# Patient Record
Sex: FEMALE | Race: BLACK OR AFRICAN AMERICAN | Employment: OTHER | URBAN - METROPOLITAN AREA
[De-identification: names, ages, dates, MRNs, and addresses within clinical notes are randomized per-mention and may not be internally consistent; named-entity substitution may affect disease eponyms.]

---

## 2019-10-15 ENCOUNTER — OFFICE VISIT (OUTPATIENT)
Dept: SLEEP MEDICINE | Age: 74
End: 2019-10-15

## 2019-10-15 VITALS
BODY MASS INDEX: 34.27 KG/M2 | SYSTOLIC BLOOD PRESSURE: 155 MMHG | HEIGHT: 64 IN | RESPIRATION RATE: 18 BRPM | WEIGHT: 200.7 LBS | DIASTOLIC BLOOD PRESSURE: 58 MMHG | OXYGEN SATURATION: 97 % | HEART RATE: 66 BPM

## 2019-10-15 DIAGNOSIS — G47.00 INSOMNIA, UNSPECIFIED TYPE: Primary | ICD-10-CM

## 2019-10-15 RX ORDER — SODIUM BICARBONATE 650 MG/1
650 TABLET ORAL 2 TIMES DAILY
Status: ON HOLD | COMMUNITY
End: 2022-07-25

## 2019-10-15 RX ORDER — OXYCODONE AND ACETAMINOPHEN 5; 325 MG/1; MG/1
TABLET ORAL
COMMUNITY
End: 2021-02-17

## 2019-10-15 RX ORDER — CALCITRIOL 0.5 UG/1
0.5 CAPSULE ORAL DAILY
COMMUNITY
End: 2021-02-14

## 2019-10-15 RX ORDER — CLONAZEPAM 0.5 MG/1
TABLET ORAL
Qty: 60 TAB | Refills: 5 | Status: SHIPPED | OUTPATIENT
Start: 2019-10-15 | End: 2021-02-14

## 2019-10-15 RX ORDER — INSULIN ASPART 100 [IU]/ML
INJECTION, SOLUTION INTRAVENOUS; SUBCUTANEOUS
COMMUNITY
End: 2021-05-10

## 2019-10-15 RX ORDER — LORATADINE 10 MG/1
10 TABLET ORAL
COMMUNITY
End: 2021-02-14

## 2019-10-15 RX ORDER — SENNOSIDES 25 MG/1
TABLET, FILM COATED ORAL
COMMUNITY
End: 2021-02-17

## 2019-10-15 RX ORDER — METOPROLOL TARTRATE 25 MG/1
50 TABLET, FILM COATED ORAL 2 TIMES DAILY
COMMUNITY
End: 2021-04-30 | Stop reason: DRUGHIGH

## 2019-10-15 NOTE — PATIENT INSTRUCTIONS

## 2019-10-15 NOTE — PROGRESS NOTES
[address] [facility] [place]. [place], [address]  Tel.  [phone]  Fax. 100 [facility] 60  [place], [address] [facility]  Tel.  [phone]  Fax. [phone] [address] [facility] Natalie Swan   Tel.  [phone]  Fax. [phone]       Chief Complaint       Chief Complaint   Patient presents with    Sleep Problem       HPI      Shannan Lilly is 69 y.o. female seen for evaluation of a sleep disorder. She has a significant medical history including cerebrovascular disease (status post CVA December 2018), coronary artery disease, diabetes and end-stage renal failure. She has a history of spinal stenosis for which she has been on oxycodone 5 mg taking 1/2-1 daily. She notes difficulties with sleep initiation stating that she will \"stay up all night\" and will sleep only briefly in the afternoons; she notes that her \"brain does not shut off on \". She had been on Ambien for 10 years; this was discontinued by her PCP. Since then she has had significant insomnia. She notes having had a sleep study 10 years ago which was interpreted as unremarkable. She had been recently given trazodone 50 mg at bedtime which is also ineffective. She had tried melatonin without a sustained response. [place] Sleepiness Score: 3       No Known Allergies    Current Outpatient Medications   Medication Sig Dispense Refill    fluticasone propion-salmeterol (ADVAIR HFA) 115-21 mcg/actuation inhaler Take 2 Puffs by inhalation two (2) times a day.  azelastine (ASTEPRO) 0.15 % (205.5 mcg) two (2) times a day.  calcitRIOL (ROCALTROL) 0.5 mcg capsule Take 0.5 mcg by mouth daily.  loratadine (CLARITIN) 10 mg tablet Take 10 mg by mouth.  insulin regular (NOVOLIN R, HUMULIN R) 100 unit/mL injection by SubCUTAneous route.  lidocaine 5 % topical cream Apply  to affected area two (2) times daily as needed for Itching.       metoprolol tartrate (LOPRESSOR) 25 mg tablet Take  by mouth two (2) times a day.  insulin aspart U-100 (NOVOLOG FLEXPEN U-100 INSULIN) 100 unit/mL (3 mL) inpn by SubCUTAneous route.  oxyCODONE-acetaminophen (PERCOCET) 5-325 mg per tablet Take  by mouth every four (4) hours as needed for Pain.  sodium bicarbonate 650 mg tablet Take  by mouth four (4) times daily.  Insulin Needles, Disposable, 31 X 5/16 \" Ndle by SubCUTAneous route three (3) times daily (with meals). 2 Package 11    amoxicillin (AMOXIL) 500 mg capsule       hydrALAZINE (APRESOLINE) 100 mg tablet Take 100 mg by mouth three (3) times daily.  NIFEdipine XL (PROCARDIA XL) 30 mg tablet Take  by mouth daily.  cholecalciferol, vitamin d3, (VITAMIN D) 1,000 unit tablet Take 1,000 Units by mouth daily.  fluconazole (DIFLUCAN) 150 mg tablet TAKE 1 TABLET EVERY DAY FOR 3 DAYS 3 Tab 0    amlodipine (NORVASC) 5 mg tablet 5 mg daily.  AGGRENOX 200-25 mg per SR capsule Take 1 Cap by mouth two (2) times a day.  ASCENSIA CONTOUR strip       cephALEXin (KEFLEX) 250 mg capsule       clotrimazole-betamethasone (LOTRISONE) topical cream       diazepam (VALIUM) 10 mg tablet       furosemide (LASIX) 40 mg tablet Take 40 mg by mouth daily.  hydrocodone-acetaminophen (LORTAB) 7.5-500 mg per tablet       lisinopril (PRINIVIL, ZESTRIL) 40 mg tablet Take 40 mg by mouth daily.  NYSTOP powder       ZEMPLAR 1 mcg capsule Take 1 mcg by mouth daily.  simvastatin (ZOCOR) 40 mg tablet Take 40 mg by mouth nightly.  BD LO-DOSE ULTRA-FINE SHORT 1/2 mL 31 x 5/16\" Syrg       DIOVAN 320 mg tablet Take 320 mg by mouth daily.  zolpidem (AMBIEN) 10 mg tablet Take 10 mg by mouth nightly as needed.  nitroglycerin (NITROSTAT) 0.4 mg SL tablet by SubLINGual route every five (5) minutes as needed.  acetaminophen (TYLENOL EX STR ARTHRITIS PAIN) 500 mg tablet Take  by mouth every six (6) hours as needed.       ALBUTEROL SULFATE (PROAIR HFA IN) Take by inhalation.  FLUTICASONE PROPIONATE (FLOVENT DISKUS IN) Take  by inhalation.  insulin glargine (LANTUS) 100 unit/mL injection 30 Units by SubCUTAneous route nightly. 1 Vial 5    insulin lispro, human, (HUMALOG KWIKPEN) 100 unit/mL flexpen 5 Units by SubCUTAneous route three (3) times daily (with meals). Plus sliding scale 1 Package 5        She  has a past medical history of Blindness/low vision (2008), CHF (congestive heart failure) ([facility] DM (diabetes mellitus) ([facility] HTN (hypertension), and Stroke ([facility] She  has a past surgical history that includes stent insertion (9422,1824). She family history is not on file. She  reports that she has quit smoking. She has never used smokeless tobacco. She reports that she does not drink alcohol or use drugs. Review of Systems:  Review of Systems   Constitutional: Negative for chills and fever. HENT: Negative for hearing loss and tinnitus. Eyes: Negative for blurred vision and double vision. Respiratory: Positive for shortness of breath. Cardiovascular: Negative for chest pain and palpitations. Gastrointestinal: Negative for abdominal pain and heartburn. Genitourinary: Negative for frequency and urgency. Musculoskeletal: Positive for back pain and joint pain. Skin: Negative for itching and rash. Neurological: Negative for dizziness and headaches. Psychiatric/Behavioral: The patient has insomnia. Objective:     Visit Vitals  /58 (BP 1 Location: Left arm, BP Patient Position: Sitting)   Pulse 66   Resp 18   Ht 5' 4\" (1.626 m)   Wt 200 lb 11.2 oz (91 kg)   LMP  (LMP Unknown)   SpO2 97%   BMI 34.45 kg/m²     Body mass index is 34.45 kg/m². General:   Conversant, cooperative   Eyes:  Pupils equal and reactive, no nystagmus   Oropharynx:   Mallampati score II, tongue normal   Tonsils:     Neck:   No carotid bruits; Neck circ.  in \"inches\": 15   Chest/Lungs:  Clear on auscultation    CVS:  Normal rate, regular rhythm, murmur ( has AVF for dialysis). Skin:  Warm to touch; no obvious rashes   Neuro:  Speech fluent, face symmetrical, tongue movement normal   Psych:  Normal affect,  normal countenance        Assessment:       ICD-10-CM ICD-9-CM    1. Insomnia, unspecified type G47.00 780.52      History of significant insomnia. Resources are limited locally for her; cognitive behavioral therapy (CBT-I) was discussed. She has difficulty getting transportation. A brief trial of clonazepam 0.5 mg at at bedtime will be undertaken. She does take limited oxycodone; she was advised to limit that medication when taking clonazepam.  She will contact the office in several weeks or sooner as needed. Plan:     No orders of the defined types were placed in this encounter. * Patient has a history and examination consistent with the diagnosis of chronic insomnia. * Treatment options if indicated were reviewed today. Instructions:    o The patient would benefit from weight reduction measures. o Do not engage in activities requiring a normal degree of alertness if fatigue is present. o Call or return if symptoms worsen or persist.          Srinivasa Padron MD, FAASM  Electronically signed 10/15/19       This note was created using voice recognition software. Despite editing, there may be syntax errors. This note will not be viewable in [address]

## 2020-07-28 ENCOUNTER — IP HISTORICAL/CONVERTED ENCOUNTER (OUTPATIENT)
Dept: OTHER | Age: 75
End: 2020-07-28

## 2020-09-15 ENCOUNTER — HOSPITAL ENCOUNTER (EMERGENCY)
Age: 75
Discharge: HOME | End: 2020-09-15
Attending: EMERGENCY MEDICINE
Payer: MEDICARE

## 2020-09-15 ENCOUNTER — APPOINTMENT (OUTPATIENT)
Dept: GENERAL RADIOLOGY | Age: 75
End: 2020-09-15
Attending: EMERGENCY MEDICINE
Payer: MEDICARE

## 2020-09-15 VITALS
DIASTOLIC BLOOD PRESSURE: 73 MMHG | RESPIRATION RATE: 18 BRPM | WEIGHT: 181 LBS | HEART RATE: 62 BPM | BODY MASS INDEX: 30.9 KG/M2 | SYSTOLIC BLOOD PRESSURE: 145 MMHG | HEIGHT: 64 IN | TEMPERATURE: 97.7 F | OXYGEN SATURATION: 99 %

## 2020-09-15 DIAGNOSIS — S20.212A CONTUSION OF LEFT CHEST WALL, INITIAL ENCOUNTER: ICD-10-CM

## 2020-09-15 DIAGNOSIS — N18.30 STAGE 3 CHRONIC KIDNEY DISEASE (HCC): Primary | ICD-10-CM

## 2020-09-15 LAB
ANION GAP SERPL CALC-SCNC: 5 MMOL/L (ref 5–15)
BASOPHILS # BLD: 0.1 K/UL (ref 0–0.2)
BASOPHILS NFR BLD: 1 % (ref 0–2.5)
BUN SERPL-MCNC: 48 MG/DL (ref 6–20)
BUN/CREAT SERPL: 13 (ref 12–20)
CA-I BLD-MCNC: 8.9 MG/DL (ref 8.5–10.1)
CHLORIDE SERPL-SCNC: 105 MMOL/L (ref 97–108)
CO2 SERPL-SCNC: 30 MMOL/L (ref 21–32)
CREAT SERPL-MCNC: 3.7 MG/DL (ref 0.55–1.02)
EOSINOPHIL # BLD: 0.2 K/UL (ref 0–0.7)
EOSINOPHIL NFR BLD: 3 % (ref 0.9–2.9)
ERYTHROCYTE [DISTWIDTH] IN BLOOD BY AUTOMATED COUNT: 15.5 % (ref 11.5–14.5)
GLUCOSE SERPL-MCNC: 112 MG/DL (ref 65–100)
HCT VFR BLD AUTO: 28.8 % (ref 36–46)
HGB BLD-MCNC: 9.6 % (ref 13.5–17.5)
LYMPHOCYTES # BLD: 1.3 K/UL (ref 1–4.8)
LYMPHOCYTES NFR BLD: 20 % (ref 20.5–51.1)
MCH RBC QN AUTO: 28.4 PG (ref 31–34)
MCHC RBC AUTO-ENTMCNC: 33.1 G/DL (ref 31–36)
MCV RBC AUTO: 85.8 FL (ref 80–100)
MONOCYTES # BLD: 0.5 K/UL (ref 0.2–2.4)
MONOCYTES NFR BLD: 7 % (ref 1.7–9.3)
NEUTS SEG # BLD: 4.6 K/UL (ref 1.8–7.7)
NEUTS SEG NFR BLD: 69 % (ref 42–75)
NRBC # BLD: 0.01 K/UL
NRBC BLD-RTO: 10 PER 100 WBC
PLATELET # BLD AUTO: 189 K/UL
PMV BLD AUTO: 8.7 FL (ref 6.5–11.5)
POTASSIUM SERPL-SCNC: 4.2 MMOL/L (ref 3.5–5.1)
RBC # BLD AUTO: 3.36 M/UL (ref 4.5–5.9)
SODIUM SERPL-SCNC: 140 MMOL/L (ref 136–145)
WBC # BLD AUTO: 6.6 K/UL (ref 4.4–11.3)

## 2020-09-15 PROCEDURE — 71046 X-RAY EXAM CHEST 2 VIEWS: CPT

## 2020-09-15 PROCEDURE — 36600 WITHDRAWAL OF ARTERIAL BLOOD: CPT

## 2020-09-15 PROCEDURE — 99283 EMERGENCY DEPT VISIT LOW MDM: CPT

## 2020-09-15 PROCEDURE — 85025 COMPLETE CBC W/AUTO DIFF WBC: CPT

## 2020-09-15 PROCEDURE — 80048 BASIC METABOLIC PNL TOTAL CA: CPT

## 2020-09-15 NOTE — ED PROVIDER NOTES
HPI   Patient reports a lengthy stay in rehab facility some months ago. She reports that she feels she is not fully rehabilitated because she has been experiencing frequent falls and a recent MRI of her brain revealed multiple subclinical CVA. Patient reports that 3 days ago, she slipped and had a mechanical fall in her kitchen and fell onto her left side. She reports that since then she has been experiencing left-sided chest wall pain that is radiated to the right, and also has radiated into her left clavicle, the site of an old fracture. Pain is described as dull and aching and is exacerbated by movement and palpation and relieved by Tylenol and rest.  Patient also reports that she has chronic kidney disease and believes that her kidney function is worsening because her urinary output has decreased. She denies dysuria,  or GI pain. Denies focal weakness, changes in speech, vision, sensation. 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    [facility]     [facility]     HTN (hypertension)     Stroke [facility])        Past Surgical History:   Procedure Laterality Date    STENT INSERTION  2001,2002    Allen Cook         No family history on file.     Social History     Socioeconomic History    Marital status:      Spouse name: Not on file    Number of children: Not on file    Years of education: Not on file    Highest education level: Not on file   Occupational History    Not on file   Social Needs    Financial resource strain: Not on file    Food insecurity     Worry: Not on file     Inability: Not on file    Transportation needs     Medical: Not on file     Non-medical: Not on file   Tobacco Use    Smoking status: Former Smoker    Smokeless tobacco: Never Used    Tobacco comment: quit 1972   Substance and Sexual Activity    Alcohol use: No    Drug use: No    Sexual activity: Not Currently   Lifestyle    Physical activity     Days per week: Not on file     Minutes per session: Not on file    Stress: Not on file   Relationships    Social connections     Talks on phone: Not on file     Gets together: Not on file     Attends Jehovah's witness service: Not on file     Active member of club or organization: Not on file     Attends meetings of clubs or organizations: Not on file     Relationship status: Not on file    Intimate partner violence     Fear of current or ex partner: Not on file     Emotionally abused: Not on file     Physically abused: Not on file     Forced sexual activity: Not on file   Other Topics Concern     Service Not Asked    Blood Transfusions Not Asked    Caffeine Concern Not Asked    Occupational Exposure Not Asked   Carmie Bame Hazards Not Asked    Sleep Concern Not Asked    Stress Concern Not Asked    Weight Concern Not Asked    Special Diet Not Asked    Back Care Not Asked    Exercise Not Asked    Bike Helmet Not Asked   [address] Not Asked    Self-Exams Not Asked   Social History Narrative    Not on file         ALLERGIES: Patient has no known allergies. Review of Systems   Constitutional: Negative. HENT: Negative. Eyes: Negative. Respiratory: Negative. Cardiovascular: Negative. Gastrointestinal: Negative. Endocrine: Negative. Genitourinary:        As in HPI   Musculoskeletal:        As in HPI   Skin: Negative. Allergic/Immunologic: Negative. Neurological: Negative. Hematological: Negative. Psychiatric/Behavioral: Negative. All other systems reviewed and are negative. Vitals:    09/15/20 1508   BP: (!) 141/88   Pulse: 60   Resp: 18   Temp: 97.7 °F (36.5 °C)   SpO2: 100%   Weight: 82.1 kg (181 lb)   Height: 5' 4\" (1.626 m)            Physical Exam  Vitals signs and nursing note reviewed. Constitutional:       Appearance: Normal appearance. She is obese. HENT:      Head: Normocephalic and atraumatic.       Nose: Nose normal. Mouth/Throat:      Mouth: Mucous membranes are moist.   Eyes:      Extraocular Movements: Extraocular movements intact. Pupils: Pupils are equal, round, and reactive to light. Neck:      Musculoskeletal: Normal range of motion and neck supple. No muscular tenderness. Cardiovascular:      Rate and Rhythm: Normal rate and regular rhythm. Comments: Chest wall is mildly tender to palpation throughout the entire anterior chest.  No sign of trauma, cellulitis, deformity. Pulmonary:      Effort: Pulmonary effort is normal. No respiratory distress. Breath sounds: Normal breath sounds. No wheezing or rales. Abdominal:      General: Abdomen is flat. Palpations: Abdomen is soft. Tenderness: There is no abdominal tenderness. Musculoskeletal: Normal range of motion. General: No swelling, tenderness or deformity. Skin:     General: Skin is warm and dry. Neurological:      General: No focal deficit present. Mental Status: She is alert and oriented to person, place, and time. Cranial Nerves: No cranial nerve deficit. Sensory: No sensory deficit. Motor: No weakness. Gait: Gait normal.   Psychiatric:         Mood and Affect: Mood normal.         Behavior: Behavior normal.          MDM  Number of Diagnoses or Management Options  Diagnosis management comments: This appears to be musculoskeletal pain associated with a recent fall. Remainder of patient's issues are chronic and known. Will check labs given patient's report of possible worsening kidney function, and chest x-ray. I suspect she will be okay for discharge.          Procedures

## 2020-09-15 NOTE — ED TRIAGE NOTES
pt with fall on Saturday evening; pt reports losing balance and falling in between bed and chair; pt unsure of LOC; pt has recently had 2 strokes and treated at [facility] [place]; has PT at home; has had multiple falls; c/o pain to B neck, B shoulders, and bruisee to R lower leg

## 2020-09-23 ENCOUNTER — HOSPITAL ENCOUNTER (OUTPATIENT)
Dept: MAMMOGRAPHY | Age: 75
Discharge: HOME | End: 2020-09-23
Attending: INTERNAL MEDICINE
Payer: MEDICARE

## 2020-09-23 DIAGNOSIS — Z12.31 SCREENING MAMMOGRAM, ENCOUNTER FOR: ICD-10-CM

## 2020-09-23 PROCEDURE — 77067 SCR MAMMO BI INCL CAD: CPT

## 2020-10-15 ENCOUNTER — APPOINTMENT (OUTPATIENT)
Dept: CT IMAGING | Age: 75
End: 2020-10-15
Attending: EMERGENCY MEDICINE
Payer: MEDICARE

## 2020-10-15 ENCOUNTER — HOSPITAL ENCOUNTER (EMERGENCY)
Age: 75
Discharge: HOME | End: 2020-10-15
Attending: EMERGENCY MEDICINE
Payer: MEDICARE

## 2020-10-15 VITALS
SYSTOLIC BLOOD PRESSURE: 134 MMHG | OXYGEN SATURATION: 98 % | WEIGHT: 181 LBS | TEMPERATURE: 98.5 F | BODY MASS INDEX: 30.9 KG/M2 | HEART RATE: 80 BPM | HEIGHT: 64 IN | DIASTOLIC BLOOD PRESSURE: 99 MMHG | RESPIRATION RATE: 15 BRPM

## 2020-10-15 DIAGNOSIS — M54.50 CHRONIC MIDLINE LOW BACK PAIN, UNSPECIFIED WHETHER SCIATICA PRESENT: ICD-10-CM

## 2020-10-15 DIAGNOSIS — G89.29 CHRONIC MIDLINE LOW BACK PAIN, UNSPECIFIED WHETHER SCIATICA PRESENT: ICD-10-CM

## 2020-10-15 DIAGNOSIS — N18.9 CHRONIC RENAL FAILURE, UNSPECIFIED CKD STAGE: Primary | ICD-10-CM

## 2020-10-15 LAB
ANION GAP SERPL CALC-SCNC: 8 MMOL/L (ref 5–15)
BASOPHILS # BLD: 0.1 K/UL (ref 0–0.2)
BASOPHILS NFR BLD: 1 % (ref 0–2.5)
BUN SERPL-MCNC: 48 MG/DL (ref 6–20)
BUN/CREAT SERPL: 14 (ref 12–20)
CA-I BLD-MCNC: 9.1 MG/DL (ref 8.5–10.1)
CHLORIDE SERPL-SCNC: 106 MMOL/L (ref 97–108)
CO2 SERPL-SCNC: 29 MMOL/L (ref 21–32)
CREAT SERPL-MCNC: 3.49 MG/DL (ref 0.55–1.02)
EOSINOPHIL # BLD: 0.2 K/UL (ref 0–0.7)
EOSINOPHIL NFR BLD: 2 % (ref 0.9–2.9)
ERYTHROCYTE [DISTWIDTH] IN BLOOD BY AUTOMATED COUNT: 16.1 % (ref 11.5–14.5)
GLUCOSE SERPL-MCNC: 168 MG/DL (ref 65–100)
HCT VFR BLD AUTO: 32.5 % (ref 36–46)
HGB BLD-MCNC: 10.5 G/DL (ref 13.5–17.5)
INR PPP: 1.1 (ref 0.9–1.1)
LYMPHOCYTES # BLD: 1.6 K/UL (ref 1–4.8)
LYMPHOCYTES NFR BLD: 19 % (ref 20.5–51.1)
MCH RBC QN AUTO: 28.2 PG (ref 31–34)
MCHC RBC AUTO-ENTMCNC: 32.3 G/DL (ref 31–36)
MCV RBC AUTO: 87.5 FL (ref 80–100)
MONOCYTES # BLD: 0.7 K/UL (ref 0.2–2.4)
MONOCYTES NFR BLD: 8 % (ref 1.7–9.3)
NEUTS SEG # BLD: 5.9 K/UL (ref 1.8–7.7)
NEUTS SEG NFR BLD: 70 % (ref 42–75)
NRBC # BLD: 0.01 K/UL
NRBC BLD-RTO: 10 PER 100 WBC
PLATELET # BLD AUTO: 187 K/UL
PMV BLD AUTO: 9.4 FL (ref 6.5–11.5)
POTASSIUM SERPL-SCNC: 3.5 MMOL/L (ref 3.5–5.1)
PROTHROMBIN TIME: 10.4 SEC (ref 9–11.1)
RBC # BLD AUTO: 3.72 M/UL (ref 4.5–5.9)
SODIUM SERPL-SCNC: 143 MMOL/L (ref 136–145)
WBC # BLD AUTO: 8.4 K/UL (ref 4.4–11.3)

## 2020-10-15 PROCEDURE — 85610 PROTHROMBIN TIME: CPT

## 2020-10-15 PROCEDURE — 36415 COLL VENOUS BLD VENIPUNCTURE: CPT

## 2020-10-15 PROCEDURE — 74011250637 HC RX REV CODE- 250/637: Performed by: EMERGENCY MEDICINE

## 2020-10-15 PROCEDURE — 70450 CT HEAD/BRAIN W/O DYE: CPT

## 2020-10-15 PROCEDURE — 80048 BASIC METABOLIC PNL TOTAL CA: CPT

## 2020-10-15 PROCEDURE — 85025 COMPLETE CBC W/AUTO DIFF WBC: CPT

## 2020-10-15 PROCEDURE — 99284 EMERGENCY DEPT VISIT MOD MDM: CPT

## 2020-10-15 RX ORDER — NAPROXEN SODIUM 220 MG
TABLET ORAL
COMMUNITY
End: 2021-02-17

## 2020-10-15 RX ORDER — NIFEDIPINE 10 MG/1
10 CAPSULE ORAL ONCE
Status: COMPLETED | OUTPATIENT
Start: 2020-10-15 | End: 2020-10-15

## 2020-10-15 RX ORDER — ALLOPURINOL 100 MG/1
50 TABLET ORAL
COMMUNITY

## 2020-10-15 RX ORDER — CHOLECALCIFEROL (VITAMIN D3) 25 MCG
1000 TABLET,CHEWABLE ORAL
COMMUNITY

## 2020-10-15 RX ORDER — ATORVASTATIN CALCIUM 40 MG/1
TABLET, FILM COATED ORAL
COMMUNITY

## 2020-10-15 RX ORDER — KETOROLAC TROMETHAMINE 30 MG/ML
30 INJECTION, SOLUTION INTRAMUSCULAR; INTRAVENOUS
Status: DISCONTINUED | OUTPATIENT
Start: 2020-10-15 | End: 2020-10-15 | Stop reason: HOSPADM

## 2020-10-15 RX ORDER — TRAZODONE HYDROCHLORIDE 100 MG/1
100 TABLET ORAL
COMMUNITY
End: 2021-04-30

## 2020-10-15 RX ORDER — AMLODIPINE BESYLATE AND ATORVASTATIN CALCIUM 10; 10 MG/1; MG/1
1 TABLET, FILM COATED ORAL DAILY
COMMUNITY
End: 2021-02-14

## 2020-10-15 RX ADMIN — NIFEDIPINE 10 MG: 10 CAPSULE ORAL at 03:21

## 2020-10-15 NOTE — ED TRIAGE NOTES
Pt brought in by EMS, in report this RN was told that pt's daughter was concerned that the pt had a fall around 2200 last night and wanted her to come in for a wellness check. Pt denies a fall.

## 2020-10-15 NOTE — ED NOTES
Transport arranged by Daughter; family friends to transport pt to her home. Report to Dearl JENELLE Yuan; all questions and concerns addressed.

## 2020-10-15 NOTE — ED NOTES
Spoke with Pt's daughter, concerning pt's status. Per MD Chris Miner pt does not meet criteria for admission, nursing home placement was offered--to which both the pt and daughter refused.  Pt currently A/Ox4 and requesting to go home, Daughter aware and was unable to contact family friend for transportation, medical transport to be called, pt with PT home visit at 0900 this am.

## 2020-10-15 NOTE — ED NOTES
This RN spoke with daughter, there was no concern related to a fall, however the pt has had a steady cognitive decline since last year, but increasingly worse starting last week which included difficulty with word recognition. Per daughter, the pt's OT and PT providers have also noted a change in her cognitive state.  Pt reports \"I have had some memory issues lately\"

## 2020-10-15 NOTE — ED PROVIDER NOTES
Patient was sent to the ER by her daughter because she is concerned that her mother has sutart a cognitive decline in past week and is innapropriate. Patient denies any recent trauma and complains of chronic back pain.             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    [facility]     [facility]di[facility]     HTN (hypertension)     Stroke [facility])        Past Surgical History:   Procedure Laterality Date    HX OOPHORECTOMY      STENT INSERTION  2001,2002    Neha Bliss         Family History:   Problem Relation Age of Onset    Breast Cancer Maternal Aunt     Breast Cancer Cousin        Social History     Socioeconomic History    Marital status:      Spouse name: Not on file    Number of children: Not on file    Years of education: Not on file    Highest education level: Not on file   Occupational History    Not on file   Social Needs    Financial resource strain: Not on file    Food insecurity     Worry: Not on file     Inability: Not on file    Transportation needs     Medical: Not on file     Non-medical: Not on file   Tobacco Use    Smoking status: Former Smoker    Smokeless tobacco: Never Used    Tobacco comment: quit 1972   Substance and Sexual Activity    Alcohol use: No    Drug use: No    Sexual activity: Not Currently   Lifestyle    Physical activity     Days per week: Not on file     Minutes per session: Not on file    Stress: Not on file   Relationships    Social connections     Talks on phone: Not on file     Gets together: Not on file     Attends Pentecostal service: Not on file     Active member of club or organization: Not on file     Attends meetings of clubs or organizations: Not on file     Relationship status: Not on file    Intimate partner violence     Fear of current or ex partner: Not on file     Emotionally abused: Not on file     Physically abused: Not on file     Forced sexual activity: Not on file   Other Topics Concern     Service Not Asked    Blood Transfusions Not Asked    Caffeine Concern Not Asked    Occupational Exposure Not Asked    Hobby Hazards Not Asked    Sleep Concern Not Asked    Stress Concern Not Asked    Weight Concern Not Asked    Special Diet Not Asked    Back Care Not Asked    Exercise Not Asked    Bike Helmet Not Asked   [address] Not Asked    Self-Exams Not Asked   Social History Narrative    Not on file         ALLERGIES: Patient has no known allergies. Review of Systems   Constitutional: Negative. HENT: Negative. Eyes: Negative. Respiratory: Negative. Cardiovascular: Negative. Gastrointestinal: Negative. Endocrine: Negative. Genitourinary: Negative. Musculoskeletal: Positive for back pain. Skin: Negative. Allergic/Immunologic: Negative. Neurological: Negative. Hematological: Negative. Psychiatric/Behavioral: Positive for decreased concentration. All other systems reviewed and are negative. Vitals:    10/15/20 0212   BP: (!) 202/81   Pulse: 81   Resp: 18   Temp: 98.5 °F (36.9 °C)   SpO2: 96%   Weight: 82.1 kg (181 lb)   Height: 5' 4\" (1.626 m)            Physical Exam  Vitals signs and nursing note reviewed. Constitutional:       Appearance: She is well-developed. HENT:      Head: Normocephalic and atraumatic. Neck:      Musculoskeletal: Normal range of motion and neck supple. Cardiovascular:      Rate and Rhythm: Normal rate and regular rhythm. Heart sounds: Normal heart sounds. Pulmonary:      Breath sounds: Normal breath sounds. Abdominal:      General: Bowel sounds are normal.      Palpations: Abdomen is soft. Musculoskeletal:      Right shoulder: She exhibits decreased range of motion and tenderness. Arms:    Neurological:      General: No focal deficit present. Mental Status: She is alert.    Psychiatric:         Mood and Affect: Mood normal.         Behavior: Behavior normal.          MDM  Number of Diagnoses or Management Options  Risk of Complications, Morbidity, and/or Mortality  Presenting problems: moderate  Diagnostic procedures: moderate      ED Course as of Oct 15 0412   Thu Oct 15, 2020   1144 Prothrombin time: 10.4 [BH]   0339 CBC WITH AUTOMATED DIFF(!):    WBC 8.4   RBC 3.72(!)   HGB 10.5(!)   HCT 32.5(!)   MCV 87.5   MCH 28.2(!)   MCHC 32.3   RDW 16.1(!)   PLATELET 423   MPV 9.4   NRBC 10.0   ABSOLUTE NRBC 0.01   NEUTROPHILS 70   LYMPHOCYTES 19(!)   MONOCYTES 8   EOSINOPHILS 2   BASOPHILS 1   ABS. NEUTROPHILS 5.9   ABS. LYMPHOCYTES 1.6   ABS. MONOCYTES 0.7   ABS. EOSINOPHILS 0.2   ABS.  BASOPHILS 0.1 [BH]   0339 HGB(!): 10.5 [BH]   0339 BUN(!): 48 [BH]   0339 Creatinine(!): 3.49 [BH]   0339 Glucose(!): 168 []      ED Course User Index  [] Hernesto Way MD       Procedures

## 2020-10-15 NOTE — ED NOTES
Spoke with patient and her daughter regarding medical transport cost, both patient and daughter aware of cost.

## 2020-12-29 ENCOUNTER — HOSPITAL ENCOUNTER (OUTPATIENT)
Dept: GENERAL RADIOLOGY | Age: 75
Discharge: HOME | End: 2020-12-29
Payer: MEDICARE

## 2020-12-29 ENCOUNTER — TRANSCRIBE ORDER (OUTPATIENT)
Dept: REGISTRATION | Age: 75
End: 2020-12-29

## 2020-12-29 DIAGNOSIS — R07.89 OTHER CHEST PAIN: Primary | ICD-10-CM

## 2020-12-29 DIAGNOSIS — R07.89 OTHER CHEST PAIN: ICD-10-CM

## 2020-12-29 PROCEDURE — 71100 X-RAY EXAM RIBS UNI 2 VIEWS: CPT

## 2021-02-13 ENCOUNTER — HOSPITAL ENCOUNTER (EMERGENCY)
Age: 76
Discharge: ANOTHER HOSPITAL | End: 2021-02-13
Attending: EMERGENCY MEDICINE
Payer: MEDICARE

## 2021-02-13 ENCOUNTER — APPOINTMENT (OUTPATIENT)
Dept: GENERAL RADIOLOGY | Age: 76
End: 2021-02-13
Attending: EMERGENCY MEDICINE
Payer: MEDICARE

## 2021-02-13 ENCOUNTER — HOSPITAL ENCOUNTER (INPATIENT)
Age: 76
LOS: 4 days | Discharge: HOME | DRG: 065 | End: 2021-02-17
Attending: EMERGENCY MEDICINE | Admitting: FAMILY MEDICINE
Payer: MEDICARE

## 2021-02-13 ENCOUNTER — APPOINTMENT (OUTPATIENT)
Dept: CT IMAGING | Age: 76
End: 2021-02-13
Attending: EMERGENCY MEDICINE
Payer: MEDICARE

## 2021-02-13 VITALS
WEIGHT: 181 LBS | BODY MASS INDEX: 30.9 KG/M2 | OXYGEN SATURATION: 97 % | HEART RATE: 60 BPM | TEMPERATURE: 99.2 F | RESPIRATION RATE: 18 BRPM | SYSTOLIC BLOOD PRESSURE: 151 MMHG | DIASTOLIC BLOOD PRESSURE: 81 MMHG | HEIGHT: 64 IN

## 2021-02-13 DIAGNOSIS — R20.0 FACIAL NUMBNESS: Primary | ICD-10-CM

## 2021-02-13 DIAGNOSIS — R11.0 NAUSEA WITHOUT VOMITING: ICD-10-CM

## 2021-02-13 DIAGNOSIS — R20.0 RIGHT FACIAL NUMBNESS: Primary | ICD-10-CM

## 2021-02-13 PROBLEM — I63.9 CVA (CEREBRAL VASCULAR ACCIDENT) (HCC): Status: ACTIVE | Noted: 2021-02-13

## 2021-02-13 LAB
ALBUMIN SERPL-MCNC: 3.5 G/DL (ref 3.5–5)
ALBUMIN/GLOB SERPL: 0.8 {RATIO} (ref 1.1–2.2)
ALP SERPL-CCNC: 78 U/L (ref 45–117)
ALT SERPL-CCNC: 21 U/L (ref 12–78)
ANION GAP SERPL CALC-SCNC: 10 MMOL/L (ref 5–15)
APPEARANCE UR: ABNORMAL
AST SERPL W P-5'-P-CCNC: 25 U/L (ref 15–37)
BACTERIA URNS QL MICRO: ABNORMAL /HPF
BASOPHILS # BLD: 0.1 K/UL (ref 0–0.2)
BASOPHILS NFR BLD: 1 % (ref 0–2.5)
BILIRUB SERPL-MCNC: 0.5 MG/DL (ref 0.2–1)
BILIRUB UR QL: NEGATIVE
BUN SERPL-MCNC: 56 MG/DL (ref 6–20)
BUN/CREAT SERPL: 15 (ref 12–20)
CA-I BLD-MCNC: 9.3 MG/DL (ref 8.5–10.1)
CHLORIDE SERPL-SCNC: 103 MMOL/L (ref 97–108)
CO2 SERPL-SCNC: 29 MMOL/L (ref 21–32)
COLOR UR: ABNORMAL
CREAT SERPL-MCNC: 3.81 MG/DL (ref 0.55–1.02)
EOSINOPHIL # BLD: 0.1 K/UL (ref 0–0.7)
EOSINOPHIL NFR BLD: 1 % (ref 0.9–2.9)
ERYTHROCYTE [DISTWIDTH] IN BLOOD BY AUTOMATED COUNT: 14.9 % (ref 11.5–14.5)
GLOBULIN SER CALC-MCNC: 4.2 G/DL (ref 2–4)
GLUCOSE BLD STRIP.AUTO-MCNC: 129 MG/DL (ref 65–100)
GLUCOSE SERPL-MCNC: 152 MG/DL (ref 65–100)
GLUCOSE UR STRIP.AUTO-MCNC: NEGATIVE MG/DL
HCT VFR BLD AUTO: 36.3 % (ref 36–46)
HGB BLD-MCNC: 11.5 G/DL (ref 13.5–17.5)
HGB UR QL STRIP: NEGATIVE
INR PPP: 1 (ref 0.9–1.1)
KETONES UR QL STRIP.AUTO: NEGATIVE MG/DL
LEUKOCYTE ESTERASE UR QL STRIP.AUTO: NEGATIVE
LYMPHOCYTES # BLD: 1.3 K/UL (ref 1–4.8)
LYMPHOCYTES NFR BLD: 22 % (ref 20.5–51.1)
MCH RBC QN AUTO: 28 PG (ref 31–34)
MCHC RBC AUTO-ENTMCNC: 31.8 G/DL (ref 31–36)
MCV RBC AUTO: 87.9 FL (ref 80–100)
MONOCYTES # BLD: 0.4 K/UL (ref 0.2–2.4)
MONOCYTES NFR BLD: 7 % (ref 1.7–9.3)
NEUTS SEG # BLD: 4.2 K/UL (ref 1.8–7.7)
NEUTS SEG NFR BLD: 69 % (ref 42–75)
NITRITE UR QL STRIP.AUTO: NEGATIVE
NRBC # BLD: 0 K/UL
NRBC BLD-RTO: 0 PER 100 WBC
PERFORMED BY, TECHID: ABNORMAL
PH UR STRIP: 7 [PH] (ref 5–8)
PLATELET # BLD AUTO: 221 K/UL
PMV BLD AUTO: 9.8 FL (ref 6.5–11.5)
POTASSIUM SERPL-SCNC: 4 MMOL/L (ref 3.5–5.1)
PROT SERPL-MCNC: 7.7 G/DL (ref 6.4–8.2)
PROT UR STRIP-MCNC: 30 MG/DL
PROTHROMBIN TIME: 10.1 SEC (ref 9–11.1)
RBC # BLD AUTO: 4.13 M/UL (ref 4.5–5.9)
RBC #/AREA URNS HPF: ABNORMAL /HPF (ref 0–3)
SODIUM SERPL-SCNC: 142 MMOL/L (ref 136–145)
SP GR UR REFRACTOMETRY: 1.01 (ref 1–1.03)
TROPONIN I SERPL-MCNC: <0.05 NG/ML
UA: UC IF INDICATED,UAUC: ABNORMAL
UROBILINOGEN UR QL STRIP.AUTO: 0.2 EU/DL (ref 0.2–1)
WBC # BLD AUTO: 6 K/UL (ref 4.4–11.3)
WBC URNS QL MICRO: ABNORMAL /HPF (ref 0–5)

## 2021-02-13 PROCEDURE — 93005 ELECTROCARDIOGRAM TRACING: CPT

## 2021-02-13 PROCEDURE — 82962 GLUCOSE BLOOD TEST: CPT

## 2021-02-13 PROCEDURE — 70450 CT HEAD/BRAIN W/O DYE: CPT

## 2021-02-13 PROCEDURE — 94761 N-INVAS EAR/PLS OXIMETRY MLT: CPT

## 2021-02-13 PROCEDURE — 74011250636 HC RX REV CODE- 250/636: Performed by: FAMILY MEDICINE

## 2021-02-13 PROCEDURE — 36415 COLL VENOUS BLD VENIPUNCTURE: CPT

## 2021-02-13 PROCEDURE — 71045 X-RAY EXAM CHEST 1 VIEW: CPT

## 2021-02-13 PROCEDURE — 99284 EMERGENCY DEPT VISIT MOD MDM: CPT

## 2021-02-13 PROCEDURE — 84484 ASSAY OF TROPONIN QUANT: CPT

## 2021-02-13 PROCEDURE — 65270000029 HC RM PRIVATE

## 2021-02-13 PROCEDURE — 74011250637 HC RX REV CODE- 250/637: Performed by: FAMILY MEDICINE

## 2021-02-13 PROCEDURE — 74011636637 HC RX REV CODE- 636/637: Performed by: FAMILY MEDICINE

## 2021-02-13 PROCEDURE — 85025 COMPLETE CBC W/AUTO DIFF WBC: CPT

## 2021-02-13 PROCEDURE — 81001 URINALYSIS AUTO W/SCOPE: CPT

## 2021-02-13 PROCEDURE — 80053 COMPREHEN METABOLIC PANEL: CPT

## 2021-02-13 PROCEDURE — 74011250637 HC RX REV CODE- 250/637: Performed by: EMERGENCY MEDICINE

## 2021-02-13 PROCEDURE — 99285 EMERGENCY DEPT VISIT HI MDM: CPT

## 2021-02-13 PROCEDURE — 85610 PROTHROMBIN TIME: CPT

## 2021-02-13 RX ORDER — ACETAMINOPHEN 325 MG/1
650 TABLET ORAL
Status: DISCONTINUED | OUTPATIENT
Start: 2021-02-13 | End: 2021-02-17 | Stop reason: HOSPADM

## 2021-02-13 RX ORDER — SODIUM BICARBONATE 650 MG/1
650 TABLET ORAL 2 TIMES DAILY
Status: DISCONTINUED | OUTPATIENT
Start: 2021-02-13 | End: 2021-02-17 | Stop reason: HOSPADM

## 2021-02-13 RX ORDER — FUROSEMIDE 40 MG/1
40 TABLET ORAL DAILY
Status: DISCONTINUED | OUTPATIENT
Start: 2021-02-14 | End: 2021-02-14

## 2021-02-13 RX ORDER — INSULIN GLARGINE 100 [IU]/ML
30 INJECTION, SOLUTION SUBCUTANEOUS
Status: DISCONTINUED | OUTPATIENT
Start: 2021-02-13 | End: 2021-02-16

## 2021-02-13 RX ORDER — POLYETHYLENE GLYCOL 3350 17 G/17G
17 POWDER, FOR SOLUTION ORAL DAILY PRN
Status: DISCONTINUED | OUTPATIENT
Start: 2021-02-13 | End: 2021-02-17 | Stop reason: HOSPADM

## 2021-02-13 RX ORDER — SODIUM CHLORIDE 9 MG/ML
25 INJECTION, SOLUTION INTRAVENOUS CONTINUOUS
Status: DISCONTINUED | OUTPATIENT
Start: 2021-02-13 | End: 2021-02-17 | Stop reason: HOSPADM

## 2021-02-13 RX ORDER — ONDANSETRON 4 MG/1
4 TABLET, ORALLY DISINTEGRATING ORAL
Status: COMPLETED | OUTPATIENT
Start: 2021-02-13 | End: 2021-02-13

## 2021-02-13 RX ORDER — LISINOPRIL 10 MG/1
40 TABLET ORAL DAILY
Status: DISCONTINUED | OUTPATIENT
Start: 2021-02-14 | End: 2021-02-13

## 2021-02-13 RX ORDER — DEXTROSE 50 % IN WATER (D50W) INTRAVENOUS SYRINGE
25-50 AS NEEDED
Status: DISCONTINUED | OUTPATIENT
Start: 2021-02-13 | End: 2021-02-17 | Stop reason: HOSPADM

## 2021-02-13 RX ORDER — PARICALCITOL 1 UG/1
1 CAPSULE, LIQUID FILLED ORAL DAILY
Status: DISCONTINUED | OUTPATIENT
Start: 2021-02-14 | End: 2021-02-14

## 2021-02-13 RX ORDER — ACETAMINOPHEN 650 MG/1
650 SUPPOSITORY RECTAL
Status: DISCONTINUED | OUTPATIENT
Start: 2021-02-13 | End: 2021-02-17 | Stop reason: HOSPADM

## 2021-02-13 RX ORDER — ONDANSETRON 2 MG/ML
4 INJECTION INTRAMUSCULAR; INTRAVENOUS
Status: DISCONTINUED | OUTPATIENT
Start: 2021-02-13 | End: 2021-02-17 | Stop reason: HOSPADM

## 2021-02-13 RX ORDER — HYDRALAZINE HYDROCHLORIDE 25 MG/1
100 TABLET, FILM COATED ORAL 3 TIMES DAILY
Status: DISCONTINUED | OUTPATIENT
Start: 2021-02-13 | End: 2021-02-14

## 2021-02-13 RX ORDER — INSULIN LISPRO 100 [IU]/ML
INJECTION, SOLUTION INTRAVENOUS; SUBCUTANEOUS
Status: DISCONTINUED | OUTPATIENT
Start: 2021-02-13 | End: 2021-02-17 | Stop reason: HOSPADM

## 2021-02-13 RX ORDER — ATORVASTATIN CALCIUM 40 MG/1
40 TABLET, FILM COATED ORAL DAILY
Status: DISCONTINUED | OUTPATIENT
Start: 2021-02-14 | End: 2021-02-15

## 2021-02-13 RX ORDER — AMLODIPINE BESYLATE 5 MG/1
5 TABLET ORAL DAILY
Status: DISCONTINUED | OUTPATIENT
Start: 2021-02-14 | End: 2021-02-14

## 2021-02-13 RX ORDER — HEPARIN SODIUM 5000 [USP'U]/ML
5000 INJECTION, SOLUTION INTRAVENOUS; SUBCUTANEOUS EVERY 8 HOURS
Status: DISCONTINUED | OUTPATIENT
Start: 2021-02-13 | End: 2021-02-14

## 2021-02-13 RX ORDER — ALLOPURINOL 100 MG/1
200 TABLET ORAL 2 TIMES DAILY
Status: DISCONTINUED | OUTPATIENT
Start: 2021-02-13 | End: 2021-02-14

## 2021-02-13 RX ORDER — ONDANSETRON 4 MG/1
4 TABLET, ORALLY DISINTEGRATING ORAL
Status: DISCONTINUED | OUTPATIENT
Start: 2021-02-13 | End: 2021-02-17 | Stop reason: HOSPADM

## 2021-02-13 RX ORDER — ASPIRIN AND DIPYRIDAMOLE 25; 200 MG/1; MG/1
1 CAPSULE, EXTENDED RELEASE ORAL 2 TIMES DAILY
Status: DISCONTINUED | OUTPATIENT
Start: 2021-02-13 | End: 2021-02-14

## 2021-02-13 RX ORDER — METOPROLOL TARTRATE 25 MG/1
25 TABLET, FILM COATED ORAL 2 TIMES DAILY
Status: DISCONTINUED | OUTPATIENT
Start: 2021-02-13 | End: 2021-02-14

## 2021-02-13 RX ORDER — MAGNESIUM SULFATE 100 %
4 CRYSTALS MISCELLANEOUS AS NEEDED
Status: DISCONTINUED | OUTPATIENT
Start: 2021-02-13 | End: 2021-02-17 | Stop reason: HOSPADM

## 2021-02-13 RX ORDER — VALSARTAN 320 MG/1
320 TABLET ORAL DAILY
Status: DISCONTINUED | OUTPATIENT
Start: 2021-02-14 | End: 2021-02-13

## 2021-02-13 RX ORDER — AMOXICILLIN 500 MG/1
500 CAPSULE ORAL EVERY 8 HOURS
Status: DISCONTINUED | OUTPATIENT
Start: 2021-02-13 | End: 2021-02-13

## 2021-02-13 RX ADMIN — ONDANSETRON 4 MG: 4 TABLET, ORALLY DISINTEGRATING ORAL at 12:46

## 2021-02-13 RX ADMIN — ACETAMINOPHEN 650 MG: 325 TABLET ORAL at 23:54

## 2021-02-13 RX ADMIN — SODIUM CHLORIDE 25 ML/HR: 9 INJECTION, SOLUTION INTRAVENOUS at 20:59

## 2021-02-13 RX ADMIN — INSULIN GLARGINE 30 UNITS: 100 INJECTION, SOLUTION SUBCUTANEOUS at 22:44

## 2021-02-13 RX ADMIN — ASPIRIN AND DIPYRIDAMOLE 1 CAPSULE: 25; 200 CAPSULE, EXTENDED RELEASE ORAL at 21:32

## 2021-02-13 RX ADMIN — SODIUM BICARBONATE 650 MG TABLET 650 MG: at 21:32

## 2021-02-13 RX ADMIN — ALLOPURINOL 200 MG: 100 TABLET ORAL at 20:46

## 2021-02-13 RX ADMIN — HYDRALAZINE HYDROCHLORIDE 100 MG: 25 TABLET ORAL at 21:32

## 2021-02-13 RX ADMIN — HEPARIN SODIUM 5000 UNITS: 5000 INJECTION INTRAVENOUS; SUBCUTANEOUS at 21:32

## 2021-02-13 NOTE — ED TRIAGE NOTES
Pt to ed via [address] [facility] [address] as a transfer from [facility] for possible stroke. Reported right sided facial numbness that began around 2am. Hx of dental work to the same side. No facial droop noted. Speech is clear.

## 2021-02-13 NOTE — ED NOTES
Pt transferred to [facility] via [place]. All pt belongings given to transport and patient.

## 2021-02-13 NOTE — ED NOTES
Pt daughter called and updated on pt care. According to pt daughter Lore Has mom  is in end stage kidney failure, takes asprin and has early onset dementia. \"     Pt daughter is requesting an MRI.

## 2021-02-13 NOTE — ED TRIAGE NOTES
Per EMS, daughter from out of stated called stating other seemed out of the normal. Pt shows no signs of neurologic deficit. Pt speech is clear, pt had  Teeth pulled on the right side two weeks ago. Pt is in NAD.

## 2021-02-13 NOTE — ED NOTES
Pt denies any pain and states \"my power is out right now so I don't know what yall are going to do about that. \" Pt denies any pain and has hx decreased strength on right d/t previous stroke. Pt ambulatory with assistance and speaking in full clear sentences.

## 2021-02-13 NOTE — ED PROVIDER NOTES
EMERGENCY DEPARTMENT HISTORY AND PHYSICAL EXAM      Date: 2/13/2021  Patient Name: Courtney Lund      History of Presenting Illness     Chief Complaint   Patient presents with    Neurologic Problem       History Provided By: Patient    HPI: Courtney Lund, 70 y.o. female with a past medical history significant diabetes, hypertension, renal insufficiency and Multiple CVAs presents to the ED with cc of right facial numbness. Patient began with right facial numbness at approximately 4 AM yesterday which is persisted. She awoke with nausea vomited one time has had intermittent nausea since then. She has chronic right hemiplegia from previous CVAs. These sensations are similar to prior CVA. There are no other complaints, changes, or physical findings at this time. PCP: Graciela Mohr MD    Current Outpatient Medications   Medication Sig Dispense Refill    Insulin Syringe-Needle U-100 (BD Insulin Syringe Ultra-Fine) 0.5 mL 31 gauge x 5/16\" syrg BD Insulin Syringe Ultra-Fine 0.5 mL 31 gauge x 5/16\"      cyanocobalamin, vitamin B-12, 3,000 mcg cap cyanocobalamin (vitamin B-12) 3,000 mcg capsule      allopurinoL (ZYLOPRIM) 100 mg tablet allopurinol 100 mg tablet      amLODIPine-atorvastatin (CADUET) 10-10 mg per tablet Take 1 Tab by mouth daily.  atorvastatin (LIPITOR) 40 mg tablet atorvastatin 40 mg tablet      traZODone (DESYREL) 100 mg tablet trazodone 100 mg tablet      fluticasone propion-salmeterol (ADVAIR HFA) 115-21 mcg/actuation inhaler Take 2 Puffs by inhalation two (2) times a day.  azelastine (ASTEPRO) 0.15 % (205.5 mcg) two (2) times a day.  calcitRIOL (ROCALTROL) 0.5 mcg capsule Take 0.5 mcg by mouth daily.  loratadine (CLARITIN) 10 mg tablet Take 10 mg by mouth.  insulin regular (NOVOLIN R, HUMULIN R) 100 unit/mL injection by SubCUTAneous route.  lidocaine 5 % topical cream Apply  to affected area two (2) times daily as needed for Itching.       metoprolol tartrate (LOPRESSOR) 25 mg tablet Take  by mouth two (2) times a day.  insulin aspart U-100 (NOVOLOG FLEXPEN U-100 INSULIN) 100 unit/mL (3 mL) inpn by SubCUTAneous route.  oxyCODONE-acetaminophen (PERCOCET) 5-325 mg per tablet Take  by mouth every four (4) hours as needed for Pain.  sodium bicarbonate 650 mg tablet Take  by mouth four (4) times daily.  clonazePAM (KLONOPIN) 0.5 mg tablet Take 1 or 2 at hs prn sleep 60 Tab 5    fluconazole (DIFLUCAN) 150 mg tablet TAKE 1 TABLET EVERY DAY FOR 3 DAYS 3 Tab 0    Insulin Needles, Disposable, 31 X 5/16 \" Ndle by SubCUTAneous route three (3) times daily (with meals). 2 Package 11    amlodipine (NORVASC) 5 mg tablet 5 mg daily.  amoxicillin (AMOXIL) 500 mg capsule       AGGRENOX 200-25 mg per SR capsule Take 1 Cap by mouth two (2) times a day.  ASCENSIA CONTOUR strip       cephALEXin (KEFLEX) 250 mg capsule       clotrimazole-betamethasone (LOTRISONE) topical cream       diazepam (VALIUM) 10 mg tablet       furosemide (LASIX) 40 mg tablet Take 40 mg by mouth daily.  hydrALAZINE (APRESOLINE) 100 mg tablet Take 100 mg by mouth three (3) times daily.  hydrocodone-acetaminophen (LORTAB) 7.5-500 mg per tablet       lisinopril (PRINIVIL, ZESTRIL) 40 mg tablet Take 40 mg by mouth daily.  NIFEdipine XL (PROCARDIA XL) 30 mg tablet Take  by mouth daily.  NYSTOP powder       ZEMPLAR 1 mcg capsule Take 1 mcg by mouth daily.  simvastatin (ZOCOR) 40 mg tablet Take 40 mg by mouth nightly.  BD LO-DOSE ULTRA-FINE SHORT 1/2 mL 31 x 5/16\" Syrg       DIOVAN 320 mg tablet Take 320 mg by mouth daily.  zolpidem (AMBIEN) 10 mg tablet Take 10 mg by mouth nightly as needed.  cholecalciferol, vitamin d3, (VITAMIN D) 1,000 unit tablet Take 1,000 Units by mouth daily.  nitroglycerin (NITROSTAT) 0.4 mg SL tablet by SubLINGual route every five (5) minutes as needed.       acetaminophen (TYLENOL EX STR ARTHRITIS PAIN) 500 mg tablet Take  by mouth every six (6) hours as needed.  ALBUTEROL SULFATE (PROAIR HFA IN) Take  by inhalation.  FLUTICASONE PROPIONATE (FLOVENT DISKUS IN) Take  by inhalation.  insulin glargine (LANTUS) 100 unit/mL injection 30 Units by SubCUTAneous route nightly. 1 Vial 5    insulin lispro, human, (HUMALOG KWIKPEN) 100 unit/mL flexpen 5 Units by SubCUTAneous route three (3) times daily (with meals). 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    CHF (congestive heart failure) (HCC)     Chronic kidney disease     [facility]di[facility] ([facility] [address]     HTN (hypertension)     Stroke ([facility] [address]        Past Surgical History:  Past Surgical History:   Procedure Laterality Date    HX OOPHORECTOMY      STENT INSERTION  2001,2002    Elham Guan       Family History:  Family History   Problem Relation Age of Onset    Breast Cancer Maternal Aunt     Breast Cancer Cousin        Social History:  Social History     Tobacco Use    Smoking status: Former Smoker    Smokeless tobacco: Never Used    Tobacco comment: quit 1972   Substance Use Topics    Alcohol use: No    Drug use: No       Allergies:  No Known Allergies      Review of Systems   Review of all other systems is negative however somewhat limited historian  Review of Systems    Physical Exam   Pleasant elderly -American female no acute distress  Physical Exam  Vitals signs and nursing note reviewed. Constitutional:       General: She is not in acute distress. Appearance: Normal appearance. She is obese. She is not ill-appearing, toxic-appearing or diaphoretic. HENT:      Head: Normocephalic and atraumatic. Nose: Nose normal.      Mouth/Throat:      Mouth: Mucous membranes are moist.   Eyes:      Extraocular Movements: Extraocular movements intact.       Conjunctiva/sclera: Conjunctivae normal.      Pupils: Pupils are equal, round, and reactive to light. Neck:      Musculoskeletal: Normal range of motion and neck supple. No neck rigidity or muscular tenderness. Cardiovascular:      Rate and Rhythm: Normal rate and regular rhythm. Pulses: Normal pulses. Heart sounds: Normal heart sounds. No murmur. Pulmonary:      Effort: Pulmonary effort is normal. No respiratory distress. Breath sounds: Normal breath sounds. No wheezing, rhonchi or rales. Chest:      Chest wall: No tenderness. Abdominal:      General: Abdomen is flat. Palpations: There is no mass. Tenderness: There is no abdominal tenderness. There is no right CVA tenderness, left CVA tenderness, guarding or rebound. Hernia: No hernia is present. Musculoskeletal: Normal range of motion. General: No tenderness, deformity or signs of injury. Right lower leg: No edema. Left lower leg: No edema. Skin:     General: Skin is warm. Findings: No erythema or rash. Neurological:      Mental Status: She is alert and oriented to person, place, and time. Cranial Nerves: No cranial nerve deficit. Sensory: Sensory deficit present. Motor: Weakness present. Comments: Chronic right hemiplegia which appears baseline per patient; decreased sensation in the right face primarily maxillary and periorbital area-NO motor weakness of the face, tongue is midline. Speech is normal   Psychiatric:         Mood and Affect: Mood normal.         Behavior: Behavior normal.         Thought Content:  Thought content normal.         Lab and Diagnostic Study Results     Labs -     Recent Results (from the past 12 hour(s))   EKG, 12 LEAD, INITIAL    Collection Time: 02/13/21 10:42 AM   Result Value Ref Range    Ventricular Rate 54 BPM    Atrial Rate 99 BPM    P-R Interval 158 ms    QRS Duration 102 ms    Q-T Interval 466 ms    QTC Calculation (Bezet) 442 ms    Calculated P Axis -15 degrees    Calculated R Axis 18 degrees Calculated T Axis 16 degrees    Diagnosis       Sinus rhythm  Atrial premature complex  Posterior infarct, old  Borderline T abnormalities, inferior leads  Baseline wander in lead(s) II,aVR,aVF     CBC WITH AUTOMATED DIFF    Collection Time: 02/13/21 11:45 AM   Result Value Ref Range    WBC 6.0 4.4 - 11.3 K/uL    RBC 4.13 (L) 4.50 - 5.90 M/uL    HGB 11.5 (L) 13.5 - 17.5 g/dL    HCT 36.3 36 - 46 %    MCV 87.9 80 - 100 FL    MCH 28.0 (L) 31 - 34 PG    MCHC 31.8 31.0 - 36.0 g/dL    RDW 14.9 (H) 11.5 - 14.5 %    PLATELET 970 K/uL    MPV 9.8 6.5 - 11.5 FL    NRBC 0.0  WBC    ABSOLUTE NRBC 0.00 K/uL    NEUTROPHILS 69 42 - 75 %    LYMPHOCYTES 22 20.5 - 51.1 %    MONOCYTES 7 1.7 - 9.3 %    EOSINOPHILS 1 0.9 - 2.9 %    BASOPHILS 1 0.0 - 2.5 %    ABS. NEUTROPHILS 4.2 1.8 - 7.7 K/UL    ABS. LYMPHOCYTES 1.3 1.0 - 4.8 K/UL    ABS. MONOCYTES 0.4 0.2 - 2.4 K/UL    ABS. EOSINOPHILS 0.1 0.0 - 0.7 K/UL    ABS. BASOPHILS 0.1 0.0 - 0.2 K/UL   METABOLIC PANEL, COMPREHENSIVE    Collection Time: 02/13/21 11:45 AM   Result Value Ref Range    Sodium 142 136 - 145 mmol/L    Potassium 4.0 3.5 - 5.1 mmol/L    Chloride 103 97 - 108 mmol/L    CO2 29 21 - 32 mmol/L    Anion gap 10 5 - 15 mmol/L    Glucose 152 (H) 65 - 100 mg/dL    BUN 56 (H) 6 - 20 mg/dL    Creatinine 3.81 (H) 0.55 - 1.02 mg/dL    BUN/Creatinine ratio 15 12 - 20      GFR est AA 14 (L) >60 ml/min/1.73m2    GFR est non-AA 12 (L) >60 ml/min/1.73m2    Calcium 9.3 8.5 - 10.1 mg/dL    Bilirubin, total 0.5 0.2 - 1.0 mg/dL    AST (SGOT) 25 15 - 37 U/L    ALT (SGPT) 21 12 - 78 U/L    Alk.  phosphatase 78 45 - 117 U/L    Protein, total 7.7 6.4 - 8.2 g/dL    Albumin 3.5 3.5 - 5.0 g/dL    Globulin 4.2 (H) 2.0 - 4.0 g/dL    A-G Ratio 0.8 (L) 1.1 - 2.2     TROPONIN I    Collection Time: 02/13/21 11:45 AM   Result Value Ref Range    Troponin-I, Qt. <0.05 <0.05 ng/mL   PROTHROMBIN TIME + INR    Collection Time: 02/13/21 11:45 AM   Result Value Ref Range    Prothrombin time 10.1 9.0 - 11.1 sec    INR 1.0 0.9 - 1.1     URINALYSIS W/ REFLEX CULTURE    Collection Time: 02/13/21 12:58 PM    Specimen: Urine   Result Value Ref Range    Color Yellow/Straw      Appearance Hazy (A) Clear      Specific gravity 1.010 1.003 - 1.030      pH (UA) 7.0 5.0 - 8.0      Protein 30 (A) Negative mg/dL    Glucose Negative Negative mg/dL    Ketone Negative Negative mg/dL    Bilirubin Negative Negative      Blood Negative Negative      Urobilinogen 0.2 0.2 - 1.0 EU/dL    Nitrites Negative Negative      Leukocyte Esterase Negative Negative      WBC 0-4 0 - 5 /hpf    RBC 0-5 0 - 3 /hpf    Bacteria 1+ (A) Negative /hpf    UA:UC IF INDICATED Culture not indicated by UA result Culture not indicated by UA result         Radiologic Studies -   [unfilled]  CT Results  (Last 48 hours)               02/13/21 1055  CT HEAD WO CONT Final result    Impression:  Atrophy. Microangiopathy. No evidence of intracranial hemorrhage or   acute large vessel distribution infarct. If there is clinical suspicion of acute lacunar infarction, consider additional   imaging evaluation with MRI. Narrative:  Dose Reduction:    All CT scans at this facility are performed using dose reduction optimization   techniques as appropriate to a performed exam including the following: Automated   exposure control, adjustments of the mA and/or kV according to patient size, or   use of iterative reconstruction technique. Unenhanced images were obtained and compared with 10/15/2020       Prominent calcification of bilateral distal vertebral and cavernous internal   carotid arteries. Internal calvarial hyperostosis, similar to 10/15/2020. Normal   gray-white differentiation. Small vessel disease involving brainstem,   periventricular deep white matter and bilateral thalami. There is diffuse   atrophy. No evidence of brain mass, hemorrhage, or acute large vessel   distribution infarct. No abnormal extra-axial fluid collection.  Thinning of corpus callosum. Normal appearance of craniovertebral junction and pituitary   gland. CXR Results  (Last 48 hours)               02/13/21 1048  XR CHEST PORT Final result    Impression:  Moderate enlargement of cardiomediastinal silhouette. Central   pulmonary vessels appear prominent. No convincing evidence of focal airspace   consolidation, but the right lung base and right hemidiaphragm are suboptimally   visualized. No definite pneumothorax. Narrative:  Chest, AP portable, 1040 hours. Comparison with 9/15/2020. Positioning is suboptimal, lordotic and slightly oblique. Medical Decision Making and ED Course   - I am the first and primary provider for this patient AND AM THE PRIMARY PROVIDER OF RECORD. - I reviewed the vital signs, available nursing notes, past medical history, past surgical history, family history and social history. - Initial assessment performed. The patients presenting problems have been discussed, and the staff are in agreement with the care plan formulated and outlined with them. I have encouraged them to ask questions as they arise throughout their visit. Vital Signs-Reviewed the patient's vital signs. Patient Vitals for the past 12 hrs:   Temp Pulse Resp BP SpO2   02/13/21 1416  60  (!) 157/87 96 %   02/13/21 1037    (!) 165/69    02/13/21 1027 99.2 °F (37.3 °C) 63 18 (!) 203/66 95 %       EKG interpretation: (Preliminary): Performed at 1042, and read at 1048  Rhythm: atrial fib; and irreg. Rate (approx.): 54; Axis: normal; OH interval: Atrial fibrillation; QRS interval: prolonged; ST/T wave: non-specific changes; Other findings: Atrial flutter at 47 probable old posterior infarct. Records Reviewed: Nursing Notes and Old Medical Records    The patient presents with facial numbness and nausea with a differential diagnosis of CVA TIA;  Bell's palsy; compression neuropathy    ED Course:              Provider Notes (Medical Decision Making):   68-year-old female multiple previous CVAs multiple medical problems presents with right facial numbness and nausea beginning approximately 30 hours ago. Labs are fairly unremarkable chronic renal insufficiency appears stable CT of the head shows chronic changes only without acute infarct. Case discussed with hospitalist Dr. Kerry Valles recommends transfer to facility with neurologic capabilities. patient notes previous CVA stable and been found by MRI discussed options will transfer to [facility] for MRI of the brain and neurologic evaluation. MDM           Consultations:       Consultations: -  Dr. Calderon Gutierrez ED physician [facility]        Procedures and Critical Care       Performed by: Jhonatan Velazquez MD  PROCEDURES: None  Procedures               CRITICAL CARE NOTE :  2:22 PM  Amount of Critical Care Time: 45(minutes)    IMPENDING DETERIORATION -CNS, Metabolic and Renal  ASSOCIATED RISK FACTORS - Hypotension, Shock, Metabolic changes, Dehydration and CNS Decompensation  MANAGEMENT- Bedside Assessment  INTERPRETATION -  Xrays and CT Scan  INTERVENTIONS - hemodynamic mngmt and Metobolic interventions  CASE REVIEW - Hospitalist/Intensivist and ED attending at [facility]  TREATMENT RESPONSE -Stable and Unchanged   PERFORMED BY - Self    NOTES   :  I have spent critical care time involved in lab review, consultations with specialist, family decision- making, bedside attention and documentation. This time excludes time spent in any separate billed procedures. During this entire length of time I was immediately available to the patient . Jhonatan Velazquez MD        Disposition     Disposition: Condition unchanged and stable        Remove if not discharged  DISCHARGE PLAN:  1. Current Discharge Medication List      CONTINUE these medications which have NOT CHANGED    Details   !!  Insulin Syringe-Needle U-100 (BD Insulin Syringe Ultra-Fine) 0.5 mL 31 gauge x 5/16\" syrg BD Insulin Syringe Ultra-Fine 0.5 mL 31 gauge x 5/16\"      cyanocobalamin, vitamin B-12, 3,000 mcg cap cyanocobalamin (vitamin B-12) 3,000 mcg capsule      allopurinoL (ZYLOPRIM) 100 mg tablet allopurinol 100 mg tablet      amLODIPine-atorvastatin (CADUET) 10-10 mg per tablet Take 1 Tab by mouth daily. atorvastatin (LIPITOR) 40 mg tablet atorvastatin 40 mg tablet      traZODone (DESYREL) 100 mg tablet trazodone 100 mg tablet      fluticasone propion-salmeterol (ADVAIR HFA) 115-21 mcg/actuation inhaler Take 2 Puffs by inhalation two (2) times a day. azelastine (ASTEPRO) 0.15 % (205.5 mcg) two (2) times a day. calcitRIOL (ROCALTROL) 0.5 mcg capsule Take 0.5 mcg by mouth daily. loratadine (CLARITIN) 10 mg tablet Take 10 mg by mouth. insulin regular (NOVOLIN R, HUMULIN R) 100 unit/mL injection by SubCUTAneous route.      lidocaine 5 % topical cream Apply  to affected area two (2) times daily as needed for Itching. metoprolol tartrate (LOPRESSOR) 25 mg tablet Take  by mouth two (2) times a day. insulin aspart U-100 (NOVOLOG FLEXPEN U-100 INSULIN) 100 unit/mL (3 mL) inpn by SubCUTAneous route. oxyCODONE-acetaminophen (PERCOCET) 5-325 mg per tablet Take  by mouth every four (4) hours as needed for Pain.      sodium bicarbonate 650 mg tablet Take  by mouth four (4) times daily. clonazePAM (KLONOPIN) 0.5 mg tablet Take 1 or 2 at hs prn sleep  Qty: 60 Tab, Refills: 5    Associated Diagnoses: Insomnia, unspecified type      fluconazole (DIFLUCAN) 150 mg tablet TAKE 1 TABLET EVERY DAY FOR 3 DAYS  Qty: 3 Tab, Refills: 0      Insulin Needles, Disposable, 31 X 5/16 \" Ndle by SubCUTAneous route three (3) times daily (with meals). Qty: 2 Package, Refills: 11      amlodipine (NORVASC) 5 mg tablet 5 mg daily. amoxicillin (AMOXIL) 500 mg capsule       AGGRENOX 200-25 mg per SR capsule Take 1 Cap by mouth two (2) times a day.       ASCENSIA CONTOUR strip       cephALEXin (KEFLEX) 250 mg capsule       clotrimazole-betamethasone (LOTRISONE) topical cream       diazepam (VALIUM) 10 mg tablet       furosemide (LASIX) 40 mg tablet Take 40 mg by mouth daily. hydrALAZINE (APRESOLINE) 100 mg tablet Take 100 mg by mouth three (3) times daily. hydrocodone-acetaminophen (LORTAB) 7.5-500 mg per tablet       lisinopril (PRINIVIL, ZESTRIL) 40 mg tablet Take 40 mg by mouth daily. NIFEdipine XL (PROCARDIA XL) 30 mg tablet Take  by mouth daily. NYSTOP powder       ZEMPLAR 1 mcg capsule Take 1 mcg by mouth daily. simvastatin (ZOCOR) 40 mg tablet Take 40 mg by mouth nightly. !! BD LO-DOSE ULTRA-FINE SHORT 1/2 mL 31 x 5/16\" Syrg       DIOVAN 320 mg tablet Take 320 mg by mouth daily. zolpidem (AMBIEN) 10 mg tablet Take 10 mg by mouth nightly as needed. cholecalciferol, vitamin d3, (VITAMIN D) 1,000 unit tablet Take 1,000 Units by mouth daily. nitroglycerin (NITROSTAT) 0.4 mg SL tablet by SubLINGual route every five (5) minutes as needed. acetaminophen (TYLENOL EX STR ARTHRITIS PAIN) 500 mg tablet Take  by mouth every six (6) hours as needed. ALBUTEROL SULFATE (PROAIR HFA IN) Take  by inhalation. FLUTICASONE PROPIONATE (FLOVENT DISKUS IN) Take  by inhalation. insulin glargine (LANTUS) 100 unit/mL injection 30 Units by SubCUTAneous route nightly. Qty: 1 Vial, Refills: 5      insulin lispro, human, (HUMALOG KWIKPEN) 100 unit/mL flexpen 5 Units by SubCUTAneous route three (3) times daily (with meals). Plus sliding scale  Qty: 1 Package, Refills: 5       !! - Potential duplicate medications found. Please discuss with provider. 2.   Follow-up Information    None       3. Return to ED if worse   4. Current Discharge Medication List          Diagnosis     Clinical Impression: Facial numbness and nausea possible CVA attestations:    Mehreen Louis MD    Please note that this dictation was completed with [email or web address], the computer voice recognition software. Quite often unanticipated grammatical, syntax, homophones, and other interpretive errors are inadvertently transcribed by the computer software. Please disregard these errors. Please excuse any errors that have escaped final proofreading. Thank you.

## 2021-02-14 LAB
ALBUMIN SERPL-MCNC: 3.1 G/DL (ref 3.5–5)
ALBUMIN/GLOB SERPL: 0.8 {RATIO} (ref 1.1–2.2)
ALP SERPL-CCNC: 76 U/L (ref 45–117)
ALT SERPL-CCNC: 16 U/L (ref 12–78)
ANION GAP SERPL CALC-SCNC: 4 MMOL/L (ref 5–15)
AST SERPL W P-5'-P-CCNC: 19 U/L (ref 15–37)
BASOPHILS # BLD: 0 K/UL (ref 0–0.1)
BASOPHILS NFR BLD: 0 % (ref 0–1)
BILIRUB SERPL-MCNC: 0.4 MG/DL (ref 0.2–1)
BUN SERPL-MCNC: 54 MG/DL (ref 6–20)
BUN/CREAT SERPL: 15 (ref 12–20)
CA-I BLD-MCNC: 9.1 MG/DL (ref 8.5–10.1)
CHLORIDE SERPL-SCNC: 106 MMOL/L (ref 97–108)
CO2 SERPL-SCNC: 30 MMOL/L (ref 21–32)
CREAT SERPL-MCNC: 3.64 MG/DL (ref 0.55–1.02)
DIFFERENTIAL METHOD BLD: ABNORMAL
EOSINOPHIL # BLD: 0 K/UL (ref 0–0.4)
EOSINOPHIL NFR BLD: 0 % (ref 0–7)
ERYTHROCYTE [DISTWIDTH] IN BLOOD BY AUTOMATED COUNT: 14.7 % (ref 11.5–14.5)
GLOBULIN SER CALC-MCNC: 3.9 G/DL (ref 2–4)
GLUCOSE BLD STRIP.AUTO-MCNC: 108 MG/DL (ref 65–100)
GLUCOSE BLD STRIP.AUTO-MCNC: 185 MG/DL (ref 65–100)
GLUCOSE BLD STRIP.AUTO-MCNC: 56 MG/DL (ref 65–100)
GLUCOSE SERPL-MCNC: 129 MG/DL (ref 65–100)
HCT VFR BLD AUTO: 32.2 % (ref 35–47)
HGB BLD-MCNC: 10.1 G/DL (ref 11.5–16)
IMM GRANULOCYTES # BLD AUTO: 0 K/UL (ref 0–0.04)
IMM GRANULOCYTES NFR BLD AUTO: 0 % (ref 0–0.5)
LYMPHOCYTES # BLD: 1.5 K/UL (ref 0.8–3.5)
LYMPHOCYTES NFR BLD: 17 % (ref 12–49)
MCH RBC QN AUTO: 27.7 PG (ref 26–34)
MCHC RBC AUTO-ENTMCNC: 31.4 G/DL (ref 30–36.5)
MCV RBC AUTO: 88.2 FL (ref 80–99)
MONOCYTES # BLD: 0.5 K/UL (ref 0–1)
MONOCYTES NFR BLD: 6 % (ref 5–13)
NEUTS SEG # BLD: 6.4 K/UL (ref 1.8–8)
NEUTS SEG NFR BLD: 77 % (ref 32–75)
PERFORMED BY, TECHID: ABNORMAL
PLATELET # BLD AUTO: 243 K/UL (ref 150–400)
PMV BLD AUTO: 11.7 FL (ref 8.9–12.9)
POTASSIUM SERPL-SCNC: 3.6 MMOL/L (ref 3.5–5.1)
PROT SERPL-MCNC: 7 G/DL (ref 6.4–8.2)
RBC # BLD AUTO: 3.65 M/UL (ref 3.8–5.2)
SODIUM SERPL-SCNC: 140 MMOL/L (ref 136–145)
WBC # BLD AUTO: 8.5 K/UL (ref 3.6–11)

## 2021-02-14 PROCEDURE — 92610 EVALUATE SWALLOWING FUNCTION: CPT

## 2021-02-14 PROCEDURE — 80053 COMPREHEN METABOLIC PANEL: CPT

## 2021-02-14 PROCEDURE — 36415 COLL VENOUS BLD VENIPUNCTURE: CPT

## 2021-02-14 PROCEDURE — 74011636637 HC RX REV CODE- 636/637: Performed by: FAMILY MEDICINE

## 2021-02-14 PROCEDURE — 74011250636 HC RX REV CODE- 250/636: Performed by: FAMILY MEDICINE

## 2021-02-14 PROCEDURE — 80061 LIPID PANEL: CPT

## 2021-02-14 PROCEDURE — 74011250637 HC RX REV CODE- 250/637: Performed by: FAMILY MEDICINE

## 2021-02-14 PROCEDURE — 65270000029 HC RM PRIVATE

## 2021-02-14 PROCEDURE — 74011250637 HC RX REV CODE- 250/637: Performed by: PSYCHIATRY & NEUROLOGY

## 2021-02-14 PROCEDURE — 85025 COMPLETE CBC W/AUTO DIFF WBC: CPT

## 2021-02-14 PROCEDURE — 82962 GLUCOSE BLOOD TEST: CPT

## 2021-02-14 RX ORDER — ASPIRIN 81 MG/1
81 TABLET ORAL DAILY
Status: DISCONTINUED | OUTPATIENT
Start: 2021-02-14 | End: 2021-02-15

## 2021-02-14 RX ORDER — METOPROLOL TARTRATE 50 MG/1
50 TABLET ORAL 2 TIMES DAILY
Status: DISCONTINUED | OUTPATIENT
Start: 2021-02-14 | End: 2021-02-17 | Stop reason: HOSPADM

## 2021-02-14 RX ORDER — FUROSEMIDE 40 MG/1
40 TABLET ORAL 3 TIMES DAILY
Status: DISCONTINUED | OUTPATIENT
Start: 2021-02-14 | End: 2021-02-17 | Stop reason: HOSPADM

## 2021-02-14 RX ORDER — AMLODIPINE BESYLATE 5 MG/1
10 TABLET ORAL DAILY
Status: DISCONTINUED | OUTPATIENT
Start: 2021-02-14 | End: 2021-02-17 | Stop reason: HOSPADM

## 2021-02-14 RX ORDER — FERROUS SULFATE 325(65) MG
325 TABLET, DELAYED RELEASE (ENTERIC COATED) ORAL
COMMUNITY
End: 2021-04-30 | Stop reason: DRUGHIGH

## 2021-02-14 RX ORDER — ALLOPURINOL 100 MG/1
100 TABLET ORAL 2 TIMES DAILY
Status: DISCONTINUED | OUTPATIENT
Start: 2021-02-14 | End: 2021-02-17 | Stop reason: HOSPADM

## 2021-02-14 RX ORDER — DOXAZOSIN 2 MG/1
2 TABLET ORAL
Status: ON HOLD | COMMUNITY
End: 2022-07-25

## 2021-02-14 RX ORDER — LANOLIN ALCOHOL/MO/W.PET/CERES
1 CREAM (GRAM) TOPICAL
Status: DISCONTINUED | OUTPATIENT
Start: 2021-02-14 | End: 2021-02-17 | Stop reason: HOSPADM

## 2021-02-14 RX ORDER — GUAIFENESIN 100 MG/5ML
81 LIQUID (ML) ORAL DAILY
COMMUNITY

## 2021-02-14 RX ORDER — DOXAZOSIN 2 MG/1
2 TABLET ORAL
Status: DISCONTINUED | OUTPATIENT
Start: 2021-02-14 | End: 2021-02-17 | Stop reason: HOSPADM

## 2021-02-14 RX ORDER — TRAZODONE HYDROCHLORIDE 50 MG/1
100 TABLET ORAL
Status: DISCONTINUED | OUTPATIENT
Start: 2021-02-14 | End: 2021-02-17 | Stop reason: HOSPADM

## 2021-02-14 RX ADMIN — ACETAMINOPHEN 650 MG: 325 TABLET ORAL at 10:49

## 2021-02-14 RX ADMIN — INSULIN LISPRO 3 UNITS: 100 INJECTION, SOLUTION INTRAVENOUS; SUBCUTANEOUS at 18:35

## 2021-02-14 RX ADMIN — SODIUM BICARBONATE 650 MG TABLET 650 MG: at 11:38

## 2021-02-14 RX ADMIN — AMLODIPINE BESYLATE 10 MG: 5 TABLET ORAL at 10:41

## 2021-02-14 RX ADMIN — APIXABAN 2.5 MG: 2.5 TABLET, FILM COATED ORAL at 21:35

## 2021-02-14 RX ADMIN — SODIUM BICARBONATE 650 MG TABLET 650 MG: at 21:35

## 2021-02-14 RX ADMIN — FUROSEMIDE 40 MG: 40 TABLET ORAL at 21:35

## 2021-02-14 RX ADMIN — ATORVASTATIN CALCIUM 40 MG: 40 TABLET, FILM COATED ORAL at 10:41

## 2021-02-14 RX ADMIN — FUROSEMIDE 40 MG: 40 TABLET ORAL at 10:41

## 2021-02-14 RX ADMIN — METOPROLOL TARTRATE 50 MG: 50 TABLET, FILM COATED ORAL at 10:41

## 2021-02-14 RX ADMIN — HEPARIN SODIUM 5000 UNITS: 5000 INJECTION INTRAVENOUS; SUBCUTANEOUS at 05:52

## 2021-02-14 RX ADMIN — ALLOPURINOL 100 MG: 100 TABLET ORAL at 21:35

## 2021-02-14 RX ADMIN — ALLOPURINOL 100 MG: 100 TABLET ORAL at 10:41

## 2021-02-14 RX ADMIN — ASPIRIN 81 MG: 81 TABLET, COATED ORAL at 10:41

## 2021-02-14 RX ADMIN — FERROUS SULFATE TAB 325 MG (65 MG ELEMENTAL FE) 325 MG: 325 (65 FE) TAB at 10:41

## 2021-02-14 RX ADMIN — TRAZODONE HYDROCHLORIDE 100 MG: 50 TABLET ORAL at 21:35

## 2021-02-14 RX ADMIN — FUROSEMIDE 40 MG: 40 TABLET ORAL at 15:04

## 2021-02-14 RX ADMIN — METOPROLOL TARTRATE 50 MG: 50 TABLET, FILM COATED ORAL at 21:35

## 2021-02-14 NOTE — CONSULTS
Consult Date: 2/14/2021    Consults Ms. Ursula Man is a 70year old woman with history of DM, HTN, ESRD on HD, multiple prior strokes with the last one being in July 2020 which presented with vertigo. She says this time she is having numbness under her right eye and right cheek and the room is spinning. She \"feels\" like she had a stroke. Ct head unrevealing. [place] 164/66 on admission.      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    CHF (congestive heart failure) (HCC)     Chronic kidney disease     DM [facility]diabetes mellitus) ([facility] [address]     HTN (hypertension)     Stroke [facility])       Past Surgical History:   Procedure Laterality Date    HX OOPHORECTOMY      STENT INSERTION  2001,2002    Josue Cage     Family History   Problem Relation Age of Onset    Breast Cancer Maternal Aunt     Breast Cancer Cousin       Social History     Tobacco Use    Smoking status: Former Smoker    Smokeless tobacco: Never Used    Tobacco comment: quit 1972   Substance Use Topics    Alcohol use: No       Current Facility-Administered Medications   Medication Dose Route Frequency Provider Last Rate Last Admin    allopurinoL (ZYLOPRIM) tablet 100 mg  100 mg Oral BID Eda Granda MD   100 mg at 02/14/21 1041    amLODIPine (NORVASC) tablet 10 mg  10 mg Oral DAILY Eda Granda MD   10 mg at 02/14/21 1041    aspirin delayed-release tablet 81 mg  81 mg Oral DAILY Eda Granda MD   81 mg at 02/14/21 1041    furosemide (LASIX) tablet 40 mg  40 mg Oral TID Eda Granda MD   40 mg at 02/14/21 1041    metoprolol tartrate (LOPRESSOR) tablet 50 mg  50 mg Oral BID Eda Granda MD   50 mg at 02/14/21 1041    traZODone (DESYREL) tablet 100 mg  100 mg Oral QHS Eda Granda MD        ferrous sulfate tablet 325 mg  1 Tab Oral DAILY WITH BREAKFAST Eda Granda MD   325 mg at 02/14/21 1041    doxazosin (CARDURA) tablet 2 mg  2 mg Oral QHS Miroslava Lo MD  apixaban (ELIQUIS) tablet 2.5 mg  2.5 mg Oral BID Michelle Bishop MD        insulin glargine (LANTUS) injection 30 Units  30 Units SubCUTAneous QHS Eda Granda MD   30 Units at 02/13/21 2244    atorvastatin (LIPITOR) tablet 40 mg  40 mg Oral DAILY Eda Granda MD   40 mg at 02/14/21 1041    sodium bicarbonate tablet 650 mg  650 mg Oral BID Yesi Granda MD   650 mg at 02/14/21 1138    acetaminophen (TYLENOL) tablet 650 mg  650 mg Oral Q6H PRN Yesi Granda MD   650 mg at 02/14/21 1049    Or    acetaminophen (TYLENOL) suppository 650 mg  650 mg Rectal Q6H PRN Yesi Granda MD        polyethylene glycol (MIRALAX) packet 17 g  17 g Oral DAILY PRN Yesi Granda MD        ondansetron (ZOFRAN ODT) tablet 4 mg  4 mg Oral Q8H PRN Yesi Granda MD        Or    ondansetron [facility]) injection 4 mg  4 mg IntraVENous Q6H PRN Venkat Seaman MD        0.9% sodium chloride infusion  25 mL/hr IntraVENous CONTINUOUS Eda Granda MD 25 mL/hr at 02/13/21 2059 25 mL/hr at 02/13/21 2059    insulin lispro (HUMALOG) injection   SubCUTAneous AC&HS Venkat Seaman MD   Stopped at 02/13/21 2200    glucose chewable tablet 16 g  4 Tab Oral PRN Yesi Granda MD        dextrose (D50W) injection syrg 12.5-25 g  25-50 mL IntraVENous PRN Yesi Granda MD        glucagon (GLUCAGEN) injection 1 mg  1 mg IntraMUSCular PRYesi Nguyen MD         Current Outpatient Medications   Medication Sig Dispense Refill    Insulin Syringe-Needle U-100 (BD Insulin Syringe Ultra-Fine) 0.5 mL 31 gauge x 5/16\" syrg BD Insulin Syringe Ultra-Fine 0.5 mL 31 gauge x 5/16\"      cyanocobalamin, vitamin B-12, 3,000 mcg cap cyanocobalamin (vitamin B-12) 3,000 mcg capsule      allopurinoL (ZYLOPRIM) 100 mg tablet allopurinol 100 mg tablet      atorvastatin (LIPITOR) 40 mg tablet atorvastatin 40 mg tablet      traZODone (DESYREL) 100 mg tablet trazodone 100 mg tablet      fluticasone propion-salmeterol (ADVAIR HFA) 115-21 mcg/actuation inhaler Take 2 Puffs by inhalation two (2) times a day.  azelastine (ASTEPRO) 0.15 % (205.5 mcg) two (2) times a day.  insulin regular (NOVOLIN R, HUMULIN R) 100 unit/mL injection by SubCUTAneous route.  lidocaine 5 % topical cream Apply  to affected area two (2) times daily as needed for Itching.  metoprolol tartrate (LOPRESSOR) 25 mg tablet Take  by mouth two (2) times a day.  insulin aspart U-100 (NOVOLOG FLEXPEN U-100 INSULIN) 100 unit/mL (3 mL) inpn by SubCUTAneous route.  oxyCODONE-acetaminophen (PERCOCET) 5-325 mg per tablet Take  by mouth every four (4) hours as needed for Pain.  sodium bicarbonate 650 mg tablet Take  by mouth four (4) times daily.  Insulin Needles, Disposable, 31 X 5/16 \" Ndle by SubCUTAneous route three (3) times daily (with meals). 2 Package 11    amlodipine (NORVASC) 5 mg tablet 5 mg daily.  ASCENSIA CONTOUR strip       clotrimazole-betamethasone (LOTRISONE) topical cream       furosemide (LASIX) 40 mg tablet Take 40 mg by mouth daily.  hydrocodone-acetaminophen (LORTAB) 7.5-500 mg per tablet       NYSTOP powder       BD LO-DOSE ULTRA-FINE SHORT 1/2 mL 31 x 5/16\" Syrg       cholecalciferol, vitamin d3, (VITAMIN D) 1,000 unit tablet Take 1,000 Units by mouth daily.  nitroglycerin (NITROSTAT) 0.4 mg SL tablet by SubLINGual route every five (5) minutes as needed.  acetaminophen (TYLENOL EX STR ARTHRITIS PAIN) 500 mg tablet Take  by mouth every six (6) hours as needed.  ALBUTEROL SULFATE (PROAIR HFA IN) Take  by inhalation.  FLUTICASONE PROPIONATE (FLOVENT DISKUS IN) Take  by inhalation.  insulin glargine (LANTUS) 100 unit/mL injection 30 Units by SubCUTAneous route nightly. 1 Vial 5    insulin lispro, human, (HUMALOG KWIKPEN) 100 unit/mL flexpen 5 Units by SubCUTAneous route three (3) times daily (with meals).  Plus sliding scale 1 Package 5        Review of Systems Neurological: Positive for dizziness and weakness. All other systems reviewed and are negative. Objective     Vital signs for last 24 hours:  Visit Vitals  BP (!) 149/69   Pulse 77   Temp 98.1 °F (36.7 °C)   Resp 20   Ht 5' 4\" (1.626 m)   Wt 82.1 kg (181 lb)   LMP  (LMP Unknown)   SpO2 99%   BMI 31.07 kg/m²       Intake/Output this shift:  Current Shift: No intake/output data recorded. Last 3 Shifts: No intake/output data recorded. Data Review:   Recent Results (from the past 24 hour(s))   URINALYSIS W/ REFLEX CULTURE    Collection Time: 02/13/21 12:58 PM    Specimen: Urine   Result Value Ref Range    Color Yellow/Straw      Appearance Hazy (A) Clear      Specific gravity 1.010 1.003 - 1.030      pH (UA) 7.0 5.0 - 8.0      Protein 30 (A) Negative mg/dL    Glucose Negative Negative mg/dL    Ketone Negative Negative mg/dL    Bilirubin Negative Negative      Blood Negative Negative      Urobilinogen 0.2 0.2 - 1.0 EU/dL    Nitrites Negative Negative      Leukocyte Esterase Negative Negative      WBC 0-4 0 - 5 /hpf    RBC 0-5 0 - 3 /hpf    Bacteria 1+ (A) Negative /hpf    UA:UC IF INDICATED Culture not indicated by UA result Culture not indicated by UA result     GLUCOSE, POC    Collection Time: 02/13/21  9:24 PM   Result Value Ref Range    Glucose (POC) 129 (H) 65 - 100 mg/dL    Performed by Tour Raisernetta Kazakh    METABOLIC PANEL, COMPREHENSIVE    Collection Time: 02/14/21  4:00 AM   Result Value Ref Range    Sodium 140 136 - 145 mmol/L    Potassium 3.6 3.5 - 5.1 mmol/L    Chloride 106 97 - 108 mmol/L    CO2 30 21 - 32 mmol/L    Anion gap 4 (L) 5 - 15 mmol/L    Glucose 129 (H) 65 - 100 mg/dL    BUN 54 (H) 6 - 20 mg/dL    Creatinine 3.64 (H) 0.55 - 1.02 mg/dL    BUN/Creatinine ratio 15 12 - 20      GFR est AA 15 (L) >60 ml/min/1.73m2    GFR est non-AA 12 (L) >60 ml/min/1.73m2    Calcium 9.1 8.5 - 10.1 mg/dL    Bilirubin, total 0.4 0.2 - 1.0 mg/dL    AST (SGOT) 19 15 - 37 U/L    ALT (SGPT) 16 12 - 78 U/L    Alk. phosphatase 76 45 - 117 U/L    Protein, total 7.0 6.4 - 8.2 g/dL    Albumin 3.1 (L) 3.5 - 5.0 g/dL    Globulin 3.9 2.0 - 4.0 g/dL    A-G Ratio 0.8 (L) 1.1 - 2.2     CBC WITH AUTOMATED DIFF    Collection Time: 02/14/21  4:00 AM   Result Value Ref Range    WBC 8.5 3.6 - 11.0 K/uL    RBC 3.65 (L) 3.80 - 5.20 M/uL    HGB 10.1 (L) 11.5 - 16.0 g/dL    HCT 32.2 (L) 35.0 - 47.0 %    MCV 88.2 80.0 - 99.0 FL    MCH 27.7 26.0 - 34.0 PG    MCHC 31.4 30.0 - 36.5 g/dL    RDW 14.7 (H) 11.5 - 14.5 %    PLATELET 561 786 - 569 K/uL    MPV 11.7 8.9 - 12.9 FL    NEUTROPHILS 77 (H) 32 - 75 %    LYMPHOCYTES 17 12 - 49 %    MONOCYTES 6 5 - 13 %    EOSINOPHILS 0 0 - 7 %    BASOPHILS 0 0 - 1 %    IMMATURE GRANULOCYTES 0 0.0 - 0.5 %    ABS. NEUTROPHILS 6.4 1.8 - 8.0 K/UL    ABS. LYMPHOCYTES 1.5 0.8 - 3.5 K/UL    ABS. MONOCYTES 0.5 0.0 - 1.0 K/UL    ABS. EOSINOPHILS 0.0 0.0 - 0.4 K/UL    ABS. BASOPHILS 0.0 0.0 - 0.1 K/UL    ABS. IMM. GRANS. 0.0 0.00 - 0.04 K/UL    DF AUTOMATED     GLUCOSE, POC    Collection Time: 02/14/21 10:29 AM   Result Value Ref Range    Glucose (POC) 108 (H) 65 - 100 mg/dL    Performed by Monica Grayson        Physical Exam    Neuro Physical Exam      General: Well developed, well nourished. Patient in no apparent distress. Neurological Exam:  Mental Status: AAOx3, normal speech   Cranial Nerves:   Intact visual fields. PERRL, EOM's full, no nystagmus, no ptosis. Facial sensation is normal. Facial movement is symmetric. Palate is midline. Normal sternocleidomastoid strength. Tongue is midline. Hearing is intact bilaterally. Motor:  4/5 strength in upper and lower proximal and distal muscles on right. 5/5 on left arm and leg    Reflexes:   Deep tendon reflexes 2+/4 and symmetrical.   Sensory:   Normal to light touch   Gait:  Patient wheelchair bound at baseline   Tremor:   No tremor noted. Cerebellar:  No cerebellar signs present.       Assessment and Plan: Ms. Juan Juárez is a 70year old woman with multiple vascular risk factors who has been having recurrent strokes over the past few years. She is currently wheelchair bound. She has new symptoms on non specific right facial numbness and vertigo. Need to rule out stroke. - CUS to rule out any new significant stenosis  - hold off TTE since she already had this with her last stroke workup.   - mri brain w/o contrast  - Hga1c and lipid panel  - add eliquis 2.5 mg bid for stroke prevention given multiple recurrent strokes despite antiplt therapy.    - cont statin  -pt/ot

## 2021-02-14 NOTE — PROGRESS NOTES
Reason for Admission:   Possible stroke                  RUR Score:     20%             PCP: First and Last name:  Deepti Carrillo MD   Name of Practice:    Are you a current patient: Yes/No:    Approximate date of last visit:    Can you participate in a virtual visit if needed:     Do you (patient/family) have any concerns for transition/discharge? Plan for utilizing home health:  [facility] with [facility]     Current Advanced Directive/Advance Care Plan:      Healthcare Decision Maker:   Click here to complete [address] including selection of the Healthcare Decision Maker Relationship (ie \"Primary\")            Transition of Care Plan:     [facility]  The patient denied SNF and IRF. Interested in going back home [facility]. The daughter expressed some concern about her mother's care. The patient lives alone, ambulate with walker, has 3 times a week personal care, paying out of pocket. The daughter is a  in [place] and checks on her mother every few weeks.

## 2021-02-14 NOTE — PROGRESS NOTES
General Daily Progress Note          Patient Name:   Annie Somers       YOB: 1945       Age:  70 y.o. Admit Date: 2/13/2021      Subjective:            Patient is resting the bed alert awake no distress no new complaints seen by the neurology ordered MRI of the brain      Objective:     Visit Vitals  /63 (BP Patient Position: At rest)   Pulse (!) 58   Temp 97.7 °F (36.5 °C)   Resp 20   Ht 5' 4\" (1.626 m)   Wt 82.1 kg (181 lb)   SpO2 99%   BMI 31.07 kg/m²        Recent Results (from the past 24 hour(s))   GLUCOSE, POC    Collection Time: 02/13/21  9:24 PM   Result Value Ref Range    Glucose (POC) 129 (H) 65 - 100 mg/dL    Performed by Mac Chaudharis    METABOLIC PANEL, COMPREHENSIVE    Collection Time: 02/14/21  4:00 AM   Result Value Ref Range    Sodium 140 136 - 145 mmol/L    Potassium 3.6 3.5 - 5.1 mmol/L    Chloride 106 97 - 108 mmol/L    CO2 30 21 - 32 mmol/L    Anion gap 4 (L) 5 - 15 mmol/L    Glucose 129 (H) 65 - 100 mg/dL    BUN 54 (H) 6 - 20 mg/dL    Creatinine 3.64 (H) 0.55 - 1.02 mg/dL    BUN/Creatinine ratio 15 12 - 20      GFR est AA 15 (L) >60 ml/min/1.73m2    GFR est non-AA 12 (L) >60 ml/min/1.73m2    Calcium 9.1 8.5 - 10.1 mg/dL    Bilirubin, total 0.4 0.2 - 1.0 mg/dL    AST (SGOT) 19 15 - 37 U/L    ALT (SGPT) 16 12 - 78 U/L    Alk.  phosphatase 76 45 - 117 U/L    Protein, total 7.0 6.4 - 8.2 g/dL    Albumin 3.1 (L) 3.5 - 5.0 g/dL    Globulin 3.9 2.0 - 4.0 g/dL    A-G Ratio 0.8 (L) 1.1 - 2.2     CBC WITH AUTOMATED DIFF    Collection Time: 02/14/21  4:00 AM   Result Value Ref Range    WBC 8.5 3.6 - 11.0 K/uL    RBC 3.65 (L) 3.80 - 5.20 M/uL    HGB 10.1 (L) 11.5 - 16.0 g/dL    HCT 32.2 (L) 35.0 - 47.0 %    MCV 88.2 80.0 - 99.0 FL    MCH 27.7 26.0 - 34.0 PG    MCHC 31.4 30.0 - 36.5 g/dL    RDW 14.7 (H) 11.5 - 14.5 %    PLATELET 721 019 - 317 K/uL    MPV 11.7 8.9 - 12.9 FL    NEUTROPHILS 77 (H) 32 - 75 %    LYMPHOCYTES 17 12 - 49 %    MONOCYTES 6 5 - 13 %    EOSINOPHILS 0 0 - 7 %    BASOPHILS 0 0 - 1 %    IMMATURE GRANULOCYTES 0 0.0 - 0.5 %    ABS. NEUTROPHILS 6.4 1.8 - 8.0 K/UL    ABS. LYMPHOCYTES 1.5 0.8 - 3.5 K/UL    ABS. MONOCYTES 0.5 0.0 - 1.0 K/UL    ABS. EOSINOPHILS 0.0 0.0 - 0.4 K/UL    ABS. BASOPHILS 0.0 0.0 - 0.1 K/UL    ABS. IMM. GRANS. 0.0 0.00 - 0.04 K/UL    DF AUTOMATED     GLUCOSE, POC    Collection Time: 02/14/21 10:29 AM   Result Value Ref Range    Glucose (POC) 108 (H) 65 - 100 mg/dL    Performed by [employer] S, POC    Collection Time: 02/14/21  2:24 PM   Result Value Ref Range    Glucose (POC) 185 (H) 65 - 100 mg/dL    Performed by Vikas Santos      [unfilled]      Review of Systems    Constitutional: Negative for chills and fever. HENT: Negative. Eyes: Negative. Respiratory: Negative. Cardiovascular: Negative. Gastrointestinal: Negative for abdominal pain and nausea. Skin: Negative. Neurological: Negative. Physical Exam:      Constitutional: pt is oriented to person, place, and time. HENT:   Head: Normocephalic and atraumatic. Eyes: Pupils are equal, round, and reactive to light. EOM are normal.   Cardiovascular: Normal rate, regular rhythm and normal heart sounds. Pulmonary/Chest: Breath sounds normal. No wheezes. No rales. Exhibits no tenderness. Abdominal: Soft. Bowel sounds are normal. There is no abdominal tenderness. There is no rebound and no guarding. Musculoskeletal: Normal range of motion. Neurological: pt is alert and oriented to person, place, and time.      MRI BRAIN WO CONT    (Results Pending)        Recent Results (from the past 24 hour(s))   GLUCOSE, POC    Collection Time: 02/13/21  9:24 PM   Result Value Ref Range    Glucose (POC) 129 (H) 65 - 100 mg/dL    Performed by Kanslerinrinne , COMPREHENSIVE    Collection Time: 02/14/21  4:00 AM   Result Value Ref Range    Sodium 140 136 - 145 mmol/L    Potassium 3.6 3.5 - 5.1 mmol/L    Chloride 106 97 - 108 mmol/L    CO2 30 21 - 32 mmol/L    Anion gap 4 (L) 5 - 15 mmol/L    Glucose 129 (H) 65 - 100 mg/dL    BUN 54 (H) 6 - 20 mg/dL    Creatinine 3.64 (H) 0.55 - 1.02 mg/dL    BUN/Creatinine ratio 15 12 - 20      GFR est AA 15 (L) >60 ml/min/1.73m2    GFR est non-AA 12 (L) >60 ml/min/1.73m2    Calcium 9.1 8.5 - 10.1 mg/dL    Bilirubin, total 0.4 0.2 - 1.0 mg/dL    AST (SGOT) 19 15 - 37 U/L    ALT (SGPT) 16 12 - 78 U/L    Alk. phosphatase 76 45 - 117 U/L    Protein, total 7.0 6.4 - 8.2 g/dL    Albumin 3.1 (L) 3.5 - 5.0 g/dL    Globulin 3.9 2.0 - 4.0 g/dL    A-G Ratio 0.8 (L) 1.1 - 2.2     CBC WITH AUTOMATED DIFF    Collection Time: 02/14/21  4:00 AM   Result Value Ref Range    WBC 8.5 3.6 - 11.0 K/uL    RBC 3.65 (L) 3.80 - 5.20 M/uL    HGB 10.1 (L) 11.5 - 16.0 g/dL    HCT 32.2 (L) 35.0 - 47.0 %    MCV 88.2 80.0 - 99.0 FL    MCH 27.7 26.0 - 34.0 PG    MCHC 31.4 30.0 - 36.5 g/dL    RDW 14.7 (H) 11.5 - 14.5 %    PLATELET 038 864 - 559 K/uL    MPV 11.7 8.9 - 12.9 FL    NEUTROPHILS 77 (H) 32 - 75 %    LYMPHOCYTES 17 12 - 49 %    MONOCYTES 6 5 - 13 %    EOSINOPHILS 0 0 - 7 %    BASOPHILS 0 0 - 1 %    IMMATURE GRANULOCYTES 0 0.0 - 0.5 %    ABS. NEUTROPHILS 6.4 1.8 - 8.0 K/UL    ABS. LYMPHOCYTES 1.5 0.8 - 3.5 K/UL    ABS. MONOCYTES 0.5 0.0 - 1.0 K/UL    ABS. EOSINOPHILS 0.0 0.0 - 0.4 K/UL    ABS. BASOPHILS 0.0 0.0 - 0.1 K/UL    ABS. IMM. GRANS. 0.0 0.00 - 0.04 K/UL    DF AUTOMATED     GLUCOSE, POC    Collection Time: 02/14/21 10:29 AM   Result Value Ref Range    Glucose (POC) 108 (H) 65 - 100 mg/dL    Performed by [employer] S, POC    Collection Time: 02/14/21  2:24 PM   Result Value Ref Range    Glucose (POC) 185 (H) 65 - 100 mg/dL    Performed by Terrell Ho        Results     ** No results found for the last 336 hours.  **           Labs:     Recent Labs     02/14/21  0400 02/13/21  1145   WBC 8.5 6.0   HGB 10.1* 11.5*   HCT 32.2* 36.3    221     Recent Labs     02/14/21  0400 02/13/21  1145    142   K 3.6 4.0    103 CO2 30 29   BUN 54* 56*   CREA 3.64* 3.81*   * 152*   CA 9.1 9.3     Recent Labs     02/14/21  0400 02/13/21  1145   ALT 16 21   AP 76 78   TBILI 0.4 0.5   TP 7.0 7.7   ALB 3.1* 3.5   GLOB 3.9 4.2*     Recent Labs     02/13/21  1145   INR 1.0   PTP 10.1      No results for input(s): FE, TIBC, PSAT, FERR in the last 72 hours. No results found for: FOL, RBCF   No results for input(s): PH, PCO2, PO2 in the last 72 hours.   Recent Labs     02/13/21  1145   TROIQ <0.05     Lab Results   Component Value Date/Time    Cholesterol, total 102 01/31/2011 10:16 AM    HDL Cholesterol 51 01/31/2011 10:16 AM    LDL, calculated 35 01/31/2011 10:16 AM    Triglyceride 82 01/31/2011 10:16 AM     Lab Results   Component Value Date/Time    Glucose (POC) 185 (H) 02/14/2021 02:24 PM    Glucose (POC) 108 (H) 02/14/2021 10:29 AM    Glucose (POC) 129 (H) 02/13/2021 09:24 PM    Glucose  01/31/2011 09:43 AM     Lab Results   Component Value Date/Time    Color Yellow/Straw 02/13/2021 12:58 PM    Appearance Hazy (A) 02/13/2021 12:58 PM    Specific gravity 1.010 02/13/2021 12:58 PM    pH (UA) 7.0 02/13/2021 12:58 PM    Protein 30 (A) 02/13/2021 12:58 PM    Glucose Negative 02/13/2021 12:58 PM    Ketone Negative 02/13/2021 12:58 PM    Bilirubin Negative 02/13/2021 12:58 PM    Urobilinogen 0.2 02/13/2021 12:58 PM    Nitrites Negative 02/13/2021 12:58 PM    Leukocyte Esterase Negative 02/13/2021 12:58 PM    Bacteria 1+ (A) 02/13/2021 12:58 PM    WBC 0-4 02/13/2021 12:58 PM    RBC 0-5 02/13/2021 12:58 PM         Assessment:          Rule out CVA  History of CVA  Type 2 diabetes  Hypertension  Hyperlipidemia  Anxiety disorder  CAD status post stent  Chronic kidney disease  Recent dental procedures    Plan:     Continue current medication  MRI of the brain if negative can be discharged home  Cussed with the neurology        Current Facility-Administered Medications:     allopurinoL (ZYLOPRIM) tablet 100 mg, 100 mg, Oral, BID, Antonio Rob MD, 100 mg at 02/14/21 1041    amLODIPine (NORVASC) tablet 10 mg, 10 mg, Oral, DAILY, Eda Granda MD, 10 mg at 02/14/21 1041    aspirin delayed-release tablet 81 mg, 81 mg, Oral, DAILY, Eda Granda MD, 81 mg at 02/14/21 1041    furosemide (LASIX) tablet 40 mg, 40 mg, Oral, TID, Valeriy Granda MD, 40 mg at 02/14/21 1041    metoprolol tartrate (LOPRESSOR) tablet 50 mg, 50 mg, Oral, BID, Eda Granda MD, 50 mg at 02/14/21 1041    traZODone (DESYREL) tablet 100 mg, 100 mg, Oral, QHS, Eda Granda MD    ferrous sulfate tablet 325 mg, 1 Tab, Oral, DAILY WITH BREAKFAST, Antonio Rob MD, 325 mg at 02/14/21 1041    doxazosin (CARDURA) tablet 2 mg, 2 mg, Oral, QHS, Valeriy Granda MD    apixaban (ELIQUIS) tablet 2.5 mg, 2.5 mg, Oral, BID, Augusto Pickering MD    insulin glargine (LANTUS) injection 30 Units, 30 Units, SubCUTAneous, QHS, Eda Granda MD, 30 Units at 02/13/21 2244    atorvastatin (LIPITOR) tablet 40 mg, 40 mg, Oral, DAILY, Antonio Rob MD, 40 mg at 02/14/21 1041    sodium bicarbonate tablet 650 mg, 650 mg, Oral, BID, Eda Granda MD, 650 mg at 02/14/21 1138    acetaminophen (TYLENOL) tablet 650 mg, 650 mg, Oral, Q6H PRN, 650 mg at 02/14/21 1049 **OR** acetaminophen (TYLENOL) suppository 650 mg, 650 mg, Rectal, Q6H PRN, Valeriy Granda MD    polyethylene glycol (MIRALAX) packet 17 g, 17 g, Oral, DAILY PRN, Valeriy Granda MD    ondansetron (ZOFRAN ODT) tablet 4 mg, 4 mg, Oral, Q8H PRN **OR** ondansetron (ZOFRAN) injection 4 mg, 4 mg, IntraVENous, Q6H PRN, Eda Granda MD    0.9% sodium chloride infusion, 25 mL/hr, IntraVENous, CONTINUOUS, Eda Granda MD, Last Rate: 25 mL/hr at 02/13/21 2059, 25 mL/hr at 02/13/21 2059    insulin lispro (HUMALOG) injection, , SubCUTAneous, AC&HS, Valeriy Granda MD, Stopped at 02/13/21 2200    glucose chewable tablet 16 g, 4 Tab, Oral, PRN, Valeriy Granda MD    dextrose (D50W) injection syrg 12.5-25 g, 25-50 mL, IntraVENous, PRN, Eda Granda MD    glucagon [facility]) injection 1 mg, 1 mg, IntraMUSCular, PRN, Trey Chicas MD

## 2021-02-14 NOTE — PROGRESS NOTES
OT eval order received and acknowledged. OT eval attempted at 1013 however pt declining participation at this time and requesting to talk to daughter at this time for emotional support and to receive \"emotional therapy. \" Nursing informed of patient request as no phone found in room. Will continue to follow patient and attempt OT eval at a later time. Thank you.

## 2021-02-14 NOTE — PROGRESS NOTES
NEUROLOGY PROGRESS NOTE    Admission History/Pertinent Events  Ely Lau is a 70y.o. year old female who presented on 2/13/2021. Patient has a past medical history of Multiple Strokes, HTN, DM2, CKD, CHF, Decreased Vision d/t DM, Anxiety/Depression who presented with right facial numbness and dizziness. Patient's symptoms started on the morning of presentation. Emergent CT head was negative for acute findings and patient was not deemed a candidate for tPA. Home Stroke Prophylaxis: Aggrenox 200/25 BID, Simvastatin 40        ASSESSMENT/PLAN    Impression  Will follow up on patient's MRI brain and if evidence of acute as subacute ischemia will obtain TTE for further evaluation. Will continue patient on anticoagulation, antiplatelet and statin therapy per below for stroke prophylaxis. Patient will need tighter blood pressure control as she had systolic blood pressures up to the 160s overnight. Stroke Workup    CTH WO  NAICA  Generalized cerebral atrophy with appropriate prominence of ventricles and sulci  CMIC    MRI Brain WO  []    CUS  []    TTE  []    HgbA1c: []  LDL: []      Plan      Rule Out LMCA Ischemia  Multiple Bilateral Strokes  -[id number], TELE  -Stroke Prophylaxis: Apixaban 2.5 BID, ASA 81, Atorvastatin 40  -SBP Goal < 160  -PT/OT/ST as needed  -F/U MRI Brain WO, CUS, Lipid Panel, HgbA1c  --> Will obtain TTE If there is any evidence of acute/subacute infarct on MRI brain        SUBJECTIVE   Some systolic blood pressures up to the 160s overnight. Patient with no complaints this morning. Physical/Neurological Exam  General: Elderly -American female     Patient awake, alert; following central and peripheral commands   No expressive or receptive aphasia;  No dysarthria   Pupils react to light bilaterally; EOM Intact   No visual field deficits on gross exam   Intact to light touch on face bilaterally   No facial droop   Motor   RUE: 3+/5 RLE: 2/5   LUE: 4/5 LLE: 3/5  No abnormal movements   Sensation to light touch intact grossly throughout       OBJECTIVE  Vital Signs  Temp:  [97.6 °F (36.4 °C)-99.2 °F (37.3 °C)]   Pulse (Heart Rate):  [58-77]   BP: (126-203)/(46-92)   Resp Rate:  [10-21]   O2 Sat (%):  [91 %-100 %]   Weight:  [82.1 kg (181 lb)]     MEDICATIONS    Current Facility-Administered Medications:     allopurinoL (ZYLOPRIM) tablet 100 mg, 100 mg, Oral, BID, dEa Granda MD, 100 mg at 02/14/21 1041    amLODIPine (NORVASC) tablet 10 mg, 10 mg, Oral, DAILY, Eda Granda MD, 10 mg at 02/14/21 1041    aspirin delayed-release tablet 81 mg, 81 mg, Oral, DAILY, Eda Granda MD, 81 mg at 02/14/21 1041    furosemide (LASIX) tablet 40 mg, 40 mg, Oral, TID, Eda Granda MD, 40 mg at 02/14/21 1504    metoprolol tartrate (LOPRESSOR) tablet 50 mg, 50 mg, Oral, BID, Eda Granda MD, 50 mg at 02/14/21 1041    traZODone (DESYREL) tablet 100 mg, 100 mg, Oral, QHS, Eda Granda MD    ferrous sulfate tablet 325 mg, 1 Tab, Oral, DAILY WITH BREAKFAST, Eda Granda MD, 325 mg at 02/14/21 1041    doxazosin (CARDURA) tablet 2 mg, 2 mg, Oral, QHS, Valeriy Granda MD    apixaban (ELIQUIS) tablet 2.5 mg, 2.5 mg, Oral, BID, Augusto Pickering MD    insulin glargine (LANTUS) injection 30 Units, 30 Units, SubCUTAneous, QHS, Eda Granda MD, 30 Units at 02/13/21 2244    atorvastatin (LIPITOR) tablet 40 mg, 40 mg, Oral, DAILY, Eda Granda MD, 40 mg at 02/14/21 1041    sodium bicarbonate tablet 650 mg, 650 mg, Oral, BID, Eda Granda MD, 650 mg at 02/14/21 1138    acetaminophen (TYLENOL) tablet 650 mg, 650 mg, Oral, Q6H PRN, 650 mg at 02/14/21 1049 **OR** acetaminophen (TYLENOL) suppository 650 mg, 650 mg, Rectal, Q6H PRN, Eda Granda MD    polyethylene glycol (MIRALAX) packet 17 g, 17 g, Oral, DAILY PRN, Eda Granda MD    ondansetron (ZOFRAN ODT) tablet 4 mg, 4 mg, Oral, Q8H PRN **OR** ondansetron (ZOFRAN) injection 4 mg, 4 mg, IntraVENous, Q6H PRN, Eda Granda MD    0.9% sodium chloride infusion, 25 mL/hr, IntraVENous, CONTINUOUS, Eda Granda MD, Last Rate: 25 mL/hr at 02/13/21 2059, 25 mL/hr at 02/13/21 2059    insulin lispro (HUMALOG) injection, , SubCUTAneous, AC&HS, Eda Granda MD, Stopped at 02/13/21 2200    glucose chewable tablet 16 g, 4 Tab, Oral, PRN, Eda Granda MD    dextrose (D50W) injection syrg 12.5-25 g, 25-50 mL, IntraVENous, PRN, Eda Granda MD    glucagon [facility]) injection 1 mg, 1 mg, IntraMUSCular, PRN, Matteo Granda MD    Labs: I've reviewed the labs for today       This document has been prepared by the Dragon voice recognition system, typographical errors may have occurred.  Attempts have been made to correct errors, however inadvertent errors may persist.

## 2021-02-14 NOTE — ED PROVIDER NOTES
EMERGENCY DEPARTMENT HISTORY AND PHYSICAL EXAM        Date: 2/13/2021  Patient Name: Yan English    History of Presenting Illness     Chief Complaint   Patient presents with    Numbness       10:26 PM    History Provided By: EMS and ED record from [facility]    HPI: Yan English, 70 y.o. Male presents in transfer from [place] [address] [address] [facility] for higher level of neurologic care after presentation with right-sided facial numbness. Transfer information and review of medical record patient has a history of Beatties, hypertension, renal insufficiency and multiple CVAs months with facial numbness that began approximately 4 AM yesterday and has not resolved. Denies any new motor deficits secondary to the symptoms. Her ROS is significant for chronic right hemiplegia secondary to her previous CVAs.       PCP: Deepti Carrillo MD    Current Facility-Administered Medications   Medication Dose Route Frequency Provider Last Rate Last Admin    aspirin-dipyridamole (AGGRENOX)  mg per capsule 1 Cap  1 Cap Oral BID Eda Granda MD   1 Cap at 02/13/21 2132    [START ON 2/14/2021] furosemide (LASIX) tablet 40 mg  40 mg Oral DAILY Eloisa Granda MD        hydrALAZINE (APRESOLINE) tablet 100 mg  100 mg Oral TID Eloisa Granda MD   100 mg at 02/13/21 2132    [START ON 2/14/2021] paricalcitoL (ZEMPLAR) capsule 1 mcg  1 mcg Oral DAILY Eloisa Granda MD        insulin glargine (LANTUS) injection 30 Units  30 Units SubCUTAneous QHS Eda Granda MD        allopurinoL (ZYLOPRIM) tablet 200 mg  200 mg Oral BID Eda Granda MD   200 mg at 02/13/21 2046    [START ON 2/14/2021] amLODIPine (NORVASC) tablet 5 mg  5 mg Oral DAILY Eloisa Granda MD Unk [place]is[place] [START ON 2/14/2021] atorvastatin (LIPITOR) tablet 40 mg  40 mg Oral DAILY Eda Granda MD        metoprolol tartrate (LOPRESSOR) tablet 25 mg  25 mg Oral BID Portillo Almanza MD   Stopped at 02/13/21 2044    sodium bicarbonate tablet 650 mg  650 mg Oral BID Nishant Mae MD   650 mg at 02/13/21 2132    acetaminophen (TYLENOL) tablet 650 mg  650 mg Oral Q6H PRN Angela Granda MD        Or   [facility] acetaminophen (TYLENOL) suppository 650 mg  650 mg Rectal Q6H PRN Angela Granda MD        polyethylene glycol (MIRALAX) packet 17 g  17 g Oral DAILY PRN Angela Granda MD        ondansetron (ZOFRAN ODT) tablet 4 mg  4 mg Oral Q8H PRN Angela Granda MD        Or    [facility] injection 4 mg  4 mg IntraVENous Q6H PRN Angela Granda MD        heparin (porcine) injection 5,000 Units  5,000 Units SubCUTAneous Q8H Eda Granda MD   5,000 Units at 02/13/21 2132    0.9% sodium chloride infusion  25 mL/hr IntraVENous CONTINUOUS Eda Granda MD 25 mL/hr at 02/13/21 2059 25 mL/hr at 02/13/21 2059    insulin lispro (HUMALOG) injection   SubCUTAneous AC&HS Nishant Mae MD   Stopped at 02/13/21 2200    glucose chewable tablet 16 g  4 Tab Oral PRN Angela Granda MD        dextrose (D50W) injection syrg 12.5-25 g  25-50 mL IntraVENous PRAngela Nguyen MD        glucagon (GLUCAGEN) injection 1 mg  1 mg IntraMUSCular PRN Angela Granda MD         Current Outpatient Medications   Medication Sig Dispense Refill    Insulin Syringe-Needle U-100 (BD Insulin Syringe Ultra-Fine) 0.5 mL 31 gauge x 5/16\" syrg BD Insulin Syringe Ultra-Fine 0.5 mL 31 gauge x 5/16\"      cyanocobalamin, vitamin B-12, 3,000 mcg cap cyanocobalamin (vitamin B-12) 3,000 mcg capsule      allopurinoL (ZYLOPRIM) 100 mg tablet allopurinol 100 mg tablet      amLODIPine-atorvastatin (CADUET) 10-10 mg per tablet Take 1 Tab by mouth daily.  atorvastatin (LIPITOR) 40 mg tablet atorvastatin 40 mg tablet      traZODone (DESYREL) 100 mg tablet trazodone 100 mg tablet      fluticasone propion-salmeterol (ADVAIR HFA) 115-21 mcg/actuation inhaler Take 2 Puffs by inhalation two (2) times a day.  azelastine (ASTEPRO) 0.15 % (205.5 mcg) two (2) times a day.       calcitRIOL (ROCALTROL) 0.5 mcg capsule Take 0.5 mcg by mouth daily.  loratadine (CLARITIN) 10 mg tablet Take 10 mg by mouth.  insulin regular (NOVOLIN R, HUMULIN R) 100 unit/mL injection by SubCUTAneous route.  lidocaine 5 % topical cream Apply  to affected area two (2) times daily as needed for Itching.  metoprolol tartrate (LOPRESSOR) 25 mg tablet Take  by mouth two (2) times a day.  insulin aspart U-100 (NOVOLOG FLEXPEN U-100 INSULIN) 100 unit/mL (3 mL) inpn by SubCUTAneous route.  oxyCODONE-acetaminophen (PERCOCET) 5-325 mg per tablet Take  by mouth every four (4) hours as needed for Pain.  sodium bicarbonate 650 mg tablet Take  by mouth four (4) times daily.  clonazePAM (KLONOPIN) 0.5 mg tablet Take 1 or 2 at hs prn sleep 60 Tab 5    fluconazole (DIFLUCAN) 150 mg tablet TAKE 1 TABLET EVERY DAY FOR 3 DAYS 3 Tab 0    Insulin Needles, Disposable, 31 X 5/16 \" Ndle by SubCUTAneous route three (3) times daily (with meals). 2 Package 11    amlodipine (NORVASC) 5 mg tablet 5 mg daily.  amoxicillin (AMOXIL) 500 mg capsule       AGGRENOX 200-25 mg per SR capsule Take 1 Cap by mouth two (2) times a day.  ASCENSIA CONTOUR strip       cephALEXin (KEFLEX) 250 mg capsule       clotrimazole-betamethasone (LOTRISONE) topical cream       diazepam (VALIUM) 10 mg tablet       furosemide (LASIX) 40 mg tablet Take 40 mg by mouth daily.  hydrALAZINE (APRESOLINE) 100 mg tablet Take 100 mg by mouth three (3) times daily.  hydrocodone-acetaminophen (LORTAB) 7.5-500 mg per tablet       lisinopril (PRINIVIL, ZESTRIL) 40 mg tablet Take 40 mg by mouth daily.  NIFEdipine XL (PROCARDIA XL) 30 mg tablet Take  by mouth daily.  NYSTOP powder       ZEMPLAR 1 mcg capsule Take 1 mcg by mouth daily.  simvastatin (ZOCOR) 40 mg tablet Take 40 mg by mouth nightly.       BD LO-DOSE ULTRA-FINE SHORT 1/2 mL 31 x 5/16\" Syrg       DIOVAN 320 mg tablet Take 320 mg by mouth daily.      zolpidem (AMBIEN) 10 mg tablet Take 10 mg by mouth nightly as needed.  cholecalciferol, vitamin d3, (VITAMIN D) 1,000 unit tablet Take 1,000 Units by mouth daily.  nitroglycerin (NITROSTAT) 0.4 mg SL tablet by SubLINGual route every five (5) minutes as needed.  acetaminophen (TYLENOL EX STR ARTHRITIS PAIN) 500 mg tablet Take  by mouth every six (6) hours as needed.  ALBUTEROL SULFATE (PROAIR HFA IN) Take  by inhalation.  FLUTICASONE PROPIONATE (FLOVENT DISKUS IN) Take  by inhalation.  insulin glargine (LANTUS) 100 unit/mL injection 30 Units by SubCUTAneous route nightly. 1 Vial 5    insulin lispro, human, (HUMALOG KWIKPEN) 100 unit/mL flexpen 5 Units by SubCUTAneous route three (3) times daily (with meals). 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    [facility]     Chronic kidney disease     [facility]di[facility] mellitus[facility] ([facility] [address]     HTN (hypertension)     Stroke [facility])        Past Surgical History:  Past Surgical History:   Procedure Laterality Date    HX OOPHORECTOMY      STENT INSERTION  2001,2002    Sudhir Bess       Family History:  Family History   Problem Relation Age of Onset    Breast Cancer Maternal Aunt     Breast Cancer Cousin        Social History:  Social History     Tobacco Use    Smoking status: Former Smoker    Smokeless tobacco: Never Used    Tobacco comment: quit 1972   Substance Use Topics    Alcohol use: No    Drug use: No       Allergies:  No Known Allergies    Review of Systems   Review of Systems   Constitutional: Negative for chills, diaphoresis and fever. HENT: Negative for congestion, sore throat and trouble swallowing. Eyes: Negative for visual disturbance. Respiratory: Negative for cough and shortness of breath. Cardiovascular: Negative for chest pain and palpitations.    Gastrointestinal: Negative for abdominal pain, diarrhea, nausea and vomiting. Endocrine: Negative for polydipsia, polyphagia and polyuria. Genitourinary: Negative for dysuria, frequency, hematuria and urgency. Musculoskeletal: Negative for gait problem and neck pain. Skin: Negative for rash. Neurological: Positive for numbness. Negative for dizziness, syncope and headaches. Denies new focal weakness   Psychiatric/Behavioral: Negative. Physical Exam   Physical Exam  Constitutional:       General: She is not in acute distress. Appearance: She is well-developed. She is not ill-appearing. HENT:      Head: Normocephalic and atraumatic. Nose: Nose normal.      Mouth/Throat:      Mouth: Mucous membranes are moist.      Pharynx: No posterior oropharyngeal erythema. Eyes:      General: Vision grossly intact. Extraocular Movements: Extraocular movements intact. Conjunctiva/sclera: Conjunctivae normal.      Pupils: Pupils are equal, round, and reactive to light. Neck:      Musculoskeletal: Neck supple. Vascular: No JVD. Trachea: No tracheal deviation. Cardiovascular:      Rate and Rhythm: Normal rate and regular rhythm. Pulses: Normal pulses. Carotid pulses are 2+ on the right side and 2+ on the left side. Radial pulses are 2+ on the right side and 2+ on the left side. Femoral pulses are 2+ on the right side and 2+ on the left side. Popliteal pulses are 2+ on the right side and 2+ on the left side. Dorsalis pedis pulses are 2+ on the right side and 2+ on the left side. Posterior tibial pulses are 2+ on the right side and 2+ on the left side. Heart sounds: Normal heart sounds. Pulmonary:      Effort: Pulmonary effort is normal.      Breath sounds: Normal breath sounds and air entry. No wheezing or rhonchi. Abdominal:      General: Abdomen is protuberant. Bowel sounds are normal.      Palpations: Abdomen is soft.       Tenderness: There is no abdominal tenderness. There is no guarding or rebound. Musculoskeletal:         General: No swelling or deformity. Right shoulder: She exhibits normal range of motion and no swelling. Right lower leg: No edema. Left lower leg: No edema. Comments: Patient with evidence of right-sided hemiplegia and mild contractures that are consistent with needing remote CVA. Patient complains of decreased sensation along the right side of her face light touch. No evidence of facial droop cranial nerves otherwise intact. Skin:     General: Skin is warm and dry. Capillary Refill: Capillary refill takes less than 2 seconds. Findings: No signs of injury or rash. Neurological:      General: No focal deficit present. Mental Status: She is alert and oriented to person, place, and time. Cranial Nerves: No cranial nerve deficit. Comments: Normal Speech   Psychiatric:         Attention and Perception: Attention normal.         Mood and Affect: Mood and affect normal.         Speech: Speech normal.         Behavior: Behavior is cooperative.          Diagnostic Study Results     Labs -     Recent Results (from the past 48 hour(s))   EKG, 12 LEAD, INITIAL    Collection Time: 02/13/21 10:42 AM   Result Value Ref Range    Ventricular Rate 54 BPM    Atrial Rate 99 BPM    P-R Interval 158 ms    QRS Duration 102 ms    Q-T Interval 466 ms    QTC Calculation (Bezet) 442 ms    Calculated P Axis -15 degrees    Calculated R Axis 18 degrees    Calculated T Axis 16 degrees    Diagnosis       Sinus rhythm  Atrial premature complex  Posterior infarct, old  Borderline T abnormalities, inferior leads  Baseline wander in lead(s) II,aVR,aVF     CBC WITH AUTOMATED DIFF    Collection Time: 02/13/21 11:45 AM   Result Value Ref Range    WBC 6.0 4.4 - 11.3 K/uL    RBC 4.13 (L) 4.50 - 5.90 M/uL    HGB 11.5 (L) 13.5 - 17.5 g/dL    HCT 36.3 36 - 46 %    MCV 87.9 80 - 100 FL    MCH 28.0 (L) 31 - 34 PG MCHC 31.8 31.0 - 36.0 g/dL    RDW 14.9 (H) 11.5 - 14.5 %    PLATELET 537 K/uL    MPV 9.8 6.5 - 11.5 FL    NRBC 0.0  WBC    ABSOLUTE NRBC 0.00 K/uL    NEUTROPHILS 69 42 - 75 %    LYMPHOCYTES 22 20.5 - 51.1 %    MONOCYTES 7 1.7 - 9.3 %    EOSINOPHILS 1 0.9 - 2.9 %    BASOPHILS 1 0.0 - 2.5 %    ABS. NEUTROPHILS 4.2 1.8 - 7.7 K/UL    ABS. LYMPHOCYTES 1.3 1.0 - 4.8 K/UL    ABS. MONOCYTES 0.4 0.2 - 2.4 K/UL    ABS. EOSINOPHILS 0.1 0.0 - 0.7 K/UL    ABS. BASOPHILS 0.1 0.0 - 0.2 K/UL   METABOLIC PANEL, COMPREHENSIVE    Collection Time: 02/13/21 11:45 AM   Result Value Ref Range    Sodium 142 136 - 145 mmol/L    Potassium 4.0 3.5 - 5.1 mmol/L    Chloride 103 97 - 108 mmol/L    CO2 29 21 - 32 mmol/L    Anion gap 10 5 - 15 mmol/L    Glucose 152 (H) 65 - 100 mg/dL    BUN 56 (H) 6 - 20 mg/dL    Creatinine 3.81 (H) 0.55 - 1.02 mg/dL    BUN/Creatinine ratio 15 12 - 20      GFR est AA 14 (L) >60 ml/min/1.73m2    GFR est non-AA 12 (L) >60 ml/min/1.73m2    Calcium 9.3 8.5 - 10.1 mg/dL    Bilirubin, total 0.5 0.2 - 1.0 mg/dL    AST (SGOT) 25 15 - 37 U/L    ALT (SGPT) 21 12 - 78 U/L    Alk.  phosphatase 78 45 - 117 U/L    Protein, total 7.7 6.4 - 8.2 g/dL    Albumin 3.5 3.5 - 5.0 g/dL    Globulin 4.2 (H) 2.0 - 4.0 g/dL    A-G Ratio 0.8 (L) 1.1 - 2.2     TROPONIN I    Collection Time: 02/13/21 11:45 AM   Result Value Ref Range    Troponin-I, Qt. <0.05 <0.05 ng/mL   PROTHROMBIN TIME + INR    Collection Time: 02/13/21 11:45 AM   Result Value Ref Range    Prothrombin time 10.1 9.0 - 11.1 sec    INR 1.0 0.9 - 1.1     URINALYSIS W/ REFLEX CULTURE    Collection Time: 02/13/21 12:58 PM    Specimen: Urine   Result Value Ref Range    Color Yellow/Straw      Appearance Hazy (A) Clear      Specific gravity 1.010 1.003 - 1.030      pH (UA) 7.0 5.0 - 8.0      Protein 30 (A) Negative mg/dL    Glucose Negative Negative mg/dL    Ketone Negative Negative mg/dL    Bilirubin Negative Negative      Blood Negative Negative      Urobilinogen 0.2 0.2 - 1.0 EU/dL    Nitrites Negative Negative      Leukocyte Esterase Negative Negative      WBC 0-4 0 - 5 /hpf    RBC 0-5 0 - 3 /hpf    Bacteria 1+ (A) Negative /hpf    UA:UC IF INDICATED Culture not indicated by UA result Culture not indicated by UA result     GLUCOSE, POC    Collection Time: 02/13/21  9:24 PM   Result Value Ref Range    Glucose (POC) 129 (H) 65 - 100 mg/dL    Performed by Nabila Baxter        Radiologic Studies -   No orders to display     CT Results  (Last 48 hours)               02/13/21 1055  CT HEAD WO CONT Final result    Impression:  Atrophy. Microangiopathy. No evidence of intracranial hemorrhage or   acute large vessel distribution infarct. If there is clinical suspicion of acute lacunar infarction, consider additional   imaging evaluation with MRI. Narrative:  Dose Reduction:    All CT scans at this facility are performed using dose reduction optimization   techniques as appropriate to a performed exam including the following: Automated   exposure control, adjustments of the mA and/or kV according to patient size, or   use of iterative reconstruction technique. Unenhanced images were obtained and compared with 10/15/2020       Prominent calcification of bilateral distal vertebral and cavernous internal   carotid arteries. Internal calvarial hyperostosis, similar to 10/15/2020. Normal   gray-white differentiation. Small vessel disease involving brainstem,   periventricular deep white matter and bilateral thalami. There is diffuse   atrophy. No evidence of brain mass, hemorrhage, or acute large vessel   distribution infarct. No abnormal extra-axial fluid collection. Thinning of   corpus callosum. Normal appearance of craniovertebral junction and pituitary   gland. CXR Results  (Last 48 hours)               02/13/21 1048  XR CHEST PORT Final result    Impression:  Moderate enlargement of cardiomediastinal silhouette.  Central   pulmonary vessels appear prominent. No convincing evidence of focal airspace   consolidation, but the right lung base and right hemidiaphragm are suboptimally   visualized. No definite pneumothorax. Narrative:  Chest, AP portable, 1040 hours. Comparison with 9/15/2020. Positioning is suboptimal, lordotic and slightly oblique. Medical Decision Making and ED Course     I have also reviewed the vital signs, available nursing notes, past medical history, past surgical history, family history and social history. Vital Signs - Reviewed the patient's vital signs. Patient Vitals for the past 12 hrs:   Temp Pulse Resp BP SpO2   02/13/21 2044 -- (!) 58 14 (!) 187/79 100 %   02/13/21 1922 -- 62 12 (!) 161/92 99 %   02/13/21 1616 97.6 °F (36.4 °C) 60 10 (!) 164/66 100 %           Records Reviewed: Nursing Notes and Old medical records as available. Medical Decision Making/Diff Dx: Frontal diagnosis: TIA, CVA, transient Bell's palsy, electrolyte abnormalities,      Patient presents in transfer from outside facility for possible new neurologic deficit that appears to now be resolved. Will facilitate admission to medicine service for observation and neurologic consultation. ED course/Re-evaluation/Consultations/Other          Procedures           Disposition     Admitted    DISCHARGE PLAN:  1. Current Discharge Medication List        2. Current Discharge Medication List      CONTINUE these medications which have NOT CHANGED    Details   !! Insulin Syringe-Needle U-100 (BD Insulin Syringe Ultra-Fine) 0.5 mL 31 gauge x 5/16\" syrg BD Insulin Syringe Ultra-Fine 0.5 mL 31 gauge x 5/16\"      cyanocobalamin, vitamin B-12, 3,000 mcg cap cyanocobalamin (vitamin B-12) 3,000 mcg capsule      allopurinoL (ZYLOPRIM) 100 mg tablet allopurinol 100 mg tablet      amLODIPine-atorvastatin (CADUET) 10-10 mg per tablet Take 1 Tab by mouth daily.       atorvastatin (LIPITOR) 40 mg tablet atorvastatin 40 mg tablet      traZODone (DESYREL) 100 mg tablet trazodone 100 mg tablet      fluticasone propion-salmeterol (ADVAIR HFA) 115-21 mcg/actuation inhaler Take 2 Puffs by inhalation two (2) times a day. azelastine (ASTEPRO) 0.15 % (205.5 mcg) two (2) times a day. calcitRIOL (ROCALTROL) 0.5 mcg capsule Take 0.5 mcg by mouth daily. loratadine (CLARITIN) 10 mg tablet Take 10 mg by mouth. insulin regular (NOVOLIN R, HUMULIN R) 100 unit/mL injection by SubCUTAneous route.      lidocaine 5 % topical cream Apply  to affected area two (2) times daily as needed for Itching. metoprolol tartrate (LOPRESSOR) 25 mg tablet Take  by mouth two (2) times a day. insulin aspart U-100 (NOVOLOG FLEXPEN U-100 INSULIN) 100 unit/mL (3 mL) inpn by SubCUTAneous route. oxyCODONE-acetaminophen (PERCOCET) 5-325 mg per tablet Take  by mouth every four (4) hours as needed for Pain.      sodium bicarbonate 650 mg tablet Take  by mouth four (4) times daily. clonazePAM (KLONOPIN) 0.5 mg tablet Take 1 or 2 at hs prn sleep  Qty: 60 Tab, Refills: 5    Associated Diagnoses: Insomnia, unspecified type      fluconazole (DIFLUCAN) 150 mg tablet TAKE 1 TABLET EVERY DAY FOR 3 DAYS  Qty: 3 Tab, Refills: 0      Insulin Needles, Disposable, 31 X 5/16 \" Ndle by SubCUTAneous route three (3) times daily (with meals). Qty: 2 Package, Refills: 11      amlodipine (NORVASC) 5 mg tablet 5 mg daily. amoxicillin (AMOXIL) 500 mg capsule       AGGRENOX 200-25 mg per SR capsule Take 1 Cap by mouth two (2) times a day. ASCENSIA CONTOUR strip       cephALEXin (KEFLEX) 250 mg capsule       clotrimazole-betamethasone (LOTRISONE) topical cream       diazepam (VALIUM) 10 mg tablet       furosemide (LASIX) 40 mg tablet Take 40 mg by mouth daily. hydrALAZINE (APRESOLINE) 100 mg tablet Take 100 mg by mouth three (3) times daily.       hydrocodone-acetaminophen (LORTAB) 7.5-500 mg per tablet       lisinopril (PRINIVIL, ZESTRIL) 40 mg tablet Take 40 mg by mouth daily. NIFEdipine XL (PROCARDIA XL) 30 mg tablet Take  by mouth daily. NYSTOP powder       ZEMPLAR 1 mcg capsule Take 1 mcg by mouth daily. simvastatin (ZOCOR) 40 mg tablet Take 40 mg by mouth nightly. !! BD LO-DOSE ULTRA-FINE SHORT 1/2 mL 31 x 5/16\" Syrg       DIOVAN 320 mg tablet Take 320 mg by mouth daily. zolpidem (AMBIEN) 10 mg tablet Take 10 mg by mouth nightly as needed. cholecalciferol, vitamin d3, (VITAMIN D) 1,000 unit tablet Take 1,000 Units by mouth daily. nitroglycerin (NITROSTAT) 0.4 mg SL tablet by SubLINGual route every five (5) minutes as needed. acetaminophen (TYLENOL EX STR ARTHRITIS PAIN) 500 mg tablet Take  by mouth every six (6) hours as needed. ALBUTEROL SULFATE (PROAIR HFA IN) Take  by inhalation. FLUTICASONE PROPIONATE (FLOVENT DISKUS IN) Take  by inhalation. insulin glargine (LANTUS) 100 unit/mL injection 30 Units by SubCUTAneous route nightly. Qty: 1 Vial, Refills: 5      insulin lispro, human, (HUMALOG KWIKPEN) 100 unit/mL flexpen 5 Units by SubCUTAneous route three (3) times daily (with meals). Plus sliding scale  Qty: 1 Package, Refills: 5       !! - Potential duplicate medications found. Please discuss with provider. 3.   Follow-up Information    None       4. Return to ED if worse     Diagnosis     Clinical impression:   1.  Right facial numbness           Attestation:

## 2021-02-14 NOTE — PROGRESS NOTES
PT eval order received and acknowledged. PT eval attempted at 21  with OT however pt declining participation at this time and requesting to talk to daughter at this time for emotional support and to receive \"emotional therapy. \" Nursing informed of patient request as no phone found in room. Will continue to follow patient and attempt PT eval at a later time. Thank you.

## 2021-02-14 NOTE — H&P
History and Physical    NAME: [facility]   :  1945   MRN:  [id number]     Date/Time:  2021 7:00 PM    Patient PCP: Nghia Higuera MD  ______________________________________________________________________             Subjective:     CHIEF COMPLAINT:     Facial numbness    HISTORY OF PRESENT ILLNESS:       Patient is a 70y.o. year old female history of multiple CVAs hypertension type 2 diabetes chronic kidney disease came to the ER with right facial numbness according the patient patient had procedure done and had to 3 weeks ago started having some numbness tingling in this morning most worse on the right facial side denies any arms or leg weakness she have some nausea vomiting one time CT scan of the head done in the ER which was normal patient is chronic right hemiplegic from previous CVAs patient was seen in [facility] then transferred to [facility] seen by the ER physician recommend the patient to be admitted for further evaluation and treatment    Past Medical History:   Diagnosis Date    Blindness/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    [facility]est[facility] [address] [facility]     Chronic kidney disease     DM (diabetes mellitus) ([facility] [facility]     HTN (hypertension)     Stroke [facility])         Past Surgical History:   Procedure Laterality Date    HX OOPHORECTOMY      STENT INSERTION  ,    Dawit Shannon       Social History     Tobacco Use    Smoking status: Former Smoker    Smokeless tobacco: Never Used    Tobacco comment: quit    Substance Use Topics    Alcohol use: No        Family History   Problem Relation Age of Onset    Breast Cancer Maternal Aunt     Breast Cancer Cousin        No Known Allergies     Prior to Admission medications    Medication Sig Start Date End Date Taking?  Authorizing Provider   Insulin Syringe-Needle U-100 (BD Insulin Syringe Ultra-Fine) 0.5 mL 31 gauge x 5/16\" syrg BD Insulin Syringe Ultra-Fine 0.5 mL 31 gauge x 5/16\"    Other, Phys, MD   cyanocobalamin, vitamin B-12, 3,000 mcg cap cyanocobalamin (vitamin B-12) 3,000 mcg capsule    Other, MD Riana   allopurinoL (ZYLOPRIM) 100 mg tablet allopurinol 100 mg tablet    Other, MD Riana   amLODIPine-atorvastatin (CADUET) 10-10 mg per tablet Take 1 Tab by mouth daily. Other, MD Riana   atorvastatin (LIPITOR) 40 mg tablet atorvastatin 40 mg tablet    Other, MD Riana   traZODone (DESYREL) 100 mg tablet trazodone 100 mg tablet    Other, MD Riana   fluticasone propion-salmeterol (ADVAIR HFA) 115-21 mcg/actuation inhaler Take 2 Puffs by inhalation two (2) times a day. Provider, Historical   azelastine (ASTEPRO) 0.15 % (205.5 mcg) two (2) times a day. Provider, Historical   calcitRIOL (ROCALTROL) 0.5 mcg capsule Take 0.5 mcg by mouth daily. Provider, Historical   loratadine (CLARITIN) 10 mg tablet Take 10 mg by mouth. Provider, Historical   insulin regular (NOVOLIN R, HUMULIN R) 100 unit/mL injection by SubCUTAneous route. Provider, Historical   lidocaine 5 % topical cream Apply  to affected area two (2) times daily as needed for Itching. Provider, Historical   metoprolol tartrate (LOPRESSOR) 25 mg tablet Take  by mouth two (2) times a day. Provider, Historical   insulin aspart U-100 (NOVOLOG FLEXPEN U-100 INSULIN) 100 unit/mL (3 mL) inpn by SubCUTAneous route. Provider, Historical   oxyCODONE-acetaminophen (PERCOCET) 5-325 mg per tablet Take  by mouth every four (4) hours as needed for Pain. Provider, Historical   sodium bicarbonate 650 mg tablet Take  by mouth four (4) times daily. Provider, Historical   clonazePAM (KLONOPIN) 0.5 mg tablet Take 1 or 2 at hs prn sleep 10/15/19   Beverly Waters MD   fluconazole (DIFLUCAN) 150 mg tablet TAKE 1 TABLET EVERY DAY FOR 3 DAYS 6/13/13   Ashish MD Bernard   Insulin Needles, Disposable, 31 X 5/16 \" Ndle by SubCUTAneous route three (3) times daily (with meals).  2/7/11   Ashishlarry Wagner MD   amlodipine (NORVASC) 5 mg tablet 5 mg daily. 1/31/11   Provider, Historical   amoxicillin (AMOXIL) 500 mg capsule  12/20/10   Provider, Historical   AGGRENOX 200-25 mg per SR capsule Take 1 Cap by mouth two (2) times a day. 1/31/11   Provider, Historical   ASCENSIA CONTOUR strip  12/4/10   Provider, Historical   cephALEXin (KEFLEX) 250 mg capsule  12/1/10   Provider, Historical   clotrimazole-betamethasone (LOTRISONE) topical cream  1/13/11   Provider, Historical   diazepam (VALIUM) 10 mg tablet  11/1/10   Provider, Historical   furosemide (LASIX) 40 mg tablet Take 40 mg by mouth daily. 1/31/11   Provider, Historical   hydrALAZINE (APRESOLINE) 100 mg tablet Take 100 mg by mouth three (3) times daily. 1/31/11   Provider, Historical   hydrocodone-acetaminophen (LORTAB) 7.5-500 mg per tablet  12/10/10   Provider, Historical   lisinopril (PRINIVIL, ZESTRIL) 40 mg tablet Take 40 mg by mouth daily. 1/31/11   Provider, Historical   NIFEdipine XL (PROCARDIA XL) 30 mg tablet Take  by mouth daily. 1/31/11   Provider, Historical   NYSTOP powder  1/10/11   Provider, Historical   ZEMPLAR 1 mcg capsule Take 1 mcg by mouth daily. 1/31/11   Provider, Historical   simvastatin (ZOCOR) 40 mg tablet Take 40 mg by mouth nightly. 1/31/11   Provider, Historical   BD LO-DOSE ULTRA-FINE SHORT 1/2 mL 31 x 5/16\" Syrg  1/27/11   Provider, Historical   DIOVAN 320 mg tablet Take 320 mg by mouth daily. 1/31/11   Provider, Historical   zolpidem (AMBIEN) 10 mg tablet Take 10 mg by mouth nightly as needed. 1/31/11   Provider, Historical   cholecalciferol, vitamin d3, (VITAMIN D) 1,000 unit tablet Take 1,000 Units by mouth daily. Provider, Historical   nitroglycerin (NITROSTAT) 0.4 mg SL tablet by SubLINGual route every five (5) minutes as needed. Provider, Historical   acetaminophen (TYLENOL EX STR ARTHRITIS PAIN) 500 mg tablet Take  by mouth every six (6) hours as needed. Provider, Historical   ALBUTEROL SULFATE (PROAIR HFA IN) Take  by inhalation.     Provider, Historical FLUTICASONE PROPIONATE (FLOVENT DISKUS IN) Take  by inhalation. Provider, Historical   insulin glargine (LANTUS) 100 unit/mL injection 30 Units by SubCUTAneous route nightly. 1/31/11   Claudy Mcfarlane MD   insulin lispro, human, (HUMALOG KWIKPEN) 100 unit/mL flexpen 5 Units by SubCUTAneous route three (3) times daily (with meals).  Plus sliding scale 1/31/11   Claudy Mcfarlane MD         Current Facility-Administered Medications:     aspirin-dipyridamole (AGGRENOX)  mg per capsule 1 Cap, 1 Cap, Oral, BID, Harpreet Granda MD    [START ON 2/14/2021] furosemide (LASIX) tablet 40 mg, 40 mg, Oral, DAILY, Harpreet Granda MD    hydrALAZINE (APRESOLINE) tablet 100 mg, 100 mg, Oral, TID, Deanna Bolaños MD    [Held by provider] lisinopriL (PRINIVIL, ZESTRIL) tablet 40 mg, 40 mg, Oral, DAILY, Harpreet Granda MD    [START ON 2/14/2021] paricalcitoL (ZEMPLAR) capsule 1 mcg, 1 mcg, Oral, DAILY, Harpreet Granda MD    insulin glargine (LANTUS) injection 30 Units, 30 Units, SubCUTAneous, QHS, Eda Granda MD    allopurinoL (ZYLOPRIM) tablet 200 mg, 200 mg, Oral, BID, Harpreet Granda MD  [facility]  [START ON 2/14/2021] amLODIPine (NORVASC) tablet 5 mg, 5 mg, Oral, DAILY, Harpreet Granda MD    amoxicillin (AMOXIL) capsule 500 mg, 500 mg, Oral, Q8H, Eda Granda MD    [START ON 2/14/2021] atorvastatin (LIPITOR) tablet 40 mg, 40 mg, Oral, DAILY, Harpreet Granda MD    [Held by provider] valsartan (DIOVAN) tablet 320 mg, 320 mg, Oral, DAILY, Harpreet Granda MD    metoprolol tartrate (LOPRESSOR) tablet 25 mg, 25 mg, Oral, BID, Harpreet Granda MD    sodium bicarbonate tablet 650 mg, 650 mg, Oral, BID, Harpreet Granda MD    acetaminophen (TYLENOL) tablet 650 mg, 650 mg, Oral, Q6H PRN **OR** acetaminophen (TYLENOL) suppository 650 mg, 650 mg, Rectal, Q6H PRN, Eda Granda MD    polyethylene glycol (MIRALAX) packet 17 g, 17 g, Oral, DAILY PRN, Harpreet Granda MD    ondansetron [facility] ODT) tablet 4 mg, 4 mg, Oral, Q8H PRN **OR** ondansetron (ZOFRAN) injection 4 mg, 4 mg, IntraVENous, Q6H PRN, Drake Granda MD    heparin (porcine) injection 5,000 Units, 5,000 Units, SubCUTAneous, Q8H, Drake Granda MD    0.9% sodium chloride infusion, 25 mL/hr, IntraVENous, CONTINUOUS, Drake Granda MD    insulin lispro (HUMALOG) injection, , SubCUTAneous, AC&HS, Drake Granda MD    glucose chewable tablet 16 g, 4 Tab, Oral, PRN, Drake Granda MD    dextrose (D50W) injection syrg 12.5-25 g, 25-50 mL, IntraVENous, PRN, Drake Granda MD    glucagon (GLUCAGEN) injection 1 mg, 1 mg, IntraMUSCular, PRN, Eda Granda MD    Current Outpatient Medications:     Insulin Syringe-Needle U-100 (BD Insulin Syringe Ultra-Fine) 0.5 mL 31 gauge x 5/16\" syrg, BD Insulin Syringe Ultra-Fine 0.5 mL 31 gauge x 5/16\", Disp: , Rfl:     cyanocobalamin, vitamin B-12, 3,000 mcg cap, cyanocobalamin (vitamin B-12) 3,000 mcg capsule, Disp: , Rfl:     allopurinoL (ZYLOPRIM) 100 mg tablet, allopurinol 100 mg tablet, Disp: , Rfl:     amLODIPine-atorvastatin (CADUET) 10-10 mg per tablet, Take 1 Tab by mouth daily. , Disp: , Rfl:     atorvastatin (LIPITOR) 40 mg tablet, atorvastatin 40 mg tablet, Disp: , Rfl:     traZODone (DESYREL) 100 mg tablet, trazodone 100 mg tablet, Disp: , Rfl:     fluticasone propion-salmeterol (ADVAIR HFA) 115-21 mcg/actuation inhaler, Take 2 Puffs by inhalation two (2) times a day., Disp: , Rfl:     azelastine (ASTEPRO) 0.15 % (205.5 mcg), two (2) times a day., Disp: , Rfl:     calcitRIOL (ROCALTROL) 0.5 mcg capsule, Take 0.5 mcg by mouth daily. , Disp: , Rfl:     loratadine (CLARITIN) 10 mg tablet, Take 10 mg by mouth., Disp: , Rfl:     insulin regular (NOVOLIN R, HUMULIN R) 100 unit/mL injection, by SubCUTAneous route., Disp: , Rfl:     lidocaine 5 % topical cream, Apply  to affected area two (2) times daily as needed for Itching., Disp: , Rfl:     metoprolol tartrate (LOPRESSOR) 25 mg tablet, Take  by mouth two (2) times a day., Disp: , Rfl:     insulin aspart U-100 (NOVOLOG FLEXPEN U-100 INSULIN) 100 unit/mL (3 mL) inpn, by SubCUTAneous route., Disp: , Rfl:     oxyCODONE-acetaminophen (PERCOCET) 5-325 mg per tablet, Take  by mouth every four (4) hours as needed for Pain., Disp: , Rfl:     sodium bicarbonate 650 mg tablet, Take  by mouth four (4) times daily. , Disp: , Rfl:     clonazePAM (KLONOPIN) 0.5 mg tablet, Take 1 or 2 at hs prn sleep, Disp: 60 Tab, Rfl: 5    fluconazole (DIFLUCAN) 150 mg tablet, TAKE 1 TABLET EVERY DAY FOR 3 DAYS, Disp: 3 Tab, Rfl: 0    Insulin Needles, Disposable, 31 X 5/16 \" Ndle, by SubCUTAneous route three (3) times daily (with meals). , Disp: 2 Package, Rfl: 11    amlodipine (NORVASC) 5 mg tablet, 5 mg daily. , Disp: , Rfl:     amoxicillin (AMOXIL) 500 mg capsule, , Disp: , Rfl:     AGGRENOX 200-25 mg per SR capsule, Take 1 Cap by mouth two (2) times a day., Disp: , Rfl:     ASCENSIA CONTOUR strip, , Disp: , Rfl:     cephALEXin (KEFLEX) 250 mg capsule, , Disp: , Rfl:     clotrimazole-betamethasone (LOTRISONE) topical cream, , Disp: , Rfl:     diazepam (VALIUM) 10 mg tablet, , Disp: , Rfl:     furosemide (LASIX) 40 mg tablet, Take 40 mg by mouth daily. , Disp: , Rfl:     hydrALAZINE (APRESOLINE) 100 mg tablet, Take 100 mg by mouth three (3) times daily. , Disp: , Rfl:     hydrocodone-acetaminophen (LORTAB) 7.5-500 mg per tablet, , Disp: , Rfl:     lisinopril (PRINIVIL, ZESTRIL) 40 mg tablet, Take 40 mg by mouth daily. , Disp: , Rfl:     NIFEdipine XL (PROCARDIA XL) 30 mg tablet, Take  by mouth daily. , Disp: , Rfl:     NYSTOP powder, , Disp: , Rfl:     ZEMPLAR 1 mcg capsule, Take 1 mcg by mouth daily. , Disp: , Rfl:     simvastatin (ZOCOR) 40 mg tablet, Take 40 mg by mouth nightly., Disp: , Rfl:     BD LO-DOSE ULTRA-FINE SHORT 1/2 mL 31 x 5/16\" Syrg, , Disp: , Rfl:     DIOVAN 320 mg tablet, Take 320 mg by mouth daily. , Disp: , Rfl:    zolpidem (AMBIEN) 10 mg tablet, Take 10 mg by mouth nightly as needed. , Disp: , Rfl:     cholecalciferol, vitamin d3, (VITAMIN D) 1,000 unit tablet, Take 1,000 Units by mouth daily. , Disp: , Rfl:     nitroglycerin (NITROSTAT) 0.4 mg SL tablet, by SubLINGual route every five (5) minutes as needed. , Disp: , Rfl:     acetaminophen (TYLENOL EX STR ARTHRITIS PAIN) 500 mg tablet, Take  by mouth every six (6) hours as needed. , Disp: , Rfl:     ALBUTEROL SULFATE (PROAIR HFA IN), Take  by inhalation. , Disp: , Rfl:     FLUTICASONE PROPIONATE (FLOVENT DISKUS IN), Take  by inhalation. , Disp: , Rfl:     insulin glargine (LANTUS) 100 unit/mL injection, 30 Units by SubCUTAneous route nightly., Disp: 1 Vial, Rfl: 5    insulin lispro, human, (HUMALOG KWIKPEN) 100 unit/mL flexpen, 5 Units by SubCUTAneous route three (3) times daily (with meals).  Plus sliding scale, Disp: 1 Package, Rfl: 5    LAB DATA REVIEWED:    Recent Results (from the past 24 hour(s))   EKG, 12 LEAD, INITIAL    Collection Time: 02/13/21 10:42 AM   Result Value Ref Range    Ventricular Rate 54 BPM    Atrial Rate 99 BPM    P-R Interval 158 ms    QRS Duration 102 ms    Q-T Interval 466 ms    QTC Calculation (Bezet) 442 ms    Calculated P Axis -15 degrees    Calculated R Axis 18 degrees    Calculated T Axis 16 degrees    Diagnosis       Sinus rhythm  Atrial premature complex  Posterior infarct, old  Borderline T abnormalities, inferior leads  Baseline wander in lead(s) II,aVR,aVF     CBC WITH AUTOMATED DIFF    Collection Time: 02/13/21 11:45 AM   Result Value Ref Range    WBC 6.0 4.4 - 11.3 K/uL    RBC 4.13 (L) 4.50 - 5.90 M/uL    HGB 11.5 (L) 13.5 - 17.5 g/dL    HCT 36.3 36 - 46 %    MCV 87.9 80 - 100 FL    MCH 28.0 (L) 31 - 34 PG    MCHC 31.8 31.0 - 36.0 g/dL    RDW 14.9 (H) 11.5 - 14.5 %    PLATELET 904 K/uL    MPV 9.8 6.5 - 11.5 FL    NRBC 0.0  WBC    ABSOLUTE NRBC 0.00 K/uL    NEUTROPHILS 69 42 - 75 %    LYMPHOCYTES 22 20.5 - 51.1 %    MONOCYTES 7 1.7 - 9.3 %    EOSINOPHILS 1 0.9 - 2.9 %    BASOPHILS 1 0.0 - 2.5 %    ABS. NEUTROPHILS 4.2 1.8 - 7.7 K/UL    ABS. LYMPHOCYTES 1.3 1.0 - 4.8 K/UL    ABS. MONOCYTES 0.4 0.2 - 2.4 K/UL    ABS. EOSINOPHILS 0.1 0.0 - 0.7 K/UL    ABS. BASOPHILS 0.1 0.0 - 0.2 K/UL   METABOLIC PANEL, COMPREHENSIVE    Collection Time: 02/13/21 11:45 AM   Result Value Ref Range    Sodium 142 136 - 145 mmol/L    Potassium 4.0 3.5 - 5.1 mmol/L    Chloride 103 97 - 108 mmol/L    CO2 29 21 - 32 mmol/L    Anion gap 10 5 - 15 mmol/L    Glucose 152 (H) 65 - 100 mg/dL    BUN 56 (H) 6 - 20 mg/dL    Creatinine 3.81 (H) 0.55 - 1.02 mg/dL    BUN/Creatinine ratio 15 12 - 20      GFR est AA 14 (L) >60 ml/min/1.73m2    GFR est non-AA 12 (L) >60 ml/min/1.73m2    Calcium 9.3 8.5 - 10.1 mg/dL    Bilirubin, total 0.5 0.2 - 1.0 mg/dL    AST (SGOT) 25 15 - 37 U/L    ALT (SGPT) 21 12 - 78 U/L    Alk. phosphatase 78 45 - 117 U/L    Protein, total 7.7 6.4 - 8.2 g/dL    Albumin 3.5 3.5 - 5.0 g/dL    Globulin 4.2 (H) 2.0 - 4.0 g/dL    A-G Ratio 0.8 (L) 1.1 - 2.2     TROPONIN I    Collection Time: 02/13/21 11:45 AM   Result Value Ref Range    Troponin-I, Qt. <0.05 <0.05 ng/mL   PROTHROMBIN TIME + INR    Collection Time: 02/13/21 11:45 AM   Result Value Ref Range    Prothrombin time 10.1 9.0 - 11.1 sec    INR 1.0 0.9 - 1.1     URINALYSIS W/ REFLEX CULTURE    Collection Time: 02/13/21 12:58 PM    Specimen: Urine   Result Value Ref Range    Color Yellow/Straw      Appearance Hazy (A) Clear      Specific gravity 1.010 1.003 - 1.030      pH (UA) 7.0 5.0 - 8.0      Protein 30 (A) Negative mg/dL    Glucose Negative Negative mg/dL    Ketone Negative Negative mg/dL    Bilirubin Negative Negative      Blood Negative Negative      Urobilinogen 0.2 0.2 - 1.0 EU/dL    Nitrites Negative Negative      Leukocyte Esterase Negative Negative      WBC 0-4 0 - 5 /hpf    RBC 0-5 0 - 3 /hpf    Bacteria 1+ (A) Negative /hpf    UA:UC IF INDICATED Culture not indicated by UA result Culture not  indicated by UA result         XR Results (most recent):  Results from Hospital Encounter encounter on 02/13/21   XR CHEST PORT    Narrative Chest, AP portable, 1040 hours. Comparison with 9/15/2020. Positioning is suboptimal, lordotic and slightly oblique. Impression Moderate enlargement of cardiomediastinal silhouette. Central  pulmonary vessels appear prominent. No convincing evidence of focal airspace  consolidation, but the right lung base and right hemidiaphragm are suboptimally  visualized. No definite pneumothorax. No orders to display        Review of Systems:  Constitutional: Negative for chills and fever. HENT: Negative. Eyes: Negative. Respiratory: Negative. Cardiovascular: Negative. Gastrointestinal: Negative for abdominal pain and nausea. Skin: Negative. Neurological: Right facial numbness    Objective:   VITALS:    Visit Vitals  BP (!) 164/66   Pulse 60   Temp 97.6 °F (36.4 °C)   Resp 10   Ht 5' 4\" (1.626 m)   Wt 82.1 kg (181 lb)   SpO2 100%   BMI 31.07 kg/m²       Physical Exam:   Constitutional: pt is oriented to person, place, and time. HENT:   Head: Normocephalic and atraumatic. Eyes: Pupils are equal, round, and reactive to light. EOM are normal.   Cardiovascular: Normal rate, regular rhythm and normal heart sounds. Pulmonary/Chest: Breath sounds normal. No wheezes. No rales. Exhibits no tenderness. Abdominal: Soft. Bowel sounds are normal. There is no abdominal tenderness. There is no rebound and no guarding. Musculoskeletal: Normal range of motion.    Neurological: Right-sided weakness right facial numbness    ASSESSMENT & PLAN:    Rule out CVA  History of CVA  Type 2 diabetes  Hypertension  Hyperlipidemia  Anxiety disorder  CAD status post stent  Chronic kidney disease  Recent dental procedures      Current Facility-Administered Medications:     aspirin-dipyridamole (AGGRENOX)  mg per capsule 1 Cap, 1 Cap, Oral, BID, Alysha Granda MD  [facility] [START ON 2/14/2021] furosemide (LASIX) tablet 40 mg, 40 mg, Oral, DAILY, Mo Granda MD    hydrALAZINE (APRESOLINE) tablet 100 mg, 100 mg, Oral, TID, Mo Granda MD    [Held by provider] lisinopriL (PRINIVIL, ZESTRIL) tablet 40 mg, 40 mg, Oral, DAILY, Mo Granda MD    [START ON 2/14/2021] paricalcitoL (ZEMPLAR) capsule 1 mcg, 1 mcg, Oral, DAILY, Mo Granda MD    insulin glargine (LANTUS) injection 30 Units, 30 Units, SubCUTAneous, QHS, Eda Granda MD    allopurinoL (ZYLOPRIM) tablet 200 mg, 200 mg, Oral, BID, Mo Granda MD Sharm[facility] Ping  [START ON 2/14/2021] amLODIPine (NORVASC) tablet 5 mg, 5 mg, Oral, DAILY, Mo Granda MD    amoxicillin (AMOXIL) capsule 500 mg, 500 mg, Oral, Q8H, Eda Granda MD Sharmaine Ping  [START ON 2/14/2021] atorvastatin (LIPITOR) tablet 40 mg, 40 mg, Oral, DAILY, Mo Granda MD    [Held by provider] valsartan (DIOVAN) tablet 320 mg, 320 mg, Oral, DAILY, Mo Granda MD    metoprolol tartrate (LOPRESSOR) tablet 25 mg, 25 mg, Oral, BID, Mo Granda MD    sodium bicarbonate tablet 650 mg, 650 mg, Oral, BID, Mo Granda MD    acetaminophen (TYLENOL) tablet 650 mg, 650 mg, Oral, Q6H PRN **OR** acetaminophen (TYLENOL) suppository 650 mg, 650 mg, Rectal, Q6H PRN, Mo Granda MD    polyethylene glycol (MIRALAX) packet 17 g, 17 g, Oral, DAILY PRN, Mo Granda MD    ondansetron (ZOFRAN ODT) tablet 4 mg, 4 mg, Oral, Q8H PRN **OR** ondansetron (ZOFRAN) injection 4 mg, 4 mg, IntraVENous, Q6H PRN, Eda Granda MD    heparin (porcine) injection 5,000 Units, 5,000 Units, SubCUTAneous, Q8H, Eda Granda MD    0.9% sodium chloride infusion, 25 mL/hr, IntraVENous, CONTINUOUS, Eda Granda MD    insulin lispro (HUMALOG) injection, , SubCUTAneous, AC&HS, Eda Granda MD    glucose chewable tablet 16 g, 4 Tab, Oral, PRN, Eda Granda MD    dextrose (D50W) injection syrg 12.5-25 g, 25-50 mL, IntraVENous, PRN, Vic Carrillo MD    glucagon [facility]) injection 1 mg, 1 mg, IntraMUSCular, PRN, Christian Granda MD    Current Outpatient Medications:     Insulin Syringe-Needle U-100 (BD Insulin Syringe Ultra-Fine) 0.5 mL 31 gauge x 5/16\" syrg, BD Insulin Syringe Ultra-Fine 0.5 mL 31 gauge x 5/16\", Disp: , Rfl:     cyanocobalamin, vitamin B-12, 3,000 mcg cap, cyanocobalamin (vitamin B-12) 3,000 mcg capsule, Disp: , Rfl:     allopurinoL (ZYLOPRIM) 100 mg tablet, allopurinol 100 mg tablet, Disp: , Rfl:     amLODIPine-atorvastatin (CADUET) 10-10 mg per tablet, Take 1 Tab by mouth daily. , Disp: , Rfl:     atorvastatin (LIPITOR) 40 mg tablet, atorvastatin 40 mg tablet, Disp: , Rfl:     traZODone (DESYREL) 100 mg tablet, trazodone 100 mg tablet, Disp: , Rfl:     fluticasone propion-salmeterol (ADVAIR HFA) 115-21 mcg/actuation inhaler, Take 2 Puffs by inhalation two (2) times a day., Disp: , Rfl:     azelastine (ASTEPRO) 0.15 % (205.5 mcg), two (2) times a day., Disp: , Rfl:     calcitRIOL (ROCALTROL) 0.5 mcg capsule, Take 0.5 mcg by mouth daily. , Disp: , Rfl:     loratadine (CLARITIN) 10 mg tablet, Take 10 mg by mouth., Disp: , Rfl:     insulin regular (NOVOLIN R, HUMULIN R) 100 unit/mL injection, by SubCUTAneous route., Disp: , Rfl:     lidocaine 5 % topical cream, Apply  to affected area two (2) times daily as needed for Itching., Disp: , Rfl:     metoprolol tartrate (LOPRESSOR) 25 mg tablet, Take  by mouth two (2) times a day., Disp: , Rfl:     insulin aspart U-100 (NOVOLOG FLEXPEN U-100 INSULIN) 100 unit/mL (3 mL) inpn, by SubCUTAneous route., Disp: , Rfl:     oxyCODONE-acetaminophen (PERCOCET) 5-325 mg per tablet, Take  by mouth every four (4) hours as needed for Pain., Disp: , Rfl:     sodium bicarbonate 650 mg tablet, Take  by mouth four (4) times daily. , Disp: , Rfl:     clonazePAM (KLONOPIN) 0.5 mg tablet, Take 1 or 2 at hs prn sleep, Disp: 60 Tab, Rfl: 5    fluconazole (DIFLUCAN) 150 mg tablet, TAKE 1 TABLET EVERY DAY FOR 3 DAYS, Disp: 3 Tab, Rfl: 0    Insulin Needles, Disposable, 31 X 5/16 \" Ndle, by SubCUTAneous route three (3) times daily (with meals). , Disp: 2 Package, Rfl: 11    amlodipine (NORVASC) 5 mg tablet, 5 mg daily. , Disp: , Rfl:     amoxicillin (AMOXIL) 500 mg capsule, , Disp: , Rfl:     AGGRENOX 200-25 mg per SR capsule, Take 1 Cap by mouth two (2) times a day., Disp: , Rfl:     ASCENSIA CONTOUR strip, , Disp: , Rfl:     cephALEXin (KEFLEX) 250 mg capsule, , Disp: , Rfl:     clotrimazole-betamethasone (LOTRISONE) topical cream, , Disp: , Rfl:     diazepam (VALIUM) 10 mg tablet, , Disp: , Rfl:     furosemide (LASIX) 40 mg tablet, Take 40 mg by mouth daily. , Disp: , Rfl:     hydrALAZINE (APRESOLINE) 100 mg tablet, Take 100 mg by mouth three (3) times daily. , Disp: , Rfl:     hydrocodone-acetaminophen (LORTAB) 7.5-500 mg per tablet, , Disp: , Rfl:     lisinopril (PRINIVIL, ZESTRIL) 40 mg tablet, Take 40 mg by mouth daily. , Disp: , Rfl:     NIFEdipine XL (PROCARDIA XL) 30 mg tablet, Take  by mouth daily. , Disp: , Rfl:     NYSTOP powder, , Disp: , Rfl:     ZEMPLAR 1 mcg capsule, Take 1 mcg by mouth daily. , Disp: , Rfl:     simvastatin (ZOCOR) 40 mg tablet, Take 40 mg by mouth nightly., Disp: , Rfl:     BD LO-DOSE ULTRA-FINE SHORT 1/2 mL 31 x 5/16\" Syrg, , Disp: , Rfl:     DIOVAN 320 mg tablet, Take 320 mg by mouth daily. , Disp: , Rfl:     zolpidem (AMBIEN) 10 mg tablet, Take 10 mg by mouth nightly as needed. , Disp: , Rfl:     cholecalciferol, vitamin d3, (VITAMIN D) 1,000 unit tablet, Take 1,000 Units by mouth daily. , Disp: , Rfl:     nitroglycerin (NITROSTAT) 0.4 mg SL tablet, by SubLINGual route every five (5) minutes as needed. , Disp: , Rfl:     acetaminophen (TYLENOL EX STR ARTHRITIS PAIN) 500 mg tablet, Take  by mouth every six (6) hours as needed. , Disp: , Rfl:     ALBUTEROL SULFATE (PROAIR HFA IN), Take  by inhalation. , Disp: , Rfl:     FLUTICASONE PROPIONATE ([address]), Take  by inhalation. , Disp: , Rfl:     insulin glargine (LANTUS) 100 unit/mL injection, 30 Units by SubCUTAneous route nightly., Disp: 1 Vial, Rfl: 5    insulin lispro, human, (HUMALOG KWIKPEN) 100 unit/mL flexpen, 5 Units by SubCUTAneous route three (3) times daily (with meals).  Plus sliding scale, Disp: 1 Package, Rfl: 5      Patient admitted to telemetry floor  Start home medications  Neurology consult PT OT and speech consult  Start amoxicillin for tooth ache    Possible discharge in 1 to 2 days  ________________________________________________________________________    Signed: Robinson Mckeon MD

## 2021-02-14 NOTE — PROGRESS NOTES
PT kristopher attempted a second time today at 16:05. Pt reported that she was only willing to do one therapy either today or tomorrow. This PT does not think that pt will be willing to do two different therapy tomorrow. This PT suggests OT/PT co treatment tomorrow for better pt tolerance.

## 2021-02-14 NOTE — PROGRESS NOTES
[address] SWALLOW EVALUATIONS  Patient: Jeny Singh  (65 y.o. )  Date: 2/14/2021  Primary Diagnosis: CVA  Precautions:      ASSESSMENT :  Patient is alert, oriented x4, and follows basic commands. No facial droop or dysarthria noted. Informally, speech language is wfl. Patient presents w/ oropharyngeal swallow fxn. Oral phase c/b effective mastication and timely A-P. Pharyngeal phase c/b timely swallow initiation and HLE is wfl upon palpation. No overt s/s of pen/asp observed. PLAN :  Recommendations and Planned Interventions:  Rec cont diabetic diet w/ thin liquids and general asp/GERD precautions. No further ST intervention at this time. Please re-consult as medically indicated. SUBJECTIVE:   Patient reports R facial paresthesia since dental surgery 3 weeks ago. She denies dysphagia. 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 [address]     HTN (hypertension)     Stroke ([facility] [address]        CXR Results  (Last 48 hours)               02/13/21 1048  XR CHEST PORT Final result    Impression:  Moderate enlargement of cardiomediastinal silhouette. Central   pulmonary vessels appear prominent. No convincing evidence of focal airspace   consolidation, but the right lung base and right hemidiaphragm are suboptimally   visualized. No definite pneumothorax. Narrative:  Chest, AP portable, 1040 hours. Comparison with 9/15/2020. Positioning is suboptimal, lordotic and slightly oblique.                  Diet prior to admission: Regular  Current Diet:  DIET CARDIAC     Cognitive and Communication Status:  Neurologic State: Alert  Orientation Level: Oriented X4  Cognition: Appropriate decision making, Appropriate for age attention/concentration, Appropriate safety awareness, Follows commands  Perception: Appears intact  Perseveration: No perseveration noted  Safety/Judgement: Awareness of environment  Swallowing Evaluation:   Oral Assessment:  Oral Assessment  Labial: No impairment  Dentition: Intact  Oral Hygiene: wfl  Lingual: No impairment  Velum: No impairment  Mandible: No impairment  P.O. Trials:  Patient Position: upright in bed  Vocal quality prior to P.O.: No impairment  Consistency Presented: Puree; Solid; Thin liquid  How Presented: Self-fed/presented;Cup/sip; Successive swallows     Bolus Acceptance: No impairment  Bolus Formation/Control: No impairment     Propulsion: No impairment  Oral Residue: None  Initiation of Swallow: No impairment  Laryngeal Elevation: Functional  Aspiration Signs/Symptoms: None        Oral Phase Severity: No impairment  Pharyngeal Phase Severity : No impairment  Voice:   Vocal Quality: No impairment     Pain:   0         After treatment:   Patient left in no apparent distress in bed, Call bell within reach and Nursing notified    COMMUNICATION/EDUCATION:   Patient receptive of education regarding s/s aspiration and aspiration precautions. The patient's plan of care including recommendations, planned interventions, and recommended diet changes were discussed with: Registered nurse.        Thank you for this referral.  Jamshid Torres M.S., M.Ed., CCC-SLP  Time Calculation: 11 mins

## 2021-02-15 ENCOUNTER — APPOINTMENT (OUTPATIENT)
Dept: MRI IMAGING | Age: 76
DRG: 065 | End: 2021-02-15
Attending: PSYCHIATRY & NEUROLOGY
Payer: MEDICARE

## 2021-02-15 ENCOUNTER — APPOINTMENT (OUTPATIENT)
Dept: NON INVASIVE DIAGNOSTICS | Age: 76
DRG: 065 | End: 2021-02-15
Attending: PSYCHIATRY & NEUROLOGY
Payer: MEDICARE

## 2021-02-15 LAB
ATRIAL RATE: 99 BPM
CALCULATED P AXIS, ECG09: -15 DEGREES
CALCULATED R AXIS, ECG10: 18 DEGREES
CALCULATED T AXIS, ECG11: 16 DEGREES
CHOLEST SERPL-MCNC: 117 MG/DL
DIAGNOSIS, 93000: NORMAL
GLUCOSE BLD STRIP.AUTO-MCNC: 110 MG/DL (ref 65–100)
GLUCOSE BLD STRIP.AUTO-MCNC: 124 MG/DL (ref 65–100)
GLUCOSE BLD STRIP.AUTO-MCNC: 210 MG/DL (ref 65–100)
HDLC SERPL-MCNC: 41 MG/DL
HDLC SERPL: 2.9 {RATIO} (ref 0–5)
LDLC SERPL CALC-MCNC: 60.8 MG/DL (ref 0–100)
LIPID PROFILE,FLP: NORMAL
P-R INTERVAL, ECG05: 158 MS
PERFORMED BY, TECHID: ABNORMAL
Q-T INTERVAL, ECG07: 466 MS
QRS DURATION, ECG06: 102 MS
QTC CALCULATION (BEZET), ECG08: 442 MS
TRIGL SERPL-MCNC: 76 MG/DL (ref ?–150)
VENTRICULAR RATE, ECG03: 54 BPM
VLDLC SERPL CALC-MCNC: 15.2 MG/DL

## 2021-02-15 PROCEDURE — 74011250637 HC RX REV CODE- 250/637: Performed by: FAMILY MEDICINE

## 2021-02-15 PROCEDURE — 70551 MRI BRAIN STEM W/O DYE: CPT

## 2021-02-15 PROCEDURE — 74011250637 HC RX REV CODE- 250/637: Performed by: PSYCHIATRY & NEUROLOGY

## 2021-02-15 PROCEDURE — 93880 EXTRACRANIAL BILAT STUDY: CPT

## 2021-02-15 PROCEDURE — 65270000029 HC RM PRIVATE

## 2021-02-15 PROCEDURE — 74011636637 HC RX REV CODE- 636/637: Performed by: FAMILY MEDICINE

## 2021-02-15 PROCEDURE — 82962 GLUCOSE BLOOD TEST: CPT

## 2021-02-15 RX ORDER — ATORVASTATIN CALCIUM 40 MG/1
80 TABLET, FILM COATED ORAL DAILY
Status: DISCONTINUED | OUTPATIENT
Start: 2021-02-16 | End: 2021-02-17 | Stop reason: HOSPADM

## 2021-02-15 RX ORDER — ASPIRIN 325 MG
325 TABLET, DELAYED RELEASE (ENTERIC COATED) ORAL DAILY
Status: DISCONTINUED | OUTPATIENT
Start: 2021-02-16 | End: 2021-02-17 | Stop reason: HOSPADM

## 2021-02-15 RX ADMIN — ACETAMINOPHEN 650 MG: 325 TABLET ORAL at 21:52

## 2021-02-15 RX ADMIN — APIXABAN 2.5 MG: 2.5 TABLET, FILM COATED ORAL at 09:13

## 2021-02-15 RX ADMIN — ALLOPURINOL 100 MG: 100 TABLET ORAL at 09:13

## 2021-02-15 RX ADMIN — AMLODIPINE BESYLATE 10 MG: 5 TABLET ORAL at 09:13

## 2021-02-15 RX ADMIN — TRAZODONE HYDROCHLORIDE 100 MG: 50 TABLET ORAL at 23:35

## 2021-02-15 RX ADMIN — FUROSEMIDE 40 MG: 40 TABLET ORAL at 23:35

## 2021-02-15 RX ADMIN — ASPIRIN 81 MG: 81 TABLET, COATED ORAL at 09:13

## 2021-02-15 RX ADMIN — METOPROLOL TARTRATE 50 MG: 50 TABLET, FILM COATED ORAL at 21:52

## 2021-02-15 RX ADMIN — SODIUM BICARBONATE 650 MG TABLET 650 MG: at 21:52

## 2021-02-15 RX ADMIN — DOXAZOSIN 2 MG: 2 TABLET ORAL at 23:36

## 2021-02-15 RX ADMIN — ACETAMINOPHEN 650 MG: 325 TABLET ORAL at 09:21

## 2021-02-15 RX ADMIN — FUROSEMIDE 40 MG: 40 TABLET ORAL at 18:59

## 2021-02-15 RX ADMIN — ATORVASTATIN CALCIUM 40 MG: 40 TABLET, FILM COATED ORAL at 09:13

## 2021-02-15 RX ADMIN — FUROSEMIDE 40 MG: 40 TABLET ORAL at 09:13

## 2021-02-15 RX ADMIN — INSULIN GLARGINE 30 UNITS: 100 INJECTION, SOLUTION SUBCUTANEOUS at 21:53

## 2021-02-15 RX ADMIN — ALLOPURINOL 100 MG: 100 TABLET ORAL at 21:52

## 2021-02-15 RX ADMIN — FERROUS SULFATE TAB 325 MG (65 MG ELEMENTAL FE) 325 MG: 325 (65 FE) TAB at 09:13

## 2021-02-15 RX ADMIN — SODIUM BICARBONATE 650 MG TABLET 650 MG: at 09:13

## 2021-02-15 RX ADMIN — METOPROLOL TARTRATE 50 MG: 50 TABLET, FILM COATED ORAL at 09:12

## 2021-02-15 NOTE — PROGRESS NOTES
Attempted OT treatment X 2; Patient off the floor at [phone] for MRI; at CV lab in pm.  Will attempt tx next visit.

## 2021-02-15 NOTE — PROGRESS NOTES
Chart reviewed, DCP is for patient to return home with [facility], accepted with [facility] , patient had declined IRF and SNF placement. CM continues to follow.

## 2021-02-15 NOTE — PROGRESS NOTES
General Daily Progress Note          Patient Name:   Blaise Conn       YOB: 1945       Age:  75 y.o.      Admit Date: 2/13/2021      Subjective:            Patient is resting the bed alert awake no distress no new complaints  Mri    IMPRESSION  Prominent microvascular ischemic changes as described above.     Acute lacunar infarct in the region of the right facial colliculus as described  above..      Objective:     Visit Vitals  BP (!) 151/74 (BP 1 Location: Right upper arm, BP Patient Position: At rest)   Pulse (!) 57   Temp 97.7 °F (36.5 °C)   Resp 18   Ht 5' 4\" (1.626 m)   Wt 83.5 kg (184 lb 1.4 oz)   SpO2 99%   BMI 31.60 kg/m²        Recent Results (from the past 24 hour(s))   GLUCOSE, POC    Collection Time: 02/14/21  9:49 PM   Result Value Ref Range    Glucose (POC) 56 (L) 65 - 100 mg/dL    Performed by HU JUNIOR    GLUCOSE, POC    Collection Time: 02/15/21  8:28 AM   Result Value Ref Range    Glucose (POC) 124 (H) 65 - 100 mg/dL    Performed by ANCELMO WILLETT    GLUCOSE, POC    Collection Time: 02/15/21 12:28 PM   Result Value Ref Range    Glucose (POC) 110 (H) 65 - 100 mg/dL    Performed by ROHINIVERO FLETCHER      [unfilled]      Review of Systems    Constitutional: Negative for chills and fever.   HENT: Negative.    Eyes: Negative.    Respiratory: Negative.    Cardiovascular: Negative.    Gastrointestinal: Negative for abdominal pain and nausea.   Skin: Negative.    Neurological: Negative.        Physical Exam:      Constitutional: pt is oriented to person, place, and time.   HENT:   Head: Normocephalic and atraumatic.   Eyes: Pupils are equal, round, and reactive to light. EOM are normal.   Cardiovascular: Normal rate, regular rhythm and normal heart sounds.   Pulmonary/Chest: Breath sounds normal. No wheezes. No rales.   Exhibits no tenderness.   Abdominal: Soft. Bowel sounds are normal. There is no abdominal tenderness. There is no rebound and no guarding.   Musculoskeletal: Normal  range of motion. Neurological: pt is alert and oriented to person, place, and time. MRI BRAIN WO CONT   Final Result   Prominent microvascular ischemic changes as described above. Acute lacunar infarct in the region of the right facial colliculus as described   above. .           Recent Results (from the past 24 hour(s))   GLUCOSE, POC    Collection Time: 02/14/21  9:49 PM   Result Value Ref Range    Glucose (POC) 56 (L) 65 - 100 mg/dL    Performed by Katrin Gaona    GLUCOSE, POC    Collection Time: 02/15/21  8:28 AM   Result Value Ref Range    Glucose (POC) 124 (H) 65 - 100 mg/dL    Performed by Ben Loera, POC    Collection Time: 02/15/21 12:28 PM   Result Value Ref Range    Glucose (POC) 110 (H) 65 - 100 mg/dL    Performed by Vishal Case        Results     ** No results found for the last 336 hours. **           Labs:     Recent Labs     02/14/21  0400 02/13/21  1145   WBC 8.5 6.0   HGB 10.1* 11.5*   HCT 32.2* 36.3    221     Recent Labs     02/14/21  0400 02/13/21  1145    142   K 3.6 4.0    103   CO2 30 29   BUN 54* 56*   CREA 3.64* 3.81*   * 152*   CA 9.1 9.3     Recent Labs     02/14/21  0400 02/13/21  1145   ALT 16 21   AP 76 78   TBILI 0.4 0.5   TP 7.0 7.7   ALB 3.1* 3.5   GLOB 3.9 4.2*     Recent Labs     02/13/21  1145   INR 1.0   PTP 10.1      No results for input(s): FE, TIBC, PSAT, FERR in the last 72 hours. No results found for: FOL, RBCF   No results for input(s): PH, PCO2, PO2 in the last 72 hours.   Recent Labs     02/13/21  1145   TROIQ <0.05     Lab Results   Component Value Date/Time    Cholesterol, total 102 01/31/2011 10:16 AM    HDL Cholesterol 51 01/31/2011 10:16 AM    LDL, calculated 35 01/31/2011 10:16 AM    Triglyceride 82 01/31/2011 10:16 AM     Lab Results   Component Value Date/Time    Glucose (POC) 110 (H) 02/15/2021 12:28 PM    Glucose (POC) 124 (H) 02/15/2021 08:28 AM    Glucose (POC) 56 (L) 02/14/2021 09:49 PM    Glucose (POC) 185 (H) 02/14/2021 02:24 PM    Glucose (POC) 108 (H) 02/14/2021 10:29 AM     Lab Results   Component Value Date/Time    Color Yellow/Straw 02/13/2021 12:58 PM    Appearance Hazy (A) 02/13/2021 12:58 PM    Specific gravity 1.010 02/13/2021 12:58 PM    pH (UA) 7.0 02/13/2021 12:58 PM    Protein 30 (A) 02/13/2021 12:58 PM    Glucose Negative 02/13/2021 12:58 PM    Ketone Negative 02/13/2021 12:58 PM    Bilirubin Negative 02/13/2021 12:58 PM    Urobilinogen 0.2 02/13/2021 12:58 PM    Nitrites Negative 02/13/2021 12:58 PM    Leukocyte Esterase Negative 02/13/2021 12:58 PM    Bacteria 1+ (A) 02/13/2021 12:58 PM    WBC 0-4 02/13/2021 12:58 PM    RBC 0-5 02/13/2021 12:58 PM         Assessment:          Acute CVA  History of CVA  Type 2 diabetes  Hypertension  Hyperlipidemia  Anxiety disorder  CAD status post stent  Chronic kidney disease  Recent dental procedures    Plan:   MRI  IMPRESSION  Prominent microvascular ischemic changes as described above.     Acute lacunar infarct in the region of the right facial colliculus as described  Above. .    Continue aspirin Eliquis Lipitor    Follow-up with the neurology      Current Facility-Administered Medications:     allopurinoL (ZYLOPRIM) tablet 100 mg, 100 mg, Oral, BID, dEa Granda MD, 100 mg at 02/15/21 0913    amLODIPine (NORVASC) tablet 10 mg, 10 mg, Oral, DAILY, Eda Granda MD, 10 mg at 02/15/21 0913    aspirin delayed-release tablet 81 mg, 81 mg, Oral, DAILY, Eda Granda MD, 81 mg at 02/15/21 0913    furosemide (LASIX) tablet 40 mg, 40 mg, Oral, TID, Eda Granda MD, 40 mg at 02/15/21 0913    metoprolol tartrate (LOPRESSOR) tablet 50 mg, 50 mg, Oral, BID, Eda Granda MD, 50 mg at 02/15/21 0912    traZODone (DESYREL) tablet 100 mg, 100 mg, Oral, QHS, Eda Granda MD, 100 mg at 02/14/21 2135    ferrous sulfate tablet 325 mg, 1 Tab, Oral, DAILY WITH BREAKFAST, Eda Granda MD, 325 mg at 02/15/21 0913    doxazosin (CARDURA) tablet 2 mg, 2 mg, Oral, QHS, Eda Granda MD, Stopped at 02/14/21 2200    apixaban (ELIQUIS) tablet 2.5 mg, 2.5 mg, Oral, BID, Adriana Myers MD, 2.5 mg at 02/15/21 0913    insulin glargine (LANTUS) injection 30 Units, 30 Units, SubCUTAneous, QHS, Eda Granda MD, Stopped at 02/14/21 2200    atorvastatin (LIPITOR) tablet 40 mg, 40 mg, Oral, DAILY, Eda Granda MD, 40 mg at 02/15/21 0913    sodium bicarbonate tablet 650 mg, 650 mg, Oral, BID, Daniel Granda MD, 650 mg at 02/15/21 0913    acetaminophen (TYLENOL) tablet 650 mg, 650 mg, Oral, Q6H PRN, 650 mg at 02/15/21 6799 **OR** acetaminophen (TYLENOL) suppository 650 mg, 650 mg, Rectal, Q6H PRN, Daniel Granda MD    polyethylene glycol (MIRALAX) packet 17 g, 17 g, Oral, DAILY PRN, Daniel Granda MD    ondansetron (ZOFRAN ODT) tablet 4 mg, 4 mg, Oral, Q8H PRN **OR** ondansetron (ZOFRAN) injection 4 mg, 4 mg, IntraVENous, Q6H PRN, Eda Granda MD    0.9% sodium chloride infusion, 25 mL/hr, IntraVENous, CONTINUOUS, Eda Granda MD, Last Rate: 25 mL/hr at 02/13/21 2059, 25 mL/hr at 02/13/21 2059    insulin lispro (HUMALOG) injection, , SubCUTAneous, AC&HS, Eda Granda MD, Stopped at 02/14/21 2200    glucose chewable tablet 16 g, 4 Tab, Oral, PRN, Eda Granda MD    dextrose (D50W) injection syrg 12.5-25 g, 25-50 mL, IntraVENous, PRN, Eda Granda MD    glucagon (GLUCAGEN) injection 1 mg, 1 mg, IntraMUSCular, PRN, Tahmina Drew MD

## 2021-02-15 NOTE — PROGRESS NOTES
NEUROLOGY PROGRESS NOTE    Admission History/Pertinent Events  Courtney Lund is a 70y.o. year old female who presented on 2/13/2021. Patient has a past medical history of Multiple Strokes, HTN, DM2, CKD, CHF, Decreased Vision d/t DM, Anxiety/Depression who presented with right facial numbness and dizziness. Patient's symptoms started on the morning of presentation. Emergent CT head was negative for acute findings and patient was not deemed a candidate for tPA. Home Stroke Prophylaxis: Aggrenox 200/25 BID, Simvastatin 40        ASSESSMENT/PLAN    Impression  Patient's MRI of the brain revealing subacute ischemia in the right pontomedullary junction which correlates with patient's presenting symptoms. Patient also found to have hemorrhagic transformation of prior pontine infarcts. Will hold patient's apixaban and increase patient's aspirin to full dose and increase statin therapy for likely small-vessel disease related ischemia. Will follow up on patient's TTE and carotid ultrasound. Patient may benefit from outpatient CTA head/neck after 2 weeks to see if dual antiplatelet therapy is needed for neurovascular/ stroke prophylaxis given patient's multiple bilateral infarcts. Will hold off on this at the current time as patient has hemorrhagic conversion of prior infarcts in the posterior circulation and scan would not change current management. Was called earlier this morning in regards to patient having pinpoint pupils which were still reactive. Feel that this is likely related to patient's cataracts. Did not see anything pathological on examination this morning. We will continue to monitor patient closely however.       Stroke Workup    CTH WO  NAICA  Generalized cerebral atrophy with appropriate prominence of ventricles and sulci  CMIC    MRI Brain WO  Subacute right pontomedullary region  New regions of hemorrhagic transformation in the silvina from prior scan  Remote infarcts/ischemia in the bilateral thalami, posterior limb of the right internal capsule, left silvina  Severe microvascular ischemic changes    CTH WO 2/19 (Small Pupils)  NAICA  No significant interval changes    CUS  No significant stenosis in bilateral carotid systems  Minimal atherosclerotic lesion in the proximal right ICA  Tortuous left proximal ICA without evidence of significant stenosis  Physiologically forward flowing bilateral vertebral arteries without evidence of significant stenosis    TTE  []    HgbA1c: []  LDL: 60.8      Plan      Right Pontomedullary Infarct  Hemorraghic Transformation of Prior Pontine Infarcts  Multiple Bilateral Strokes  -[id number], TELE  -Stroke Prophylaxis: , Atorvastatin 80  -SBP Goal < 160  -PT/OT/ST as needed  -Plan for outpatient CTA Head/Neck in 2 weeks after discharge   -F/U TTE, HgbA1c        SUBJECTIVE   Patient reportedly with some pinpoint pupils that were reactive overnight for which emergent CT head was obtained which was negative for any interval changes. No significant changes on examination this morning. Physical/Neurological Exam  General: Elderly -American female     Patient awake, alert; following central and peripheral commands   No expressive or receptive aphasia;  No dysarthria   Pupils react to light bilaterally; EOM Intact   No visual field deficits on gross exam   Intact to light touch on face bilaterally   No facial droop   Motor   RUE: 3+/5 RLE: 2/5   LUE: 4/5 LLE: 3/5  No abnormal movements   Sensation to light touch intact grossly throughout       OBJECTIVE  Vital Signs  Temp:  [97.6 °F (36.4 °C)-99.2 °F (37.3 °C)]   Pulse (Heart Rate):  [58-77]   BP: (126-203)/(46-92)   Resp Rate:  [10-21]   O2 Sat (%):  [91 %-100 %]   Weight:  [82.1 kg (181 lb)]     MEDICATIONS    Current Facility-Administered Medications:     allopurinoL (ZYLOPRIM) tablet 100 mg, 100 mg, Oral, BID, Eda Granda MD, 100 mg at 02/14/21 2135    amLODIPine (NORVASC) tablet 10 mg, 10 mg, Oral, Shyann Cobos MD, 10 mg at 02/14/21 1041    aspirin delayed-release tablet 81 mg, 81 mg, Oral, DAILY, Eda Granda MD, 81 mg at 02/14/21 1041    furosemide (LASIX) tablet 40 mg, 40 mg, Oral, TID, Mohiuddin, Gretel Lundborg, MD, 40 mg at 02/14/21 2135    metoprolol tartrate (LOPRESSOR) tablet 50 mg, 50 mg, Oral, BID, Eda Granda MD, 50 mg at 02/14/21 2135    traZODone (DESYREL) tablet 100 mg, 100 mg, Oral, QHS, Eda Granda MD, 100 mg at 02/14/21 2135    ferrous sulfate tablet 325 mg, 1 Tab, Oral, DAILY WITH BREAKFAST, Eda Granda MD, 325 mg at 02/14/21 1041    doxazosin (CARDURA) tablet 2 mg, 2 mg, Oral, QHS, Eda Granda MD, Stopped at 02/14/21 2200    apixaban (ELIQUIS) tablet 2.5 mg, 2.5 mg, Oral, BID, Klaudia Jurado MD, 2.5 mg at 02/14/21 2135    insulin glargine (LANTUS) injection 30 Units, 30 Units, SubCUTAneous, QHS, Eda Granda MD, Stopped at 02/14/21 2200    atorvastatin (LIPITOR) tablet 40 mg, 40 mg, Oral, DAILY, Eda Granda MD, 40 mg at 02/14/21 1041    sodium bicarbonate tablet 650 mg, 650 mg, Oral, BID, Eda Granda MD, 650 mg at 02/14/21 2135    acetaminophen (TYLENOL) tablet 650 mg, 650 mg, Oral, Q6H PRN, 650 mg at 02/14/21 1049 **OR** acetaminophen (TYLENOL) suppository 650 mg, 650 mg, Rectal, Q6H PRN, Mohiuddin, Gretel Lundborg, MD    polyethylene glycol (MIRALAX) packet 17 g, 17 g, Oral, DAILY PRN, Mohiuddin, Gretel Lundborg, MD    ondansetron (ZOFRAN ODT) tablet 4 mg, 4 mg, Oral, Q8H PRN **OR** ondansetron (ZOFRAN) injection 4 mg, 4 mg, IntraVENous, Q6H PRN, Eda Granda MD    0.9% sodium chloride infusion, 25 mL/hr, IntraVENous, CONTINUOUS, Eda Granda MD, Last Rate: 25 mL/hr at 02/13/21 2059, 25 mL/hr at 02/13/21 2059    insulin lispro (HUMALOG) injection, , SubCUTAneous, AC&HS, Mohiuddin, Gretel Lundborg, MD, Stopped at 02/14/21 2200    glucose chewable tablet 16 g, 4 Tab, Oral, PRN, Eda Granda MD    dextrose (D50W) injection syrg 12.5-25 g, 25-50 mL, IntraVENous, PRN, Eda Granda MD    glucagon [facility]) injection 1 mg, 1 mg, IntraMUSCular, PRN, Dilcia Granda MD    Labs: I've reviewed the labs for today       This document has been prepared by the Dragon voice recognition system, typographical errors may have occurred.  Attempts have been made to correct errors, however inadvertent errors may persist.

## 2021-02-15 NOTE — PROGRESS NOTES
Spiritual Care Assessment/Progress Note  [facility]      NAME: [facility]      MRN: [id number]  AGE: 70 y.o.  SEX: female  Gnosticism Affiliation: No preference   Language: English     2/15/2021     Total Time (in minutes): 6     Spiritual Assessment begun in [address] through conversation with:         [x]Patient        [] Family    [] Friend(s)        Reason for Consult: Request by patient     Spiritual beliefs: (Please include comment if needed)     [] Identifies with a pascale tradition:         [] Supported by a pascale community:            [] Claims no spiritual orientation:           [] Seeking spiritual identity:                [] Adheres to an individual form of spirituality:           [x] Not able to assess:                           Identified resources for coping:      [] Prayer                               [] Music                  [] Guided Imagery     [] Family/friends                 [] Pet visits     [] Devotional reading                         [x] Unknown     [] Other:                                               Interventions offered during this visit: (See comments for more details)    Patient Interventions: Coping skills reviewed/reinforced, Initial/Spiritual assessment, patient floor, Normalization of emotional/spiritual concerns           Plan of Care:     [] Support spiritual and/or cultural needs    [] Support AMD and/or advance care planning process      [] Support grieving process   [] Coordinate Rites and/or Rituals    [] Coordination with community clergy   [] No spiritual needs identified at this time   [] Detailed Plan of Care below (See Comments)  [] Make referral to Music Therapy  [] Make referral to Pet Therapy     [] Make referral to Addiction services  [] Make referral to [facility]  [] Make referral to Spiritual Care Partner  [] No future visits requested        [x] Follow up upon further referrals     Comments:  Visited patient in [facility] cardiac telemetry for patient referral.  Patient was alone  during the visit watching TV. Ms. Eduardo Mojica inquired about 's identify and purpose, and afterwards she identified her need to get better and to go home.  provided chaplaincy education and listened with empathy and reflection. I explored Ms. Conn's needs and condition of the moment. I normalized her needs which included not wanting further visit at this time. I advised of  availability. Chaplains will follow up if further referrals are requested. Chaplain Elvis Romero M.Div.    can be reached by calling the  at [facility]  [phone]

## 2021-02-15 NOTE — PROGRESS NOTES
PT evaluation orders received and acknowledged, attempted eval at 0945 however pt currently off the floor for MRI. Attempted evaluation again at [phone], pt GREGORY in cardiac lab. Will continue to follow and attempt eval at a later time. Thank you.

## 2021-02-16 ENCOUNTER — APPOINTMENT (OUTPATIENT)
Dept: NON INVASIVE DIAGNOSTICS | Age: 76
DRG: 065 | End: 2021-02-16
Attending: STUDENT IN AN ORGANIZED HEALTH CARE EDUCATION/TRAINING PROGRAM
Payer: MEDICARE

## 2021-02-16 ENCOUNTER — APPOINTMENT (OUTPATIENT)
Dept: CT IMAGING | Age: 76
DRG: 065 | End: 2021-02-16
Attending: STUDENT IN AN ORGANIZED HEALTH CARE EDUCATION/TRAINING PROGRAM
Payer: MEDICARE

## 2021-02-16 LAB
ECHO AR MAX VEL PISA: 370 CM/S
ECHO AV AREA PEAK VELOCITY: 2.7 CM2
ECHO AV AREA/BSA PEAK VELOCITY: 1.4 CM2/M2
ECHO AV PEAK GRADIENT: 11 MMHG
ECHO AV REGURGITANT PHT: 558 MS
ECHO EST RA PRESSURE: 3 MMHG
ECHO LA AREA 4C: 23.99 CM2
ECHO LV E' SEPTAL VELOCITY: 3.31 CM/S
ECHO LV EDV A2C: 34 CM3
ECHO LV EJECTION FRACTION A2C: 65 %
ECHO LV EJECTION FRACTION BIPLANE: 63.4 % (ref 55–100)
ECHO LV EJECTION FRACTION BIPLANE: 72 % (ref 55–100)
ECHO LV ESV A2C: 21.4 CM3
ECHO LV ESV A2C: 9.53 CM3
ECHO LV INTERNAL DIMENSION DIASTOLIC MMODE: 3.71 CM
ECHO LV INTERNAL DIMENSION DIASTOLIC: 3.24 CM (ref 3.9–5.3)
ECHO LV INTERNAL DIMENSION SYSTOLIC MMODE: 2.46 CM
ECHO LV INTERNAL DIMENSION SYSTOLIC: 2.12 CM
ECHO LV IVSD MMODE: 1.81 CM
ECHO LV IVSD: 1.73 CM (ref 0.6–0.9)
ECHO LV MASS 2D: 215.9 G (ref 67–162)
ECHO LV MASS INDEX 2D: 114.3 G/M2 (ref 43–95)
ECHO LV POSTERIOR WALL DIASTOLIC MMODE: 1.65 CM
ECHO LV POSTERIOR WALL DIASTOLIC: 1.67 CM (ref 0.6–0.9)
ECHO LVOT DIAM: 2.1 CM
ECHO LVOT PEAK GRADIENT: 7 MMHG
ECHO LVOT SV: 36.2 CM3
ECHO MV A VELOCITY: 128 CM/S
ECHO MV AREA PHT: 2.82 CM2
ECHO MV E DECELERATION TIME (DT): 355 MS
ECHO MV E VELOCITY: 88 CM/S
ECHO MV E/A RATIO: 0.69
ECHO MV E/E' SEPTAL: 26.59
ECHO MV PRESSURE HALF TIME (PHT): 78 MS
ECHO PV PEAK INSTANTANEOUS GRADIENT SYSTOLIC: 3 MMHG
ECHO PV REGURGITANT MAX VELOCITY: 128 CM/S
ECHO PV REGURGITANT MAX VELOCITY: 164 CM/S
ECHO PV REGURGITANT MAX VELOCITY: 84.5 CM/S
ECHO PVEIN A DURATION: 130 MS
ECHO PVEIN A VELOCITY: 22.2 CM/S
ECHO RA AREA 4C: 13.8 CM2
ECHO RIGHT VENTRICULAR SYSTOLIC PRESSURE (RVSP): 34 MMHG
ECHO RV INTERNAL DIMENSION: 3.43 CM
ECHO TV MAX VELOCITY: 278 CM/S
ECHO TV REGURGITANT PEAK GRADIENT: 31 MMHG
GLUCOSE BLD STRIP.AUTO-MCNC: 115 MG/DL (ref 65–100)
GLUCOSE BLD STRIP.AUTO-MCNC: 119 MG/DL (ref 65–100)
GLUCOSE BLD STRIP.AUTO-MCNC: 156 MG/DL (ref 65–100)
GLUCOSE BLD STRIP.AUTO-MCNC: 212 MG/DL (ref 65–100)
GLUCOSE BLD STRIP.AUTO-MCNC: 232 MG/DL (ref 65–100)
GLUCOSE BLD STRIP.AUTO-MCNC: 56 MG/DL (ref 65–100)
GLUCOSE BLD STRIP.AUTO-MCNC: 56 MG/DL (ref 65–100)
GLUCOSE BLD STRIP.AUTO-MCNC: 71 MG/DL (ref 65–100)
GLUCOSE BLD STRIP.AUTO-MCNC: 83 MG/DL (ref 65–100)
LEFT CCA DIST DIAS: 9.1 CM/S
LEFT CCA DIST SYS: 65 CM/S
LEFT CCA PROX DIAS: 4.8 CM/S
LEFT CCA PROX SYS: 55.9 CM/S
LEFT ECA DIAS: 0 CM/S
LEFT ECA SYS: 43.5 CM/S
LEFT ICA DIST DIAS: 14 CM/S
LEFT ICA DIST SYS: 67.1 CM/S
LEFT ICA PROX DIAS: 10.7 CM/S
LEFT ICA PROX SYS: 62.8 CM/S
LEFT SUBCLAVIAN DIAS: 8.4 CM/S
LEFT SUBCLAVIAN SYS: 118 CM/S
LEFT VERTEBRAL DIAS: 5.22 CM/S
LEFT VERTEBRAL SYS: 27.6 CM/S
MV DEC SLOPE: 4750 MM/S2
MV DEC SLOPE: 4750 MM/S2
PERFORMED BY, TECHID: ABNORMAL
PERFORMED BY, TECHID: NORMAL
PERFORMED BY, TECHID: NORMAL
RIGHT CCA DIST DIAS: 3.4 CM/S
RIGHT CCA DIST SYS: 60.3 CM/S
RIGHT CCA PROX DIAS: 4.8 CM/S
RIGHT CCA PROX SYS: 79 CM/S
RIGHT ECA DIAS: 0 CM/S
RIGHT ECA SYS: 54.2 CM/S
RIGHT ICA DIST DIAS: 13.4 CM/S
RIGHT ICA DIST SYS: 71.4 CM/S
RIGHT ICA PROX DIAS: 8.4 CM/S
RIGHT ICA PROX SYS: 47.4 CM/S
RIGHT SUBCLAVIAN DIAS: 0 CM/S
RIGHT SUBCLAVIAN SYS: 93.2 CM/S
RIGHT VERTEBRAL DIAS: 5.73 CM/S
RIGHT VERTEBRAL SYS: 32.8 CM/S

## 2021-02-16 PROCEDURE — 65270000029 HC RM PRIVATE

## 2021-02-16 PROCEDURE — 74011250637 HC RX REV CODE- 250/637: Performed by: STUDENT IN AN ORGANIZED HEALTH CARE EDUCATION/TRAINING PROGRAM

## 2021-02-16 PROCEDURE — 97161 PT EVAL LOW COMPLEX 20 MIN: CPT

## 2021-02-16 PROCEDURE — 82962 GLUCOSE BLOOD TEST: CPT

## 2021-02-16 PROCEDURE — 74011250637 HC RX REV CODE- 250/637: Performed by: FAMILY MEDICINE

## 2021-02-16 PROCEDURE — 97530 THERAPEUTIC ACTIVITIES: CPT

## 2021-02-16 PROCEDURE — 74011000250 HC RX REV CODE- 250: Performed by: FAMILY MEDICINE

## 2021-02-16 PROCEDURE — 83036 HEMOGLOBIN GLYCOSYLATED A1C: CPT

## 2021-02-16 PROCEDURE — 70450 CT HEAD/BRAIN W/O DYE: CPT

## 2021-02-16 PROCEDURE — 93306 TTE W/DOPPLER COMPLETE: CPT

## 2021-02-16 PROCEDURE — 97166 OT EVAL MOD COMPLEX 45 MIN: CPT

## 2021-02-16 PROCEDURE — 36415 COLL VENOUS BLD VENIPUNCTURE: CPT

## 2021-02-16 RX ORDER — LIDOCAINE 4 G/100G
1 PATCH TOPICAL EVERY 24 HOURS
Status: DISCONTINUED | OUTPATIENT
Start: 2021-02-16 | End: 2021-02-17 | Stop reason: HOSPADM

## 2021-02-16 RX ADMIN — TRAZODONE HYDROCHLORIDE 100 MG: 50 TABLET ORAL at 21:50

## 2021-02-16 RX ADMIN — DOXAZOSIN 2 MG: 2 TABLET ORAL at 21:54

## 2021-02-16 RX ADMIN — ALLOPURINOL 100 MG: 100 TABLET ORAL at 21:50

## 2021-02-16 RX ADMIN — AMLODIPINE BESYLATE 10 MG: 5 TABLET ORAL at 10:22

## 2021-02-16 RX ADMIN — SODIUM BICARBONATE 650 MG TABLET 650 MG: at 10:22

## 2021-02-16 RX ADMIN — ATORVASTATIN CALCIUM 80 MG: 40 TABLET, FILM COATED ORAL at 10:22

## 2021-02-16 RX ADMIN — FUROSEMIDE 40 MG: 40 TABLET ORAL at 18:57

## 2021-02-16 RX ADMIN — ASPIRIN 325 MG: 325 TABLET, COATED ORAL at 10:22

## 2021-02-16 RX ADMIN — METOPROLOL TARTRATE 50 MG: 50 TABLET, FILM COATED ORAL at 10:22

## 2021-02-16 RX ADMIN — FERROUS SULFATE TAB 325 MG (65 MG ELEMENTAL FE) 325 MG: 325 (65 FE) TAB at 10:22

## 2021-02-16 RX ADMIN — ALLOPURINOL 100 MG: 100 TABLET ORAL at 10:22

## 2021-02-16 RX ADMIN — FUROSEMIDE 40 MG: 40 TABLET ORAL at 10:22

## 2021-02-16 RX ADMIN — METOPROLOL TARTRATE 50 MG: 50 TABLET, FILM COATED ORAL at 21:50

## 2021-02-16 RX ADMIN — FUROSEMIDE 40 MG: 40 TABLET ORAL at 21:50

## 2021-02-16 RX ADMIN — SODIUM BICARBONATE 650 MG TABLET 650 MG: at 21:50

## 2021-02-16 NOTE — PROGRESS NOTES
OT eval order received and acknowledged. OT eval attempted at 9:58 am however off the floor at [facility]. Will continue to follow patient and attempt OT eval at a later time. Thank you.

## 2021-02-16 NOTE — PROGRESS NOTES
NEUROLOGY PROGRESS NOTE    Admission History/Pertinent Events  Luis Fernando Nicole is a 70y.o. year old female who presented on 2/13/2021. Patient has a past medical history of Multiple Strokes, HTN, DM2, CKD, CHF, Decreased Vision d/t DM, Anxiety/Depression who presented with right facial numbness and dizziness. Patient's symptoms started on the morning of presentation. Emergent CT head was negative for acute findings and patient was not deemed a candidate for tPA. Home Stroke Prophylaxis: Aggrenox 200/25 BID, Simvastatin 40        ASSESSMENT/PLAN    Impression  Patient's TTE without any significant valvular stenosis or any evidence of atrial dilation. We will continue patient on antiplatelet and statin therapy per below for stroke prophylaxis and plan to obtain CTA head/neck in 2 weeks from discharge for possible dual antiplatelet therapy if necessary given patient's history of multiple bilateral strokes.   Patient unable to tolerate anticoagulation as this has resulted in hemorrhagic conversion of some of patient's posterior circulation infarcts from the past.      Stroke Workup    CTH WO  NAICA  Generalized cerebral atrophy with appropriate prominence of ventricles and sulci  CMIC    MRI Brain WO  Subacute right pontomedullary region  New regions of hemorrhagic transformation in the sivlina from prior scan  Remote infarcts/ischemia in the bilateral thalami, posterior limb of the right internal capsule, left silvina  Severe microvascular ischemic changes    CTH WO 2/19 (Small Pupils)  NAICA  No significant interval changes    CUS  No significant stenosis in bilateral carotid systems  Minimal atherosclerotic lesion in the proximal right ICA  Tortuous left proximal ICA without evidence of significant stenosis  Physiologically forward flowing bilateral vertebral arteries without evidence of significant stenosis    TTE  EF 65%, LA/RA normal, no PFO, no significant valvular stenosis    HgbA1c: []  LDL: 60.8      Plan      Right Pontomedullary Infarct  Hemorraghic Transformation of Prior Pontine Infarcts  Multiple Bilateral Strokes  -[id number], TELE  -Stroke Prophylaxis: , Atorvastatin 80  -SBP Goal < 160  -PT/OT/ST as needed  -Plan for outpatient CTA Head/Neck in 2 weeks after discharge   -F/U HgbA1c      Patient to follow-up with Dr. Amalia HINTON [facility]) in 2 weeks from discharge    Please contact neurology with any further questions/concerns        SUBJECTIVE   No significant changes on neurological examination. Physical/Neurological Exam  General: Elderly -American female     Patient awake, alert; following central and peripheral commands   No expressive or receptive aphasia;  No dysarthria   Pupils react to light bilaterally; EOM Intact   No visual field deficits on gross exam   Intact to light touch on face bilaterally   No facial droop   Motor   RUE: 3+/5 RLE: 2/5   LUE: 4/5 LLE: 3/5  No abnormal movements   Sensation to light touch intact grossly throughout       OBJECTIVE  Vital Signs  Temp:  [97.6 °F (36.4 °C)-99.2 °F (37.3 °C)]   Pulse (Heart Rate):  [55-77]   BP: (126-203)/(46-92)   Resp Rate:  [10-21]   O2 Sat (%):  [91 %-100 %]   Weight:  [82.1 kg (181 lb)-83.5 kg (184 lb 1.4 oz)]     MEDICATIONS    Current Facility-Administered Medications:     atorvastatin (LIPITOR) tablet 80 mg, 80 mg, Oral, DAILY, Casimiro Ortiz MD    aspirin delayed-release tablet 325 mg, 325 mg, Oral, DAILY, Casimiro Ortiz MD    allopurinoL (ZYLOPRIM) tablet 100 mg, 100 mg, Oral, BID, Eda Granda MD, 100 mg at 02/15/21 2152    amLODIPine (NORVASC) tablet 10 mg, 10 mg, Oral, DAILY, Eda Granda MD, 10 mg at 02/15/21 0913    furosemide (LASIX) tablet 40 mg, 40 mg, Oral, TID, Eda Granda MD, 40 mg at 02/15/21 2335    metoprolol tartrate (LOPRESSOR) tablet 50 mg, 50 mg, Oral, BID, Eda Granda MD, 50 mg at 02/15/21 2152    traZODone (DESYREL) tablet 100 mg, 100 mg, Oral, QHS, James Martins MD, 100 mg at 02/15/21 2335    ferrous sulfate tablet 325 mg, 1 Tab, Oral, DAILY WITH BREAKFAST, Mohiuddin, Romana Dallas, MD, 325 mg at 02/15/21 0913    doxazosin (CARDURA) tablet 2 mg, 2 mg, Oral, QHS, Eda Granda MD, 2 mg at 02/15/21 2336    insulin glargine (LANTUS) injection 30 Units, 30 Units, SubCUTAneous, QHS, Eda Granda MD, 30 Units at 02/15/21 2153    sodium bicarbonate tablet 650 mg, 650 mg, Oral, BID, Mohiuddin, Romana Dallas, MD, 650 mg at 02/15/21 2152    acetaminophen (TYLENOL) tablet 650 mg, 650 mg, Oral, Q6H PRN, 650 mg at 02/15/21 2152 **OR** acetaminophen (TYLENOL) suppository 650 mg, 650 mg, Rectal, Q6H PRN, Mohiuddin, Romana Dallas, MD    polyethylene glycol (MIRALAX) packet 17 g, 17 g, Oral, DAILY PRN, Mohiuddin, Romana Dallas, MD    ondansetron (ZOFRAN ODT) tablet 4 mg, 4 mg, Oral, Q8H PRN **OR** ondansetron (ZOFRAN) injection 4 mg, 4 mg, IntraVENous, Q6H PRN, Eda Granda MD    0.9% sodium chloride infusion, 25 mL/hr, IntraVENous, CONTINUOUS, Eda Granda MD, Last Rate: 25 mL/hr at 02/13/21 2059, 25 mL/hr at 02/13/21 2059    insulin lispro (HUMALOG) injection, , SubCUTAneous, AC&HS, Eda Granda MD, Stopped at 02/14/21 2200    glucose chewable tablet 16 g, 4 Tab, Oral, PRN, Eda Granda MD    dextrose (D50W) injection syrg 12.5-25 g, 25-50 mL, IntraVENous, PRN, Eda Granda MD    glucagon (GLUCAGEN) injection 1 mg, 1 mg, IntraMUSCular, PRN, Mohiuddin, Romana Dallas, MD    Labs: I've reviewed the labs for today       This document has been prepared by the Dragon voice recognition system, typographical errors may have occurred.  Attempts have been made to correct errors, however inadvertent errors may persist.

## 2021-02-16 NOTE — PROGRESS NOTES
PT eval order received and acknowledged. PT eval attempted at 0900 however pt off the floor in CVL. Will continue to follow patient and attempt PT eval at a later time. Thank you.

## 2021-02-16 NOTE — PROGRESS NOTES
General Daily Progress Note          Patient Name:   Courtney Lund       YOB: 1945       Age:  70 y.o. Admit Date: 2/13/2021      Subjective:        Patient is resting the bed asleep, wakes to voice. no distress no new complaints    IMPRESSION  Prominent microvascular ischemic changes     Acute lacunar infarct in the region of the right facial colliculus as described  [facility]. [facility] in right pontomedullary junction. Seen by neurology this morning. Will hold apixaban and increase aspirin to full dose and increase statin therapy due to small vessel disease ischemia.  Evaluated pinpoint pupils and emergent CT ordered negative    Seen by PT given mobility education and training              Objective:     Visit Vitals  BP (!) 160/68 (BP 1 Location: Right upper arm)   Pulse 65   Temp 97.7 °F (36.5 °C)   Resp 18   Ht 5' 4.02\" (1.626 m)   Wt 83.5 kg (184 lb 1.4 oz)   SpO2 98%   BMI 31.58 kg/m²        Recent Results (from the past 24 hour(s))   DUPLEX CAROTID BILATERAL    Collection Time: 02/15/21  3:49 PM   Result Value Ref Range    Left CCA dist sys 65.0 cm/s    Left CCA dist cortes 9.1 cm/s    Left CCA prox sys 55.9 cm/s    Left CCA prox cortes 4.8 cm/s    Left ICA dist sys 67.1 cm/s    Left ICA dist cortes 14.0 cm/s    Left ICA prox sys 62.8 cm/s    Left ICA prox cortes 10.7 cm/s    Left ECA sys 43.5 cm/s    LEFT EXTERNAL CAROTID ARTERY D 0.00 cm/s    Left subclavian sys 118.0 cm/s    LEFT SUBCLAVIAN ARTERY D 8.40 cm/s    Left vertebral sys 27.6 cm/s    LEFT VERTEBRAL ARTERY D 5.22 cm/s    Right cca dist sys 60.3 cm/s    Right CCA dist cortes 3.4 cm/s    Right CCA prox sys 79.0 cm/s    Right CCA prox cortes 4.8 cm/s    Right ICA dist sys 71.4 cm/s    Right ICA dist cortes 13.4 cm/s    Right ICA prox sys 47.4 cm/s    Right ICA prox cortes 8.4 cm/s    Right eca sys 54.2 cm/s    RIGHT EXTERNAL CAROTID ARTERY D 0.00 cm/s    Right subclavian sys 93.2 cm/s    RIGHT SUBCLAVIAN ARTERY D 0.00 cm/s    Right vertebral sys 32.8 cm/s    RIGHT VERTEBRAL ARTERY D 5.73 cm/s   GLUCOSE, POC    Collection Time: 02/15/21  9:37 PM   Result Value Ref Range    Glucose (POC) 210 (H) 65 - 100 mg/dL    Performed by Jenni Perez    GLUCOSE, POC    Collection Time: 02/16/21  3:24 AM   Result Value Ref Range    Glucose (POC) 56 (L) 65 - 100 mg/dL    Performed by Leigha Ellis    GLUCOSE, POC    Collection Time: 02/16/21  3:45 AM   Result Value Ref Range    Glucose (POC) 56 (L) 65 - 100 mg/dL    Performed by [address], POC    Collection Time: 02/16/21  3:54 AM   Result Value Ref Range    Glucose (POC) 71 65 - 100 mg/dL    Performed by [address], POC    Collection Time: 02/16/21  4:38 AM   Result Value Ref Range    Glucose (POC) 83 65 - 100 mg/dL    Performed by [email or web address], POC    Collection Time: 02/16/21  6:16 AM   Result Value Ref Range    Glucose (POC) 115 (H) 65 - 100 mg/dL    Performed by [email or web address], POC    Collection Time: 02/16/21  8:01 AM   Result Value Ref Range    Glucose (POC) 119 (H) 65 - 100 mg/dL    Performed by DANNY LOCKETT    ECHO ADULT COMPLETE    Collection Time: 02/16/21 10:00 AM   Result Value Ref Range    Aortic Regurgitant Pressure Half-time 558.00 ms    AR Max Zi 370.00 cm/s    Pulmonic Regurgitant End Max Velocity 164.00 cm/s    AoV PG 11.00 mmHg    Aortic Valve Area by Continuity of Peak Velocity 2.70 cm2    IVSd 1.73 (A) 0.6 - 0.9 cm    IVSd (M-mode) 1.81 (A) cm    LVIDd 3.24 (A) 3.9 - 5.3 cm    LVIDd (M-mode) 3.71 (A) cm    LVIDs 2.12 cm    LVIDs (M-mode) 2.46 cm    LVOT d 2.10 cm    Pulmonic Regurgitant End Max Velocity 128.00 cm/s    LVOT Peak Gradient 7.00 mmHg    LVPWd 1.67 (A) 0.6 - 0.9 cm    LVPWd (M-mode) 1.65 (A) cm    LV E' Septal Velocity 3.31 cm/s    LV ED Vol A2C 34.00 cm3    BP EF 72.0 55 - 100 %    BP EF 63.4 55 - 100 %    LV ES Vol A2C 9.53 cm3    LV ES Vol A2C 21.40 cm3    E/E' septal 26.59     LV Ejection Fraction MOD 2C 65.0 %    LV Mass .9 (A) 67 - 162 g    LV Mass AL Index 114.3 (A) 43 - 95 g/m2    LVOT SV 36.2 cm3    Mitral Valve Deceleration Green 4,750.00 mm/s2    Mitral Valve Deceleration Green 4,750.00 mm/s2    Mitral Valve E Wave Deceleration Time 355.00 ms    Mitral Valve Pressure Half-time 78.00 ms    MV A Zi 128.00 cm/s    MV E Zi 88.00 cm/s    MVA (PHT) 2.82 cm2    MV E/A 0.69     Pulmonic Regurgitant End Max Velocity 84.50 cm/s    Pulmonic Valve Systolic Peak Instantaneous Gradient 3.00 mmHg    P Vein A Dur 130.00 ms    Pulmonary Vein \"A\" Wave Velocity 22.20 cm/s    Est. RA Pressure 3.00 mmHg    RVIDd 3.43 cm    RVSP 34.00 mmHg    Tricuspid Valve Max Velocity 278.00 cm/s    Triscuspid Valve Regurgitation Peak Gradient 31.00 mmHg    Right Atrial Area 4C 13.80 cm2    LA Area 4C 23.99 cm2    NURA/BSA Pk Zi 1.4 cm2/m2   GLUCOSE, POC    Collection Time: 02/16/21 11:40 AM   Result Value Ref Range    Glucose (POC) 212 (H) 65 - 100 mg/dL    Performed by Zechariah Palma      [unfilled]      Review of Systems    Constitutional: Negative for chills and fever. HENT: Negative. Eyes: Negative. Respiratory: Negative. Cardiovascular: Negative. Gastrointestinal: Negative for abdominal pain and nausea. Skin: Negative. Neurological: Negative. Physical Exam:      Constitutional: pt is oriented to person, place, and time. HENT:   Head: Normocephalic and atraumatic. Eyes: Pupils are equal, round, and reactive to light. EOM are normal.   Cardiovascular: Normal rate, regular rhythm and normal heart sounds. Pulmonary/Chest: Breath sounds normal. No wheezes. No rales. Exhibits no tenderness. Abdominal: Soft. Bowel sounds are normal. Abdominal tenderness. Musculoskeletal: Normal range of motion. Neurological: pt is alert and oriented to person, place, and time. CT HEAD WO CONT   Final Result   Diffuse chronic changes which appear stable. No acute or active   intracranial process. MRI BRAIN WO CONT   Final Result   Prominent microvascular ischemic changes as described above. Acute lacunar infarct in the region of the right facial colliculus as described   above. .           Recent Results (from the past 24 hour(s))   DUPLEX CAROTID BILATERAL    Collection Time: 02/15/21  3:49 PM   Result Value Ref Range    Left CCA dist sys 65.0 cm/s    Left CCA dist cortes 9.1 cm/s    Left CCA prox sys 55.9 cm/s    Left CCA prox cortes 4.8 cm/s    Left ICA dist sys 67.1 cm/s    Left ICA dist cortes 14.0 cm/s    Left ICA prox sys 62.8 cm/s    Left ICA prox cortes 10.7 cm/s    Left ECA sys 43.5 cm/s    LEFT EXTERNAL CAROTID ARTERY D 0.00 cm/s    Left subclavian sys 118.0 cm/s    LEFT SUBCLAVIAN ARTERY D 8.40 cm/s    Left vertebral sys 27.6 cm/s    LEFT VERTEBRAL ARTERY D 5.22 cm/s    Right cca dist sys 60.3 cm/s    Right CCA dist cortes 3.4 cm/s    Right CCA prox sys 79.0 cm/s    Right CCA prox cortes 4.8 cm/s    Right ICA dist sys 71.4 cm/s    Right ICA dist cortes 13.4 cm/s    Right ICA prox sys 47.4 cm/s    Right ICA prox cortes 8.4 cm/s    Right eca sys 54.2 cm/s    RIGHT EXTERNAL CAROTID ARTERY D 0.00 cm/s    Right subclavian sys 93.2 cm/s    RIGHT SUBCLAVIAN ARTERY D 0.00 cm/s    Right vertebral sys 32.8 cm/s    RIGHT VERTEBRAL ARTERY D 5.73 cm/s   GLUCOSE, POC    Collection Time: 02/15/21  9:37 PM   Result Value Ref Range    Glucose (POC) 210 (H) 65 - 100 mg/dL    Performed by Carisa Moscoso    GLUCOSE, POC    Collection Time: 02/16/21  3:24 AM   Result Value Ref Range    Glucose (POC) 56 (L) 65 - 100 mg/dL    Performed by Shelley Cisse    GLUCOSE, POC    Collection Time: 02/16/21  3:45 AM   Result Value Ref Range    Glucose (POC) 56 (L) 65 - 100 mg/dL    Performed by [address] [place] [address] Road, POC    Collection Time: 02/16/21  3:54 AM   Result Value Ref Range    Glucose (POC) 71 65 - 100 mg/dL    Performed by [address] [place] [address], POC    Collection Time: 02/16/21  4:38 AM   Result Value Ref Range Glucose (POC) 83 65 - 100 mg/dL    Performed by [employer], POC    Collection Time: 02/16/21  6:16 AM   Result Value Ref Range    Glucose (POC) 115 (H) 65 - 100 mg/dL    Performed by [employer], POC    Collection Time: 02/16/21  8:01 AM   Result Value Ref Range    Glucose (POC) 119 (H) 65 - 100 mg/dL    Performed by DANNY LOCKETT    ECHO ADULT COMPLETE    Collection Time: 02/16/21 10:00 AM   Result Value Ref Range    Aortic Regurgitant Pressure Half-time 558.00 ms    AR Max Zi 370.00 cm/s    Pulmonic Regurgitant End Max Velocity 164.00 cm/s    AoV PG 11.00 mmHg    Aortic Valve Area by Continuity of Peak Velocity 2.70 cm2    IVSd 1.73 (A) 0.6 - 0.9 cm    IVSd (M-mode) 1.81 (A) cm    LVIDd 3.24 (A) 3.9 - 5.3 cm    LVIDd (M-mode) 3.71 (A) cm    LVIDs 2.12 cm    LVIDs (M-mode) 2.46 cm    LVOT d 2.10 cm    Pulmonic Regurgitant End Max Velocity 128.00 cm/s    LVOT Peak Gradient 7.00 mmHg    LVPWd 1.67 (A) 0.6 - 0.9 cm    LVPWd (M-mode) 1.65 (A) cm    LV E' Septal Velocity 3.31 cm/s    LV ED Vol A2C 34.00 cm3    BP EF 72.0 55 - 100 %    BP EF 63.4 55 - 100 %    LV ES Vol A2C 9.53 cm3    LV ES Vol A2C 21.40 cm3    E/E' septal 26.59     LV Ejection Fraction MOD 2C 65.0 %    LV Mass .9 (A) 67 - 162 g    LV Mass AL Index 114.3 (A) 43 - 95 g/m2    LVOT SV 36.2 cm3    Mitral Valve Deceleration [place] 4,750.00 mm/s2    Mitral Valve Deceleration [place] 4,750.00 mm/s2    Mitral Valve E Wave Deceleration Time 355.00 ms    Mitral Valve Pressure Half-time 78.00 ms    MV A Zi 128.00 cm/s    MV E Zi 88.00 cm/s    MVA (PHT) 2.82 cm2    MV E/A 0.69     Pulmonic Regurgitant End Max Velocity 84.50 cm/s    Pulmonic Valve Systolic Peak Instantaneous Gradient 3.00 mmHg    P Vein A Dur 130.00 ms    Pulmonary Vein \"A\" Wave Velocity 22.20 cm/s    Est. RA Pressure 3.00 mmHg    RVIDd 3.43 cm    RVSP 34.00 mmHg    Tricuspid Valve Max Velocity 278.00 cm/s    Triscuspid Valve Regurgitation Peak Gradient 31.00 mmHg Right Atrial Area 4C 13.80 cm2    LA Area 4C 23.99 cm2    NURA/BSA Pk Zi 1.4 cm2/m2   GLUCOSE, POC    Collection Time: 02/16/21 11:40 AM   Result Value Ref Range    Glucose (POC) 212 (H) 65 - 100 mg/dL    Performed by [address] [place] [address]        Results     ** No results found for the last 336 hours. **           Labs:     Recent Labs     02/14/21  0400   WBC 8.5   HGB 10.1*   HCT 32.2*        Recent Labs     02/14/21  0400      K 3.6      CO2 30   BUN 54*   CREA 3.64*   *   CA 9.1     Recent Labs     02/14/21  0400   ALT 16   AP 76   TBILI 0.4   TP 7.0   ALB 3.1*   GLOB 3.9     No results for input(s): INR, PTP, APTT, INREXT, INREXT in the last 72 hours. No results for input(s): FE, TIBC, PSAT, FERR in the last 72 hours. No results found for: FOL, RBCF   No results for input(s): PH, PCO2, PO2 in the last 72 hours. No results for input(s): CPK, CKNDX, TROIQ in the last 72 hours.     No lab exists for component: CPKMB  Lab Results   Component Value Date/Time    Cholesterol, total 117 02/14/2021 11:00 PM    HDL Cholesterol 41 02/14/2021 11:00 PM    LDL, calculated 60.8 02/14/2021 11:00 PM    Triglyceride 76 02/14/2021 11:00 PM    CHOL/HDL Ratio 2.9 02/14/2021 11:00 PM     Lab Results   Component Value Date/Time    Glucose (POC) 212 (H) 02/16/2021 11:40 AM    Glucose (POC) 119 (H) 02/16/2021 08:01 AM    Glucose (POC) 115 (H) 02/16/2021 06:16 AM    Glucose (POC) 83 02/16/2021 04:38 AM    Glucose (POC) 71 02/16/2021 03:54 AM     Lab Results   Component Value Date/Time    Color Yellow/Straw 02/13/2021 12:58 PM    Appearance Hazy (A) 02/13/2021 12:58 PM    Specific gravity 1.010 02/13/2021 12:58 PM    pH (UA) 7.0 02/13/2021 12:58 PM    Protein 30 (A) 02/13/2021 12:58 PM    Glucose Negative 02/13/2021 12:58 PM    Ketone Negative 02/13/2021 12:58 PM    Bilirubin Negative 02/13/2021 12:58 PM    Urobilinogen 0.2 02/13/2021 12:58 PM    Nitrites Negative 02/13/2021 12:58 PM    Leukocyte Esterase Negative 02/13/2021 12:58 PM    Bacteria 1+ (A) 02/13/2021 12:58 PM    WBC 0-4 02/13/2021 12:58 PM    RBC 0-5 02/13/2021 12:58 PM         Assessment:       Right pontomedullay infarct  Hemorrhagic transformation of prior pontine infarcts  History of CVA  Type 2 diabetes  Hypertension  Hyperlipidemia  Anxiety disorder  CAD status post stent  Chronic kidney disease  Recent dental procedures    Plan:     MRI IMPRESSION  Prominent microvascular ischemic changes as described above.     Acute lacunar infarct in the region of the right facial colliculus as described  Above. .    Hold Eliquis  Increase aspirin dose to 325 mg  Continue Lipitor 80 mg  Q4 neuro checks  Plan to discharge to rehabilitation  Outpatient CTA of head/neck 2 weeks after discharge  TTE f/u  Order HgbA1c        Current Facility-Administered Medications:     lidocaine 4 % patch 1 Patch, 1 Patch, TransDERmal, Q24H, Eda Granda MD    atorvastatin (LIPITOR) tablet 80 mg, 80 mg, Oral, DAILY, Mirella Jarvis MD, 80 mg at 02/16/21 1022    aspirin delayed-release tablet 325 mg, 325 mg, Oral, DAILY, Mirella Ortiz MD, 325 mg at 02/16/21 1022    allopurinoL (ZYLOPRIM) tablet 100 mg, 100 mg, Oral, BID, Eda Granda MD, 100 mg at 02/16/21 1022    amLODIPine (NORVASC) tablet 10 mg, 10 mg, Oral, DAILY, Eda Granda MD, 10 mg at 02/16/21 1022    furosemide (LASIX) tablet 40 mg, 40 mg, Oral, TID, Eda Granda MD, 40 mg at 02/16/21 1022    metoprolol tartrate (LOPRESSOR) tablet 50 mg, 50 mg, Oral, BID, Eda Granda MD, 50 mg at 02/16/21 1022    traZODone (DESYREL) tablet 100 mg, 100 mg, Oral, QHS, Eda Granda MD, 100 mg at 02/15/21 2335    ferrous sulfate tablet 325 mg, 1 Tab, Oral, DAILY WITH BREAKFAST, Eda Granda MD, 325 mg at 02/16/21 1022    doxazosin (CARDURA) tablet 2 mg, 2 mg, Oral, QHS, Eda Granda MD, 2 mg at 02/15/21 7187    insulin glargine (LANTUS) injection 30 Units, 30 Units, SubCUTAneous, QHS, Deanna Bolaños MD, 30 Units at 02/15/21 2153    sodium bicarbonate tablet 650 mg, 650 mg, Oral, BID, Harpreet Granda MD, 650 mg at 02/16/21 1022    acetaminophen (TYLENOL) tablet 650 mg, 650 mg, Oral, Q6H PRN, 650 mg at 02/15/21 2152 **OR** acetaminophen (TYLENOL) suppository 650 mg, 650 mg, Rectal, Q6H PRN, Harpreet Granda MD    polyethylene glycol (MIRALAX) packet 17 g, 17 g, Oral, DAILY PRN, Harpreet Granda MD    ondansetron (ZOFRAN ODT) tablet 4 mg, 4 mg, Oral, Q8H PRN **OR** ondansetron (ZOFRAN) injection 4 mg, 4 mg, IntraVENous, Q6H PRN, Eda Granda MD    0.9% sodium chloride infusion, 25 mL/hr, IntraVENous, CONTINUOUS, Eda Granda MD, Last Rate: 25 mL/hr at 02/13/21 2059, 25 mL/hr at 02/13/21 2059    insulin lispro (HUMALOG) injection, , SubCUTAneous, AC&HS, Eda Granda MD, Stopped at 02/14/21 2200    glucose chewable tablet 16 g, 4 Tab, Oral, PRN, Eda Granda MD    dextrose (D50W) injection syrg 12.5-25 g, 25-50 mL, IntraVENous, PRN, Eda Granda MD    glucagon (GLUCAGEN) injection 1 mg, 1 mg, IntraMUSCular, PRN, Deanna Bloaños MD

## 2021-02-16 NOTE — ROUTINE PROCESS
Pt neuro assessment Q4h same as it has been, however pt pupils are almost pinpoint yet still reactive. Attempted to call on call neurologist at 2300 and 0330 with no response, vm left. Notified primary physician who still wanted neuro to be contacted. Sent to another neurologist in same practice, left vm. Notified nursing supervisor. Will continue to monitor pt. Pt called out stating blood sugar felt low, pt was groggy and sweaty, given orange juice, went from 56 to 71.pt currently drinking milk and eating peanut butter crackers, will recheck sugar afterwards. Nothing acute on head ct, bs now in 80s. Will continue to monitor pt.

## 2021-02-16 NOTE — PROGRESS NOTES
OCCUPATIONAL THERAPY EVALUATION  Patient: Luis Fernando Nicole (80 y.o. 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 PMH: low vision, CHF, CKD, DM, HTN, CVA. Pt A&O x 4. Per pt report pt resides alone in a 1 story home with 0 JAMES, ramp entrance, pt was I for ADLS/IADLS, RW occasionally with mobility prior to admission. DME at home: RW, [facility], crutch. Based on the objective data described below, the patient presents with generalized weakness, deconditioning, impaired coordination, intact sensation, anxious with mobility, decreased activity tolerance. Pt semi-supine in bed upon OT/PT arrival, agreeable to evaluation. Pt required CGA to min A for bed mobility, CGA to min A supine <> sit, CGA to min A sit <> stand transfers. Patient total A LE dressing EOB, setup A simulated grooming EOB. Pt did fair with session today with demonstrating decreased coordination via finger tip to nose and finger opposition on right compared to left as well as weakness on right LE compared to left. Patient would benefit from continued skilled OT services to address above deficits and improve safety and independence with self care and functional mobility/transfers. Recommend discharge to IRF when medically appropriate. Current Level of Function Impacting Discharge (ADLs/self-care): setup A grooming, total A LE dressing.     Other factors to consider for discharge: time since onset, new CVA, PLOF        PLAN :  Recommendations and Planned Interventions: self care training, functional mobility training, therapeutic exercise, balance training, therapeutic activities, endurance activities, patient education and family training/education    Frequency/Duration: Patient will be followed by occupational therapy 5 times a week to address goals. Recommendation for discharge: (in order for the patient to meet his/her long term goals)  IRF    This discharge recommendation:  Has been made in collaboration with the attending provider and/or case management    IF patient discharges home will need the following DME: TBD       SUBJECTIVE:   Patient stated I just signed the contract!  patient yelled when scooting towards EOB, when asked pt referring to fall contract. OBJECTIVE DATA SUMMARY:   HISTORY:   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    [facility]est[facility]     Chronic kidney disease     Chronic pain     SPINAL STENOSIS    DM [facility]diabetes mellitus) ([facility] [address]     HTN (hypertension)     Stroke [facility])      Past Surgical History:   Procedure Laterality Date    HX OOPHORECTOMY      STENT INSERTION  2001,2002    [facility]       Expanded or extensive additional review of patient history:     Home Situation  Home Environment: Private residence  # Steps to Enter: 0  Wheelchair Ramp: Yes  One/Two Story Residence: One story  Living Alone: Yes  Support Systems: Child(jennifer), Home care staff  Patient Expects to be Discharged to[de-identified] Private residence  Current DME Used/Available at Home: Cane, straight, Walker, rolling    PLOF: Pt I for ADLS/IADLS, I with mobility prior to admission. EXAMINATION OF PERFORMANCE DEFICITS:  Cognitive/Behavioral Status:  Neurologic State: Alert  Orientation Level: Oriented X4  Cognition: Appropriate decision making    Skin: intact where visible    Edema: none noted    Hearing:   Auditory  Auditory Impairment: None    Vision/Perceptual:    No visual deficits noted at this time    Range of Motion:  AROM: Generally decreased, functional  PROM: Within functional limits    Strength:  Strength: Generally decreased, functional  Grossly observed to be 3+/5    Coordination:  Coordination: Generally decreased, functional(decreased right UE compared to left UE)    Tone & Sensation:  Tone: Normal  Sensation: Intact(light touch)    Balance:  Sitting: Intact; With support  Standing: Intact; With support(used RW)    Functional Mobility and Transfers for ADLs:  Bed Mobility:  Rolling: Contact guard assistance  Supine to Sit: Contact guard assistance;Minimum assistance  Sit to Supine: Contact guard assistance;Minimum assistance  Scooting: Minimum assistance; Moderate assistance    Transfers:  Sit to Stand: Contact guard assistance  Stand to Sit: Contact guard assistance    ADL Assessment:    Oral Facial Hygiene/Grooming: Setup    Lower Body Dressing: Total assistance    ADL Intervention and task modifications:    Grooming  Grooming Assistance: Set-up(simulated)  Position Performed: Seated edge of bed    Lower Body Dressing Assistance  Dressing Assistance: Total assistance(dependent)  Socks: Total assistance (dependent)  Position Performed: Seated edge of bed    Therapeutic Exercise:  Patient may benefit from UE HEP including coordination exercises     Functional Measure:    [facility] AM-PACTM \"6 Clicks\"                                                       Daily Activity Inpatient Short Form  How much help from another person does the patient currently need. .. Total; A Lot A Little None   1. Putting on and taking off regular lower body clothing? [x]  1 []  2 []  3 []  4   2. Bathing (including washing, rinsing, drying)? []  1 [x]  2 []  3 []  4   3. Toileting, which includes using toilet, bedpan or urinal? [] 1 [x]  2 []  3 []  4   4. Putting on and taking off regular upper body clothing? []  1 []  2 [x]  3 []  4   5. Taking care of personal grooming such as brushing teeth? []  1 []  2 [x]  3 []  4   6. Eating meals? []  1 []  2 [x]  3 []  4   © 2007, Trustees of [facility], under license to [employer].  All rights reserved     Score: 14/24     Interpretation of Tool:  Represents clinically-significant functional categories (i.e. Activities of daily living). Percentage of Impairment CH    0%   CI    1-19% CJ    20-39% CK    40-59% CL    60-79% CM    80-99% CN     100%   [facility]  Score 6-24 24 23 20-22 15-19 10-14 7-9 6        Occupational Therapy Evaluation Charge Determination   History Examination Decision-Making   MEDIUM Complexity : Expanded review of history including physical, cognitive and psychosocial  history  MEDIUM Complexity : 3-5 performance deficits relating to physical, cognitive , or psychosocial skils that result in activity limitations and / or participation restrictions MEDIUM Complexity : Patient may present with comorbidities that affect occupational performnce. Miniml to moderate modification of tasks or assistance (eg, physical or verbal ) with assesment(s) is necessary to enable patient to complete evaluation       Based on the above components, the patient evaluation is determined to be of the following complexity level: MEDIUM    Pain Ratin/10 R knee pain    Activity Tolerance:   Fair    After treatment patient left in no apparent distress:    Supine in bed, Call bell within reach, Bed / chair alarm activated and Side rails x 3    COMMUNICATION/EDUCATION:   The patients plan of care was discussed with: Physical therapist and Registered nurse. Patient/family have participated as able in goal setting and plan of care. and Patient/family agree to work toward stated goals and plan of care. This patients plan of care is appropriate for delegation to \[facility]\"". OT/PT sessions occurred together for increased patient and clinician safety.     Problem: Self Care Deficits Care Plan (Adult)  Goal: *Acute Goals and Plan of Care (Insert Text)  Description: Pt will be mod I sup <> sit in prep for EOB ADLs  Pt will be mod I grooming sitting EOB  Pt will be mod A LE dressing sitting EOB/long sit  Pt will be mod I sit <>  prep for toileting LRAD  Pt will be mod I toileting/toilet transfer/cloth mgmt LRAD  Pt will be I following UE HEP in prep for self care tasks     Outcome: Not Met     Thank you for this referral.  Keyla Mcginnis, OTR/L  Time Calculation: 24 mins

## 2021-02-16 NOTE — PROGRESS NOTES
PHYSICAL THERAPY EVALUATION  Patient: Charlie Lewistingham (66 y.o. 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 PMH: low vision, CHF, CKD, DM, HTN, CVA. Pt A&O x 4. Per pt report pt resides alone in a 1 story home with 0 JAMES, ramp entrance, pt was I for ADLS/IADLS, RW occasionally with mobility prior to admission. DME at home: RW, [facility], crutch. Based on the objective data described below, the patient presents with generalized weakness, impaired functional mobility, impaired amb, impaired balance, and decreased activity tolerance. Pt semi-supine in bed upon PT/OT arrival, agreeable to evaluation. Pt required CGA to min A for bed mobility, CGA to min A supine <> sit, CGA to min A sit <> stand transfers. Pt amb 10 feet with gt belt, RW, and CGA; demonstrating WBOS with short, shuffling, step to gt pattern with no LOB or knee buckling noted. Pt did fair with session today with demonstrating decreased coordination via finger tip to nose and finger opposition on right compared to left as well as weakness on right LE compared to left. Pt will benefit from continued skilled PT to address above deficits and return to PLOF. Current PT DC recommendation IRF.      Current Level of Function Impacting Discharge (mobility/balance): CGA to min A with increased assistance required     Other factors to consider for discharge: lives alone, h/o CVA       PLAN :  Recommendations and Planned Interventions: bed mobility training, transfer training, gait training, therapeutic exercises, patient and family training/education and therapeutic activities      Recommend with staff: amb to bathroom with gt belt and RW and assistance from staff Frequency/Duration: Patient will be followed by physical therapy:  5 times a week to address goals. Recommendation for discharge: (in order for the patient to meet his/her long term goals)  IRF     This discharge recommendation:  Has been made in collaboration with the attending provider and/or case management    IF patient discharges home will need the following DME: none         SUBJECTIVE:   Patient stated I just signed a contract.  when scooting pt forward towards EOB referring to fall contract. OBJECTIVE DATA SUMMARY:   HISTORY:    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    [facility])     Chronic kidney disease     Chronic pain     SPINAL STENOSIS    DM [facility]di[facility] mellitus) ([facility] [address]     HTN (hypertension)     Stroke [facility])      Past Surgical History:   Procedure Laterality Date    HX OOPHORECTOMY      STENT INSERTION  2001,2002    [facility] Environment: Private residence  # Steps to Enter: 0  Wheelchair Ramp: Yes  One/Two Story Residence: One story  Living Alone: Yes  Support Systems: Child(jennifer), Home care staff  Patient Expects to be Discharged to[de-identified] Private residence  Current DME Used/Available at Home: Stefanie Coins, straight, Walker, rolling    EXAMINATION/PRESENTATION/DECISION MAKING:   Critical Behavior:  Neurologic State: Alert  Orientation Level: Oriented X4  Cognition: Appropriate decision making  Safety/Judgement: Awareness of environment  Hearing:   Auditory  Auditory Impairment: None  Skin:  Intact where visible   Edema: none noted   Range Of Motion:  AROM: Generally decreased, functional           PROM: Within functional limits           Strength:    Strength: Generally decreased, functional(grossly 3/5)                    Tone & Sensation:   Tone: Normal              Sensation: Intact(light touch)               Coordination:  Coordination: Generally decreased, functional(decreased right UE compared to left UE)  Vision:      Functional Mobility:  Bed Mobility:  Rolling: Contact guard assistance  Supine to Sit: Contact guard assistance;Minimum assistance  Sit to Supine: Contact guard assistance;Minimum assistance  Scooting: Minimum assistance; Moderate assistance  Transfers:  Sit to Stand: Contact guard assistance  Stand to Sit: Contact guard assistance                       Balance:   Sitting: Intact; With support  Standing: Intact; With support(used RW)  Ambulation/Gait Training:  Distance (ft): 15 Feet (ft)     Ambulation - Level of Assistance: Contact guard assistance;Minimal assistance     Gait Description (WDL): Exceptions to [facility]           Base of Support: Widened     Speed/Jennie: Shuffled; Slow    Therapeutic Exercises:   Not completed this session    Functional Measure:  [address] \[facility]\"" [address] [address] AM-PAC 6 Clicks         Basic Mobility Inpatient Short Form  How much difficulty does the patient currently have. .. Unable A Lot A Little None   1. Turning over in bed (including adjusting bedclothes, sheets and blankets)? [] 1   [] 2   [x] 3   [] 4   2. Sitting down on and standing up from a chair with arms ( e.g., wheelchair, bedside commode, etc.)   [] 1   [] 2   [x] 3   [] 4   3. Moving from lying on back to sitting on the side of the bed? [] 1   [] 2   [x] 3   [] 4          How much help from another person does the patient currently need. .. Total A Lot A Little None   4. Moving to and from a bed to a chair (including a wheelchair)? [] 1   [] 2   [x] 3   [] 4   5. Need to walk in hospital room? [] 1   [x] 2   [] 3   [] 4   6. Climbing 3-5 steps with a railing? [] 1   [x] 2   [] 3   [] 4   © 2007, Trustees of [facility] \[facility]\"" [address] [address], under license to [employer]. All rights reserved     Score:  Initial: 16/24 Most Recent: X (Date: 2/16/2021 )   Interpretation of Tool:  Represents activities that are increasingly more difficult (i.e. Bed mobility, Transfers, Gait).   Score 24 23 22-20 19-15 14-10 9-7 6 Modifier CH CI CJ CK CL CM CN         Physical Therapy Evaluation Charge Determination   History Examination Presentation Decision-Making   HIGH Complexity :3+ comorbidities / personal factors will impact the outcome/ POC  HIGH Complexity : 4+ Standardized tests and measures addressing body structure, function, activity limitation and / or participation in recreation  LOW Complexity : Stable, uncomplicated  Other outcome measures ampac 6  mod      Based on the above components, the patient evaluation is determined to be of the following complexity level: LOW     Pain Ratin/10 right knee     Activity Tolerance:   Fair    After treatment patient left in no apparent distress:   Supine in bed, Call bell within reach, Bed / chair alarm activated and Side rails x 3 and nsg updated. GOALS:    Problem: Mobility Impaired (Adult and Pediatric)  Goal: *Acute Goals and Plan of Care (Insert Text)  Description: Pt will be I with LE HEP in 7 days. Pt will perform bed mobility with mod I in 7 days. Pt will perform transfers with mod I in 7 days. Pt will amb   feet with LRAD safely with mod I in 7 days. Outcome: Not Met       COMMUNICATION/EDUCATION:   The patients plan of care was discussed with: Occupational therapist and Registered nurse. Fall prevention education was provided and the patient/caregiver indicated understanding., Patient/family have participated as able in goal setting and plan of care. and Patient/family agree to work toward stated goals and plan of care. PT/OT sessions occurred together for increased safety of pt and clinician.     Thank you for this referral.  Ginger Chirinos, PT, DPT   Time Calculation: 24 mins

## 2021-02-16 NOTE — PROGRESS NOTES
CM contacted by patient's daughter, Nitin White,, in [place] asking for an update, CM explained that [place] was set up and that when patient is d/c she will return home with [place]. Nitin White stated that she is agreeable with this plan and reported that patient has a caregiver at home that can assist her once she returns. Ms. Nitin White reportedly comes to [facility] monthly to assist the patient with her needs however was unable to come to [facility] when patient DC and was concerned about transportation and requested that she be able to set up her own transport for the patient on d/c.    CM continues to follow.

## 2021-02-17 VITALS
HEART RATE: 66 BPM | DIASTOLIC BLOOD PRESSURE: 63 MMHG | RESPIRATION RATE: 20 BRPM | SYSTOLIC BLOOD PRESSURE: 152 MMHG | OXYGEN SATURATION: 97 % | BODY MASS INDEX: 33.46 KG/M2 | TEMPERATURE: 98.1 F | HEIGHT: 64 IN | WEIGHT: 195.99 LBS

## 2021-02-17 LAB
EST. AVERAGE GLUCOSE BLD GHB EST-MCNC: 134 MG/DL
GLUCOSE BLD STRIP.AUTO-MCNC: 126 MG/DL (ref 65–100)
GLUCOSE BLD STRIP.AUTO-MCNC: 144 MG/DL (ref 65–100)
GLUCOSE BLD STRIP.AUTO-MCNC: 180 MG/DL (ref 65–100)
GLUCOSE BLD STRIP.AUTO-MCNC: 70 MG/DL (ref 65–100)
HBA1C MFR BLD: 6.3 % (ref 4–5.6)
PERFORMED BY, TECHID: ABNORMAL
PERFORMED BY, TECHID: NORMAL

## 2021-02-17 PROCEDURE — 74011250637 HC RX REV CODE- 250/637: Performed by: FAMILY MEDICINE

## 2021-02-17 PROCEDURE — 74011000250 HC RX REV CODE- 250: Performed by: FAMILY MEDICINE

## 2021-02-17 PROCEDURE — 82962 GLUCOSE BLOOD TEST: CPT

## 2021-02-17 PROCEDURE — 74011636637 HC RX REV CODE- 636/637: Performed by: FAMILY MEDICINE

## 2021-02-17 PROCEDURE — 97530 THERAPEUTIC ACTIVITIES: CPT

## 2021-02-17 PROCEDURE — 74011250637 HC RX REV CODE- 250/637: Performed by: STUDENT IN AN ORGANIZED HEALTH CARE EDUCATION/TRAINING PROGRAM

## 2021-02-17 RX ADMIN — FUROSEMIDE 40 MG: 40 TABLET ORAL at 15:29

## 2021-02-17 RX ADMIN — METOPROLOL TARTRATE 50 MG: 50 TABLET, FILM COATED ORAL at 08:51

## 2021-02-17 RX ADMIN — ALLOPURINOL 100 MG: 100 TABLET ORAL at 08:51

## 2021-02-17 RX ADMIN — ASPIRIN 325 MG: 325 TABLET, COATED ORAL at 08:51

## 2021-02-17 RX ADMIN — FUROSEMIDE 40 MG: 40 TABLET ORAL at 08:51

## 2021-02-17 RX ADMIN — ATORVASTATIN CALCIUM 80 MG: 40 TABLET, FILM COATED ORAL at 08:51

## 2021-02-17 RX ADMIN — SODIUM BICARBONATE 650 MG TABLET 650 MG: at 08:51

## 2021-02-17 RX ADMIN — INSULIN LISPRO 3 UNITS: 100 INJECTION, SOLUTION INTRAVENOUS; SUBCUTANEOUS at 12:14

## 2021-02-17 RX ADMIN — INSULIN LISPRO 3 UNITS: 100 INJECTION, SOLUTION INTRAVENOUS; SUBCUTANEOUS at 16:40

## 2021-02-17 RX ADMIN — AMLODIPINE BESYLATE 10 MG: 5 TABLET ORAL at 08:52

## 2021-02-17 RX ADMIN — POLYETHYLENE GLYCOL 3350 17 G: 17 POWDER, FOR SOLUTION ORAL at 08:53

## 2021-02-17 RX ADMIN — FERROUS SULFATE TAB 325 MG (65 MG ELEMENTAL FE) 325 MG: 325 (65 FE) TAB at 08:52

## 2021-02-17 NOTE — PROGRESS NOTES
PHYSICAL THERAPY TREATMENT  Patient: Chris Mims (2 y.o. female)  Date: 2/17/2021  Diagnosis: CVA (cerebral vascular accident) ([facility] [address] [I63.9] <principal problem not specified>       Precautions:    Chart, physical therapy assessment, plan of care and goals were reviewed. ASSESSMENT  Patient continues with skilled PT services and is progressing towards goals. Pt. Semi supine in bed agreeable to therapy session for ambulation only. Able to perform bed mobility and transfers with Min a to [address]. Ambulated 20' with RW at [address]. Foot drop on LLE Noted with shuffling gait. Pt. Emotionally liable and hard to redirect, appears stressed and mentioned being with her mom in 2 years. Notified SN for Psych consult. Recommend DC to IRF. Munir Duggan Current Level of Function Impacting Discharge (mobility/balance): Cognition. Motivation, balance, endurance, strength    Other factors to consider for discharge: PMH         PLAN :  Patient continues to benefit from skilled intervention to address the above impairments. Continue treatment per established plan of care. to address goals. Recommendation for discharge: (in order for the patient to meet his/her long term goals)  Therapy 3 hours per day 5-7 days per week    This discharge recommendation:  Has been made in collaboration with the attending provider and/or case management    IF patient discharges home will need the following DME: rolling walker       SUBJECTIVE:   Patient stated IM OKAY.     OBJECTIVE DATA SUMMARY:   Critical Behavior:  Neurologic State: Alert  Orientation Level: Oriented X4  Cognition: Appropriate decision making, Appropriate for age attention/concentration, Appropriate safety awareness  Safety/Judgement: Awareness of environment  Functional Mobility Training:  Bed Mobility:  Rolling: Stand-by assistance  Supine to Sit: Minimum assistance  Sit to Supine: Stand-by assistance  Scooting: Stand-by assistance        Transfers:  Sit to Stand: Contact guard assistance  Stand to Sit: Contact guard assistance                             Balance:  Sitting: Intact  Standing: Impaired;Pull to stand; With support  Standing - Static: Fair;Constant support  Standing - Dynamic : Fair;Constant support  Ambulation/Gait Training:  Distance (ft): 20 Feet (ft)     Ambulation - Level of Assistance: Contact guard assistance                 Base of Support: Widened     Speed/Jennie: Slow                       Stairs: Therapeutic Exercises:   REFUSED  Pain Rating:  NONE    Activity Tolerance:   Fair  Please refer to the flowsheet for vital signs taken during this treatment. After treatment patient left in no apparent distress:   Supine in bed    COMMUNICATION/COLLABORATION:   The patients plan of care was discussed with: Physical therapy assistantTerri Bradley Cue   Time Calculation: 38 mins

## 2021-02-17 NOTE — PROGRESS NOTES
CM spoke with patient's daughter regarding patient's daughter, Citlaly Unger [phone], [facility] for IRF. She is agreeable to referrals being sent, referrals sent via richard, awaiting acceptance. CM continues to follow.

## 2021-02-17 NOTE — PROGRESS NOTES
General Daily Progress Note          Patient Name:   Blaise Conn       YOB: 1945       Age:  75 y.o.      Admit Date: 2/13/2021      Subjective:        Patient is resting the bed asleep, wakes to voice. no distress no new complaints. Pinpoint pupils still present    TTE showed LVEF 65%. Mild AV regurgitation moderate mitral annular calcification and mild tricuspid valve regurgitation. No significant stenosis or atrial dilation.     Seen by neurology after TTE. Recommend continue antiplatelet therapy and statin for stroke prophylaxis and obtain CTA of head and neck in 2 weeks outpatient. Possible dual antiplatelet therapy    Patient will discharge home and home health set up.        Objective:     Visit Vitals  BP (!) 149/71   Pulse 61   Temp 98.2 °F (36.8 °C)   Resp 20   Ht 5' 4.02\" (1.626 m)   Wt 88.9 kg (195 lb 15.8 oz)   SpO2 98%   BMI 33.62 kg/m²        Recent Results (from the past 24 hour(s))   GLUCOSE, POC    Collection Time: 02/16/21 11:40 AM   Result Value Ref Range    Glucose (POC) 212 (H) 65 - 100 mg/dL    Performed by Brigida Foster    GLUCOSE, POC    Collection Time: 02/16/21  3:56 PM   Result Value Ref Range    Glucose (POC) 232 (H) 65 - 100 mg/dL    Performed by Brigida Foster    GLUCOSE, POC    Collection Time: 02/16/21  8:34 PM   Result Value Ref Range    Glucose (POC) 156 (H) 65 - 100 mg/dL    Performed by PHILL PEREZ    GLUCOSE, POC    Collection Time: 02/17/21  7:47 AM   Result Value Ref Range    Glucose (POC) 70 65 - 100 mg/dL    Performed by Khoi Michel ([facility])      [unfilled]      Review of Systems    Constitutional: Negative for chills and fever.   HENT: Negative.    Eyes: Negative.    Respiratory: Negative.    Cardiovascular: Negative.    Gastrointestinal: Negative for abdominal pain and nausea.   Skin: Negative.    Neurological: Negative.        Physical Exam:      Constitutional: pt is oriented to person, place, and time.   HENT:   Head: Normocephalic and atraumatic.  Eyes: Pupils are equal, round, and reactive to light. EOM are normal.   Cardiovascular: Normal rate, regular rhythm and normal heart sounds. Pulmonary/Chest: Breath sounds normal. No wheezes. No rales. Exhibits no tenderness. Abdominal: Soft. Bowel sounds are normal. Abdominal tenderness. Musculoskeletal: Normal range of motion. Neurological: pt is alert and oriented to person, place, and time. CT HEAD WO CONT   Final Result   Diffuse chronic changes which appear stable. No acute or active   intracranial process. MRI BRAIN WO CONT   Final Result   Prominent microvascular ischemic changes as described above. Acute lacunar infarct in the region of the right facial colliculus as described   above. .           Recent Results (from the past 24 hour(s))   GLUCOSE, POC    Collection Time: 02/16/21 11:40 AM   Result Value Ref Range    Glucose (POC) 212 (H) 65 - 100 mg/dL    Performed by [address], POC    Collection Time: 02/16/21  3:56 PM   Result Value Ref Range    Glucose (POC) 232 (H) 65 - 100 mg/dL    Performed by [address], POC    Collection Time: 02/16/21  8:34 PM   Result Value Ref Range    Glucose (POC) 156 (H) 65 - 100 mg/dL    Performed by Wyatt Sierra    GLUCOSE, POC    Collection Time: 02/17/21  7:47 AM   Result Value Ref Range    Glucose (POC) 70 65 - 100 mg/dL    Performed by Tereso Sever ([facility])        Results     ** No results found for the last 336 hours. **           Labs:     No results for input(s): WBC, HGB, HCT, PLT, HGBEXT, HCTEXT, PLTEXT, HGBEXT, HCTEXT, PLTEXT in the last 72 hours. No results for input(s): NA, K, CL, CO2, BUN, CREA, GLU, CA, MG, PHOS, URICA in the last 72 hours. No results for input(s): ALT, AP, TBIL, TBILI, TP, ALB, GLOB, GGT, AML, LPSE in the last 72 hours. No lab exists for component: SGOT, GPT, AMYP, HLPSE  No results for input(s): INR, PTP, APTT, INREXT, INREXT in the last 72 hours.    No results for input(s): FE, TIBC, PSAT, FERR in the last 72 hours. No results found for: FOL, RBCF   No results for input(s): PH, PCO2, PO2 in the last 72 hours. No results for input(s): CPK, CKNDX, TROIQ in the last 72 hours. No lab exists for component: CPKMB  Lab Results   Component Value Date/Time    Cholesterol, total 117 02/14/2021 11:00 PM    HDL Cholesterol 41 02/14/2021 11:00 PM    LDL, calculated 60.8 02/14/2021 11:00 PM    Triglyceride 76 02/14/2021 11:00 PM    CHOL/HDL Ratio 2.9 02/14/2021 11:00 PM     Lab Results   Component Value Date/Time    Glucose (POC) 70 02/17/2021 07:47 AM    Glucose (POC) 156 (H) 02/16/2021 08:34 PM    Glucose (POC) 232 (H) 02/16/2021 03:56 PM    Glucose (POC) 212 (H) 02/16/2021 11:40 AM    Glucose (POC) 119 (H) 02/16/2021 08:01 AM     Lab Results   Component Value Date/Time    Color Yellow/Straw 02/13/2021 12:58 PM    Appearance Hazy (A) 02/13/2021 12:58 PM    Specific gravity 1.010 02/13/2021 12:58 PM    pH (UA) 7.0 02/13/2021 12:58 PM    Protein 30 (A) 02/13/2021 12:58 PM    Glucose Negative 02/13/2021 12:58 PM    Ketone Negative 02/13/2021 12:58 PM    Bilirubin Negative 02/13/2021 12:58 PM    Urobilinogen 0.2 02/13/2021 12:58 PM    Nitrites Negative 02/13/2021 12:58 PM    Leukocyte Esterase Negative 02/13/2021 12:58 PM    Bacteria 1+ (A) 02/13/2021 12:58 PM    WBC 0-4 02/13/2021 12:58 PM    RBC 0-5 02/13/2021 12:58 PM         Assessment:       Right pontomedullay infarct  Hemorrhagic transformation of prior pontine infarcts  History of CVA  Type 2 diabetes  Hypertension  Hyperlipidemia  Anxiety disorder  CAD status post stent  Chronic kidney disease  Recent dental procedures    Echo done shows ejection fraction was 65%    Carotid Dopplers negative    Plan:     MRI IMPRESSION  Patient's MRI of the brain revealing subacute ischemia in the right pontomedullary junction which correlates with patient's presenting symptoms.   Patient also found to have hemorrhagic transformation of prior pontine infarcts. Hold Eliquis  Continue current medications    Continue antiplatelet therapy and statin for stroke prophylaxis and obtain CTA of head and neck in 2 weeks outpatient. Possible dual antiplatelet therapy    Patient will discharge home and home health set up.         Current Facility-Administered Medications:     lidocaine 4 % patch 1 Patch, 1 Patch, TransDERmal, Q24H, Eda Granda MD, 1 Patch at 02/16/21 1426    atorvastatin (LIPITOR) tablet 80 mg, 80 mg, Oral, DAILY, Anil Jones MD, 80 mg at 02/17/21 7652    aspirin delayed-release tablet 325 mg, 325 mg, Oral, DAILY, Anil Jones MD, 325 mg at 02/17/21 0851    allopurinoL (ZYLOPRIM) tablet 100 mg, 100 mg, Oral, BID, Eda Granda MD, 100 mg at 02/17/21 0851    amLODIPine (NORVASC) tablet 10 mg, 10 mg, Oral, DAILY, Eda Granda MD, 10 mg at 02/17/21 8311    furosemide (LASIX) tablet 40 mg, 40 mg, Oral, TID, Delaney Granda MD, 40 mg at 02/17/21 0851    metoprolol tartrate (LOPRESSOR) tablet 50 mg, 50 mg, Oral, BID, Eda Granda MD, 50 mg at 02/17/21 8165    traZODone (DESYREL) tablet 100 mg, 100 mg, Oral, QHS, Eda Granda MD, 100 mg at 02/16/21 2150    ferrous sulfate tablet 325 mg, 1 Tab, Oral, DAILY WITH BREAKFAST, Eda Granda MD, 325 mg at 02/17/21 3568    doxazosin (CARDURA) tablet 2 mg, 2 mg, Oral, QHS, Eda Granda MD, 2 mg at 02/16/21 2154    sodium bicarbonate tablet 650 mg, 650 mg, Oral, BID, Eda Granda MD, 650 mg at 02/17/21 0851    acetaminophen (TYLENOL) tablet 650 mg, 650 mg, Oral, Q6H PRN, 650 mg at 02/15/21 2152 **OR** acetaminophen (TYLENOL) suppository 650 mg, 650 mg, Rectal, Q6H PRN, Eda Granda MD    polyethylene glycol (MIRALAX) packet 17 g, 17 g, Oral, DAILY PRN, Eda Granda MD, 17 g at 02/17/21 0853    ondansetron (ZOFRAN ODT) tablet 4 mg, 4 mg, Oral, Q8H PRN **OR** ondansetron (ZOFRAN) injection 4 mg, 4 mg, IntraVENous, Q6H PRN, Delaney Granda MD    0.9% sodium chloride infusion, 25 mL/hr, IntraVENous, CONTINUOUS, Eda Granda MD, Last Rate: 25 mL/hr at 02/13/21 2059, 25 mL/hr at 02/13/21 2059    insulin lispro (HUMALOG) injection, , SubCUTAneous, AC&HS, Eda Granda MD, Stopped at 02/14/21 2200    glucose chewable tablet 16 g, 4 Tab, Oral, PRN, Eda Granda MD    dextrose (D50W) injection syrg 12.5-25 g, 25-50 mL, IntraVENous, PRN, Eda Granda MD    glucagon (GLUCAGEN) injection 1 mg, 1 mg, IntraMUSCular, PRN, Alexa Lynn MD

## 2021-02-17 NOTE — DISCHARGE SUMMARY
General Daily Progress Note          Patient Name:   Eddie Marroquin       YOB: 1945       Age:  70 y.o. Admit Date: 2/13/2021      Subjective:        Patient is resting the bed asleep, wakes to voice. no distress no new complaints. Pinpoint pupils still present    TTE showed LVEF 65%. Mild AV regurgitation moderate mitral annular calcification and mild tricuspid valve regurgitation. No significant stenosis or atrial dilation. Seen by neurology after TTE. Recommend continue antiplatelet therapy and statin for stroke prophylaxis and obtain CTA of head and neck in 2 weeks outpatient. Possible dual antiplatelet therapy          Objective:     Visit Vitals  BP (!) 154/67   Pulse 61   Temp 98.2 °F (36.8 °C)   Resp 20   Ht 5' 4.02\" (1.626 m)   Wt 88.9 kg (195 lb 15.8 oz)   SpO2 98%   BMI 33.62 kg/m²        Recent Results (from the past 24 hour(s))   GLUCOSE, POC    Collection Time: 02/16/21  3:56 PM   Result Value Ref Range    Glucose (POC) 232 (H) 65 - 100 mg/dL    Performed by [address], POC    Collection Time: 02/16/21  8:34 PM   Result Value Ref Range    Glucose (POC) 156 (H) 65 - 100 mg/dL    Performed by Wyatt Sierra    GLUCOSE, POC    Collection Time: 02/17/21  7:47 AM   Result Value Ref Range    Glucose (POC) 70 65 - 100 mg/dL    Performed by Tereso Sever ([facility])    GLUCOSE, POC    Collection Time: 02/17/21 11:19 AM   Result Value Ref Range    Glucose (POC) 180 (H) 65 - 100 mg/dL    Performed by Endero Sever ([facility])      [unfilled]      Review of Systems    Constitutional: Negative for chills and fever. HENT: Negative. Eyes: Negative. Respiratory: Negative. Cardiovascular: Negative. Gastrointestinal: Negative for abdominal pain and nausea. Skin: Negative. Neurological: Negative. Physical Exam:      Constitutional: pt is oriented to person, place, and time. HENT:   Head: Normocephalic and atraumatic.    Eyes: Pupils are equal, round, and reactive to light. EOM are normal.   Cardiovascular: Normal rate, regular rhythm and normal heart sounds. Pulmonary/Chest: Breath sounds normal. No wheezes. No rales. Exhibits no tenderness. Abdominal: Soft. Bowel sounds are normal. Abdominal tenderness. Musculoskeletal: Normal range of motion. Neurological: pt is alert and oriented to person, place, and time. CT HEAD WO CONT   Final Result   Diffuse chronic changes which appear stable. No acute or active   intracranial process. MRI BRAIN WO CONT   Final Result   Prominent microvascular ischemic changes as described above. Acute lacunar infarct in the region of the right facial colliculus as described   above. .           Recent Results (from the past 24 hour(s))   GLUCOSE, POC    Collection Time: 02/16/21  3:56 PM   Result Value Ref Range    Glucose (POC) 232 (H) 65 - 100 mg/dL    Performed by [address], POC    Collection Time: 02/16/21  8:34 PM   Result Value Ref Range    Glucose (POC) 156 (H) 65 - 100 mg/dL    Performed by Dominguez Jacques    GLUCOSE, POC    Collection Time: 02/17/21  7:47 AM   Result Value Ref Range    Glucose (POC) 70 65 - 100 mg/dL    Performed by Bear Juarez ([facility])    GLUCOSE, POC    Collection Time: 02/17/21 11:19 AM   Result Value Ref Range    Glucose (POC) 180 (H) 65 - 100 mg/dL    Performed by Bear Juarez ([facility])        Results     ** No results found for the last 336 hours. **           Labs:     No results for input(s): WBC, HGB, HCT, PLT, HGBEXT, HCTEXT, PLTEXT, HGBEXT, HCTEXT, PLTEXT in the last 72 hours. No results for input(s): NA, K, CL, CO2, BUN, CREA, GLU, CA, MG, PHOS, URICA in the last 72 hours. No results for input(s): ALT, AP, TBIL, TBILI, TP, ALB, GLOB, GGT, AML, LPSE in the last 72 hours. No lab exists for component: SGOT, GPT, AMYP, HLPSE  No results for input(s): INR, PTP, APTT, INREXT, INREXT in the last 72 hours. No results for input(s): FE, TIBC, PSAT, FERR in the last 72 hours.    No results found for: FOL, RBCF   No results for input(s): PH, PCO2, PO2 in the last 72 hours. No results for input(s): CPK, CKNDX, TROIQ in the last 72 hours. No lab exists for component: CPKMB  Lab Results   Component Value Date/Time    Cholesterol, total 117 02/14/2021 11:00 PM    HDL Cholesterol 41 02/14/2021 11:00 PM    LDL, calculated 60.8 02/14/2021 11:00 PM    Triglyceride 76 02/14/2021 11:00 PM    CHOL/HDL Ratio 2.9 02/14/2021 11:00 PM     Lab Results   Component Value Date/Time    Glucose (POC) 180 (H) 02/17/2021 11:19 AM    Glucose (POC) 70 02/17/2021 07:47 AM    Glucose (POC) 156 (H) 02/16/2021 08:34 PM    Glucose (POC) 232 (H) 02/16/2021 03:56 PM    Glucose (POC) 212 (H) 02/16/2021 11:40 AM     Lab Results   Component Value Date/Time    Color Yellow/Straw 02/13/2021 12:58 PM    Appearance Hazy (A) 02/13/2021 12:58 PM    Specific gravity 1.010 02/13/2021 12:58 PM    pH (UA) 7.0 02/13/2021 12:58 PM    Protein 30 (A) 02/13/2021 12:58 PM    Glucose Negative 02/13/2021 12:58 PM    Ketone Negative 02/13/2021 12:58 PM    Bilirubin Negative 02/13/2021 12:58 PM    Urobilinogen 0.2 02/13/2021 12:58 PM    Nitrites Negative 02/13/2021 12:58 PM    Leukocyte Esterase Negative 02/13/2021 12:58 PM    Bacteria 1+ (A) 02/13/2021 12:58 PM    WBC 0-4 02/13/2021 12:58 PM    RBC 0-5 02/13/2021 12:58 PM         Assessment:       Right pontomedullay infarct  Hemorrhagic transformation of prior pontine infarcts  History of CVA  Type 2 diabetes  Hypertension  Hyperlipidemia  Anxiety disorder  CAD status post stent  Chronic kidney disease  Recent dental procedures    Echo done shows ejection fraction was 65%    Carotid Dopplers negative    Plan:     MRI IMPRESSION  Patient's MRI of the brain revealing subacute ischemia in the right pontomedullary junction which correlates with patient's presenting symptoms. Patient also found to have hemorrhagic transformation of prior pontine infarcts.     Hold Eliquis  Continue current medications    Continue antiplatelet therapy and statin for stroke prophylaxis and obtain CTA of head and neck in 2 weeks outpatient.  Possible dual antiplatelet therapy    Discussed with the patient she insisted to go to inpatient rehab        Current Facility-Administered Medications:     lidocaine 4 % patch 1 Patch, 1 Patch, TransDERmal, Q24H, Eda Granda MD, 1 Patch at 02/16/21 1426    atorvastatin (LIPITOR) tablet 80 mg, 80 mg, Oral, DAILY, Deann Price MD, 80 mg at 02/17/21 5150    aspirin delayed-release tablet 325 mg, 325 mg, Oral, DAILY, Deann Price MD, 325 mg at 02/17/21 0851    allopurinoL (ZYLOPRIM) tablet 100 mg, 100 mg, Oral, BID, Eda Granda MD, 100 mg at 02/17/21 0851    amLODIPine (NORVASC) tablet 10 mg, 10 mg, Oral, DAILY, Shelby Bliss MD, 10 mg at 02/17/21 2949    furosemide (LASIX) tablet 40 mg, 40 mg, Oral, TID, Shelby Bliss MD, 40 mg at 02/17/21 0851    metoprolol tartrate (LOPRESSOR) tablet 50 mg, 50 mg, Oral, BID, Eda Granda MD, 50 mg at 02/17/21 3491    traZODone (DESYREL) tablet 100 mg, 100 mg, Oral, QHS, Eda Granda MD, 100 mg at 02/16/21 2150    ferrous sulfate tablet 325 mg, 1 Tab, Oral, DAILY WITH BREAKFAST, Eda Granda MD, 325 mg at 02/17/21 0862    doxazosin (CARDURA) tablet 2 mg, 2 mg, Oral, QHS, Eda Granda MD, 2 mg at 02/16/21 2154    sodium bicarbonate tablet 650 mg, 650 mg, Oral, BID, Eda Granda MD, 650 mg at 02/17/21 0851    acetaminophen (TYLENOL) tablet 650 mg, 650 mg, Oral, Q6H PRN, 650 mg at 02/15/21 2152 **OR** acetaminophen (TYLENOL) suppository 650 mg, 650 mg, Rectal, Q6H PRN, Sunita Granda Wild, MD    polyethylene glycol (MIRALAX) packet 17 g, 17 g, Oral, DAILY PRN, Shelby Bliss MD, 17 g at 02/17/21 0853    ondansetron (ZOFRAN ODT) tablet 4 mg, 4 mg, Oral, Q8H PRN **OR** ondansetron (ZOFRAN) injection 4 mg, 4 mg, IntraVENous, Q6H PRN, Eda Granda MD    0.9% sodium chloride infusion, 25 mL/hr, IntraVENous, CONTINUOUS, Eda Granda MD, Last Rate: 25 mL/hr at 02/13/21 2059, 25 mL/hr at 02/13/21 2059    insulin lispro (HUMALOG) injection, , SubCUTAneous, AC&HS, Eda Granda MD, 3 Units at 02/17/21 1214    glucose chewable tablet 16 g, 4 Tab, Oral, PRN, Eda Granda MD    dextrose (D50W) injection syrg 12.5-25 g, 25-50 mL, IntraVENous, PRN, Eda Granda MD    glucagon (GLUCAGEN) injection 1 mg, 1 mg, IntraMUSCular, PRN, See Chung MD

## 2021-02-17 NOTE — PROGRESS NOTES
CM spoke with patient's daughter, Nas Durham [phone], regarding DC. She reported that she had spoke to [address] [place] [address] and [facility] and she much prefers that the patient go to [facility] because she feels that [facility] does not do enough to prevent COVID spread. IMM delivered. COMFORT notified by Jayla Barker at [facility] that they would have a bed for patient tonight and requested transport at 8:00, [place] to contact daughter regarding bed this date. Patient is clear to d/c from  to [facility] room 208, nurse report can be called to [phone]. Discharge checklist completed with nurse, Shannon Lakhani. Medicare pt has received, reviewed, and signed 2nd IM letter informing them of their right to appeal the discharge. Signed copied has been placed on pt bedside chart.

## 2021-02-17 NOTE — PROGRESS NOTES
OCCUPATIONAL THERAPY TREATMENT  Patient: Sourav Mcgee (89 y.o. female)  Date: 2/17/2021  Diagnosis: CVA (cerebral vascular accident) ([facility] [address] [I63.9] <principal problem not specified>       Precautions:    Chart, occupational therapy assessment, plan of care, and goals were reviewed. ASSESSMENT  Patient continues with skilled OT services and is progressing towards goals. Pt. Received seated at EOB with PTA in pt's room. Pt. Agreeable to OT treatment session. Pt. Performed self feeding with increased assistance d/t decreased fine motor coordination. Pt. Able to perform cutting foods up with SBA, pt required increased assistance with container management and required Mod A for opening container( peach container). Pt was able to use Left hand to feed self with SBA. Pt. Performed LB dressing while seated at EOB with SBA. Pt  appeared to have decreased coordination via finger tip to nose and finger opposition on right side. Pt stated decreased sensation on R side of face- stating it was tingling and numb. Pt. Stated visual impairments d/t pervious stroke. Pt declined bathroom transfer at this time stated that she was tired from working with PT. PLAN :  Patient continues to benefit from skilled intervention to address the above impairments. Continue treatment per established plan of care. to address goals. Recommend next OT session: self care training, functional mobility training, therapeutic exercise, balance training, therapeutic activities, endurance activities, patient education and family training/education    Recommendation for discharge: (in order for the patient to meet his/her long term goals)  Therapy 3 hours per day 5-7 days per week    This discharge recommendation:  Has been made in collaboration with the attending provider and/or case management    IF patient discharges home will need the following DME: TBD       SUBJECTIVE:   Patient stated  I have help at home 3 days a week.     OBJECTIVE DATA SUMMARY:   Cognitive/Behavioral Status:  Neurologic State: Alert  Orientation Level: Oriented X4    Functional Mobility and Transfers for ADLs:  Bed Mobility:  Rolling: Stand-by assistance  Supine to Sit: Minimum assistance  Sit to Supine: Stand-by assistance  Scooting: Stand-by assistance    Transfers:  Sit to Stand: Contact guard assistance    Balance:  Sitting: Intact; Without support  Standing: Impaired;Pull to stand; With support  Standing - Static: Fair;Constant support  Standing - Dynamic : Fair;Constant support    ADL Intervention:  Feeding  Feeding Assistance: Stand-by assistance  Container Management: Maximum assistance  Cutting Food: Stand-by assistance  Utensil Management: Supervision  Food to Mouth: Independent     Lower Body Dressing Assistance  Socks: Stand-by assistance  Position Performed: Seated edge of bed    Therapeutic Exercises:   Exercise Sets Reps AROM AAROM PROM Self PROM Comments   Shoulder flex/ext 1 5 [x] [] [] []    Elbow flex/ext 1 5 [x] [] [] []    Wrist flex/ext 1 5 [x] [] [] []       [] [] [] []          Pain:  0/ 10 pain    Activity Tolerance:   Fair  Please refer to the flowsheet for vital signs taken during this treatment. After treatment patient left in no apparent distress:   Supine in bed, Call bell within reach, and Bed / chair alarm activated    COMMUNICATION/COLLABORATION:   The patients plan of care was discussed with: Registered nurseTerri Berg  Time Calculation: 30 mins    Problem: Self Care Deficits Care Plan (Adult)  Goal: *Acute Goals and Plan of Care (Insert Text)  Description: Pt will be mod I sup <> sit in prep for EOB ADLs  Pt will be mod I grooming sitting EOB  Pt will be mod A LE dressing sitting EOB/long sit  Pt will be mod I sit <>  prep for toileting LRAD  Pt will be mod I toileting/toilet transfer/cloth mgmt LRAD  Pt will be I following UE HEP in prep for self care tasks     Outcome: Progressing Towards Goal

## 2021-02-18 ENCOUNTER — HOSPITAL ENCOUNTER (OUTPATIENT)
Dept: LAB | Age: 76
Discharge: HOME | End: 2021-02-18

## 2021-02-18 LAB
EST. AVERAGE GLUCOSE BLD GHB EST-MCNC: 131 MG/DL
HBA1C MFR BLD: 6.2 % (ref 4–5.6)

## 2021-02-18 PROCEDURE — 83036 HEMOGLOBIN GLYCOSYLATED A1C: CPT

## 2021-02-18 NOTE — PROGRESS NOTES
IV removed, catheter tip intact. Telemetry box removed and returned. SBAR report called to Aracelis Mojica RN at [facility]. Aracelis Mojica did not have any further questions or concerns at this time. Patient left with all belongings, discharge summary, and medication list. Patient left the floor via [facility] ambulance at this time. Discharge plan of care/case management plan validated with provider discharge order. Spoke with Clarita Cannon (patient's daughter), regarding patient's discharge to [facility]. Also spoke to Clarita Cannon regarding patient's follow-up appointments (PCP, neurology). No further questions or concerns at this time.

## 2021-02-20 ENCOUNTER — HOSPITAL ENCOUNTER (OUTPATIENT)
Dept: LAB | Age: 76
Discharge: HOME | End: 2021-02-20

## 2021-02-20 LAB
ALBUMIN SERPL-MCNC: 2.8 G/DL (ref 3.5–5)
ANION GAP SERPL CALC-SCNC: 5 MMOL/L (ref 5–15)
BASOPHILS # BLD: 0 K/UL (ref 0–0.1)
BASOPHILS NFR BLD: 0 % (ref 0–1)
BUN SERPL-MCNC: 64 MG/DL (ref 6–20)
BUN/CREAT SERPL: 17 (ref 12–20)
CA-I BLD-MCNC: 8.8 MG/DL (ref 8.5–10.1)
CHLORIDE SERPL-SCNC: 106 MMOL/L (ref 97–108)
CO2 SERPL-SCNC: 28 MMOL/L (ref 21–32)
CREAT SERPL-MCNC: 3.72 MG/DL (ref 0.55–1.02)
DIFFERENTIAL METHOD BLD: ABNORMAL
EOSINOPHIL # BLD: 0.2 K/UL (ref 0–0.4)
EOSINOPHIL NFR BLD: 2 % (ref 0–7)
ERYTHROCYTE [DISTWIDTH] IN BLOOD BY AUTOMATED COUNT: 15.2 % (ref 11.5–14.5)
GLUCOSE SERPL-MCNC: 116 MG/DL (ref 65–100)
HCT VFR BLD AUTO: 28.5 % (ref 35–47)
HGB BLD-MCNC: 8.6 G/DL (ref 11.5–16)
IMM GRANULOCYTES # BLD AUTO: 0 K/UL (ref 0–0.04)
IMM GRANULOCYTES NFR BLD AUTO: 1 % (ref 0–0.5)
LYMPHOCYTES # BLD: 2.3 K/UL (ref 0.8–3.5)
LYMPHOCYTES NFR BLD: 27 % (ref 12–49)
MCH RBC QN AUTO: 27.2 PG (ref 26–34)
MCHC RBC AUTO-ENTMCNC: 30.2 G/DL (ref 30–36.5)
MCV RBC AUTO: 90.2 FL (ref 80–99)
MONOCYTES # BLD: 0.6 K/UL (ref 0–1)
MONOCYTES NFR BLD: 7 % (ref 5–13)
NEUTS SEG # BLD: 5.4 K/UL (ref 1.8–8)
NEUTS SEG NFR BLD: 63 % (ref 32–75)
PHOSPHATE SERPL-MCNC: 3.2 MG/DL (ref 2.6–4.7)
PLATELET # BLD AUTO: 258 K/UL (ref 150–400)
PMV BLD AUTO: 12.7 FL (ref 8.9–12.9)
POTASSIUM SERPL-SCNC: 4.2 MMOL/L (ref 3.5–5.1)
RBC # BLD AUTO: 3.16 M/UL (ref 3.8–5.2)
SODIUM SERPL-SCNC: 139 MMOL/L (ref 136–145)
WBC # BLD AUTO: 8.4 K/UL (ref 3.6–11)

## 2021-02-20 PROCEDURE — 85025 COMPLETE CBC W/AUTO DIFF WBC: CPT

## 2021-02-20 PROCEDURE — 80069 RENAL FUNCTION PANEL: CPT

## 2021-02-21 ENCOUNTER — HOSPITAL ENCOUNTER (OUTPATIENT)
Dept: LAB | Age: 76
Discharge: HOME | End: 2021-02-21

## 2021-02-21 LAB — GLUCOSE SERPL-MCNC: 84 MG/DL (ref 65–100)

## 2021-02-21 PROCEDURE — 82947 ASSAY GLUCOSE BLOOD QUANT: CPT

## 2021-02-23 NOTE — PROGRESS NOTES
Physician Progress Note      PATIENT:               Laina Hodges  CSN #:                  [id number]  :                       1945  ADMIT DATE:       2021 4:13 PM  [address] [place] DATE:        2021 8:10 PM  RESPONDING  PROVIDER #:        Miguelina Henderson MD        QUERY TEXT:    Stage of Chronic Kidney Disease: Please provide further specificity, if known. Clinical indicators include: hronic kidney disease, chronic kidney disease, bun, creatinine, bun/creatinine, gfr  Options provided:  -- Chronic kidney disease stage 1  -- Chronic kidney disease stage 2  -- Chronic kidney disease stage 3  -- Chronic kidney disease stage 4  -- Chronic kidney disease stage 5  -- Chronic kidney disease stage 5, requiring dialysis  -- End stage renal disease        PROVIDER RESPONSE TEXT:    The patient has chronic kidney disease stage 5.       Electronically signed by:  Lexi Norman MD 2021 7:45 PM

## 2021-02-27 ENCOUNTER — HOSPITAL ENCOUNTER (OUTPATIENT)
Dept: LAB | Age: 76
Discharge: HOME | End: 2021-02-27

## 2021-02-27 LAB
ALBUMIN SERPL-MCNC: 2.9 G/DL (ref 3.5–5)
ANION GAP SERPL CALC-SCNC: 7 MMOL/L (ref 5–15)
BASOPHILS # BLD: 0 K/UL (ref 0–0.1)
BASOPHILS NFR BLD: 0 % (ref 0–1)
BUN SERPL-MCNC: 51 MG/DL (ref 6–20)
BUN/CREAT SERPL: 17 (ref 12–20)
CA-I BLD-MCNC: 8.7 MG/DL (ref 8.5–10.1)
CHLORIDE SERPL-SCNC: 112 MMOL/L (ref 97–108)
CO2 SERPL-SCNC: 24 MMOL/L (ref 21–32)
CREAT SERPL-MCNC: 2.96 MG/DL (ref 0.55–1.02)
DIFFERENTIAL METHOD BLD: ABNORMAL
EOSINOPHIL # BLD: 0.2 K/UL (ref 0–0.4)
EOSINOPHIL NFR BLD: 3 % (ref 0–7)
ERYTHROCYTE [DISTWIDTH] IN BLOOD BY AUTOMATED COUNT: 15.5 % (ref 11.5–14.5)
GLUCOSE SERPL-MCNC: 104 MG/DL (ref 65–100)
HCT VFR BLD AUTO: 29.8 % (ref 35–47)
HGB BLD-MCNC: 9 G/DL (ref 11.5–16)
IMM GRANULOCYTES # BLD AUTO: 0 K/UL (ref 0–0.04)
IMM GRANULOCYTES NFR BLD AUTO: 0 % (ref 0–0.5)
LYMPHOCYTES # BLD: 1.9 K/UL (ref 0.8–3.5)
LYMPHOCYTES NFR BLD: 24 % (ref 12–49)
MCH RBC QN AUTO: 27.4 PG (ref 26–34)
MCHC RBC AUTO-ENTMCNC: 30.2 G/DL (ref 30–36.5)
MCV RBC AUTO: 90.6 FL (ref 80–99)
MONOCYTES # BLD: 0.6 K/UL (ref 0–1)
MONOCYTES NFR BLD: 7 % (ref 5–13)
NEUTS SEG # BLD: 5.3 K/UL (ref 1.8–8)
NEUTS SEG NFR BLD: 66 % (ref 32–75)
PHOSPHATE SERPL-MCNC: 4 MG/DL (ref 2.6–4.7)
PLATELET # BLD AUTO: 248 K/UL (ref 150–400)
PMV BLD AUTO: 12.5 FL (ref 8.9–12.9)
POTASSIUM SERPL-SCNC: 4.3 MMOL/L (ref 3.5–5.1)
RBC # BLD AUTO: 3.29 M/UL (ref 3.8–5.2)
SODIUM SERPL-SCNC: 143 MMOL/L (ref 136–145)
WBC # BLD AUTO: 8.1 K/UL (ref 3.6–11)

## 2021-02-27 PROCEDURE — 85025 COMPLETE CBC W/AUTO DIFF WBC: CPT

## 2021-02-27 PROCEDURE — 80069 RENAL FUNCTION PANEL: CPT

## 2021-02-27 PROCEDURE — 36415 COLL VENOUS BLD VENIPUNCTURE: CPT

## 2021-04-30 ENCOUNTER — HOSPITAL ENCOUNTER (INPATIENT)
Age: 76
LOS: 10 days | Discharge: NURSING, REHAB OR LONG-TERM CARE | DRG: 304 | End: 2021-05-10
Attending: HOSPITALIST | Admitting: HOSPITALIST
Payer: MEDICARE

## 2021-04-30 ENCOUNTER — APPOINTMENT (OUTPATIENT)
Dept: GENERAL RADIOLOGY | Age: 76
DRG: 304 | End: 2021-04-30
Attending: EMERGENCY MEDICINE
Payer: MEDICARE

## 2021-04-30 DIAGNOSIS — I10 ACCELERATED HYPERTENSION: Primary | ICD-10-CM

## 2021-04-30 PROBLEM — N18.9 CKD (CHRONIC KIDNEY DISEASE): Status: ACTIVE | Noted: 2021-04-30

## 2021-04-30 LAB
ALBUMIN SERPL-MCNC: 3.1 G/DL (ref 3.5–5)
ALBUMIN/GLOB SERPL: 0.9 {RATIO} (ref 1.1–2.2)
ALP SERPL-CCNC: 78 U/L (ref 45–117)
ALT SERPL-CCNC: 13 U/L (ref 12–78)
ANION GAP SERPL CALC-SCNC: 1 MMOL/L (ref 5–15)
AST SERPL W P-5'-P-CCNC: 16 U/L (ref 15–37)
BASOPHILS # BLD: 0 K/UL (ref 0–0.1)
BASOPHILS NFR BLD: 0 % (ref 0–1)
BILIRUB SERPL-MCNC: 0.4 MG/DL (ref 0.2–1)
BUN SERPL-MCNC: 36 MG/DL (ref 6–20)
BUN/CREAT SERPL: 10 (ref 12–20)
CA-I BLD-MCNC: 8.9 MG/DL (ref 8.5–10.1)
CHLORIDE SERPL-SCNC: 109 MMOL/L (ref 97–108)
CK SERPL-CCNC: 62 NG/ML (ref 26–192)
CO2 SERPL-SCNC: 29 MMOL/L (ref 21–32)
CREAT SERPL-MCNC: 3.62 MG/DL (ref 0.55–1.02)
DIFFERENTIAL METHOD BLD: ABNORMAL
EOSINOPHIL # BLD: 0.3 K/UL (ref 0–0.4)
EOSINOPHIL NFR BLD: 4 % (ref 0–7)
ERYTHROCYTE [DISTWIDTH] IN BLOOD BY AUTOMATED COUNT: 15.3 % (ref 11.5–14.5)
GLOBULIN SER CALC-MCNC: 3.5 G/DL (ref 2–4)
GLUCOSE SERPL-MCNC: 120 MG/DL (ref 65–100)
HCT VFR BLD AUTO: 28.4 % (ref 35–47)
HGB BLD-MCNC: 8.8 G/DL (ref 11.5–16)
IMM GRANULOCYTES # BLD AUTO: 0 K/UL (ref 0–0.04)
IMM GRANULOCYTES NFR BLD AUTO: 0 % (ref 0–0.5)
LYMPHOCYTES # BLD: 1.5 K/UL (ref 0.8–3.5)
LYMPHOCYTES NFR BLD: 21 % (ref 12–49)
MCH RBC QN AUTO: 27.8 PG (ref 26–34)
MCHC RBC AUTO-ENTMCNC: 31 G/DL (ref 30–36.5)
MCV RBC AUTO: 89.9 FL (ref 80–99)
MONOCYTES # BLD: 0.6 K/UL (ref 0–1)
MONOCYTES NFR BLD: 8 % (ref 5–13)
NEUTS SEG # BLD: 4.9 K/UL (ref 1.8–8)
NEUTS SEG NFR BLD: 67 % (ref 32–75)
PLATELET # BLD AUTO: 249 K/UL (ref 150–400)
PMV BLD AUTO: 11.6 FL (ref 8.9–12.9)
POTASSIUM SERPL-SCNC: 4.8 MMOL/L (ref 3.5–5.1)
PROT SERPL-MCNC: 6.6 G/DL (ref 6.4–8.2)
RBC # BLD AUTO: 3.16 M/UL (ref 3.8–5.2)
SODIUM SERPL-SCNC: 139 MMOL/L (ref 136–145)
TROPONIN I SERPL-MCNC: <0.05 NG/ML
WBC # BLD AUTO: 7.3 K/UL (ref 3.6–11)

## 2021-04-30 PROCEDURE — 74011250637 HC RX REV CODE- 250/637: Performed by: NURSE PRACTITIONER

## 2021-04-30 PROCEDURE — 36415 COLL VENOUS BLD VENIPUNCTURE: CPT

## 2021-04-30 PROCEDURE — 84484 ASSAY OF TROPONIN QUANT: CPT

## 2021-04-30 PROCEDURE — 94760 N-INVAS EAR/PLS OXIMETRY 1: CPT

## 2021-04-30 PROCEDURE — 74011250636 HC RX REV CODE- 250/636: Performed by: NURSE PRACTITIONER

## 2021-04-30 PROCEDURE — 99285 EMERGENCY DEPT VISIT HI MDM: CPT

## 2021-04-30 PROCEDURE — 71045 X-RAY EXAM CHEST 1 VIEW: CPT

## 2021-04-30 PROCEDURE — 74011250637 HC RX REV CODE- 250/637: Performed by: HOSPITALIST

## 2021-04-30 PROCEDURE — 96376 TX/PRO/DX INJ SAME DRUG ADON: CPT

## 2021-04-30 PROCEDURE — 93005 ELECTROCARDIOGRAM TRACING: CPT

## 2021-04-30 PROCEDURE — 65270000029 HC RM PRIVATE

## 2021-04-30 PROCEDURE — 82550 ASSAY OF CK (CPK): CPT

## 2021-04-30 PROCEDURE — 80053 COMPREHEN METABOLIC PANEL: CPT

## 2021-04-30 PROCEDURE — 85025 COMPLETE CBC W/AUTO DIFF WBC: CPT

## 2021-04-30 PROCEDURE — 96374 THER/PROPH/DIAG INJ IV PUSH: CPT

## 2021-04-30 RX ORDER — SODIUM CHLORIDE 0.9 % (FLUSH) 0.9 %
5-40 SYRINGE (ML) INJECTION EVERY 8 HOURS
Status: DISCONTINUED | OUTPATIENT
Start: 2021-04-30 | End: 2021-05-01

## 2021-04-30 RX ORDER — LISINOPRIL 20 MG/1
20 TABLET ORAL ONCE
Status: COMPLETED | OUTPATIENT
Start: 2021-04-30 | End: 2021-05-01

## 2021-04-30 RX ORDER — DONEPEZIL HYDROCHLORIDE 5 MG/1
5 TABLET, FILM COATED ORAL
Status: DISCONTINUED | OUTPATIENT
Start: 2021-04-30 | End: 2021-05-10 | Stop reason: HOSPADM

## 2021-04-30 RX ORDER — HYDRALAZINE HYDROCHLORIDE 20 MG/ML
10 INJECTION INTRAMUSCULAR; INTRAVENOUS
Status: COMPLETED | OUTPATIENT
Start: 2021-04-30 | End: 2021-04-30

## 2021-04-30 RX ORDER — SODIUM CHLORIDE 0.9 % (FLUSH) 0.9 %
5-40 SYRINGE (ML) INJECTION AS NEEDED
Status: DISCONTINUED | OUTPATIENT
Start: 2021-04-30 | End: 2021-05-10 | Stop reason: HOSPADM

## 2021-04-30 RX ORDER — NITROGLYCERIN 40 MG/1
1 PATCH TRANSDERMAL DAILY
Status: DISCONTINUED | OUTPATIENT
Start: 2021-04-30 | End: 2021-05-10 | Stop reason: HOSPADM

## 2021-04-30 RX ORDER — CARVEDILOL 12.5 MG/1
12.5 TABLET ORAL 2 TIMES DAILY WITH MEALS
Status: DISCONTINUED | OUTPATIENT
Start: 2021-04-30 | End: 2021-05-05

## 2021-04-30 RX ORDER — DONEPEZIL HYDROCHLORIDE 5 MG/1
5 TABLET, FILM COATED ORAL DAILY
COMMUNITY

## 2021-04-30 RX ORDER — MAGNESIUM SULFATE 100 %
4 CRYSTALS MISCELLANEOUS AS NEEDED
Status: DISCONTINUED | OUTPATIENT
Start: 2021-04-30 | End: 2021-05-10 | Stop reason: HOSPADM

## 2021-04-30 RX ORDER — METOPROLOL TARTRATE 50 MG/1
50 TABLET ORAL
Status: DISCONTINUED | OUTPATIENT
Start: 2021-04-30 | End: 2021-04-30

## 2021-04-30 RX ORDER — DEXTROSE 50 % IN WATER (D50W) INTRAVENOUS SYRINGE
25-50 AS NEEDED
Status: DISCONTINUED | OUTPATIENT
Start: 2021-04-30 | End: 2021-05-10 | Stop reason: HOSPADM

## 2021-04-30 RX ORDER — ALBUTEROL SULFATE 90 UG/1
2 AEROSOL, METERED RESPIRATORY (INHALATION)
Status: DISCONTINUED | OUTPATIENT
Start: 2021-04-30 | End: 2021-05-10 | Stop reason: HOSPADM

## 2021-04-30 RX ORDER — HYDRALAZINE HYDROCHLORIDE 25 MG/1
25 TABLET, FILM COATED ORAL 3 TIMES DAILY
COMMUNITY
Start: 2021-04-07 | End: 2021-05-10

## 2021-04-30 RX ORDER — ACETAMINOPHEN 325 MG/1
650 TABLET ORAL
Status: DISCONTINUED | OUTPATIENT
Start: 2021-04-30 | End: 2021-05-10 | Stop reason: HOSPADM

## 2021-04-30 RX ORDER — ALLOPURINOL 100 MG/1
100 TABLET ORAL DAILY
Status: DISCONTINUED | OUTPATIENT
Start: 2021-05-01 | End: 2021-05-10 | Stop reason: HOSPADM

## 2021-04-30 RX ORDER — INSULIN DETEMIR 100 [IU]/ML
5 INJECTION, SOLUTION SUBCUTANEOUS DAILY
COMMUNITY
End: 2021-05-10

## 2021-04-30 RX ORDER — LANOLIN ALCOHOL/MO/W.PET/CERES
1000 CREAM (GRAM) TOPICAL DAILY
Status: DISCONTINUED | OUTPATIENT
Start: 2021-05-01 | End: 2021-05-10 | Stop reason: HOSPADM

## 2021-04-30 RX ORDER — METOPROLOL TARTRATE 50 MG/1
50 TABLET ORAL 2 TIMES DAILY
COMMUNITY
Start: 2021-04-09 | End: 2021-05-10

## 2021-04-30 RX ORDER — ONDANSETRON 2 MG/ML
4 INJECTION INTRAMUSCULAR; INTRAVENOUS
Status: DISCONTINUED | OUTPATIENT
Start: 2021-04-30 | End: 2021-05-10 | Stop reason: HOSPADM

## 2021-04-30 RX ORDER — HEPARIN SODIUM 5000 [USP'U]/ML
5000 INJECTION, SOLUTION INTRAVENOUS; SUBCUTANEOUS EVERY 12 HOURS
Status: DISCONTINUED | OUTPATIENT
Start: 2021-04-30 | End: 2021-05-10 | Stop reason: HOSPADM

## 2021-04-30 RX ORDER — CHOLECALCIFEROL (VITAMIN D3) 125 MCG
2000 CAPSULE ORAL DAILY
COMMUNITY
Start: 2021-03-08

## 2021-04-30 RX ORDER — LISINOPRIL 20 MG/1
40 TABLET ORAL DAILY
Status: DISCONTINUED | OUTPATIENT
Start: 2021-05-01 | End: 2021-05-10 | Stop reason: HOSPADM

## 2021-04-30 RX ORDER — ALBUTEROL SULFATE 90 UG/1
2 AEROSOL, METERED RESPIRATORY (INHALATION)
COMMUNITY
Start: 2021-04-28

## 2021-04-30 RX ORDER — LIDOCAINE 50 MG/G
1 PATCH TOPICAL DAILY
COMMUNITY
Start: 2021-04-20 | End: 2022-07-27

## 2021-04-30 RX ORDER — ATORVASTATIN CALCIUM 40 MG/1
40 TABLET, FILM COATED ORAL
Status: DISCONTINUED | OUTPATIENT
Start: 2021-04-30 | End: 2021-05-10 | Stop reason: HOSPADM

## 2021-04-30 RX ORDER — AMLODIPINE BESYLATE 5 MG/1
10 TABLET ORAL DAILY
Status: DISCONTINUED | OUTPATIENT
Start: 2021-05-01 | End: 2021-05-10 | Stop reason: HOSPADM

## 2021-04-30 RX ORDER — METOPROLOL TARTRATE 50 MG/1
50 TABLET ORAL 2 TIMES DAILY
Status: DISCONTINUED | OUTPATIENT
Start: 2021-04-30 | End: 2021-04-30

## 2021-04-30 RX ORDER — MELATONIN
2000 DAILY
Status: DISCONTINUED | OUTPATIENT
Start: 2021-05-01 | End: 2021-05-10 | Stop reason: HOSPADM

## 2021-04-30 RX ORDER — HYDRALAZINE HYDROCHLORIDE 25 MG/1
25 TABLET, FILM COATED ORAL 3 TIMES DAILY
Status: DISCONTINUED | OUTPATIENT
Start: 2021-04-30 | End: 2021-05-02

## 2021-04-30 RX ORDER — INSULIN LISPRO 100 [IU]/ML
INJECTION, SOLUTION INTRAVENOUS; SUBCUTANEOUS
Status: DISCONTINUED | OUTPATIENT
Start: 2021-05-01 | End: 2021-05-10 | Stop reason: HOSPADM

## 2021-04-30 RX ORDER — LIDOCAINE 4 G/100G
1 PATCH TOPICAL DAILY
Status: DISCONTINUED | OUTPATIENT
Start: 2021-05-01 | End: 2021-05-10 | Stop reason: HOSPADM

## 2021-04-30 RX ORDER — LISINOPRIL 40 MG/1
40 TABLET ORAL DAILY
Status: ON HOLD | COMMUNITY
End: 2022-07-25

## 2021-04-30 RX ORDER — LANOLIN ALCOHOL/MO/W.PET/CERES
325 CREAM (GRAM) TOPICAL DAILY
Status: DISCONTINUED | OUTPATIENT
Start: 2021-05-01 | End: 2021-05-10 | Stop reason: HOSPADM

## 2021-04-30 RX ORDER — FERROUS SULFATE TAB 325 MG (65 MG ELEMENTAL FE) 325 (65 FE) MG
325 TAB ORAL DAILY
Status: ON HOLD | COMMUNITY
Start: 2021-03-09 | End: 2021-10-20 | Stop reason: SDUPTHER

## 2021-04-30 RX ORDER — CLONIDINE HYDROCHLORIDE 0.1 MG/1
0.2 TABLET ORAL
Status: COMPLETED | OUTPATIENT
Start: 2021-04-30 | End: 2021-04-30

## 2021-04-30 RX ORDER — ACETAMINOPHEN 650 MG/1
650 SUPPOSITORY RECTAL
Status: DISCONTINUED | OUTPATIENT
Start: 2021-04-30 | End: 2021-05-10 | Stop reason: HOSPADM

## 2021-04-30 RX ORDER — DONEPEZIL HYDROCHLORIDE 5 MG/1
5 TABLET, FILM COATED ORAL DAILY
Status: DISCONTINUED | OUTPATIENT
Start: 2021-05-01 | End: 2021-04-30

## 2021-04-30 RX ORDER — ONDANSETRON 4 MG/1
4 TABLET, ORALLY DISINTEGRATING ORAL
Status: DISCONTINUED | OUTPATIENT
Start: 2021-04-30 | End: 2021-05-10 | Stop reason: HOSPADM

## 2021-04-30 RX ORDER — LORATADINE 10 MG/1
10 TABLET ORAL DAILY
COMMUNITY
Start: 2021-04-07 | End: 2021-05-10

## 2021-04-30 RX ORDER — GUAIFENESIN 100 MG/5ML
81 LIQUID (ML) ORAL DAILY
Status: DISCONTINUED | OUTPATIENT
Start: 2021-05-01 | End: 2021-05-10 | Stop reason: HOSPADM

## 2021-04-30 RX ORDER — AMLODIPINE BESYLATE 10 MG/1
10 TABLET ORAL DAILY
Status: ON HOLD | COMMUNITY
Start: 2021-02-05 | End: 2022-07-25

## 2021-04-30 RX ORDER — FLUTICASONE PROPIONATE 50 MCG
1 SPRAY, SUSPENSION (ML) NASAL DAILY
COMMUNITY
Start: 2021-04-12 | End: 2021-05-10

## 2021-04-30 RX ADMIN — HYDRALAZINE HYDROCHLORIDE 10 MG: 20 INJECTION INTRAMUSCULAR; INTRAVENOUS at 17:49

## 2021-04-30 RX ADMIN — NITROGLYCERIN 0.5 INCH: 20 OINTMENT TOPICAL at 23:04

## 2021-04-30 RX ADMIN — CLONIDINE HYDROCHLORIDE 0.2 MG: 0.1 TABLET ORAL at 19:21

## 2021-04-30 RX ADMIN — HYDRALAZINE HYDROCHLORIDE 10 MG: 20 INJECTION INTRAMUSCULAR; INTRAVENOUS at 15:49

## 2021-04-30 NOTE — Clinical Note
Status[de-identified] INPATIENT [101]  Type of Bed: Telemetry [19]  Inpatient Hospitalization Certified Necessary for the Following Reasons: 3.  Patient receiving treatment that can only be provided in an inpatient setting (further clarification in H&P documentati on)  Admitting Diagnosis: Accelerated hypertension [773450]  Admitting Physician: Juarez Roman [[id number]]  Attending Physician: Juarez Roman [[id number]]  Estimated Length of Stay: 2 Midnights  Discharge Plan[de-identified] [facility]

## 2021-05-01 LAB
25(OH)D3 SERPL-MCNC: 42.1 NG/ML (ref 30–100)
ALBUMIN SERPL-MCNC: 2.5 G/DL (ref 3.5–5)
ANION GAP SERPL CALC-SCNC: 6 MMOL/L (ref 5–15)
ATRIAL RATE: 71 BPM
BUN SERPL-MCNC: 39 MG/DL (ref 6–20)
BUN/CREAT SERPL: 11 (ref 12–20)
CA-I BLD-MCNC: 8.6 MG/DL (ref 8.5–10.1)
CALCULATED P AXIS, ECG09: 44 DEGREES
CALCULATED R AXIS, ECG10: 15 DEGREES
CALCULATED T AXIS, ECG11: 57 DEGREES
CHLORIDE SERPL-SCNC: 111 MMOL/L (ref 97–108)
CHOLEST SERPL-MCNC: 79 MG/DL
CO2 SERPL-SCNC: 25 MMOL/L (ref 21–32)
CREAT SERPL-MCNC: 3.4 MG/DL (ref 0.55–1.02)
DIAGNOSIS, 93000: NORMAL
FOLATE SERPL-MCNC: 16.2 NG/ML (ref 5–21)
GLUCOSE BLD STRIP.AUTO-MCNC: 116 MG/DL (ref 65–100)
GLUCOSE BLD STRIP.AUTO-MCNC: 179 MG/DL (ref 65–100)
GLUCOSE BLD STRIP.AUTO-MCNC: 186 MG/DL (ref 65–100)
GLUCOSE BLD STRIP.AUTO-MCNC: 194 MG/DL (ref 65–100)
GLUCOSE BLD STRIP.AUTO-MCNC: 208 MG/DL (ref 65–100)
GLUCOSE SERPL-MCNC: 116 MG/DL (ref 65–100)
HDLC SERPL-MCNC: 39 MG/DL
HDLC SERPL: 2 {RATIO} (ref 0–5)
IRON SATN MFR SERPL: 21 % (ref 20–50)
IRON SERPL-MCNC: 30 UG/DL (ref 35–150)
LDLC SERPL CALC-MCNC: 27.6 MG/DL (ref 0–100)
LIPID PROFILE,FLP: NORMAL
P-R INTERVAL, ECG05: 196 MS
PERFORMED BY, TECHID: ABNORMAL
PHOSPHATE SERPL-MCNC: 4.5 MG/DL (ref 2.6–4.7)
POTASSIUM SERPL-SCNC: 3.8 MMOL/L (ref 3.5–5.1)
Q-T INTERVAL, ECG07: 436 MS
QRS DURATION, ECG06: 96 MS
QTC CALCULATION (BEZET), ECG08: 473 MS
SODIUM SERPL-SCNC: 142 MMOL/L (ref 136–145)
TIBC SERPL-MCNC: 144 UG/DL (ref 250–450)
TRIGL SERPL-MCNC: 62 MG/DL (ref ?–150)
TSH SERPL DL<=0.05 MIU/L-ACNC: 1.84 UIU/ML (ref 0.36–3.74)
URATE SERPL-MCNC: 4.7 MG/DL (ref 2.6–6)
VENTRICULAR RATE, ECG03: 71 BPM
VIT B12 SERPL-MCNC: >2000 PG/ML (ref 193–986)
VLDLC SERPL CALC-MCNC: 12.4 MG/DL

## 2021-05-01 PROCEDURE — 74011250637 HC RX REV CODE- 250/637: Performed by: HOSPITALIST

## 2021-05-01 PROCEDURE — 84443 ASSAY THYROID STIM HORMONE: CPT

## 2021-05-01 PROCEDURE — 80061 LIPID PANEL: CPT

## 2021-05-01 PROCEDURE — 74011250636 HC RX REV CODE- 250/636: Performed by: HOSPITALIST

## 2021-05-01 PROCEDURE — 84550 ASSAY OF BLOOD/URIC ACID: CPT

## 2021-05-01 PROCEDURE — 82306 VITAMIN D 25 HYDROXY: CPT

## 2021-05-01 PROCEDURE — 82962 GLUCOSE BLOOD TEST: CPT

## 2021-05-01 PROCEDURE — 74011636637 HC RX REV CODE- 636/637: Performed by: HOSPITALIST

## 2021-05-01 PROCEDURE — 80069 RENAL FUNCTION PANEL: CPT

## 2021-05-01 PROCEDURE — 83540 ASSAY OF IRON: CPT

## 2021-05-01 PROCEDURE — 36415 COLL VENOUS BLD VENIPUNCTURE: CPT

## 2021-05-01 PROCEDURE — 82607 VITAMIN B-12: CPT

## 2021-05-01 PROCEDURE — 65270000029 HC RM PRIVATE

## 2021-05-01 RX ADMIN — HYDRALAZINE HYDROCHLORIDE 25 MG: 25 TABLET, FILM COATED ORAL at 16:49

## 2021-05-01 RX ADMIN — ATORVASTATIN CALCIUM 40 MG: 40 TABLET, FILM COATED ORAL at 21:35

## 2021-05-01 RX ADMIN — HYDRALAZINE HYDROCHLORIDE 25 MG: 25 TABLET, FILM COATED ORAL at 21:35

## 2021-05-01 RX ADMIN — FERROUS SULFATE TAB 325 MG (65 MG ELEMENTAL FE) 325 MG: 325 (65 FE) TAB at 09:50

## 2021-05-01 RX ADMIN — HEPARIN SODIUM 5000 UNITS: 5000 INJECTION INTRAVENOUS; SUBCUTANEOUS at 21:36

## 2021-05-01 RX ADMIN — DONEPEZIL HYDROCHLORIDE 5 MG: 5 TABLET, FILM COATED ORAL at 21:35

## 2021-05-01 RX ADMIN — HYDRALAZINE HYDROCHLORIDE 25 MG: 25 TABLET, FILM COATED ORAL at 00:29

## 2021-05-01 RX ADMIN — Medication 2000 UNITS: at 09:51

## 2021-05-01 RX ADMIN — CARVEDILOL 12.5 MG: 12.5 TABLET, FILM COATED ORAL at 00:29

## 2021-05-01 RX ADMIN — ALLOPURINOL 100 MG: 100 TABLET ORAL at 09:51

## 2021-05-01 RX ADMIN — AMLODIPINE BESYLATE 10 MG: 5 TABLET ORAL at 09:51

## 2021-05-01 RX ADMIN — CARVEDILOL 12.5 MG: 12.5 TABLET, FILM COATED ORAL at 16:49

## 2021-05-01 RX ADMIN — LISINOPRIL 40 MG: 40 TABLET ORAL at 10:04

## 2021-05-01 RX ADMIN — CYANOCOBALAMIN TAB 1000 MCG 1000 MCG: 1000 TAB at 09:51

## 2021-05-01 RX ADMIN — ASPIRIN 81 MG CHEWABLE TABLET 81 MG: 81 TABLET CHEWABLE at 09:51

## 2021-05-01 RX ADMIN — HEPARIN SODIUM 5000 UNITS: 5000 INJECTION INTRAVENOUS; SUBCUTANEOUS at 09:51

## 2021-05-01 RX ADMIN — HEPARIN SODIUM 5000 UNITS: 5000 INJECTION INTRAVENOUS; SUBCUTANEOUS at 00:29

## 2021-05-01 RX ADMIN — ATORVASTATIN CALCIUM 40 MG: 40 TABLET, FILM COATED ORAL at 00:29

## 2021-05-01 RX ADMIN — LISINOPRIL 20 MG: 20 TABLET ORAL at 00:29

## 2021-05-01 RX ADMIN — INSULIN LISPRO 2 UNITS: 100 INJECTION, SOLUTION INTRAVENOUS; SUBCUTANEOUS at 11:42

## 2021-05-01 RX ADMIN — DONEPEZIL HYDROCHLORIDE 5 MG: 5 TABLET, FILM COATED ORAL at 00:33

## 2021-05-01 RX ADMIN — CARVEDILOL 12.5 MG: 12.5 TABLET, FILM COATED ORAL at 09:55

## 2021-05-01 RX ADMIN — INSULIN LISPRO 3 UNITS: 100 INJECTION, SOLUTION INTRAVENOUS; SUBCUTANEOUS at 09:50

## 2021-05-01 RX ADMIN — HYDRALAZINE HYDROCHLORIDE 25 MG: 25 TABLET, FILM COATED ORAL at 09:51

## 2021-05-01 NOTE — H&P
History and Physical              Subjective :   Chief Complaint : uncontrolled hypertension, dizziness    Source of information : Patient covered herself for not volunteering much information. History of present illness:   70 y.o. female with history of multiple CVA, diabetes mellitus, hypertension, heart failure presents to the emergency room complaining of not feeling good and weak. States that she was keep falling, having trouble with ambulation. Denies any chest pain, palpitations. But as mentioned she was just covered herself and not happy to give any information. Did not notice any fever or chills. But on evaluation found that she has significantly elevated blood pressure and due to the risk factors admitted for further management. She was comfortably laying in the bed, did not notice any trouble breathing or shortness of breath. Was given hydralazine and clonidine in the emergency room with not much improvement. 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    [facility]     Chronic kidney disease     Chronic pain     SPINAL STENOSIS    DM [facility]diabetes mellitus) ([facility] [address]     HTN (hypertension)     Stroke [facility])      Past Surgical History:   Procedure Laterality Date    HX OOPHORECTOMY      STENT INSERTION  2001,2002    Angelita Garcia     Family History   Problem Relation Age of Onset    Breast Cancer Maternal Aunt     Breast Cancer Cousin       Social History     Tobacco Use    Smoking status: Former Smoker    Smokeless tobacco: Never Used    Tobacco comment: quit 1972   Substance Use Topics    Alcohol use: No       Prior to Admission medications    Medication Sig Start Date End Date Taking? Authorizing Provider   lidocaine (LIDODERM) 5 % 1 Patch by TransDERmal route daily. 4/20/21   Provider, Historical   loratadine (CLARITIN) 10 mg tablet Take 10 mg by mouth daily.  4/7/21   Provider, Historical   albuterol (PROVENTIL HFA, VENTOLIN HFA, PROAIR HFA) 90 mcg/actuation inhaler Take 2 Puffs by inhalation every four (4) hours as needed for Shortness of Breath. 4/28/21   Provider, Historical   amLODIPine (NORVASC) 10 mg tablet Take 10 mg by mouth daily. 2/5/21   Provider, Historical   metoprolol tartrate (LOPRESSOR) 50 mg tablet Take 50 mg by mouth two (2) times a day. 4/9/21   Provider, Historical   hydrALAZINE (APRESOLINE) 25 mg tablet Take 25 mg by mouth three (3) times daily. 4/7/21   Provider, Historical   cholecalciferol, vitamin D3, 50 mcg (2,000 unit) tab Take 2,000 Units by mouth daily. 3/8/21   Provider, Historical   FeroSuL 325 mg (65 mg iron) tablet Take 325 mg by mouth daily. 3/9/21   Provider, Historical   donepeziL (ARICEPT) 5 mg tablet Take 5 mg by mouth daily. Provider, Historical   insulin detemir U-100 (Levemir FlexTouch U-100 Insuln) 100 unit/mL (3 mL) inpn 5 Units by SubCUTAneous route daily. Provider, Historical   lisinopriL (PRINIVIL, ZESTRIL) 40 mg tablet Take 40 mg by mouth daily. Provider, Historical   fluticasone propionate (FLONASE) 50 mcg/actuation nasal spray 1 [place] by Both Nostrils route daily. 4/12/21   Provider, Historical   aspirin 81 mg chewable tablet Take 81 mg by mouth daily. Provider, Historical   doxazosin (CARDURA) 2 mg tablet Take 2 mg by mouth nightly. Provider, Historical   cyanocobalamin, vitamin B-12, 3,000 mcg cap cyanocobalamin (vitamin B-12) 3,000 mcg capsule    Other, MD Riana   allopurinoL (ZYLOPRIM) 100 mg tablet allopurinol 100 mg tablet    Other, MD Riana   atorvastatin (LIPITOR) 40 mg tablet atorvastatin 40 mg tablet    Other, MD Riana   fluticasone propion-salmeterol (ADVAIR HFA) 115-21 mcg/actuation inhaler Take 2 Puffs by inhalation two (2) times a day. Provider, Historical   insulin aspart U-100 (NovoLOG Flexpen U-100 Insulin) 100 unit/mL (3 mL) inpn by SubCUTAneous route.     Provider, Historical   sodium bicarbonate 650 mg tablet Take 650 mg by mouth two (2) times a day.    Provider, Historical   furosemide (LASIX) 40 mg tablet Take 40 mg by mouth daily. 1/31/11   Provider, Historical   nitroglycerin (NITROSTAT) 0.4 mg SL tablet by SubLINGual route every five (5) minutes as needed. Provider, Historical     No Known Allergies          Review of Systems: Did not get any reasonable information from the patient. Vitals:     Patient Vitals for the past 12 hrs:   Temp Pulse Resp BP SpO2   04/30/21 2217 -- 72 18 (!) 201/78 97 %   04/30/21 1921 -- 77 17 (!) 163/86 98 %   04/30/21 1752 98.2 °F (36.8 °C) 76 -- (!) 191/61 98 %   04/30/21 1633 -- 62 -- (!) 154/121 98 %   04/30/21 1632 -- 73 16 (!) 197/162 --   04/30/21 1555 -- 69 16 (!) 230/89 99 %   04/30/21 1444 98.2 °F (36.8 °C) 70 16 (!) 226/88 96 %       Physical Exam:   General : Looks comfortable, laying in the bed comfortably. No respiratory distress noted. HEENT : Unable to evaluate well as patient not cooperative. Atraumatic normocephalic, Normal ear and nose. Neck : Supple, no JVD, no masses noted, no carotid bruit. Lungs : Breath sounds with good air entry anteriorly. No wheezes or rales, no accessory muscle use. CVS : Rhythm rate regular, S1+, S2+, no murmur or gallop. Abdomen : Soft, nontender, , bowel sounds active. Extremities:  2+ edema noted,  pedal pulses palpable. Musculoskeletal :no joint swelling or effusion, muscle tone and power appears fair. .   Skin : Moist, warm. no pathological rash. Lymphatic : No cervical lymphadenopathy. Neurological : Awake, alert, she seems oriented to the place and person. Unable to do complete examination  Psychiatric : Withdrawn and not supportive to communicate.       Data Review:   Recent Results (from the past 24 hour(s))   CBC WITH AUTOMATED DIFF    Collection Time: 04/30/21  3:52 PM   Result Value Ref Range    WBC 7.3 3.6 - 11.0 K/uL    RBC 3.16 (L) 3.80 - 5.20 M/uL    HGB 8.8 (L) 11.5 - 16.0 g/dL    HCT 28.4 (L) 35.0 - 47.0 %    MCV 89.9 80.0 - 99.0 FL    MCH 27.8 26.0 - 34.0 PG    MCHC 31.0 30.0 - 36.5 g/dL    RDW 15.3 (H) 11.5 - 14.5 %    PLATELET 804 417 - 639 K/uL    MPV 11.6 8.9 - 12.9 FL    NEUTROPHILS 67 32 - 75 %    LYMPHOCYTES 21 12 - 49 %    MONOCYTES 8 5 - 13 %    EOSINOPHILS 4 0 - 7 %    BASOPHILS 0 0 - 1 %    IMMATURE GRANULOCYTES 0 0.0 - 0.5 %    ABS. NEUTROPHILS 4.9 1.8 - 8.0 K/UL    ABS. LYMPHOCYTES 1.5 0.8 - 3.5 K/UL    ABS. MONOCYTES 0.6 0.0 - 1.0 K/UL    ABS. EOSINOPHILS 0.3 0.0 - 0.4 K/UL    ABS. BASOPHILS 0.0 0.0 - 0.1 K/UL    ABS. IMM. GRANS. 0.0 0.00 - 0.04 K/UL    DF AUTOMATED     METABOLIC PANEL, COMPREHENSIVE    Collection Time: 04/30/21  3:52 PM   Result Value Ref Range    Sodium 139 136 - 145 mmol/L    Potassium 4.8 3.5 - 5.1 mmol/L    Chloride 109 (H) 97 - 108 mmol/L    CO2 29 21 - 32 mmol/L    Anion gap 1 (L) 5 - 15 mmol/L    Glucose 120 (H) 65 - 100 mg/dL    BUN 36 (H) 6 - 20 mg/dL    Creatinine 3.62 (H) 0.55 - 1.02 mg/dL    BUN/Creatinine ratio 10 (L) 12 - 20      GFR est AA 15 (L) >60 ml/min/1.73m2    GFR est non-AA 12 (L) >60 ml/min/1.73m2    Calcium 8.9 8.5 - 10.1 mg/dL    Bilirubin, total 0.4 0.2 - 1.0 mg/dL    AST (SGOT) 16 15 - 37 U/L    ALT (SGPT) 13 12 - 78 U/L    Alk. phosphatase 78 45 - 117 U/L    Protein, total 6.6 6.4 - 8.2 g/dL    Albumin 3.1 (L) 3.5 - 5.0 g/dL    Globulin 3.5 2.0 - 4.0 g/dL    A-G Ratio 0.9 (L) 1.1 - 2.2     CK W/ REFLX CKMB    Collection Time: 04/30/21  3:52 PM   Result Value Ref Range    CK 62.0 26 - 192 ng/mL   TROPONIN I    Collection Time: 04/30/21  3:52 PM   Result Value Ref Range    Troponin-I, Qt. <0.05 <0.05 ng/mL       Radiologic Studies :     Chest x-ray:  Heart size is enlarged. No gross infiltrate or effusion. Old left clavicle  fracture. Degenerative changes of the shoulders and spine. No pneumothorax.       IMPRESSION : Cardiomegaly with no definite acute findings.       Assessment and Plan :     Malignant hypertension: Seems to have this problem for some time but not well controlled in the past also.  Medication review found discrepancies. I discontinued doxazosin as it is associated with orthostatic hypotension especially with risk of falling. Changed metoprolol to Coreg, medications are adjusted. Will adjust slowly to get in fair control, it may take some time. CVA with chronic lacunar infarcts multiple and microvascular disease for MRI done February of this year. Seems to be secondary to her fluctuating blood pressure issues and ischemia, no evidence of vascular insufficiency. Diabetes mellitus type 2: As per medication she is on a very small dose of Levemir and NovoLog, blood sugar looks very good. We will just hold the scheduled medications keep her on sliding scale, adjust.    Chronic kidney disease stage IV: Stable renal functions, I will hold the bicarbonate. Anemia secondary to chronic kidney disease, I do not see any iron studies are work-up in the past so ordered for work-up. Mention of heart failure but her echocardiogram done in February with normal ejection fraction. No evidence of fluid overload, I will hold off on diuretics. Admitted to medical telemetry, full CODE STATUS, home medication reviewed with the list that she has at bedside. No advanced medical directives available. CC : Kamari Mcmullen MD  Signed By: Tad Hobbs MD     April 30, 2021      This dictation was done by dragon, computer voice recognition software. Often unanticipated grammatical, syntax, [place] phones and other interpretive errors are inadvertently transcribed. Please excuse errors that have escaped final proofreading.

## 2021-05-01 NOTE — ROUTINE PROCESS
Myself and Nani Edge RN performed the initial skin assessment. Pt has moisture associated damage to abdominal folds and groin as well an area of shear to the right upper thigh. Rest of skin is clean, dry, and intact.

## 2021-05-01 NOTE — ROUTINE PROCESS
TRANSFER - OUT REPORT:    Verbal report given to Alexis RN(name) on Jose Domínguez  being transferred to [facility](unit) for routine progression of care       Report consisted of patients Situation, Background, Assessment and   Recommendations(SBAR). Information from the following report(s) SBAR, ED Summary and MAR was reviewed with the receiving nurse. Lines:   Peripheral IV 04/30/21 Anterior;Right Hand (Active)        Opportunity for questions and clarification was provided.       Patient transported with:   Tech    Visit Vitals  BP (!) 192/88   Pulse 68   Temp 98.2 °F (36.8 °C)   Resp 20   Ht 5' 2\" (1.575 m)   Wt 88.9 kg (195 lb 15.8 oz)   SpO2 97%   BMI 35.85 kg/m²

## 2021-05-01 NOTE — ED PROVIDER NOTES
EMERGENCY DEPARTMENT HISTORY AND PHYSICAL EXAM      Date: 4/30/2021  Patient Name: Lynette Tellez    History of Presenting Illness     Chief Complaint   Patient presents with    Hypertension       History Provided By: Patient    HPI: Lynette Tellez, 70 y.o. female with a past medical history significant hypertension, hyperlipidemia, obesity, stroke and renal insufficiency presents to the ED with cc of Hypertension. Patient sent to the ER for uncontrolled hypertension. Patient denies any associated symptoms with this. Moderate severity, no known exacerbating or relieving factors, no other associated signs and symptoms. Patient specifically denies fever, chills, chest pain, shortness of breath, abdominal pain, nausea, vomiting, diarrhea. There are no other complaints, changes, or physical findings at this time. PCP: Pancho Sinclair MD    No current facility-administered medications on file prior to encounter. Current Outpatient Medications on File Prior to Encounter   Medication Sig Dispense Refill    lidocaine (LIDODERM) 5 % 1 Patch by TransDERmal route daily.  loratadine (CLARITIN) 10 mg tablet Take 10 mg by mouth daily.  albuterol (PROVENTIL HFA, VENTOLIN HFA, PROAIR HFA) 90 mcg/actuation inhaler Take 2 Puffs by inhalation every four (4) hours as needed for Shortness of Breath.  amLODIPine (NORVASC) 10 mg tablet Take 10 mg by mouth daily.  metoprolol tartrate (LOPRESSOR) 50 mg tablet Take 50 mg by mouth two (2) times a day.  hydrALAZINE (APRESOLINE) 25 mg tablet Take 25 mg by mouth three (3) times daily.  cholecalciferol, vitamin D3, 50 mcg (2,000 unit) tab Take 2,000 Units by mouth daily.  FeroSuL 325 mg (65 mg iron) tablet Take 325 mg by mouth daily.  donepeziL (ARICEPT) 5 mg tablet Take 5 mg by mouth daily.  insulin detemir U-100 (Levemir FlexTouch U-100 Insuln) 100 unit/mL (3 mL) inpn 5 Units by SubCUTAneous route daily.       lisinopriL (PRINIVIL, ZESTRIL) 40 mg tablet Take 40 mg by mouth daily.  fluticasone propionate (FLONASE) 50 mcg/actuation nasal spray 1 [place] by Both Nostrils route daily.  aspirin 81 mg chewable tablet Take 81 mg by mouth daily.  doxazosin (CARDURA) 2 mg tablet Take 2 mg by mouth nightly.  cyanocobalamin, vitamin B-12, 3,000 mcg cap cyanocobalamin (vitamin B-12) 3,000 mcg capsule      allopurinoL (ZYLOPRIM) 100 mg tablet allopurinol 100 mg tablet      atorvastatin (LIPITOR) 40 mg tablet atorvastatin 40 mg tablet      fluticasone propion-salmeterol (ADVAIR HFA) 115-21 mcg/actuation inhaler Take 2 Puffs by inhalation two (2) times a day.  insulin aspart U-100 (NovoLOG Flexpen U-100 Insulin) 100 unit/mL (3 mL) inpn by SubCUTAneous route.  sodium bicarbonate 650 mg tablet Take 650 mg by mouth two (2) times a day.  furosemide (LASIX) 40 mg tablet Take 40 mg by mouth daily.  nitroglycerin (NITROSTAT) 0.4 mg SL tablet by SubLINGual route every five (5) minutes as needed.          Past History     Past Medical History:  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    [facility]     Chronic kidney disease     Chronic pain     SPINAL STENOSIS    DM (diabetes mellitus) ([facility] [address]     HTN (hypertension)     Stroke [facility])        Past Surgical History:  Past Surgical History:   Procedure Laterality Date    HX OOPHORECTOMY      STENT INSERTION  2001,2002    Chelsey Clarke       Family History:  Family History   Problem Relation Age of Onset    Breast Cancer Maternal Aunt     Breast Cancer Cousin        Social History:  Social History     Tobacco Use    Smoking status: Former Smoker    Smokeless tobacco: Never Used    Tobacco comment: quit 1972   Substance Use Topics    Alcohol use: No    Drug use: No       Allergies:  No Known Allergies      Review of Systems     Review of Systems   Constitutional: Negative for chills, fatigue and fever.   HENT: Negative for congestion, sinus pressure and trouble swallowing. Eyes: Negative for photophobia and pain. Respiratory: Negative for cough and shortness of breath. Cardiovascular: Negative for chest pain and leg swelling. Gastrointestinal: Negative for abdominal pain, diarrhea, nausea and vomiting. Endocrine: Negative for polydipsia, polyphagia and polyuria. Genitourinary: Negative for decreased urine volume, difficulty urinating, dysuria, hematuria and urgency. Musculoskeletal: Positive for back pain. Negative for gait problem, myalgias and neck pain. Skin: Negative for pallor and rash. Allergic/Immunologic: Negative for environmental allergies and food allergies. Neurological: Negative for dizziness, facial asymmetry, speech difficulty, numbness and headaches. Hematological: Negative for adenopathy. Does not bruise/bleed easily. Psychiatric/Behavioral: Negative for agitation, self-injury and suicidal ideas. The patient is not nervous/anxious. Physical Exam     Physical Exam  Vitals signs and nursing note reviewed. Constitutional:       Appearance: Normal appearance. HENT:      Head: Atraumatic. Right Ear: Tympanic membrane and external ear normal.      Left Ear: Tympanic membrane and external ear normal.      Nose: Nose normal.      Mouth/Throat:      Mouth: Mucous membranes are moist.   Eyes:      Extraocular Movements: Extraocular movements intact. Pupils: Pupils are equal, round, and reactive to light. Neck:      Musculoskeletal: Normal range of motion and neck supple. Cardiovascular:      Rate and Rhythm: Normal rate and regular rhythm. Pulses: Normal pulses. Heart sounds: Normal heart sounds. Pulmonary:      Breath sounds: Normal breath sounds. Abdominal:      General: Abdomen is flat. Palpations: Abdomen is soft. Musculoskeletal: Normal range of motion. Skin:     General: Skin is warm and dry.       Capillary Refill: Capillary refill takes less than 2 seconds. Neurological:      General: No focal deficit present. Mental Status: She is alert and oriented to person, place, and time. Mental status is at baseline. Psychiatric:         Mood and Affect: Mood normal.         Behavior: Behavior normal.         Lab and Diagnostic Study Results     Labs -     Recent Results (from the past 12 hour(s))   GLUCOSE, POC    Collection Time: 05/03/21  6:28 PM   Result Value Ref Range    Glucose (POC) 216 (H) 65 - 100 mg/dL    Performed by [facility] CASANDRA    GLUCOSE, POC    Collection Time: 05/03/21  7:18 PM   Result Value Ref Range    Glucose (POC) 177 (H) 65 - 100 mg/dL    Performed by [facility] ART    SED RATE (ESR)    Collection Time: 05/03/21  8:37 PM   Result Value Ref Range    Sed rate, automated 109 mm/hr   URIC ACID    Collection Time: 05/03/21  8:37 PM   Result Value Ref Range    Uric acid 4.7 2.6 - 6.0 mg/dL       Radiologic Studies -   @lastxrresult@  CT Results  (Last 48 hours)    None        CXR Results  (Last 48 hours)    None            Medical Decision Making   - I am the first provider for this patient. - I reviewed the vital signs, available nursing notes, past medical history, past surgical history, family history and social history. - Initial assessment performed. The patients presenting problems have been discussed, and they are in agreement with the care plan formulated and outlined with them. I have encouraged them to ask questions as they arise throughout their visit. Vital Signs-Reviewed the patient's vital signs. Patient Vitals for the past 12 hrs:   Temp Pulse Resp BP SpO2   05/03/21 2306 98.4 °F (36.9 °C) 79 18 (!) 171/73 99 %   05/03/21 2103 98.5 °F (36.9 °C) 76 18 (!) 174/78 97 %       Records Reviewed: Nursing Notes and Old Medical Records          ED Course:          Provider Notes (Medical Decision Making):   Patient presents with asymptomatic hypertension.  Likely essential hypertension rather than secondary from renal or endocrine cause. No symptoms like CP or SOB or HA to be concerned about end-organ damage and malignant hypertension at this time. Will provide home antihypertensives; if refractory, will provide IV medication therapy. Discussed sodium restriction, maintaining ideal body weight and regular exercise program as physiologic means to achieve blood pressure control. The patient will strive towards this. Meanwhile, it is appropriate to lower BP with medications, while observing for therapeutic effect and if appropriate later, can discuss discontinuing medicationsMDM Patient's pressure unable to be controlled in the ER. Patient admitted for hypertension. Procedures   Medical Decision Makingedical Decision Making  Performed by: Sher Salas NP  PROCEDURES:  Procedures       Disposition   Disposition: Admitted to Floor Stepdown Unit the case was discussed with the admitting physician     Admitted      Diagnosis     Clinical Impression:   1. Accelerated hypertension        Attestations:    Sher Salas NP    Please note that this dictation was completed with [employer], the computer voice recognition software. Quite often unanticipated grammatical, syntax, homophones, and other interpretive errors are inadvertently transcribed by the computer software. Please disregard these errors. Please excuse any errors that have escaped final proofreading. Thank you.

## 2021-05-01 NOTE — PROGRESS NOTES
Hospitalist Progress Note               Daily Progress Note: 5/1/2021    70 y.o. female with history of multiple CVA, diabetes mellitus, hypertension, heart failure presents to the emergency room complaining of not feeling good and weak. States that she was keep falling, having trouble with ambulation. Denies any chest pain, palpitations. But as mentioned she was just covered herself and not happy to give any information. Did not notice any fever or chills. But on evaluation found that she has significantly elevated blood pressure and due to the risk factors admitted for further management. She was comfortably laying in the bed, did not notice any trouble breathing or shortness of breath. Was given hydralazine and clonidine in the emergency room with not much improvement. Subjective: The patient is seen for follow  up. She has multiple complaints,  Feet hurt, can't walk x 2 years, in and out of snf,  No cp, + sob subjectively- not distressed and maintaining adequate sao2 on room air.   Bp remains above goal.    Problem List:  Problem List as of 5/1/2021 Date Reviewed: 10/15/2019          Codes Class Noted - Resolved    CKD (chronic kidney disease) ICD-10-CM: N18.9  ICD-9-CM: 585.9  4/30/2021 - Present        Accelerated hypertension ICD-10-CM: I10  ICD-9-CM: 401.0  4/30/2021 - Present        CVA (cerebral vascular accident) [facility]) ICD-10-CM: I63.9  ICD-9-CM: 434.91  2/13/2021 - Present              Medications reviewed  Current Facility-Administered Medications   Medication Dose Route Frequency    cyanocobalamin tablet 1,000 mcg  1,000 mcg Oral DAILY    allopurinoL (ZYLOPRIM) tablet 100 mg  100 mg Oral DAILY    atorvastatin (LIPITOR) tablet 40 mg  40 mg Oral QHS    aspirin chewable tablet 81 mg  81 mg Oral DAILY    lidocaine 4 % patch 1 Patch  1 Patch TransDERmal DAILY    albuterol (PROVENTIL HFA, VENTOLIN HFA, PROAIR HFA) inhaler 2 Puff  2 Puff Inhalation Q4H PRN    amLODIPine (NORVASC) tablet 10 mg  10 mg Oral DAILY    hydrALAZINE (APRESOLINE) tablet 25 mg  25 mg Oral TID    cholecalciferol (VITAMIN D3) (1000 Units /25 mcg) tablet 2,000 Units  2,000 Units Oral DAILY    ferrous sulfate tablet 325 mg  325 mg Oral DAILY    lisinopriL (PRINIVIL, ZESTRIL) tablet 40 mg  40 mg Oral DAILY    sodium chloride (NS) flush 5-40 mL  5-40 mL IntraVENous PRN    acetaminophen (TYLENOL) tablet 650 mg  650 mg Oral Q6H PRN    Or    acetaminophen (TYLENOL) suppository 650 mg  650 mg Rectal Q6H PRN    ondansetron (ZOFRAN ODT) tablet 4 mg  4 mg Oral Q6H PRN    Or    ondansetron (ZOFRAN) injection 4 mg  4 mg IntraVENous Q6H PRN    heparin (porcine) injection 5,000 Units  5,000 Units SubCUTAneous Q12H    insulin lispro (HUMALOG) injection   SubCUTAneous AC&HS    glucose chewable tablet 16 g  4 Tab Oral PRN    dextrose (D50W) injection syrg 12.5-25 g  25-50 mL IntraVENous PRN    glucagon (GLUCAGEN) injection 1 mg  1 mg IntraMUSCular PRN    nitroglycerin (NITRODUR) 0.2 mg/hr patch 1 Patch  1 Patch TransDERmal DAILY    carvediloL (COREG) tablet 12.5 mg  12.5 mg Oral BID WITH MEALS    donepeziL (ARICEPT) tablet 5 mg  5 mg Oral QHS       Review of Systems   Constitutional: Positive for malaise/fatigue. Negative for chills and fever. HENT: Negative. 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 Negative for cough, hemoptysis, sputum production and wheezing. Cardiovascular: Positive for leg swelling. Negative for chest pain and palpitations. Gastrointestinal: Positive for nausea. Negative for blood in stool, constipation, diarrhea and melena. Genitourinary: Negative. Musculoskeletal: Positive for myalgias. Skin: Negative. Neurological: Positive for weakness. Negative for sensory change, speech change, seizures and loss of consciousness. Endo/Heme/Allergies: Negative. Psychiatric/Behavioral: The patient is nervous/anxious.           Objective: Physical Exam:     Visit Vitals  BP (!) 149/67 (BP 1 Location: Right upper arm, BP Patient Position: At rest)   Pulse 72   Temp 97.7 °F (36.5 °C)   Resp 16   Ht 5' 2\" (1.575 m)   Wt 84.8 kg (186 lb 15.2 oz)   LMP  (LMP Unknown)   SpO2 99%   Breastfeeding No   BMI 34.19 kg/m²      O2 Device: None (Room air)    Temp (24hrs), Av.9 °F (36.6 °C), Min:97.5 °F (36.4 °C), Max:98.2 °F (36.8 °C)    No intake/output data recorded. No intake/output data recorded. General : alert, looks nad but anxious and chronically ill  HEENT : nc/at,eomi  Neck : Supple, no JVD, no masses noted, no carotid bruit. Lungs :fair ae, clear and not labored. CVS : RRR, S1+, S2+, no murmur or gallop. Abdomen : Soft, mild tenderness to deep palp, bs +  Extremities:  1+edema noted,  pedal pulses palpable. Tender feet  Musculoskeletal :no joint swelling or effusion, muscle tone and power appears fair. .   Skin : Moist, warm. no pathological rash. Lymphatic : No cervical lymphadenopathy. Neurological : Awake, alert, oriented x 3,nfd. Psychiatric : anxious not agitated  Data Review:       Recent Days:  Recent Labs     21  1552   WBC 7.3   HGB 8.8*   HCT 28.4*        Recent Labs     21  0641 21  1552    139   K 3.8 4.8   * 109*   CO2 25 29   * 120*   BUN 39* 36*   CREA 3.40* 3.62*   CA 8.6 8.9   PHOS 4.5  --    ALB 2.5* 3.1*   TBILI  --  0.4   ALT  --  13     No results for input(s): PH, PCO2, PO2, HCO3, FIO2 in the last 72 hours.     24 Hour Results:  Recent Results (from the past 24 hour(s))   GLUCOSE, POC    Collection Time: 21 12:37 AM   Result Value Ref Range    Glucose (POC) 179 (H) 65 - 100 mg/dL    Performed by Lorelei Polk    RENAL FUNCTION PANEL    Collection Time: 21  6:41 AM   Result Value Ref Range    Sodium 142 136 - 145 mmol/L    Potassium 3.8 3.5 - 5.1 mmol/L    Chloride 111 (H) 97 - 108 mmol/L    CO2 25 21 - 32 mmol/L    Anion gap 6 5 - 15 mmol/L    Glucose 116 (H) 65 - 100 mg/dL    BUN 39 (H) 6 - 20 mg/dL    Creatinine 3.40 (H) 0.55 - 1.02 mg/dL    BUN/Creatinine ratio 11 (L) 12 - 20      GFR est AA 16 (L) >60 ml/min/1.73m2    GFR est non-AA 13 (L) >60 ml/min/1.73m2    Calcium 8.6 8.5 - 10.1 mg/dL    Phosphorus 4.5 2.6 - 4.7 mg/dL    Albumin 2.5 (L) 3.5 - 5.0 g/dL   VITAMIN D, 25 HYDROXY    Collection Time: 05/01/21  6:41 AM   Result Value Ref Range    Vitamin D 25-Hydroxy 42.1 30 - 100 ng/mL   VITAMIN B12 & FOLATE    Collection Time: 05/01/21  6:41 AM   Result Value Ref Range    Vitamin B12 >2,000 (H) 193 - 986 pg/mL    Folate 16.2 5.0 - 21.0 ng/mL   TSH 3RD GENERATION    Collection Time: 05/01/21  6:41 AM   Result Value Ref Range    TSH 1.84 0.36 - 3.74 uIU/mL   URIC ACID    Collection Time: 05/01/21  6:41 AM   Result Value Ref Range    Uric acid 4.7 2.6 - 6.0 mg/dL   LIPID PANEL    Collection Time: 05/01/21  6:41 AM   Result Value Ref Range    LIPID PROFILE        Cholesterol, total 79 <200 mg/dL    Triglyceride 62 <150 mg/dL    HDL Cholesterol 39 mg/dL    LDL, calculated 27.6 0 - 100 mg/dL    VLDL, calculated 12.4 mg/dL    CHOL/HDL Ratio 2.0 0.0 - 5.0     IRON PROFILE    Collection Time: 05/01/21  6:41 AM   Result Value Ref Range    Iron 30 (L) 35 - 150 ug/dL    TIBC 144 (L) 250 - 450 ug/dL    Iron % saturation 21 20 - 50 %   GLUCOSE, POC    Collection Time: 05/01/21  9:45 AM   Result Value Ref Range    Glucose (POC) 208 (H) 65 - 100 mg/dL    Performed by [facility])    GLUCOSE, POC    Collection Time: 05/01/21 11:38 AM   Result Value Ref Range    Glucose (POC) 186 (H) 65 - 100 mg/dL    Performed by [facility])    GLUCOSE, POC    Collection Time: 05/01/21  4:51 PM   Result Value Ref Range    Glucose (POC) 116 (H) 65 - 100 mg/dL    Performed by Phil LAZCANO [place])            Assessment/     Patient Active Problem List   Diagnosis Code    CVA (cerebral vascular accident) ([facility] [address] I63.9    CKD (chronic kidney disease) N18.9    Accelerated hypertension I10         Malignant hypertension- remains above goal  Prior multiple cva/blindness  Impaired mobility and stated multiple snf over past 2 years. States can't walk. DM2, insulin treated with small dose levemir  ckf 4, bump cr 3.4, baseline appears ~ 3. Nephrology follows in [place]  Aocd  Hx chf? Polypharmacy    Plan:  Continue coreg- hr 60's, amlodipine, Hydralazine, lisinoopril, nitro patch,cardura stopped 2/2 orthostasis subjectively. Cont levemir/ssi/hypoglycemia protocol  Monitor cr- hold lasix  Pt/ot  Mobilize  nahco3 held. Vtep: heparin sq  Full code  Surrogate:  Kayce Vasquez Other Relative [phone]     Dispo: pending course, bp remains uncontrolled, mobility issues. Pt/Ot and she is open to snf possibility if recommended. Care Plan discussed with: Patient/Family and Nurse    Total time spent with patient: 30 minutes. This dictation was done by dragon, computer voice recognition software. Often unanticipated grammatical, syntax, phones and other interpretive errors are inadvertently transcribed. Please excuse errors that have escaped final proofreading.     Cristina Mcmahon MD

## 2021-05-02 LAB
ANION GAP SERPL CALC-SCNC: 5 MMOL/L (ref 5–15)
BUN SERPL-MCNC: 44 MG/DL (ref 6–20)
BUN/CREAT SERPL: 11 (ref 12–20)
CA-I BLD-MCNC: 8.6 MG/DL (ref 8.5–10.1)
CHLORIDE SERPL-SCNC: 110 MMOL/L (ref 97–108)
CO2 SERPL-SCNC: 27 MMOL/L (ref 21–32)
CREAT SERPL-MCNC: 3.84 MG/DL (ref 0.55–1.02)
GLUCOSE BLD STRIP.AUTO-MCNC: 114 MG/DL (ref 65–100)
GLUCOSE BLD STRIP.AUTO-MCNC: 143 MG/DL (ref 65–100)
GLUCOSE BLD STRIP.AUTO-MCNC: 174 MG/DL (ref 65–100)
GLUCOSE BLD STRIP.AUTO-MCNC: 181 MG/DL (ref 65–100)
GLUCOSE SERPL-MCNC: 176 MG/DL (ref 65–100)
PERFORMED BY, TECHID: ABNORMAL
POTASSIUM SERPL-SCNC: 3.6 MMOL/L (ref 3.5–5.1)
SODIUM SERPL-SCNC: 142 MMOL/L (ref 136–145)

## 2021-05-02 PROCEDURE — 80048 BASIC METABOLIC PNL TOTAL CA: CPT

## 2021-05-02 PROCEDURE — 74011636637 HC RX REV CODE- 636/637: Performed by: HOSPITALIST

## 2021-05-02 PROCEDURE — 36415 COLL VENOUS BLD VENIPUNCTURE: CPT

## 2021-05-02 PROCEDURE — 65270000029 HC RM PRIVATE

## 2021-05-02 PROCEDURE — 74011250637 HC RX REV CODE- 250/637: Performed by: HOSPITALIST

## 2021-05-02 PROCEDURE — 74011000250 HC RX REV CODE- 250: Performed by: INTERNAL MEDICINE

## 2021-05-02 PROCEDURE — 74011250636 HC RX REV CODE- 250/636: Performed by: HOSPITALIST

## 2021-05-02 PROCEDURE — 82962 GLUCOSE BLOOD TEST: CPT

## 2021-05-02 PROCEDURE — 74011250637 HC RX REV CODE- 250/637: Performed by: INTERNAL MEDICINE

## 2021-05-02 RX ORDER — HYDRALAZINE HYDROCHLORIDE 50 MG/1
50 TABLET, FILM COATED ORAL 3 TIMES DAILY
Status: DISCONTINUED | OUTPATIENT
Start: 2021-05-02 | End: 2021-05-03

## 2021-05-02 RX ORDER — SODIUM CHLORIDE 450 MG/100ML
75 INJECTION, SOLUTION INTRAVENOUS CONTINUOUS
Status: DISCONTINUED | OUTPATIENT
Start: 2021-05-02 | End: 2021-05-03

## 2021-05-02 RX ADMIN — CARVEDILOL 12.5 MG: 12.5 TABLET, FILM COATED ORAL at 17:07

## 2021-05-02 RX ADMIN — ACETAMINOPHEN 650 MG: 325 TABLET, FILM COATED ORAL at 10:23

## 2021-05-02 RX ADMIN — HEPARIN SODIUM 5000 UNITS: 5000 INJECTION INTRAVENOUS; SUBCUTANEOUS at 21:50

## 2021-05-02 RX ADMIN — FERROUS SULFATE TAB 325 MG (65 MG ELEMENTAL FE) 325 MG: 325 (65 FE) TAB at 10:18

## 2021-05-02 RX ADMIN — CYANOCOBALAMIN TAB 1000 MCG 1000 MCG: 1000 TAB at 10:18

## 2021-05-02 RX ADMIN — DONEPEZIL HYDROCHLORIDE 5 MG: 5 TABLET, FILM COATED ORAL at 21:50

## 2021-05-02 RX ADMIN — Medication 2000 UNITS: at 10:18

## 2021-05-02 RX ADMIN — AMLODIPINE BESYLATE 10 MG: 5 TABLET ORAL at 10:18

## 2021-05-02 RX ADMIN — ATORVASTATIN CALCIUM 40 MG: 40 TABLET, FILM COATED ORAL at 21:50

## 2021-05-02 RX ADMIN — INSULIN LISPRO 2 UNITS: 100 INJECTION, SOLUTION INTRAVENOUS; SUBCUTANEOUS at 11:30

## 2021-05-02 RX ADMIN — HYDRALAZINE HYDROCHLORIDE 50 MG: 50 TABLET, FILM COATED ORAL at 17:07

## 2021-05-02 RX ADMIN — LISINOPRIL 40 MG: 40 TABLET ORAL at 10:18

## 2021-05-02 RX ADMIN — CARVEDILOL 12.5 MG: 12.5 TABLET, FILM COATED ORAL at 10:18

## 2021-05-02 RX ADMIN — SODIUM CHLORIDE 75 ML/HR: 4.5 INJECTION, SOLUTION INTRAVENOUS at 17:29

## 2021-05-02 RX ADMIN — HYDRALAZINE HYDROCHLORIDE 50 MG: 50 TABLET, FILM COATED ORAL at 21:50

## 2021-05-02 RX ADMIN — ASPIRIN 81 MG CHEWABLE TABLET 81 MG: 81 TABLET CHEWABLE at 10:18

## 2021-05-02 RX ADMIN — HEPARIN SODIUM 5000 UNITS: 5000 INJECTION INTRAVENOUS; SUBCUTANEOUS at 10:19

## 2021-05-02 RX ADMIN — INSULIN LISPRO 2 UNITS: 100 INJECTION, SOLUTION INTRAVENOUS; SUBCUTANEOUS at 08:00

## 2021-05-02 RX ADMIN — HYDRALAZINE HYDROCHLORIDE 25 MG: 25 TABLET, FILM COATED ORAL at 10:18

## 2021-05-02 RX ADMIN — ACETAMINOPHEN 650 MG: 325 TABLET, FILM COATED ORAL at 17:21

## 2021-05-02 RX ADMIN — ALLOPURINOL 100 MG: 100 TABLET ORAL at 10:19

## 2021-05-02 NOTE — PROGRESS NOTES
Daughter/POA cell phone number [phone]    Daughter states patient has been fully vaccinated for COVID

## 2021-05-02 NOTE — PROGRESS NOTES
Bedside shift change report given to LAM Christensen RN (oncoming nurse) by LEXIE Louis RN (offgoing nurse). Report included the following information SBAR, Kardex, Intake/Output, MAR and Recent Results.

## 2021-05-03 LAB
ERYTHROCYTE [SEDIMENTATION RATE] IN BLOOD: 109 MM/HR
GLUCOSE BLD STRIP.AUTO-MCNC: 129 MG/DL (ref 65–100)
GLUCOSE BLD STRIP.AUTO-MCNC: 177 MG/DL (ref 65–100)
GLUCOSE BLD STRIP.AUTO-MCNC: 194 MG/DL (ref 65–100)
GLUCOSE BLD STRIP.AUTO-MCNC: 216 MG/DL (ref 65–100)
GLUCOSE BLD STRIP.AUTO-MCNC: 234 MG/DL (ref 65–100)
PERFORMED BY, TECHID: ABNORMAL
URATE SERPL-MCNC: 4.7 MG/DL (ref 2.6–6)

## 2021-05-03 PROCEDURE — 74011250636 HC RX REV CODE- 250/636: Performed by: HOSPITALIST

## 2021-05-03 PROCEDURE — 74011000250 HC RX REV CODE- 250: Performed by: HOSPITALIST

## 2021-05-03 PROCEDURE — 85652 RBC SED RATE AUTOMATED: CPT

## 2021-05-03 PROCEDURE — 97530 THERAPEUTIC ACTIVITIES: CPT

## 2021-05-03 PROCEDURE — 65270000029 HC RM PRIVATE

## 2021-05-03 PROCEDURE — 97165 OT EVAL LOW COMPLEX 30 MIN: CPT

## 2021-05-03 PROCEDURE — 74011250637 HC RX REV CODE- 250/637: Performed by: INTERNAL MEDICINE

## 2021-05-03 PROCEDURE — 97161 PT EVAL LOW COMPLEX 20 MIN: CPT

## 2021-05-03 PROCEDURE — 74011250636 HC RX REV CODE- 250/636: Performed by: INTERNAL MEDICINE

## 2021-05-03 PROCEDURE — 74011000250 HC RX REV CODE- 250: Performed by: INTERNAL MEDICINE

## 2021-05-03 PROCEDURE — 36415 COLL VENOUS BLD VENIPUNCTURE: CPT

## 2021-05-03 PROCEDURE — 74011636637 HC RX REV CODE- 636/637: Performed by: HOSPITALIST

## 2021-05-03 PROCEDURE — 82962 GLUCOSE BLOOD TEST: CPT

## 2021-05-03 PROCEDURE — 84550 ASSAY OF BLOOD/URIC ACID: CPT

## 2021-05-03 PROCEDURE — 74011250637 HC RX REV CODE- 250/637: Performed by: HOSPITALIST

## 2021-05-03 RX ORDER — TRIAMCINOLONE ACETONIDE 40 MG/ML
60 INJECTION, SUSPENSION INTRA-ARTICULAR; INTRAMUSCULAR ONCE
Status: COMPLETED | OUTPATIENT
Start: 2021-05-03 | End: 2021-05-03

## 2021-05-03 RX ORDER — HYDRALAZINE HYDROCHLORIDE 50 MG/1
100 TABLET, FILM COATED ORAL 3 TIMES DAILY
Status: DISCONTINUED | OUTPATIENT
Start: 2021-05-03 | End: 2021-05-10 | Stop reason: HOSPADM

## 2021-05-03 RX ORDER — TRAMADOL HYDROCHLORIDE 50 MG/1
50 TABLET ORAL
Status: DISCONTINUED | OUTPATIENT
Start: 2021-05-03 | End: 2021-05-10 | Stop reason: HOSPADM

## 2021-05-03 RX ADMIN — AMLODIPINE BESYLATE 10 MG: 5 TABLET ORAL at 09:25

## 2021-05-03 RX ADMIN — INSULIN LISPRO 3 UNITS: 100 INJECTION, SOLUTION INTRAVENOUS; SUBCUTANEOUS at 13:42

## 2021-05-03 RX ADMIN — CYANOCOBALAMIN TAB 1000 MCG 1000 MCG: 1000 TAB at 09:25

## 2021-05-03 RX ADMIN — TRIAMCINOLONE ACETONIDE 60 MG: 200 INJECTION, SUSPENSION INTRA-ARTICULAR; INTRAMUSCULAR at 19:00

## 2021-05-03 RX ADMIN — ASPIRIN 81 MG CHEWABLE TABLET 81 MG: 81 TABLET CHEWABLE at 09:24

## 2021-05-03 RX ADMIN — INSULIN LISPRO 3 UNITS: 100 INJECTION, SOLUTION INTRAVENOUS; SUBCUTANEOUS at 16:30

## 2021-05-03 RX ADMIN — DONEPEZIL HYDROCHLORIDE 5 MG: 5 TABLET, FILM COATED ORAL at 21:50

## 2021-05-03 RX ADMIN — HYDRALAZINE HYDROCHLORIDE 50 MG: 50 TABLET, FILM COATED ORAL at 09:25

## 2021-05-03 RX ADMIN — HYDRALAZINE HYDROCHLORIDE 50 MG: 50 TABLET, FILM COATED ORAL at 18:25

## 2021-05-03 RX ADMIN — SODIUM CHLORIDE 75 ML/HR: 4.5 INJECTION, SOLUTION INTRAVENOUS at 09:24

## 2021-05-03 RX ADMIN — HEPARIN SODIUM 5000 UNITS: 5000 INJECTION INTRAVENOUS; SUBCUTANEOUS at 13:42

## 2021-05-03 RX ADMIN — HEPARIN SODIUM 5000 UNITS: 5000 INJECTION INTRAVENOUS; SUBCUTANEOUS at 21:47

## 2021-05-03 RX ADMIN — CARVEDILOL 12.5 MG: 12.5 TABLET, FILM COATED ORAL at 09:25

## 2021-05-03 RX ADMIN — ATORVASTATIN CALCIUM 40 MG: 40 TABLET, FILM COATED ORAL at 21:57

## 2021-05-03 RX ADMIN — LISINOPRIL 40 MG: 40 TABLET ORAL at 09:25

## 2021-05-03 RX ADMIN — ALLOPURINOL 100 MG: 100 TABLET ORAL at 09:25

## 2021-05-03 RX ADMIN — CARVEDILOL 12.5 MG: 12.5 TABLET, FILM COATED ORAL at 18:25

## 2021-05-03 RX ADMIN — FERROUS SULFATE TAB 325 MG (65 MG ELEMENTAL FE) 325 MG: 325 (65 FE) TAB at 09:25

## 2021-05-03 RX ADMIN — Medication 2000 UNITS: at 09:24

## 2021-05-03 RX ADMIN — HYDRALAZINE HYDROCHLORIDE 100 MG: 50 TABLET, FILM COATED ORAL at 21:58

## 2021-05-03 NOTE — PROGRESS NOTES
Hospitalist Progress Note               Daily Progress Note: 5/3/2021    70 y.o. female with history of multiple CVA, diabetes mellitus, hypertension, heart failure presents to the emergency room complaining of not feeling good and weak. States that she was keep falling, having trouble with ambulation. Denies any chest pain, palpitations. But as mentioned she was just covered herself and not happy to give any information. Did not notice any fever or chills. But on evaluation found that she has significantly elevated blood pressure and due to the risk factors admitted for further management. She was comfortably laying in the bed, did not notice any trouble breathing or shortness of breath. Was given hydralazine and clonidine in the emergency room with not much improvement. Subjective: The patient is seen for follow  up. She has multiple complaints,  C/o feet and back pain. Gout hx- is this gouty attack- seems atypical.  No cp, + sob subjectively- not distressed and maintaining adequate sao2 on room air.   Bp remains above goal.    Problem List:  Problem List as of 5/3/2021 Date Reviewed: 10/15/2019          Codes Class Noted - Resolved    CKD (chronic kidney disease) ICD-10-CM: N18.9  ICD-9-CM: 585.9  4/30/2021 - Present        Accelerated hypertension ICD-10-CM: I10  ICD-9-CM: 401.0  4/30/2021 - Present        CVA (cerebral vascular accident) [facility]) ICD-10-CM: I63.9  ICD-9-CM: 434.91  2/13/2021 - Present              Medications reviewed  Current Facility-Administered Medications   Medication Dose Route Frequency    hydrALAZINE (APRESOLINE) tablet 50 mg  50 mg Oral TID    0.45% sodium chloride infusion  75 mL/hr IntraVENous CONTINUOUS    cyanocobalamin tablet 1,000 mcg  1,000 mcg Oral DAILY    allopurinoL (ZYLOPRIM) tablet 100 mg  100 mg Oral DAILY    atorvastatin (LIPITOR) tablet 40 mg  40 mg Oral QHS    aspirin chewable tablet 81 mg  81 mg Oral DAILY    lidocaine 4 % patch 1 Patch  1 Patch TransDERmal DAILY    albuterol (PROVENTIL HFA, VENTOLIN HFA, PROAIR HFA) inhaler 2 Puff  2 Puff Inhalation Q4H PRN    amLODIPine (NORVASC) tablet 10 mg  10 mg Oral DAILY    cholecalciferol (VITAMIN D3) (1000 Units /25 mcg) tablet 2,000 Units  2,000 Units Oral DAILY    ferrous sulfate tablet 325 mg  325 mg Oral DAILY    lisinopriL (PRINIVIL, ZESTRIL) tablet 40 mg  40 mg Oral DAILY    sodium chloride (NS) flush 5-40 mL  5-40 mL IntraVENous PRN    acetaminophen (TYLENOL) tablet 650 mg  650 mg Oral Q6H PRN    Or    acetaminophen (TYLENOL) suppository 650 mg  650 mg Rectal Q6H PRN    ondansetron (ZOFRAN ODT) tablet 4 mg  4 mg Oral Q6H PRN    Or    ondansetron (ZOFRAN) injection 4 mg  4 mg IntraVENous Q6H PRN    heparin (porcine) injection 5,000 Units  5,000 Units SubCUTAneous Q12H    insulin lispro (HUMALOG) injection   SubCUTAneous AC&HS    glucose chewable tablet 16 g  4 Tab Oral PRN    dextrose (D50W) injection syrg 12.5-25 g  25-50 mL IntraVENous PRN    glucagon (GLUCAGEN) injection 1 mg  1 mg IntraMUSCular PRN    nitroglycerin (NITRODUR) 0.2 mg/hr patch 1 Patch  1 Patch TransDERmal DAILY    carvediloL (COREG) tablet 12.5 mg  12.5 mg Oral BID WITH MEALS    donepeziL (ARICEPT) tablet 5 mg  5 mg Oral QHS       Review of Systems   Constitutional: Positive for malaise/fatigue. Negative for chills and fever. HENT: Negative. 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 Negative for cough, hemoptysis, sputum production and wheezing. Cardiovascular: Positive for leg swelling. Negative for chest pain and palpitations. Gastrointestinal: Negative for blood in stool, constipation, diarrhea, melena and nausea. Genitourinary: Negative. Musculoskeletal: Positive for myalgias. Skin: Negative. Neurological: Positive for weakness. Negative for sensory change, speech change, seizures and loss of consciousness. Endo/Heme/Allergies: Negative. Psychiatric/Behavioral: The patient is nervous/anxious. Objective:   Physical Exam:     Visit Vitals  BP (!) 158/78   Pulse 62   Temp 98.2 °F (36.8 °C)   Resp 18   Ht 5' 2\" (1.575 m)   Wt 85 kg (187 lb 8 oz)   LMP  (LMP Unknown)   SpO2 99%   Breastfeeding No   BMI 34.29 kg/m²      O2 Device: None (Room air)      General : alert, looks nad less anxious and chronically ill  HEENT : nc/at,eomi  Neck : Supple, no JVD, no masses noted, no carotid bruit. Lungs :fair ae, clear and not labored. CVS : RRR, S1+, S2+, no murmur or gallop. Abdomen : Soft, mild tenderness to deep palp, bs +  Extremities:  1+edema noted,  pedal pulses palpable. Tender feet  Musculoskeletal :no joint swelling or effusion, muscle tone and power appears fair. . Pain rom bilat ankles and tender to superficial tosh, no erythema, trace edema  Skin : Moist, warm. no pathological rash. Lymphatic : No cervical lymphadenopathy. Neurological : Awake, alert, oriented x 4,nfd. Psychiatric : anxious not agitated  Data Review:       Recent Days:  Recent Labs     04/30/21  1552   WBC 7.3   HGB 8.8*   HCT 28.4*        Recent Labs     05/02/21  1048 05/01/21  0641 04/30/21  1552    142 139   K 3.6 3.8 4.8   * 111* 109*   CO2 27 25 29   * 116* 120*   BUN 44* 39* 36*   CREA 3.84* 3.40* 3.62*   CA 8.6 8.6 8.9   PHOS  --  4.5  --    ALB  --  2.5* 3.1*   TBILI  --   --  0.4   ALT  --   --  13     No results for input(s): PH, PCO2, PO2, HCO3, FIO2 in the last 72 hours.     24 Hour Results:  Recent Results (from the past 24 hour(s))   METABOLIC PANEL, BASIC    Collection Time: 05/02/21 10:48 AM   Result Value Ref Range    Sodium 142 136 - 145 mmol/L    Potassium 3.6 3.5 - 5.1 mmol/L    Chloride 110 (H) 97 - 108 mmol/L    CO2 27 21 - 32 mmol/L    Anion gap 5 5 - 15 mmol/L    Glucose 176 (H) 65 - 100 mg/dL    BUN 44 (H) 6 - 20 mg/dL    Creatinine 3.84 (H) 0.55 - 1.02 mg/dL    BUN/Creatinine ratio 11 (L) 12 - 20      GFR est AA 14 (L) >60 ml/min/1.73m2    GFR est non-AA 12 (L) >60 ml/min/1.73m2    Calcium 8.6 8.5 - 10.1 mg/dL   GLUCOSE, POC    Collection Time: 05/02/21 12:54 PM   Result Value Ref Range    Glucose (POC) 143 (H) 65 - 100 mg/dL    Performed by Prerna LAZCANO [place])    GLUCOSE, POC    Collection Time: 05/02/21  3:44 PM   Result Value Ref Range    Glucose (POC) 114 (H) 65 - 100 mg/dL    Performed by Juju [address], POC    Collection Time: 05/02/21  7:49 PM   Result Value Ref Range    Glucose (POC) 174 (H) 65 - 100 mg/dL    Performed by Alejandro Stuart 10, POC    Collection Time: 05/03/21  7:56 AM   Result Value Ref Range    Glucose (POC) 129 (H) 65 - 100 mg/dL    Performed by Rich Murphy (PCT)            Assessment/     Patient Active Problem List   Diagnosis Code    CVA (cerebral vascular accident) ([facility] [address] I63.9    CKD (chronic kidney disease) N18.9    Accelerated hypertension I10         Malignant hypertension- remains above goal though improved  Prior multiple cva/blindness  Impaired mobility and stated multiple snf over past 2 years. States can't walk , weakness + bilat foot pains- today states x 2 weeks. Hx gout, would seem atypical for gout. DM2, insulin treated with small dose levemir- adequate  ckf 4, bump cr 3.8 baseline appears ~ 3.5- Nephrology follows in [place]  Aocd, hgb stable  Hx chf? Not decompensated  Polypharmacy    Plan:  Continue coreg- hr 60's, amlodipine, Hydralazine increased, lisinoopril, nitro patch,cardura stopped 2/2 orthostasis subjectively. Cont levemir/ssi/hypoglycemia protocol  Monitor cr- hold lasix  Esr/uric acid. triamcinalone 60 mg im x 1- gouty pain suspected. Pt/ot- rec [facility]  Mobilize  nahco3 held. Vtep: heparin sq  Full code  Surrogate:  Alison Agent Other Relative [phone]     Dispo: pending course, bp remains uncontrolled, mobility issues. Pt/Ot and she is open to snf possibility if recommended.         Care Plan discussed with: Patient/Family and Nurse    Total time spent with patient: 30 minutes. This dictation was done by dragon, computer voice recognition software. Often unanticipated grammatical, syntax, phones and other interpretive errors are inadvertently transcribed. Please excuse errors that have escaped final proofreading.     Michi Cuba MD

## 2021-05-03 NOTE — PROGRESS NOTES
Reason for Admission:  Accelerated Hypertension          RUR Score: 23%                 PCP: First and Last name:   Marina Torres MD   Name of Practice:    Are you a current patient: Yes/No: yes   Approximate date of last visit: 1 to 2 weeks    Can you participate in a virtual visit if needed: no    Do you (patient/family) have any concerns for transition/discharge? None at this time                  Plan for utilizing home health: current with [facility]      Current Advanced Directive/Advance Care Plan:  Full Code      Healthcare Decision Maker:   Laurel Elam (daughter) [phone]  Click here to complete [id number] including selection of the Healthcare Decision Maker Relationship (ie \"Primary\")            Transition of Care Plan: CM met with the patient at the bedside to complete assessment. Patient reported she lives at home alone in her own house. She stated she currently has a  who comes 3 days a week for house cleaning and she makes her meals sometimes. She also has [facility] for PT/OT/SN/ and speech therapy. Patient reported she currently has a walker and canes but reported she stays in the bed most of the time. The patient is very upset about her foot pain and feels if that can be resolved she will be able to return home and resume home health services. CM unsure if patient will be able to safely return home. Patient will need to work with PT and OT. At this time however, patient reports she will be returning home and resuming HH with [facility].

## 2021-05-03 NOTE — PROGRESS NOTES
Problem: Mobility Impaired (Adult and Pediatric)  Goal: *Acute Goals and Plan of Care (Insert Text)  Description: Patient will move from supine to sit and sit to supine , scoot up and down, and roll side to side in bed with independence within 7 day(s). Patient will transfer from bed to chair and chair to bed with independence using the least restrictive device within 7 day(s). Patient will improve static standing balance to independence within 1 week(s). Patient will ambulate 25 feet with minimal assistance with least restrictive device within 1 weeks. Outcome: Not Met   PHYSICAL THERAPY EVALUATION  Patient: Geneva Merritt (63 y.o. female)  Date: 5/3/2021  Primary Diagnosis: Accelerated hypertension [I10]        Precautions: fall  ASSESSMENT  Pt is a 71 y/o F with PMH of CVA, diabetes mellitus, hypertension, heart failure presents to the [facility] ED complaining of not feeling good and weak. States that she was keep falling, having trouble with ambulation. Denies any chest pain, palpitations. Per medical chart, on evaluation found that she has significantly elevated blood pressure and due to the risk factors admitted for further management. She was comfortably laying in the bed, did not notice any trouble breathing or shortness of breath. Was given hydralazine and clonidine in the emergency room with not much improvement. Pt admitted 4/30/21 and being treated for accelerated hypertension. Pt received semi-supine in bed upon arrival, AXO x4, and agreeable to PT/OT evaluation at this time. Per pt report, pt lives alone in a one-story home with 3 JAMES and B HR, was IND with ADLs and Mod I using RW for mobility at [facility]. Per pt/chart, pt was recently admitted to [facility] in February, treated for CVA, pt d/c to [facility] 2/17/21.  Pt states she was there for \"about 40 days\" then was d/c home and receiving HHPT/OT and speech services prior to arrival. Pt reports she has a  that assist with IADLs, also owns a RW and SPC. Based on current observations, pt presents with deficits in generalized strength/AROM, static/dynamic sitting balance, bed mobility, functional activity tolerance, and reported increased B foot pain impacting overall performance of ADLs and functional transfers/mobility. Pt currently requires SBA supine>sit with fair sitting balance noted EOB; able to pull up/adjust socks at ankles, however with limited anterior reach anticipated min A to don socks at this time. Pt scoots forward with CGA, tolerates approx 10 minutes seated EOB, attempted STS however pt reports B foot pain and unable to progress to OOB transfer at this time. Sit>supine completed with CGA back into bed and pt performs basic face washing task s/p setup in long sitting. Overall, pt tolerates session fair today, limited mostly by pain to B feet today, RN made aware. Pt would benefit from continued skilled PT services to address current impairments and improve overall IND and safety with self cares and functional transfers/mobility. Recommend discharge to SNF vs. [facility] pending progress during hospital stay and once medically appropriate. Other factors to consider for discharge: family/social support, DME, time since onset, severity of deficits      Patient will benefit from skilled therapy intervention to address the above noted impairments       PLAN :  Recommendations and Planned Interventions: bed mobility training, transfer training, gait training, therapeutic exercises, patient and family training/education, and therapeutic activities      Frequency/Duration: Patient will be followed by physical therapy:  3 -5times a week to address goals.     Recommendation for discharge: (in order for the patient to meet his/her long term goals)  Therapy 3 hours per day 5-7 days per week    This discharge recommendation:  Has been made in collaboration with the attending provider and/or case management    IF patient discharges home will need the following DME: rolling walker         SUBJECTIVE:   Patient stated i have a lot of pain in my ankle. I need cast..    OBJECTIVE DATA SUMMARY:   HISTORY:    Past Medical History:   Diagnosis Date    Blindness/low vision 2008    due to dm    CHF (congestive heart failure) (HCC)     Chronic kidney disease     Chronic pain     SPINAL STENOSIS    DM (diabetes mellitus) ([facility] [address]     HTN (hypertension)     Stroke [facility])      Past Surgical History:   Procedure Laterality Date    HX OOPHORECTOMY      STENT INSERTION  2001,2002    Montrell King       Personal factors and/or comorbidities impacting plan of care:   Home Situation  Home Environment: Private residence  # Steps to Enter: 3  Rails to Enter: Yes  One/Two Story Residence: One story  Living Alone: Yes  Support Systems: Other (comments)(caregiver 3 x week)  Patient Expects to be Discharged to[de-identified] Other (comment)(TBD)  Current DME Used/Available at Home: Walker, rolling    EXAMINATION/PRESENTATION/DECISION MAKING:   Critical Behavior:  Neurologic State: Alert  Orientation Level: Oriented X4  Cognition: Follows commands     Hearing: Auditory  Auditory Impairment: None  Range Of Motion:  AROM: Generally decreased, functional                       Strength:    Strength: Generally decreased, functional                    Tone & Sensation:                  Sensation: Intact               Functional Mobility:  Bed Mobility:  Rolling: Stand-by assistance  Supine to Sit: Stand-by assistance  Sit to Supine: Minimum assistance  Scooting: Contact guard assistance  Transfers:  Sit to Stand: Moderate assistance;Assist x1;Assist x2  Stand to Sit: Moderate assistance;Assist x1;Assist x2                       Balance:   Sitting: Impaired; With support  Sitting - Static: Good (unsupported)  Sitting - Dynamic: Fair (occasional)  Ambulation/Gait Training:         Patient was unable to walk                                                     Functional Measure:  [address] [place] [id number] AM-PAC 6 Clicks         Basic Mobility Inpatient Short Form  How much difficulty does the patient currently have. .. Unable A Lot A Little None   1. Turning over in bed (including adjusting bedclothes, sheets and blankets)? [] 1   [] 2   [x] 3   [] 4   2. Sitting down on and standing up from a chair with arms ( e.g., wheelchair, bedside commode, etc.)   [] 1   [x] 2   [x] 3   [] 4   3. Moving from lying on back to sitting on the side of the bed? [] 1   [] 2   [x] 3   [] 4          How much help from another person does the patient currently need. .. Total A Lot A Little None   4. Moving to and from a bed to a chair (including a wheelchair)? [] 1   [x] 2   [] 3   [] 4   5. Need to walk in hospital room? [] 1   [x] 2   [] 3   [] 4   6. Climbing 3-5 steps with a railing? [] 1   [x] 2   [] 3   [] 4   © 2007, Trustees of [address] [place] [address] [address] [address], under license to [employer]. All rights reserved     Score:  Initial: 14/24 Most Recent: X (Date: 05/03/2021 )   Interpretation of Tool:  Represents activities that are increasingly more difficult (i.e. Bed mobility, Transfers, Gait). Score 24 23 22-20 19-15 14-10 9-7 6   Modifier CH CI CJ CK CL CM CN           Physical Therapy Evaluation Charge Determination   History Examination Presentation Decision-Making   LOW Complexity : Zero comorbidities / personal factors that will impact the outcome / POC MEDIUM Complexity : 3 Standardized tests and measures addressing body structure, function, activity limitation and / or participation in recreation  MEDIUM Complexity : Evolving with changing characteristics  MEDIUM Complexity : FOTO score of 26-74      Based on the above components, the patient evaluation is determined to be of the following complexity level: MEDIUM    Pain Rating:  10/10    Activity Tolerance:   Fair and Poor  Please refer to the flowsheet for vital signs taken during this treatment.     After treatment patient left in no apparent distress:   Supine in bed, Heels elevated for pressure relief, Call bell within reach, and Caregiver / family present    COMMUNICATION/EDUCATION:   The patients plan of care was discussed with: Occupational therapist and Registered nurse. Fall prevention education was provided and the patient/caregiver indicated understanding., Patient/family have participated as able in goal setting and plan of care. , and Patient/family agree to work toward stated goals and plan of care.     Thank you for this referral.  Beatrice Bills PT   Time Calculation: 34 mins

## 2021-05-03 NOTE — PROGRESS NOTES
Hospitalist Progress Note               Daily Progress Note: 5/2/2021    70 y.o. female with history of multiple CVA, diabetes mellitus, hypertension, heart failure presents to the emergency room complaining of not feeling good and weak. States that she was keep falling, having trouble with ambulation. Denies any chest pain, palpitations. But as mentioned she was just covered herself and not happy to give any information. Did not notice any fever or chills. But on evaluation found that she has significantly elevated blood pressure and due to the risk factors admitted for further management. She was comfortably laying in the bed, did not notice any trouble breathing or shortness of breath. Was given hydralazine and clonidine in the emergency room with not much improvement. Subjective: The patient is seen for follow  up. She has multiple complaints,  Feet hurt, can't walk x 2 years, in and out of snf,  No cp, + sob subjectively- not distressed and maintaining adequate sao2 on room air.   Bp remains above goal.    Problem List:  Problem List as of 5/3/2021 Date Reviewed: 10/15/2019          Codes Class Noted - Resolved    CKD (chronic kidney disease) ICD-10-CM: N18.9  ICD-9-CM: 585.9  4/30/2021 - Present        Accelerated hypertension ICD-10-CM: I10  ICD-9-CM: 401.0  4/30/2021 - Present        CVA (cerebral vascular accident) [facility]) ICD-10-CM: I63.9  ICD-9-CM: 434.91  2/13/2021 - Present              Medications reviewed  Current Facility-Administered Medications   Medication Dose Route Frequency    hydrALAZINE (APRESOLINE) tablet 50 mg  50 mg Oral TID    0.45% sodium chloride infusion  75 mL/hr IntraVENous CONTINUOUS    cyanocobalamin tablet 1,000 mcg  1,000 mcg Oral DAILY    allopurinoL (ZYLOPRIM) tablet 100 mg  100 mg Oral DAILY    atorvastatin (LIPITOR) tablet 40 mg  40 mg Oral QHS    aspirin chewable tablet 81 mg  81 mg Oral DAILY    lidocaine 4 % patch 1 Patch  1 Patch TransDERmal DAILY    albuterol (PROVENTIL HFA, VENTOLIN HFA, PROAIR HFA) inhaler 2 Puff  2 Puff Inhalation Q4H PRN    amLODIPine (NORVASC) tablet 10 mg  10 mg Oral DAILY    cholecalciferol (VITAMIN D3) (1000 Units /25 mcg) tablet 2,000 Units  2,000 Units Oral DAILY    ferrous sulfate tablet 325 mg  325 mg Oral DAILY    lisinopriL (PRINIVIL, ZESTRIL) tablet 40 mg  40 mg Oral DAILY    sodium chloride (NS) flush 5-40 mL  5-40 mL IntraVENous PRN    acetaminophen (TYLENOL) tablet 650 mg  650 mg Oral Q6H PRN    Or    acetaminophen (TYLENOL) suppository 650 mg  650 mg Rectal Q6H PRN    ondansetron (ZOFRAN ODT) tablet 4 mg  4 mg Oral Q6H PRN    Or    ondansetron (ZOFRAN) injection 4 mg  4 mg IntraVENous Q6H PRN    heparin (porcine) injection 5,000 Units  5,000 Units SubCUTAneous Q12H    insulin lispro (HUMALOG) injection   SubCUTAneous AC&HS    glucose chewable tablet 16 g  4 Tab Oral PRN    dextrose (D50W) injection syrg 12.5-25 g  25-50 mL IntraVENous PRN    glucagon (GLUCAGEN) injection 1 mg  1 mg IntraMUSCular PRN    nitroglycerin (NITRODUR) 0.2 mg/hr patch 1 Patch  1 Patch TransDERmal DAILY    carvediloL (COREG) tablet 12.5 mg  12.5 mg Oral BID WITH MEALS    donepeziL (ARICEPT) tablet 5 mg  5 mg Oral QHS       Review of Systems   Constitutional: Positive for malaise/fatigue. Negative for chills and fever. HENT: Negative. 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 Negative for cough, hemoptysis, sputum production and wheezing. Cardiovascular: Positive for leg swelling. Negative for chest pain and palpitations. Gastrointestinal: Negative for blood in stool, constipation, diarrhea, melena and nausea. Genitourinary: Negative. Musculoskeletal: Positive for myalgias. Skin: Negative. Neurological: Positive for weakness. Negative for sensory change, speech change, seizures and loss of consciousness. Endo/Heme/Allergies: Negative.     Psychiatric/Behavioral: The patient is nervous/anxious. Objective:   Physical Exam:     Visit Vitals  BP (!) 165/72   Pulse 65   Temp 98.2 °F (36.8 °C)   Resp 18   Ht 5' 2\" (1.575 m)   Wt 85 kg (187 lb 8 oz)   LMP  (LMP Unknown)   SpO2 98%   Breastfeeding No   BMI 34.29 kg/m²      O2 Device: None (Room air)      General : alert, looks nad but anxious and chronically ill  HEENT : nc/at,eomi  Neck : Supple, no JVD, no masses noted, no carotid bruit. Lungs :fair ae, clear and not labored. CVS : RRR, S1+, S2+, no murmur or gallop. Abdomen : Soft, mild tenderness to deep palp, bs +  Extremities:  1+edema noted,  pedal pulses palpable. Tender feet  Musculoskeletal :no joint swelling or effusion, muscle tone and power appears fair. .   Skin : Moist, warm. no pathological rash. Lymphatic : No cervical lymphadenopathy. Neurological : Awake, alert, oriented x 3,nfd. Psychiatric : anxious not agitated  Data Review:       Recent Days:  Recent Labs     04/30/21  1552   WBC 7.3   HGB 8.8*   HCT 28.4*        Recent Labs     05/02/21  1048 05/01/21  0641 04/30/21  1552    142 139   K 3.6 3.8 4.8   * 111* 109*   CO2 27 25 29   * 116* 120*   BUN 44* 39* 36*   CREA 3.84* 3.40* 3.62*   CA 8.6 8.6 8.9   PHOS  --  4.5  --    ALB  --  2.5* 3.1*   TBILI  --   --  0.4   ALT  --   --  13     No results for input(s): PH, PCO2, PO2, HCO3, FIO2 in the last 72 hours.     24 Hour Results:  Recent Results (from the past 24 hour(s))   METABOLIC PANEL, BASIC    Collection Time: 05/02/21 10:48 AM   Result Value Ref Range    Sodium 142 136 - 145 mmol/L    Potassium 3.6 3.5 - 5.1 mmol/L    Chloride 110 (H) 97 - 108 mmol/L    CO2 27 21 - 32 mmol/L    Anion gap 5 5 - 15 mmol/L    Glucose 176 (H) 65 - 100 mg/dL    BUN 44 (H) 6 - 20 mg/dL    Creatinine 3.84 (H) 0.55 - 1.02 mg/dL    BUN/Creatinine ratio 11 (L) 12 - 20      GFR est AA 14 (L) >60 ml/min/1.73m2    GFR est non-AA 12 (L) >60 ml/min/1.73m2    Calcium 8.6 8.5 - 10.1 mg/dL GLUCOSE, POC    Collection Time: 05/02/21 12:54 PM   Result Value Ref Range    Glucose (POC) 143 (H) 65 - 100 mg/dL    Performed by [facility] [place])    GLUCOSE, POC    Collection Time: 05/02/21  3:44 PM   Result Value Ref Range    Glucose (POC) 114 (H) 65 - 100 mg/dL    Performed by Juju Lei [address], POC    Collection Time: 05/02/21  7:49 PM   Result Value Ref Range    Glucose (POC) 174 (H) 65 - 100 mg/dL    Performed by Alejandro Stuart 10, POC    Collection Time: 05/03/21  7:56 AM   Result Value Ref Range    Glucose (POC) 129 (H) 65 - 100 mg/dL    Performed by Carmen Garcia (PCT)            Assessment/     Patient Active Problem List   Diagnosis Code    CVA (cerebral vascular accident) ([facility] [address] I63.9    CKD (chronic kidney disease) N18.9    Accelerated hypertension I10         Malignant hypertension- remains above goal  Prior multiple cva/blindness  Impaired mobility and stated multiple snf over past 2 years. States can't walk , weakness + bilat foot pains  DM2, insulin treated with small dose levemir- adequate  ckf 4, bump cr 3.8 baseline appears ~ 33.5- Nephrology follows in [place]  Aocd, hgb stable  Hx chf? Not decompensated  Polypharmacy    Plan:  Continue coreg- hr 60's, amlodipine, Hydralazine, lisinoopril, nitro patch,cardura stopped 2/2 orthostasis subjectively. Cont levemir/ssi/hypoglycemia protocol  Monitor cr- hold lasix  Pt/ot  Mobilize  nahco3 held. Vtep: heparin sq  Full code  Surrogate:  Kayce Vasquez Other Relative [phone]     Dispo: pending course, bp remains uncontrolled, mobility issues. Pt/Ot and she is open to snf possibility if recommended. Care Plan discussed with: Patient/Family and Nurse    Total time spent with patient: 30 minutes. This dictation was done by dragon, computer voice recognition software. Often unanticipated grammatical, syntax, phones and other interpretive errors are inadvertently transcribed.   Please excuse errors that have escaped final proofreading.     Crow Prince MD

## 2021-05-04 ENCOUNTER — APPOINTMENT (OUTPATIENT)
Dept: GENERAL RADIOLOGY | Age: 76
DRG: 304 | End: 2021-05-04
Attending: INTERNAL MEDICINE
Payer: MEDICARE

## 2021-05-04 LAB
ANION GAP SERPL CALC-SCNC: 7 MMOL/L (ref 5–15)
BASOPHILS # BLD: 0 K/UL (ref 0–0.1)
BASOPHILS NFR BLD: 0 % (ref 0–1)
BUN SERPL-MCNC: 49 MG/DL (ref 6–20)
BUN/CREAT SERPL: 13 (ref 12–20)
CA-I BLD-MCNC: 9 MG/DL (ref 8.5–10.1)
CHLORIDE SERPL-SCNC: 111 MMOL/L (ref 97–108)
CO2 SERPL-SCNC: 24 MMOL/L (ref 21–32)
CREAT SERPL-MCNC: 3.65 MG/DL (ref 0.55–1.02)
DIFFERENTIAL METHOD BLD: ABNORMAL
EOSINOPHIL # BLD: 0.2 K/UL (ref 0–0.4)
EOSINOPHIL NFR BLD: 3 % (ref 0–7)
ERYTHROCYTE [DISTWIDTH] IN BLOOD BY AUTOMATED COUNT: 15.4 % (ref 11.5–14.5)
GLUCOSE BLD STRIP.AUTO-MCNC: 119 MG/DL (ref 65–100)
GLUCOSE BLD STRIP.AUTO-MCNC: 160 MG/DL (ref 65–100)
GLUCOSE BLD STRIP.AUTO-MCNC: 176 MG/DL (ref 65–100)
GLUCOSE BLD STRIP.AUTO-MCNC: 245 MG/DL (ref 65–100)
GLUCOSE SERPL-MCNC: 104 MG/DL (ref 65–100)
HCT VFR BLD AUTO: 27.5 % (ref 35–47)
HGB BLD-MCNC: 8.7 G/DL (ref 11.5–16)
IMM GRANULOCYTES # BLD AUTO: 0 K/UL (ref 0–0.04)
IMM GRANULOCYTES NFR BLD AUTO: 0 % (ref 0–0.5)
LYMPHOCYTES # BLD: 1.7 K/UL (ref 0.8–3.5)
LYMPHOCYTES NFR BLD: 24 % (ref 12–49)
MCH RBC QN AUTO: 27.7 PG (ref 26–34)
MCHC RBC AUTO-ENTMCNC: 31.6 G/DL (ref 30–36.5)
MCV RBC AUTO: 87.6 FL (ref 80–99)
MONOCYTES # BLD: 0.6 K/UL (ref 0–1)
MONOCYTES NFR BLD: 9 % (ref 5–13)
NEUTS SEG # BLD: 4.5 K/UL (ref 1.8–8)
NEUTS SEG NFR BLD: 64 % (ref 32–75)
PERFORMED BY, TECHID: ABNORMAL
PLATELET # BLD AUTO: 266 K/UL (ref 150–400)
PMV BLD AUTO: 12.4 FL (ref 8.9–12.9)
POTASSIUM SERPL-SCNC: 3.5 MMOL/L (ref 3.5–5.1)
RBC # BLD AUTO: 3.14 M/UL (ref 3.8–5.2)
SODIUM SERPL-SCNC: 142 MMOL/L (ref 136–145)
WBC # BLD AUTO: 7 K/UL (ref 3.6–11)

## 2021-05-04 PROCEDURE — 74011250636 HC RX REV CODE- 250/636: Performed by: HOSPITALIST

## 2021-05-04 PROCEDURE — 65270000029 HC RM PRIVATE

## 2021-05-04 PROCEDURE — 74011250637 HC RX REV CODE- 250/637: Performed by: HOSPITALIST

## 2021-05-04 PROCEDURE — 74011250637 HC RX REV CODE- 250/637: Performed by: INTERNAL MEDICINE

## 2021-05-04 PROCEDURE — 85025 COMPLETE CBC W/AUTO DIFF WBC: CPT

## 2021-05-04 PROCEDURE — 72100 X-RAY EXAM L-S SPINE 2/3 VWS: CPT

## 2021-05-04 PROCEDURE — 82962 GLUCOSE BLOOD TEST: CPT

## 2021-05-04 PROCEDURE — 80048 BASIC METABOLIC PNL TOTAL CA: CPT

## 2021-05-04 PROCEDURE — 36415 COLL VENOUS BLD VENIPUNCTURE: CPT

## 2021-05-04 PROCEDURE — 74011000250 HC RX REV CODE- 250: Performed by: HOSPITALIST

## 2021-05-04 PROCEDURE — 74011636637 HC RX REV CODE- 636/637: Performed by: HOSPITALIST

## 2021-05-04 RX ORDER — POTASSIUM CHLORIDE 20 MEQ/1
40 TABLET, EXTENDED RELEASE ORAL
Status: COMPLETED | OUTPATIENT
Start: 2021-05-04 | End: 2021-05-04

## 2021-05-04 RX ADMIN — HEPARIN SODIUM 5000 UNITS: 5000 INJECTION INTRAVENOUS; SUBCUTANEOUS at 22:15

## 2021-05-04 RX ADMIN — HYDRALAZINE HYDROCHLORIDE 100 MG: 50 TABLET, FILM COATED ORAL at 22:15

## 2021-05-04 RX ADMIN — AMLODIPINE BESYLATE 10 MG: 5 TABLET ORAL at 08:28

## 2021-05-04 RX ADMIN — HEPARIN SODIUM 5000 UNITS: 5000 INJECTION INTRAVENOUS; SUBCUTANEOUS at 12:36

## 2021-05-04 RX ADMIN — FERROUS SULFATE TAB 325 MG (65 MG ELEMENTAL FE) 325 MG: 325 (65 FE) TAB at 08:28

## 2021-05-04 RX ADMIN — ALLOPURINOL 100 MG: 100 TABLET ORAL at 08:28

## 2021-05-04 RX ADMIN — HYDRALAZINE HYDROCHLORIDE 100 MG: 50 TABLET, FILM COATED ORAL at 17:01

## 2021-05-04 RX ADMIN — HYDRALAZINE HYDROCHLORIDE 100 MG: 50 TABLET, FILM COATED ORAL at 08:28

## 2021-05-04 RX ADMIN — LISINOPRIL 40 MG: 40 TABLET ORAL at 08:28

## 2021-05-04 RX ADMIN — ATORVASTATIN CALCIUM 40 MG: 40 TABLET, FILM COATED ORAL at 22:15

## 2021-05-04 RX ADMIN — DONEPEZIL HYDROCHLORIDE 5 MG: 5 TABLET, FILM COATED ORAL at 22:16

## 2021-05-04 RX ADMIN — POTASSIUM CHLORIDE 40 MEQ: 1500 TABLET, EXTENDED RELEASE ORAL at 14:16

## 2021-05-04 RX ADMIN — CYANOCOBALAMIN TAB 1000 MCG 1000 MCG: 1000 TAB at 08:29

## 2021-05-04 RX ADMIN — CARVEDILOL 12.5 MG: 12.5 TABLET, FILM COATED ORAL at 08:28

## 2021-05-04 RX ADMIN — ASPIRIN 81 MG CHEWABLE TABLET 81 MG: 81 TABLET CHEWABLE at 08:28

## 2021-05-04 RX ADMIN — INSULIN LISPRO 3 UNITS: 100 INJECTION, SOLUTION INTRAVENOUS; SUBCUTANEOUS at 17:01

## 2021-05-04 RX ADMIN — INSULIN LISPRO 2 UNITS: 100 INJECTION, SOLUTION INTRAVENOUS; SUBCUTANEOUS at 12:36

## 2021-05-04 RX ADMIN — CARVEDILOL 12.5 MG: 12.5 TABLET, FILM COATED ORAL at 17:01

## 2021-05-04 RX ADMIN — Medication 2000 UNITS: at 08:29

## 2021-05-04 NOTE — PROGRESS NOTES
OT treatment attempted at 0945 however, pt currently off floor at this time. Will continue to follow pt and will attempt treatment at a later time.

## 2021-05-04 NOTE — PROGRESS NOTES
PT treatment attempted at 0945 however, pt currently off floor at this time. Will continue to follow pt and will attempt treatment at a later time.  Thank you

## 2021-05-04 NOTE — PROGRESS NOTES
CM had family meeting with patient and NOK/Daughter Reema Prey [phone]. Current recommendations are a combination of IRF, SNF, HH. Daughter and patient agree with IRF or SNF pending acceptance. Multiple referrals sent. Daughter will be in town on Thursday for the possible transition. In the mean time, daughter would like to be screened for LTC Medicaid. CM emailed ESS Team.  CM team will continue to follow the case. Pending accepting facility.

## 2021-05-04 NOTE — PROGRESS NOTES
Hospitalist Progress Note               Daily Progress Note: 5/3/2021    70 y.o. female with history of multiple CVA, diabetes mellitus, hypertension, heart failure presents to the emergency room complaining of not feeling good and weak. Admit s/t hypertensive malignancy, progressive decline/gait instability. Subjective: The patient is seen for follow  up. She appears less anxious today and not distressed  Maintains c/o bilat foot pain/back pain exacerbated after fall 2 weeks ago. Gout hx- is this gouty attack? seems atypical.  No cp, + sob subjectively- not distressed and maintaining adequate sao2 on room air.   Bp remains above goal.    Problem List:  Problem List as of 5/4/2021 Date Reviewed: 10/15/2019          Codes Class Noted - Resolved    CKD (chronic kidney disease) ICD-10-CM: N18.9  ICD-9-CM: 585.9  4/30/2021 - Present        Accelerated hypertension ICD-10-CM: I10  ICD-9-CM: 401.0  4/30/2021 - Present        CVA (cerebral vascular accident) [facility]) ICD-10-CM: I63.9  ICD-9-CM: 434.91  2/13/2021 - Present              Medications reviewed  Current Facility-Administered Medications   Medication Dose Route Frequency    hydrALAZINE (APRESOLINE) tablet 100 mg  100 mg Oral TID    traMADoL (ULTRAM) tablet 50 mg  50 mg Oral Q6H PRN    cyanocobalamin tablet 1,000 mcg  1,000 mcg Oral DAILY    allopurinoL (ZYLOPRIM) tablet 100 mg  100 mg Oral DAILY    atorvastatin (LIPITOR) tablet 40 mg  40 mg Oral QHS    aspirin chewable tablet 81 mg  81 mg Oral DAILY    lidocaine 4 % patch 1 Patch  1 Patch TransDERmal DAILY    albuterol (PROVENTIL HFA, VENTOLIN HFA, PROAIR HFA) inhaler 2 Puff  2 Puff Inhalation Q4H PRN    amLODIPine (NORVASC) tablet 10 mg  10 mg Oral DAILY    cholecalciferol (VITAMIN D3) (1000 Units /25 mcg) tablet 2,000 Units  2,000 Units Oral DAILY    ferrous sulfate tablet 325 mg  325 mg Oral DAILY    lisinopriL (PRINIVIL, ZESTRIL) tablet 40 mg  40 mg Oral DAILY    sodium chloride (NS) flush 5-40 mL  5-40 mL IntraVENous PRN    acetaminophen (TYLENOL) tablet 650 mg  650 mg Oral Q6H PRN    Or    acetaminophen (TYLENOL) suppository 650 mg  650 mg Rectal Q6H PRN    ondansetron (ZOFRAN ODT) tablet 4 mg  4 mg Oral Q6H PRN    Or    ondansetron (ZOFRAN) injection 4 mg  4 mg IntraVENous Q6H PRN    heparin (porcine) injection 5,000 Units  5,000 Units SubCUTAneous Q12H    insulin lispro (HUMALOG) injection   SubCUTAneous AC&HS    glucose chewable tablet 16 g  4 Tab Oral PRN    dextrose (D50W) injection syrg 12.5-25 g  25-50 mL IntraVENous PRN    glucagon (GLUCAGEN) injection 1 mg  1 mg IntraMUSCular PRN    nitroglycerin (NITRODUR) 0.2 mg/hr patch 1 Patch  1 Patch TransDERmal DAILY    carvediloL (COREG) tablet 12.5 mg  12.5 mg Oral BID WITH MEALS    donepeziL (ARICEPT) tablet 5 mg  5 mg Oral QHS       Review of Systems   Constitutional: Positive for malaise/fatigue. Negative for chills and fever. HENT: Negative. 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 Negative for cough, hemoptysis, sputum production and wheezing. Cardiovascular: Positive for leg swelling. Negative for chest pain and palpitations. Gastrointestinal: Negative for blood in stool, constipation, diarrhea, melena and nausea. Genitourinary: Negative. Musculoskeletal: Positive for falls and joint pain. Skin: Negative. Neurological: Positive for weakness. Negative for sensory change, speech change, seizures and loss of consciousness. Endo/Heme/Allergies: Negative. Psychiatric/Behavioral: The patient is nervous/anxious.           Objective:   Physical Exam:     Visit Vitals  BP (!) 171/73 (BP 1 Location: Right upper arm, BP Patient Position: At rest)   Pulse 79   Temp 98.4 °F (36.9 °C)   Resp 18   Ht 5' 2\" (1.575 m)   Wt 89 kg (196 lb 3.4 oz)   LMP  (LMP Unknown)   SpO2 99%   Breastfeeding No   BMI 35.89 kg/m²      O2 Device: None (Room air)      General : alert, looks nad less anxious and chronically ill  HEENT : nc/at,eomi  Neck : Supple, no JVD, no masses noted, no carotid bruit. Lungs :fair ae, clear and not labored. CVS : RRR, S1+, S2+, no murmur or gallop. Abdomen : Soft, mild tenderness to deep palp, bs +  Extremities:  1+edema noted,  pedal pulses palpable. Tender feet  Musculoskeletal :no joint swelling or effusion, muscle tone and power appears fair. . Pain rom bilat ankles and tender to superficial tosh, no erythema, trace edema  Skin : Moist, warm. no pathological rash. Lymphatic : No cervical lymphadenopathy. Neurological : Awake, alert, oriented x 4,nfd. Psychiatric : anxious not agitated  Data Review:       Recent Days:  No results for input(s): WBC, HGB, HCT, PLT, HGBEXT, HCTEXT, PLTEXT, HGBEXT, HCTEXT, PLTEXT in the last 72 hours. Recent Labs     05/02/21  1048 05/01/21  0641    142   K 3.6 3.8   * 111*   CO2 27 25   * 116*   BUN 44* 39*   CREA 3.84* 3.40*   CA 8.6 8.6   PHOS  --  4.5   ALB  --  2.5*     No results for input(s): PH, PCO2, PO2, HCO3, FIO2 in the last 72 hours.     24 Hour Results:  Recent Results (from the past 24 hour(s))   GLUCOSE, POC    Collection Time: 05/03/21  7:56 AM   Result Value Ref Range    Glucose (POC) 129 (H) 65 - 100 mg/dL    Performed by Jen Major (PCT)    GLUCOSE, POC    Collection Time: 05/03/21 11:12 AM   Result Value Ref Range    Glucose (POC) 234 (H) 65 - 100 mg/dL    Performed by Jen Major (PCT)    GLUCOSE, POC    Collection Time: 05/03/21  2:58 PM   Result Value Ref Range    Glucose (POC) 194 (H) 65 - 100 mg/dL    Performed by Jen Major (PCT)    GLUCOSE, POC    Collection Time: 05/03/21  6:28 PM   Result Value Ref Range    Glucose (POC) 216 (H) 65 - 100 mg/dL    Performed by [facility] CASANDRA    GLUCOSE, POC    Collection Time: 05/03/21  7:18 PM   Result Value Ref Range    Glucose (POC) 177 (H) 65 - 100 mg/dL    Performed by Shukri Shaw KAELYN    SED RATE (ESR)    Collection Time: 05/03/21  8:37 PM   Result Value Ref Range    Sed rate, automated 109 mm/hr   URIC ACID    Collection Time: 05/03/21  8:37 PM   Result Value Ref Range    Uric acid 4.7 2.6 - 6.0 mg/dL           Assessment/     Patient Active Problem List   Diagnosis Code    CVA (cerebral vascular accident) ([facility] [place] [address] I63.9    CKD (chronic kidney disease) N18.9    Accelerated hypertension I10         -Malignant hypertension- remains above goal though much improved, titrating rx  -Prior multiple cva/blindness  -Impaired mobility and stated multiple snf over past 2 years. States can't walk , weakness + bilat foot pains- today states x 2 weeks. Hx gout, would seem atypical for gout. After d/w daughter, pt has severe spinal stenosis that has previously eval'd apparently and apparently \"nothing left to do\". -DM2, insulin treated with small dose levemir- adequate  -ckf 4, bump cr 3.8 baseline appears ~ 3.5- Nephrology follows in [place]. Consider nephro if trends up further, labs pending.  -Aocd, hgb stable  -Hx chf? Not decompensated  -Polypharmacy  -Gout? Plan:  Continue coreg- hr 60's, amlodipine, Hydralazine increased, lisinoopril, nitro patch,cardura stopped 2/2 orthostasis subjectively. Cont levemir/ssi/hypoglycemia protocol  Monitor cr- hold lasix  Esr/uric acid pending. triamcinalone 60 mg im x 1- gouty pain suspected. Pt/ot- rec h[facility]  Mobilize  nahco3 held. Vtep: heparin sq  Full code  Surrogate:  Jolene Rodríguez Other Relative [phone]     Dispo: pending course, bp remains above goal but better. mobility issues acute/chronic. D/w daughter Linda Craven. States very involved with mom's care though employment keeps her in [place]. Has cameras set up at home to monitor n=mom from a distance and is working on home aid. Daughter requesting for patient to go home with the monika becauses thinks mom doesn't sleep at night secondary to frequent urination- im not sure about that possibility and will defer to case management.   Daughter states that patient cannot be discharged home until she comes down from [place] possibly Wednesday. \"Mom will not go home until I get there\"        Care Plan discussed with: Patient/Family and Nurse    Total time spent with patient: 30 minutes. This dictation was done by dragon, computer voice recognition software. Often unanticipated grammatical, syntax, phones and other interpretive errors are inadvertently transcribed. Please excuse errors that have escaped final proofreading.     Vishal Ge MD

## 2021-05-05 LAB
ANION GAP SERPL CALC-SCNC: 6 MMOL/L (ref 5–15)
BUN SERPL-MCNC: 51 MG/DL (ref 6–20)
BUN/CREAT SERPL: 14 (ref 12–20)
CA-I BLD-MCNC: 8.9 MG/DL (ref 8.5–10.1)
CHLORIDE SERPL-SCNC: 113 MMOL/L (ref 97–108)
CO2 SERPL-SCNC: 24 MMOL/L (ref 21–32)
CREAT SERPL-MCNC: 3.67 MG/DL (ref 0.55–1.02)
ERYTHROCYTE [DISTWIDTH] IN BLOOD BY AUTOMATED COUNT: 15 % (ref 11.5–14.5)
GLUCOSE BLD STRIP.AUTO-MCNC: 131 MG/DL (ref 65–100)
GLUCOSE BLD STRIP.AUTO-MCNC: 133 MG/DL (ref 65–100)
GLUCOSE BLD STRIP.AUTO-MCNC: 158 MG/DL (ref 65–100)
GLUCOSE BLD STRIP.AUTO-MCNC: 229 MG/DL (ref 65–100)
GLUCOSE SERPL-MCNC: 154 MG/DL (ref 65–100)
HCT VFR BLD AUTO: 27.8 % (ref 35–47)
HGB BLD-MCNC: 8.5 G/DL (ref 11.5–16)
MCH RBC QN AUTO: 27.4 PG (ref 26–34)
MCHC RBC AUTO-ENTMCNC: 30.6 G/DL (ref 30–36.5)
MCV RBC AUTO: 89.7 FL (ref 80–99)
NRBC # BLD: 0 K/UL (ref 0–0.01)
NRBC BLD-RTO: 0 PER 100 WBC
PERFORMED BY, TECHID: ABNORMAL
PLATELET # BLD AUTO: 231 K/UL (ref 150–400)
PMV BLD AUTO: 12.1 FL (ref 8.9–12.9)
POTASSIUM SERPL-SCNC: 4 MMOL/L (ref 3.5–5.1)
RBC # BLD AUTO: 3.1 M/UL (ref 3.8–5.2)
SODIUM SERPL-SCNC: 143 MMOL/L (ref 136–145)
WBC # BLD AUTO: 7.5 K/UL (ref 3.6–11)

## 2021-05-05 PROCEDURE — 97530 THERAPEUTIC ACTIVITIES: CPT

## 2021-05-05 PROCEDURE — 94760 N-INVAS EAR/PLS OXIMETRY 1: CPT

## 2021-05-05 PROCEDURE — 80048 BASIC METABOLIC PNL TOTAL CA: CPT

## 2021-05-05 PROCEDURE — 85027 COMPLETE CBC AUTOMATED: CPT

## 2021-05-05 PROCEDURE — 65270000029 HC RM PRIVATE

## 2021-05-05 PROCEDURE — 74011250637 HC RX REV CODE- 250/637: Performed by: INTERNAL MEDICINE

## 2021-05-05 PROCEDURE — 74011250637 HC RX REV CODE- 250/637: Performed by: HOSPITALIST

## 2021-05-05 PROCEDURE — 74011000250 HC RX REV CODE- 250: Performed by: HOSPITALIST

## 2021-05-05 PROCEDURE — 36415 COLL VENOUS BLD VENIPUNCTURE: CPT

## 2021-05-05 PROCEDURE — 74011636637 HC RX REV CODE- 636/637: Performed by: HOSPITALIST

## 2021-05-05 PROCEDURE — 82962 GLUCOSE BLOOD TEST: CPT

## 2021-05-05 PROCEDURE — 74011250636 HC RX REV CODE- 250/636: Performed by: HOSPITALIST

## 2021-05-05 RX ORDER — HALOPERIDOL 5 MG/1
5 TABLET ORAL ONCE
Status: COMPLETED | OUTPATIENT
Start: 2021-05-06 | End: 2021-05-05

## 2021-05-05 RX ORDER — CARVEDILOL 12.5 MG/1
25 TABLET ORAL 2 TIMES DAILY WITH MEALS
Status: DISCONTINUED | OUTPATIENT
Start: 2021-05-05 | End: 2021-05-10 | Stop reason: HOSPADM

## 2021-05-05 RX ADMIN — ATORVASTATIN CALCIUM 40 MG: 40 TABLET, FILM COATED ORAL at 22:10

## 2021-05-05 RX ADMIN — CYANOCOBALAMIN TAB 1000 MCG 1000 MCG: 1000 TAB at 08:52

## 2021-05-05 RX ADMIN — HALOPERIDOL 5 MG: 5 TABLET ORAL at 23:52

## 2021-05-05 RX ADMIN — CARVEDILOL 12.5 MG: 12.5 TABLET, FILM COATED ORAL at 08:52

## 2021-05-05 RX ADMIN — HYDRALAZINE HYDROCHLORIDE 100 MG: 50 TABLET, FILM COATED ORAL at 22:10

## 2021-05-05 RX ADMIN — INSULIN LISPRO 3 UNITS: 100 INJECTION, SOLUTION INTRAVENOUS; SUBCUTANEOUS at 17:34

## 2021-05-05 RX ADMIN — LISINOPRIL 40 MG: 40 TABLET ORAL at 08:51

## 2021-05-05 RX ADMIN — HYDRALAZINE HYDROCHLORIDE 100 MG: 50 TABLET, FILM COATED ORAL at 08:51

## 2021-05-05 RX ADMIN — Medication 2000 UNITS: at 08:51

## 2021-05-05 RX ADMIN — AMLODIPINE BESYLATE 10 MG: 5 TABLET ORAL at 08:51

## 2021-05-05 RX ADMIN — ALLOPURINOL 100 MG: 100 TABLET ORAL at 08:51

## 2021-05-05 RX ADMIN — DONEPEZIL HYDROCHLORIDE 5 MG: 5 TABLET, FILM COATED ORAL at 22:10

## 2021-05-05 RX ADMIN — HYDRALAZINE HYDROCHLORIDE 100 MG: 50 TABLET, FILM COATED ORAL at 17:34

## 2021-05-05 RX ADMIN — HEPARIN SODIUM 5000 UNITS: 5000 INJECTION INTRAVENOUS; SUBCUTANEOUS at 12:03

## 2021-05-05 RX ADMIN — INSULIN LISPRO 2 UNITS: 100 INJECTION, SOLUTION INTRAVENOUS; SUBCUTANEOUS at 12:03

## 2021-05-05 RX ADMIN — ASPIRIN 81 MG CHEWABLE TABLET 81 MG: 81 TABLET CHEWABLE at 08:51

## 2021-05-05 RX ADMIN — CARVEDILOL 25 MG: 12.5 TABLET, FILM COATED ORAL at 17:34

## 2021-05-05 RX ADMIN — HEPARIN SODIUM 5000 UNITS: 5000 INJECTION INTRAVENOUS; SUBCUTANEOUS at 22:10

## 2021-05-05 RX ADMIN — FERROUS SULFATE TAB 325 MG (65 MG ELEMENTAL FE) 325 MG: 325 (65 FE) TAB at 08:51

## 2021-05-05 NOTE — PROGRESS NOTES
OCCUPATIONAL THERAPY TREATMENT  Patient: Anna Bolton (49 y.o. female)  Date: 5/5/2021  Diagnosis: Accelerated hypertension [I10] <principal problem not specified>       Precautions:    Chart, occupational therapy assessment, plan of care, and goals were reviewed. ASSESSMENT  Patient continues with skilled OT services and is progressing towards goals. Pt received semi supine in bed upon arrival and agreeable to session. Pt performed sup>sit with min A as pt requesting assist with BLE d/t ankle and foot pain. Pt sat EOB while performing therex. intact sitting balance demonstrated. Performed completed sit-> stand with min A x 2 and RW for balance upon standing. Able to take a few steps forward and backward with RW and min A x 2, however pt continually stating she can't walk due to pain in ankles and feet. Pt returned to EOB and required SBA to perform sit>sup. Recommending d/c to SNF once medically appropriate. Other factors to consider for discharge: PLOF, time since on set         PLAN :  Patient continues to benefit from skilled intervention to address the above impairments. Continue treatment per established plan of care. to address goals. Recommendation for discharge: (in order for the patient to meet his/her long term goals)  SNF vs HHOT    This discharge recommendation:  Has been made in collaboration with the attending provider and/or case management    IF patient discharges home will need the following DME: TBD       SUBJECTIVE:   Patient stated my ankles hurt ! Jermaine Pearce    OBJECTIVE DATA SUMMARY:   Cognitive/Behavioral Status:  Neurologic State: Alert  Orientation Level: Disoriented to person  Cognition: Follows commands      Functional Mobility and Transfers for ADLs:  Bed Mobility:  Rolling: Stand-by assistance  Supine to Sit: Minimum assistance  Sit to Supine: Stand-by assistance  Scooting: Contact guard assistance    Transfers:  Sit to Stand: Minimum assistance;Assist x2 Balance:  Sitting: Intact  Sitting - Static: Good (unsupported)  Sitting - Dynamic: Good (unsupported)  Standing: Impaired; With support  Standing - Static: Constant support; Fair  Standing - Dynamic : Constant support; Fair      Therapeutic Exercises: gal UE's - pt stated her Ue's are strong and no need for therex- pt educated on importance to continue for overall strengthening  Exercise Sets Reps AROM AAROM PROM Self PROM Comments   Shoulder flex/ext 1 10 [x] [] [] []    Elbow flex/ext 1 10 [x] [] [] []    Wrist flex/ext   [] [] [] []       [] [] [] []          Pain:  10/10 gal ankle pain    Activity Tolerance:   Fair and Poor  Please refer to the flowsheet for vital signs taken during this treatment. After treatment patient left in no apparent distress:   Supine in bed, Call bell within reach, Bed / chair alarm activated, and Side rails x 3    COMMUNICATION/COLLABORATION:   The patients plan of care was discussed with: Physical therapy assistant co-tx with PTA for increased assistance.      Manohar Santana  Time Calculation: 23 mins    Problem: Self Care Deficits Care Plan (Adult)  Goal: *Acute Goals and Plan of Care (Insert Text)  Description: Pt will be IND sup <> sit in prep for EOB ADLs  Pt will be Mod I grooming standing sink side LRAD  Pt will be Mod I LE dressing sitting EOB/long sit  Pt will be Mod I sit <>  prep for toileting LRAD  Pt will be Mod I toileting/toilet transfer/cloth mgmt LRAD  Pt will be IND following UE HEP in prep for self care tasks     Outcome: Progressing Towards Goal

## 2021-05-05 NOTE — PROGRESS NOTES
PHYSICAL THERAPY TREATMENT  Patient: Kiera Waite (8 y.o. female)  Date: 5/5/2021  Diagnosis: Accelerated hypertension [I10] <principal problem not specified>       Precautions:    Chart, physical therapy assessment, plan of care and goals were reviewed. ASSESSMENT  Patient continues with skilled PT services and is progressing towards goals. Pt semi supine in bed upon PT/OT arrival and agreeable to session. Performed sup>sit with min A as pt requesting assist with BLE 2/2 ankle and foot pain. Pt sat EOB while performing therex, noted with intact sitting balance. Performed STS min A x 2 and RW for balance upon standing. Able to take a few steps forward and backward with RW and min A x 2, however pt continually stating she can't walk due to pain in ankles and feet. Pt returned to EOB and required SBA to perform sit>sup. Recommending d/c to SNF once medically appropriate. Current Level of Function Impacting Discharge (mobility/balance): assistance required for OOB mobility    Other factors to consider for discharge: PLOF, severity of deficits, time since onset, confusion         PLAN :  Patient continues to benefit from skilled intervention to address the above impairments. Continue treatment per established plan of care. to address goals. Recommendation for discharge: (in order for the patient to meet his/her long term goals)  Therapy up to 5 days/week in SNF setting    This discharge recommendation:  Has been made in collaboration with the attending provider and/or case management    IF patient discharges home will need the following DME: to be determined (TBD)       SUBJECTIVE:   Patient stated I can't walk my ankles and feet hurt so bad.     OBJECTIVE DATA SUMMARY:   Critical Behavior:  Neurologic State: Alert  Orientation Level: Disoriented to person  Cognition: Follows commands     Functional Mobility Training:  Bed Mobility:  Rolling: Stand-by assistance  Supine to Sit: Minimum assistance  Sit to Supine: Stand-by assistance  Scooting: Contact guard assistance    Transfers:  Sit to Stand: Minimum assistance;Assist x2  Stand to Sit: Minimum assistance;Assist x2    Balance:  Sitting: Intact  Sitting - Static: Good (unsupported)  Sitting - Dynamic: Good (unsupported)  Standing: Impaired; With support  Standing - Static: Constant support; Fair  Standing - Dynamic : Constant support; Fair    Ambulation/Gait Training:  Distance (ft): 3 Feet (ft)  Assistive Device: Gait belt;Walker, rolling  Ambulation - Level of Assistance: Minimal assistance;Assist x2  Base of Support: Narrowed  Step Length: Left shortened;Right shortened    Therapeutic Exercises:   1x 20 AP  1 x 20 LAQ  1 x 15 marches  1 x 15 hip ab/ad    Pain Rating:  10/10 bilat ankle/foot pain    Activity Tolerance:   Fair and requires rest breaks  Please refer to the flowsheet for vital signs taken during this treatment. After treatment patient left in no apparent distress:   Supine in bed, Call bell within reach, Bed / chair alarm activated, and Side rails x 3    COMMUNICATION/COLLABORATION:   The patients plan of care was discussed with: Occupational therapy assistant and Registered nurse. OT/PT sessions occurred together for increased patient and clinician safety    Problem: Mobility Impaired (Adult and Pediatric)  Goal: *Acute Goals and Plan of Care (Insert Text)  Description: Patient will move from supine to sit and sit to supine , scoot up and down, and roll side to side in bed with independence within 7 day(s). Patient will transfer from bed to chair and chair to bed with independence using the least restrictive device within 7 day(s). Patient will improve static standing balance to independence within 1 week(s). Patient will ambulate 25 feet with minimal assistance with least restrictive device within 1 weeks.        Outcome: Progressing Towards Goal       Rony West PTA   Time Calculation: 23 mins

## 2021-05-05 NOTE — PROGRESS NOTES
Physician Progress Note      PATIENT:               Jesus Jeronimo  CSN #:                  [id number]  :                       1945  ADMIT DATE:       2021 2:21 PM  [address] [place] DATE:  RESPONDING  PROVIDER #:        Garth Khalil MD          QUERY TEXT:    Pt admitted with Weakness/ Uncontrolled HTN and has CHF documented. If possible, please document in progress notes and discharge summary further specificity regarding the type and acuity of CHF:    The medical record reflects the following:  Risk Factors: HTN, DM II, CKD, ANEMIA, CHF, CVA  Clinical Indicators: H/P: PMH- CHF, ECHP : \"Calculated LVEF is 65%. Normal cavity size and systolic function (ejection fraction normal). Moderate concentric hypertrophy. \" NO BNP RESULTS SEEN. CXR: CARDIOMEGALY  Treatment: MONITOR I&O'S, LASIX, WEIGHT MONITORING,CXR, LAB MONITORING. Thank you,  CYNTHIA SamuelsN, RN, CDI Specialist  [email or web address] or [phone]  Options provided:  -- Chronic Systolic CHF/HFrEF  -- Chronic Diastolic CHF/HFpEF  -- Chronic Systolic and Diastolic CHF  -- Acute on Chronic Systolic CHF/HFrEF  -- Acute on Chronic Diastolic CHF/HFpEF  -- Acute on Chronic Systolic and Diastolic CHF  -- Acute Systolic CHF/HFrEF  -- Acute Diastolic CHF/HFpEF  -- Acute Systolic and Diastolic CHF  -- Other - I will add my own diagnosis  -- Disagree - Not applicable / Not valid  -- Disagree - Clinically unable to determine / Unknown  -- Refer to Clinical Documentation Reviewer    PROVIDER RESPONSE TEXT:    This patient has chronic diastolic CHF/HFpEF. Query created by: Feliciano Syed on 5/3/2021 1:05 PM      QUERY TEXT:    Pt admitted with WEAKNESS/UNCONTROLLED HTN. Pt noted to have MULTIPLE CHRONIC INFARTS & MICROVASCULAR DISEASE WITH SBP READINGS >180 AND DBP READINGS >110 AND THE USE OF IV MEDS TO REDUCE BP.  If possible, please document in progress notes and discharge summary if you are evaluating and/or treating any of the following: The medical record reflects the following:  Risk Factors: HTN, DM II, CVA, ANEMIA, CKD, CHF  Clinical Indicators: BP: 226/88, 230/89, 197/162, 154/121. H&P: \"CVA with chronic lacunar infarcts multiple and microvascular disease for MRI done February of this year. Seems to be secondary to her fluctuating blood pressure issues. \"  ED SUMMARY: APRESOLINE IV X 2, LOPRESSOR PO, CLONIDINE PO, NITROPASTE GIVEN FOR REDUCTION IN BP. Treatment: APRESOLINE IV X 2, LOPRESSOR PO, CLONIDINE PO, NITROPASTE, HEAD CT, CXR, BP MONITORING, LAB MONITORING. Thank you,  HIPOLITO Rahman, RN, CDI Specialist  [email or web address] or [phone]      Hypertensive Crisis, unspecified: at least 2 consecutive readings of SBP > 180 mmHg or DBP > 110 mmHg  - Hypertensive Urgency: Hypertensive crisis w/o associated organ dysfunction. S/s may or may not be present, but can include severe headache, SOB, epistaxis, severe anxiety. Tx: adjustment of oral antihypertensives; IV meds not usually required. - Hypertensive Emergency: Hypertensive crisis w/ associated organ damage (stroke, encephalopathy, LORY, MI, angina, acute or decompensated CHF, acute pulmonary edema, HELLP, etc.). Requires immediate treatment (usually IV meds) & possible ICU admission. Associated organ dysfunction needs documented. [email or web address]  Options provided:  -- Hypertensive Emergency  -- Hypertensive Crisis  -- Hypertensive Urgency  -- Other - I will add my own diagnosis  -- Disagree - Not applicable / Not valid  -- Disagree - Clinically unable to determine / Unknown  -- Refer to Clinical Documentation Reviewer    PROVIDER RESPONSE TEXT:    This patient has hypertensive urgency.     Query created by: Azalea Levy on 5/3/2021 1:15 PM      Electronically signed by:  Oswald Diehl MD 5/5/2021 7:05 AM

## 2021-05-05 NOTE — WOUND CARE
IP WOUND CONSULT    [id number] RECORD NUMBER:  [id number]  AGE: 70 y.o. GENDER: female  : 1945  TODAY'S DATE:  2021    GENERAL     [] Follow-up   [x] New Consult    Arcelia Dakin is a 70 y.o. female referred by:   [x] Physician  [] Nursing  [] Other:         PAST MEDICAL HISTORY    Past Medical History:   Diagnosis Date    Blindness/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    CHF (congestive heart failure) (HCC)     Chronic kidney disease     Chronic pain     SPINAL STENOSIS    DM [facility]di[facility] mellitus[facility]     HTN (hypertension)     Stroke ([facility]         PAST SURGICAL HISTORY    Past Surgical History:   Procedure Laterality Date    HX OOPHORECTOMY      STENT INSERTION  ,    Dawit Shannon       FAMILY HISTORY    Family History   Problem Relation Age of Onset    Breast Cancer Maternal Aunt     Breast Cancer Cousin          ALLERGIES    No Known Allergies    MEDICATIONS    No current facility-administered medications on file prior to encounter. Current Outpatient Medications on File Prior to Encounter   Medication Sig Dispense Refill    lidocaine (LIDODERM) 5 % 1 Patch by TransDERmal route daily.  loratadine (CLARITIN) 10 mg tablet Take 10 mg by mouth daily.  albuterol (PROVENTIL HFA, VENTOLIN HFA, PROAIR HFA) 90 mcg/actuation inhaler Take 2 Puffs by inhalation every four (4) hours as needed for Shortness of Breath.  amLODIPine (NORVASC) 10 mg tablet Take 10 mg by mouth daily.  metoprolol tartrate (LOPRESSOR) 50 mg tablet Take 50 mg by mouth two (2) times a day.  hydrALAZINE (APRESOLINE) 25 mg tablet Take 25 mg by mouth three (3) times daily.  cholecalciferol, vitamin D3, 50 mcg (2,000 unit) tab Take 2,000 Units by mouth daily.  FeroSuL 325 mg (65 mg iron) tablet Take 325 mg by mouth daily.  donepeziL (ARICEPT) 5 mg tablet Take 5 mg by mouth daily.       insulin detemir U-100 (Levemir FlexTouch U-100 Insuln) 100 unit/mL (3 mL) inpn 5 Units by SubCUTAneous route daily.  lisinopriL (PRINIVIL, ZESTRIL) 40 mg tablet Take 40 mg by mouth daily.  fluticasone propionate (FLONASE) 50 mcg/actuation nasal spray 1 [place] by Both Nostrils route daily.  aspirin 81 mg chewable tablet Take 81 mg by mouth daily.  doxazosin (CARDURA) 2 mg tablet Take 2 mg by mouth nightly.  cyanocobalamin, vitamin B-12, 3,000 mcg cap cyanocobalamin (vitamin B-12) 3,000 mcg capsule      allopurinoL (ZYLOPRIM) 100 mg tablet allopurinol 100 mg tablet      atorvastatin (LIPITOR) 40 mg tablet atorvastatin 40 mg tablet      fluticasone propion-salmeterol (ADVAIR HFA) 115-21 mcg/actuation inhaler Take 2 Puffs by inhalation two (2) times a day.  insulin aspart U-100 (NovoLOG Flexpen U-100 Insulin) 100 unit/mL (3 mL) inpn by SubCUTAneous route.  sodium bicarbonate 650 mg tablet Take 650 mg by mouth two (2) times a day.  furosemide (LASIX) 40 mg tablet Take 40 mg by mouth daily.  nitroglycerin (NITROSTAT) 0.4 mg SL tablet by SubLINGual route every five (5) minutes as needed.            [unfilled]  Visit Vitals  BP (!) 170/70   Pulse 81   Temp 98.1 °F (36.7 °C)   Resp 18   Ht 5' 2\" (1.575 m)   Wt 89 kg (196 lb 3.4 oz)   LMP  (LMP Unknown)   SpO2 98%   Breastfeeding No   BMI 35.89 kg/m²       ASSESSMENT     Wound Identification & Type: MASD to abdominal fold and both groin areas  Dressing change: Zinc paste to buttocks, abdominal fold, and groin areas  Verbal consent for picture: No picture needed    Contributing Factors: anticoagulation therapy, diabetes, poor glucose control, decreased mobility, shear force, incontinence of urine and obesity            PLAN     Skin Care & Pressure Relief Recommendations  Minimize layers of linen  Turn/reposition approximately every 2 hours  Pillow wedges  Manage incontinence   Promote continence; Skin Protective lotion/cream to buttocks and sacrum daily and as needed with incontinence care  Offload heels pillows    Price 17, I estimate at 14  Blood Glucose: 131 on 5/5/21                             Albumin: 2.5 on 5/1/21  WBCs: 7.0 on 5/4/21    Support Surface: Gel mattress    Physician/Provider notified: Dr. Long Smalls  Recommendations: Patient complaining of pain to forehead and to right side/flank. She is concerned about pain and wanted physician to know before potential discharge. I informed Dr. Long Smalls via [employer] and he acknowledged. Patient has MASD to abdominal fold and both groin areas. Sacrum and heels intact. Patient turns with 1-person assistance. Maintain PureWick to manage  incontinence. Float heels with pillows while in bed. Use wedge to turn q2h at 30 degrees or more. Maintain HOB at 30 degrees or less if not contraindicated. Will continue to follow.       Teaching completed with:   [] Patient           [] Family member       [] Caregiver          [] Nursing  [] Other    Patient/Caregiver Teaching:  Level of patient/caregiver understanding able to:   [] Indicates understanding       [] Needs reinforcement  [] Unsuccessful      [] Verbal Understanding  [] Demonstrated understanding       [] No evidence of learning  [] Refused teaching         [] N/A       Electronically signed by Cassandra Galan RN on 5/5/2021 at 10:16 AM

## 2021-05-05 NOTE — PROGRESS NOTES
Two person skin assessment performed by myself and Myles Barrett RN. Patient has moisture associated breakdown in the abdominal fold and groin, skin abrasion on the right upper thigh. All other skin is warm, dry and intact.

## 2021-05-05 NOTE — PROGRESS NOTES
Hospitalist Progress Note               Daily Progress Note: 5/3/2021    70 y.o. female with history of multiple CVA, diabetes mellitus, hypertension, heart failure presents to the emergency room complaining of not feeling good and weak. Admit s/t hypertensive malignancy, progressive decline/gait instability.     Subjective:   Patient seen and evaluated at bedside, of note patient currently was concerned about her blood pressure, patient states she wants to go home, discussed with RN and     Problem List:  Problem List as of 5/5/2021 Date Reviewed: 10/15/2019          Codes Class Noted - Resolved    CKD (chronic kidney disease) ICD-10-CM: N18.9  ICD-9-CM: 585.9  4/30/2021 - Present        Accelerated hypertension ICD-10-CM: I10  ICD-9-CM: 401.0  4/30/2021 - Present        CVA (cerebral vascular accident) [facility]) ICD-10-CM: I63.9  ICD-9-CM: 434.91  2/13/2021 - Present              Medications reviewed  Current Facility-Administered Medications   Medication Dose Route Frequency    carvediloL (COREG) tablet 25 mg  25 mg Oral BID WITH MEALS    hydrALAZINE (APRESOLINE) tablet 100 mg  100 mg Oral TID    traMADoL (ULTRAM) tablet 50 mg  50 mg Oral Q6H PRN    cyanocobalamin tablet 1,000 mcg  1,000 mcg Oral DAILY    allopurinoL (ZYLOPRIM) tablet 100 mg  100 mg Oral DAILY    atorvastatin (LIPITOR) tablet 40 mg  40 mg Oral QHS    aspirin chewable tablet 81 mg  81 mg Oral DAILY    lidocaine 4 % patch 1 Patch  1 Patch TransDERmal DAILY    albuterol (PROVENTIL HFA, VENTOLIN HFA, PROAIR HFA) inhaler 2 Puff  2 Puff Inhalation Q4H PRN    amLODIPine (NORVASC) tablet 10 mg  10 mg Oral DAILY    cholecalciferol (VITAMIN D3) (1000 Units /25 mcg) tablet 2,000 Units  2,000 Units Oral DAILY    ferrous sulfate tablet 325 mg  325 mg Oral DAILY    lisinopriL (PRINIVIL, ZESTRIL) tablet 40 mg  40 mg Oral DAILY    sodium chloride (NS) flush 5-40 mL  5-40 mL IntraVENous PRN    acetaminophen (TYLENOL) tablet 650 mg  650 mg Oral Q6H PRN    Or    acetaminophen (TYLENOL) suppository 650 mg  650 mg Rectal Q6H PRN    ondansetron (ZOFRAN ODT) tablet 4 mg  4 mg Oral Q6H PRN    Or    ondansetron (ZOFRAN) injection 4 mg  4 mg IntraVENous Q6H PRN    heparin (porcine) injection 5,000 Units  5,000 Units SubCUTAneous Q12H    insulin lispro (HUMALOG) injection   SubCUTAneous AC&HS    glucose chewable tablet 16 g  4 Tab Oral PRN    dextrose (D50W) injection syrg 12.5-25 g  25-50 mL IntraVENous PRN    glucagon (GLUCAGEN) injection 1 mg  1 mg IntraMUSCular PRN    nitroglycerin (NITRODUR) 0.2 mg/hr patch 1 Patch  1 Patch TransDERmal DAILY    donepeziL (ARICEPT) tablet 5 mg  5 mg Oral QHS       Review of Systems   Constitutional: Positive for malaise/fatigue. Negative for chills and fever. HENT: Negative. 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 Negative for cough, hemoptysis, sputum production and wheezing. Cardiovascular: Positive for leg swelling. Negative for chest pain and palpitations. Gastrointestinal: Negative for blood in stool, constipation, diarrhea, melena and nausea. Genitourinary: Negative. Musculoskeletal: Positive for falls and joint pain. Skin: Negative. Neurological: Positive for weakness. Negative for sensory change, speech change, seizures and loss of consciousness. Endo/Heme/Allergies: Negative. Psychiatric/Behavioral: The patient is nervous/anxious. Objective:   Physical Exam:     Visit Vitals  BP (!) 170/70   Pulse 81   Temp 98.1 °F (36.7 °C)   Resp 18   Ht 5' 2\" (1.575 m)   Wt 89 kg (196 lb 3.4 oz)   LMP  (LMP Unknown)   SpO2 98%   Breastfeeding No   BMI 35.89 kg/m²      O2 Device: None (Room air)      General : alert, looks nad less anxious and chronically ill  HEENT : nc/at,eomi  Neck : Supple, no JVD, no masses noted, no carotid bruit. Lungs :fair ae, clear and not labored. CVS : RRR, S1+, S2+, no murmur or gallop.   Abdomen : Soft, mild tenderness to deep palp, bs +  Extremities:  1+edema noted,  pedal pulses palpable. Tender feet  Musculoskeletal :no joint swelling or effusion, muscle tone and power appears fair. . Pain rom bilat ankles and tender to superficial tosh, no erythema, trace edema  Skin : Moist, warm. no pathological rash. Lymphatic : No cervical lymphadenopathy. Neurological : Awake, alert, oriented x 4,nfd. Psychiatric : anxious not agitated  Data Review:       Recent Days:  Recent Labs     05/05/21 0913 05/04/21  0720   WBC 7.5 7.0   HGB 8.5* 8.7*   HCT 27.8* 27.5*    266     Recent Labs     05/05/21 0913 05/04/21  0720    142   K 4.0 3.5   * 111*   CO2 24 24   * 104*   BUN 51* 49*   CREA 3.67* 3.65*   CA 8.9 9.0     No results for input(s): PH, PCO2, PO2, HCO3, FIO2 in the last 72 hours.     24 Hour Results:  Recent Results (from the past 24 hour(s))   GLUCOSE, POC    Collection Time: 05/04/21  3:23 PM   Result Value Ref Range    Glucose (POC) 245 (H) 65 - 100 mg/dL    Performed by RADHA GARCIA    GLUCOSE, POC    Collection Time: 05/04/21  8:37 PM   Result Value Ref Range    Glucose (POC) 176 (H) 65 - 100 mg/dL    Performed by Prince Puentes    GLUCOSE, POC    Collection Time: 05/05/21  8:03 AM   Result Value Ref Range    Glucose (POC) 131 (H) 65 - 100 mg/dL    Performed by Anne Rodgers (PCT)    CBC W/O DIFF    Collection Time: 05/05/21  9:13 AM   Result Value Ref Range    WBC 7.5 3.6 - 11.0 K/uL    RBC 3.10 (L) 3.80 - 5.20 M/uL    HGB 8.5 (L) 11.5 - 16.0 g/dL    HCT 27.8 (L) 35.0 - 47.0 %    MCV 89.7 80.0 - 99.0 FL    MCH 27.4 26.0 - 34.0 PG    MCHC 30.6 30.0 - 36.5 g/dL    RDW 15.0 (H) 11.5 - 14.5 %    PLATELET 765 314 - 379 K/uL    MPV 12.1 8.9 - 12.9 FL    NRBC 0.0 0.0  WBC    ABSOLUTE NRBC 0.00 0.00 - 1.27 K/uL   METABOLIC PANEL, BASIC    Collection Time: 05/05/21  9:13 AM   Result Value Ref Range    Sodium 143 136 - 145 mmol/L    Potassium 4.0 3.5 - 5.1 mmol/L    Chloride 113 (H) 97 - 108 mmol/L    CO2 24 21 - 32 mmol/L    Anion gap 6 5 - 15 mmol/L    Glucose 154 (H) 65 - 100 mg/dL    BUN 51 (H) 6 - 20 mg/dL    Creatinine 3.67 (H) 0.55 - 1.02 mg/dL    BUN/Creatinine ratio 14 12 - 20      GFR est AA 15 (L) >60 ml/min/1.73m2    GFR est non-AA 12 (L) >60 ml/min/1.73m2    Calcium 8.9 8.5 - 10.1 mg/dL   GLUCOSE, POC    Collection Time: 05/05/21 11:24 AM   Result Value Ref Range    Glucose (POC) 158 (H) 65 - 100 mg/dL    Performed by Leo Banks (PCT)            Assessment/     Patient Active Problem List   Diagnosis Code    CVA (cerebral vascular accident) ([facility] [address] I63.9    CKD (chronic kidney disease) N18.9    Accelerated hypertension I10           Hypertensive urgency-currently significantly better controlled however patient still hypertensive currently maxed on antihypertensive medications except Coreg  Continue hydralazine 100 mg 3 times daily  Continue lisinopril 40 mg once daily  Continue Norvasc 10 mg once daily  Increase Coreg to 25 mg twice daily  Continue to monitor    Hyperglycemia type 2 diabetes-currently under much better control  Continue insulin sliding scale  Continue to monitor    Chronic kidney disease stage IV-patient has a history of stage IV chronic kidney disease with serum creatinine currently at baseline  Continue to trend serum creatinine    Gout-continue home medications    Prophylaxis-Heparin subcu  FEN-diabetic diet, replete potassium and magnesium  Full code, patient surrogate decision-maker is her daughter  Disposition-to skilled nursing facility, patient is currently stable for discharge once accepting facility is available with bed and insurance authorization, discussed with case management in detail    [facility] Other Relative [address] discussed with: Patient/Family and Nurse/Case Management    Total time spent with patient: 35 minutes. This dictation was done by dragon, computer voice recognition software.   Often unanticipated grammatical, syntax, phones and other interpretive errors are inadvertently transcribed. Please excuse errors that have escaped final proofreading.     Kristin Dominguez MD

## 2021-05-05 NOTE — PROGRESS NOTES
CM reviewed chart, discharge order present. No accepting IRF or SNF at this time. CM uploaded recent clinicals for SNF and IRF review. Pending accepting facility.

## 2021-05-06 LAB
ANION GAP SERPL CALC-SCNC: 8 MMOL/L (ref 5–15)
BUN SERPL-MCNC: 53 MG/DL (ref 6–20)
BUN/CREAT SERPL: 15 (ref 12–20)
CA-I BLD-MCNC: 8.6 MG/DL (ref 8.5–10.1)
CHLORIDE SERPL-SCNC: 114 MMOL/L (ref 97–108)
CO2 SERPL-SCNC: 21 MMOL/L (ref 21–32)
COVID-19 RAPID TEST, COVR: NOT DETECTED
CREAT SERPL-MCNC: 3.51 MG/DL (ref 0.55–1.02)
ERYTHROCYTE [DISTWIDTH] IN BLOOD BY AUTOMATED COUNT: 14.6 % (ref 11.5–14.5)
GLUCOSE BLD STRIP.AUTO-MCNC: 156 MG/DL (ref 65–100)
GLUCOSE BLD STRIP.AUTO-MCNC: 161 MG/DL (ref 65–100)
GLUCOSE BLD STRIP.AUTO-MCNC: 165 MG/DL (ref 65–100)
GLUCOSE BLD STRIP.AUTO-MCNC: 180 MG/DL (ref 65–100)
GLUCOSE SERPL-MCNC: 135 MG/DL (ref 65–100)
HCT VFR BLD AUTO: 28.4 % (ref 35–47)
HGB BLD-MCNC: 8.6 G/DL (ref 11.5–16)
MCH RBC QN AUTO: 27 PG (ref 26–34)
MCHC RBC AUTO-ENTMCNC: 30.3 G/DL (ref 30–36.5)
MCV RBC AUTO: 89.3 FL (ref 80–99)
NRBC # BLD: 0 K/UL (ref 0–0.01)
NRBC BLD-RTO: 0 PER 100 WBC
PERFORMED BY, TECHID: ABNORMAL
PLATELET # BLD AUTO: 250 K/UL (ref 150–400)
PMV BLD AUTO: 12.8 FL (ref 8.9–12.9)
POTASSIUM SERPL-SCNC: 3.8 MMOL/L (ref 3.5–5.1)
RBC # BLD AUTO: 3.18 M/UL (ref 3.8–5.2)
SODIUM SERPL-SCNC: 143 MMOL/L (ref 136–145)
SPECIMEN SOURCE: NORMAL
WBC # BLD AUTO: 9 K/UL (ref 3.6–11)

## 2021-05-06 PROCEDURE — 74011000250 HC RX REV CODE- 250: Performed by: HOSPITALIST

## 2021-05-06 PROCEDURE — 74011250637 HC RX REV CODE- 250/637: Performed by: HOSPITALIST

## 2021-05-06 PROCEDURE — 74011250637 HC RX REV CODE- 250/637: Performed by: INTERNAL MEDICINE

## 2021-05-06 PROCEDURE — 94760 N-INVAS EAR/PLS OXIMETRY 1: CPT

## 2021-05-06 PROCEDURE — 74011636637 HC RX REV CODE- 636/637: Performed by: HOSPITALIST

## 2021-05-06 PROCEDURE — 65270000029 HC RM PRIVATE

## 2021-05-06 PROCEDURE — 74011250636 HC RX REV CODE- 250/636: Performed by: HOSPITALIST

## 2021-05-06 PROCEDURE — 82962 GLUCOSE BLOOD TEST: CPT

## 2021-05-06 PROCEDURE — 80048 BASIC METABOLIC PNL TOTAL CA: CPT

## 2021-05-06 PROCEDURE — 85027 COMPLETE CBC AUTOMATED: CPT

## 2021-05-06 PROCEDURE — 87635 SARS-COV-2 COVID-19 AMP PRB: CPT

## 2021-05-06 PROCEDURE — 36415 COLL VENOUS BLD VENIPUNCTURE: CPT

## 2021-05-06 RX ORDER — ACETAMINOPHEN 325 MG/1
650 TABLET ORAL
Qty: 60 TAB | Refills: 0 | Status: ON HOLD
Start: 2021-05-06 | End: 2022-07-25

## 2021-05-06 RX ORDER — HYDRALAZINE HYDROCHLORIDE 100 MG/1
100 TABLET, FILM COATED ORAL 3 TIMES DAILY
Qty: 90 TAB | Refills: 0 | Status: ON HOLD
Start: 2021-05-06 | End: 2022-07-25

## 2021-05-06 RX ORDER — CARVEDILOL 25 MG/1
25 TABLET ORAL 2 TIMES DAILY WITH MEALS
Qty: 60 TAB | Refills: 0 | Status: ON HOLD
Start: 2021-05-06 | End: 2022-07-20

## 2021-05-06 RX ORDER — INSULIN LISPRO 100 [IU]/ML
INJECTION, SOLUTION INTRAVENOUS; SUBCUTANEOUS
Qty: 1 VIAL | Refills: 0 | Status: SHIPPED
Start: 2021-05-06

## 2021-05-06 RX ORDER — CLONIDINE HYDROCHLORIDE 0.1 MG/1
0.1 TABLET ORAL
Status: DISCONTINUED | OUTPATIENT
Start: 2021-05-06 | End: 2021-05-10 | Stop reason: HOSPADM

## 2021-05-06 RX ORDER — NITROGLYCERIN 40 MG/1
1 PATCH TRANSDERMAL DAILY
Qty: 3 PATCH | Refills: 0 | Status: ON HOLD
Start: 2021-05-06 | End: 2022-07-25

## 2021-05-06 RX ADMIN — HYDRALAZINE HYDROCHLORIDE 100 MG: 50 TABLET, FILM COATED ORAL at 16:58

## 2021-05-06 RX ADMIN — INSULIN LISPRO 2 UNITS: 100 INJECTION, SOLUTION INTRAVENOUS; SUBCUTANEOUS at 16:58

## 2021-05-06 RX ADMIN — Medication 2000 UNITS: at 08:57

## 2021-05-06 RX ADMIN — FERROUS SULFATE TAB 325 MG (65 MG ELEMENTAL FE) 325 MG: 325 (65 FE) TAB at 08:56

## 2021-05-06 RX ADMIN — INSULIN LISPRO 2 UNITS: 100 INJECTION, SOLUTION INTRAVENOUS; SUBCUTANEOUS at 12:19

## 2021-05-06 RX ADMIN — ASPIRIN 81 MG CHEWABLE TABLET 81 MG: 81 TABLET CHEWABLE at 08:57

## 2021-05-06 RX ADMIN — INSULIN LISPRO 2 UNITS: 100 INJECTION, SOLUTION INTRAVENOUS; SUBCUTANEOUS at 07:52

## 2021-05-06 RX ADMIN — CARVEDILOL 25 MG: 12.5 TABLET, FILM COATED ORAL at 16:58

## 2021-05-06 RX ADMIN — HYDRALAZINE HYDROCHLORIDE 100 MG: 50 TABLET, FILM COATED ORAL at 22:04

## 2021-05-06 RX ADMIN — ATORVASTATIN CALCIUM 40 MG: 40 TABLET, FILM COATED ORAL at 22:04

## 2021-05-06 RX ADMIN — HYDRALAZINE HYDROCHLORIDE 100 MG: 50 TABLET, FILM COATED ORAL at 08:57

## 2021-05-06 RX ADMIN — CLONIDINE HYDROCHLORIDE 0.1 MG: 0.1 TABLET ORAL at 22:04

## 2021-05-06 RX ADMIN — LISINOPRIL 40 MG: 40 TABLET ORAL at 08:57

## 2021-05-06 RX ADMIN — CARVEDILOL 25 MG: 12.5 TABLET, FILM COATED ORAL at 07:52

## 2021-05-06 RX ADMIN — DONEPEZIL HYDROCHLORIDE 5 MG: 5 TABLET, FILM COATED ORAL at 22:00

## 2021-05-06 RX ADMIN — HEPARIN SODIUM 5000 UNITS: 5000 INJECTION INTRAVENOUS; SUBCUTANEOUS at 11:33

## 2021-05-06 RX ADMIN — TRAMADOL HYDROCHLORIDE 50 MG: 50 TABLET ORAL at 22:08

## 2021-05-06 RX ADMIN — AMLODIPINE BESYLATE 10 MG: 5 TABLET ORAL at 08:57

## 2021-05-06 RX ADMIN — ALLOPURINOL 100 MG: 100 TABLET ORAL at 08:57

## 2021-05-06 RX ADMIN — HEPARIN SODIUM 5000 UNITS: 5000 INJECTION INTRAVENOUS; SUBCUTANEOUS at 22:04

## 2021-05-06 RX ADMIN — CYANOCOBALAMIN TAB 1000 MCG 1000 MCG: 1000 TAB at 08:57
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
## 2021-05-06 NOTE — PROGRESS NOTES
CM spoke with Daughter Reema Augirre this afternoon. Additional choice for [facility] has been given. CM sent referral.  Rapid COVID test is negative. CM called [facility] [phone] and had to leave message for their admissions team.  CM awaiting response on bed availability. Patient now has been declined at [facility]. CM called [facility] [phone], and left message detailed message for admissions coordinator, requesting return telephone call back. CM team will continue to follow patient. Still pending placement.
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
## 2021-05-06 NOTE — PROGRESS NOTES
Problem: Falls - Risk of  Goal: *Absence of Falls  Description: Document Basim Ricodavion Fall Risk and appropriate interventions in the flowsheet.   Outcome: Progressing Towards Goal  Note: Fall Risk Interventions:  Mobility Interventions: Bed/chair exit alarm    Mentation Interventions: Adequate sleep, hydration, pain control, Door open when patient unattended, Bed/chair exit alarm    Medication Interventions: Bed/chair exit alarm, Patient to call before getting OOB    Elimination Interventions: Bed/chair exit alarm, Patient to call for help with toileting needs    History of Falls Interventions: Bed/chair exit alarm, Door open when patient unattended

## 2021-05-06 NOTE — DISCHARGE SUMMARY
Hospitalist Discharge Summary     Patient ID:  Hermes Cyr  [id number]  3 y.o.  1945 4/30/2021    PCP on record: Nestor Suero MD    Admit date: 4/30/2021  Discharge date and time: 5/6/2021    DISCHARGE DIAGNOSIS:    Hypertensive urgency/hyperglycemia type 2 diabetes/chronic kidney disease stage IV/gout/adjustment disorder with mixed emotions/combative/cognitive impairment    CONSULTATIONS:  IP CONSULT TO PSYCHIATRY    Excerpted HPI from H&P of Lena Roque MD:  70 y.o. female with history of multiple CVA, diabetes mellitus, hypertension, heart failure presents to the emergency room complaining of not feeling good and weak. States that she was keep falling, having trouble with ambulation. Denies any chest pain, palpitations. But as mentioned she was just covered herself and not happy to give any information. Did not notice any fever or chills. But on evaluation found that she has significantly elevated blood pressure and due to the risk factors admitted for further management. She was comfortably laying in the bed, did not notice any trouble breathing or shortness of breath. Was given hydralazine and clonidine in the emergency room with not much improvement.    ______________________________________________________________________  DISCHARGE SUMMARY/HOSPITAL COURSE:  for full details see H&P, daily progress notes, labs, consult notes.      Patient was subsequently admitted to [facility] for further evaluation as well as management, of note patient had hypertensive urgency, patient's antihypertensive medications were readjusted, Norvasc was maxed, lisinopril was continued, patient's hydralazine was uptitrated following which hypertension was under significantly better control, of note patient's fingersticks remained stable on sliding scale, patient serum creatinine remained stable, patient was evaluated by psychiatry, following which patient was deemed stable for discharge to skilled nursing facility with close outpatient follow-up with primary care physician as well as psychiatry, the plan was explained to the patient in detail who is agreeable to current plan.        _______________________________________________________________________  Patient seen and examined by me on discharge day. Pertinent Findings:  Gen:    Not in distress  Chest: Clear lungs  CVS:   Regular rhythm, s1/s2 no m/r/g  No edema  Abd:  Soft, not distended, not tender  Neuro:  Alert, Oriented x 4  _______________________________________________________________________  DISCHARGE MEDICATIONS:   Current Discharge Medication List      START taking these medications    Details   acetaminophen (TYLENOL) 325 mg tablet Take 2 Tabs by mouth every six (6) hours as needed for Pain or Fever. Qty: 60 Tab, Refills: 0      carvediloL (COREG) 25 mg tablet Take 1 Tab by mouth two (2) times daily (with meals). Qty: 60 Tab, Refills: 0      nitroglycerin (NITRODUR) 0.2 mg/hr 1 Patch by TransDERmal route daily. Qty: 3 Patch, Refills: 0      insulin lispro (HUMALOG) 100 unit/mL injection INITIATE CORRECTIVE INSULIN PROTOCOL (EDWARD):  RX EDWARD Normal Sensitivity (Average weight)    AC (before meals), Q6H, and Q4H CORRECTIONAL SCALE only For Blood Sugar (mg/dl) of :             140-199=2 units            200-249=3 units  250-299=5 units  300-349=7 units  350 or greater = Call MD  Give in addition to basal medications. Do Not Hold for NPO    BEDTIME CORRECTIONAL sliding scale when scheduled:  200-249=2 units  250-299=3 units   300-349=4 units  350 or greater = Call MD  Give in addition to basal medications. Do Not Hold for NPO Fast Acting - Administer Immediately - or within 15 minutes of start of meal, if mealtime coverage. Qty: 1 Vial, Refills: 0         CONTINUE these medications which have CHANGED    Details   hydrALAZINE (APRESOLINE) 100 mg tablet Take 1 Tab by mouth three (3) times daily.   Qty: 90 Tab, Refills: 0 CONTINUE these medications which have NOT CHANGED    Details   lidocaine (LIDODERM) 5 % 1 Patch by TransDERmal route daily. albuterol (PROVENTIL HFA, VENTOLIN HFA, PROAIR HFA) 90 mcg/actuation inhaler Take 2 Puffs by inhalation every four (4) hours as needed for Shortness of Breath. amLODIPine (NORVASC) 10 mg tablet Take 10 mg by mouth daily. cholecalciferol, vitamin D3, 50 mcg (2,000 unit) tab Take 2,000 Units by mouth daily. FeroSuL 325 mg (65 mg iron) tablet Take 325 mg by mouth daily. donepeziL (ARICEPT) 5 mg tablet Take 5 mg by mouth daily. lisinopriL (PRINIVIL, ZESTRIL) 40 mg tablet Take 40 mg by mouth daily. aspirin 81 mg chewable tablet Take 81 mg by mouth daily. doxazosin (CARDURA) 2 mg tablet Take 2 mg by mouth nightly. cyanocobalamin, vitamin B-12, 3,000 mcg cap cyanocobalamin (vitamin B-12) 3,000 mcg capsule      allopurinoL (ZYLOPRIM) 100 mg tablet allopurinol 100 mg tablet      atorvastatin (LIPITOR) 40 mg tablet atorvastatin 40 mg tablet      fluticasone propion-salmeterol (ADVAIR HFA) 115-21 mcg/actuation inhaler Take 2 Puffs by inhalation two (2) times a day. sodium bicarbonate 650 mg tablet Take 650 mg by mouth two (2) times a day.          STOP taking these medications       loratadine (CLARITIN) 10 mg tablet Comments:   Reason for Stopping:         metoprolol tartrate (LOPRESSOR) 50 mg tablet Comments:   Reason for Stopping:         insulin detemir U-100 (Levemir FlexTouch U-100 Insuln) 100 unit/mL (3 mL) inpn Comments:   Reason for Stopping:         fluticasone propionate (FLONASE) 50 mcg/actuation nasal spray Comments:   Reason for Stopping:         ferrous sulfate (IRON) 325 mg (65 mg iron) EC tablet Comments:   Reason for Stopping:         insulin aspart U-100 (NovoLOG Flexpen U-100 Insulin) 100 unit/mL (3 mL) inpn Comments:   Reason for Stopping:         metoprolol tartrate (LOPRESSOR) 25 mg tablet Comments:   Reason for Stopping: furosemide (LASIX) 40 mg tablet Comments:   Reason for Stopping:         nitroglycerin (NITROSTAT) 0.4 mg SL tablet Comments:   Reason for Stopping:         FLUTICASONE PROPIONATE (FLOVENT DISKUS IN) Comments:   Reason for Stopping:                 Patient Follow Up Instructions: Activity: PT/OT Eval and Treat  Diet: Diabetic Diet  Wound Care: As directed    Follow-up with PCP/Psychiatry in 2 weeks. Follow-up tests/labs As per above physicians  Follow-up Information     Follow up With Specialties Details Why Nellie Ortega MD Internal Medicine In 1 week  [address]      Rosemary Choudhary MD Psychiatry In 1 week  [address] [phone]          ________________________________________________________________    Risk of deterioration: Low    Condition at Discharge:  Stable  __________________________________________________________________    Disposition  SNF/LTC    ____________________________________________________________________    Code Status: Full Code  ___________________________________________________________________      Total time in minutes spent coordinating this discharge (includes going over instructions, follow-up, prescriptions, and preparing report for sign off to her PCP) :  50 minutes    Signed:   Jaylen Venegas MD

## 2021-05-06 NOTE — PROGRESS NOTES
Problem: Falls - Risk of  Goal: *Absence of Falls  Description: Document Benita Boone Fall Risk and appropriate interventions in the flowsheet. Outcome: Progressing Towards Goal  Note: Fall Risk Interventions:  Mobility Interventions: Bed/chair exit alarm, OT consult for ADLs, PT Consult for mobility concerns    Mentation Interventions: Adequate sleep, hydration, pain control, Bed/chair exit alarm, Door open when patient unattended    Medication Interventions: Bed/chair exit alarm    Elimination Interventions: Bed/chair exit alarm, Call light in reach    History of Falls Interventions: Bed/chair exit alarm, Door open when patient unattended         Problem: Patient Education: Go to Patient Education Activity  Goal: Patient/Family Education  Outcome: Progressing Towards Goal     Problem: Pressure Injury - Risk of  Goal: *Prevention of pressure injury  Description: Document Price Scale and appropriate interventions in the flowsheet. Outcome: Progressing Towards Goal  Note: Pressure Injury Interventions:  Sensory Interventions: Assess changes in LOC, Assess need for specialty bed, Keep linens dry and wrinkle-free, Maintain/enhance activity level, Minimize linen layers    Moisture Interventions: Absorbent underpads, Apply protective barrier, creams and emollients, Minimize layers, Maintain skin hydration (lotion/cream)    Activity Interventions: Assess need for specialty bed, Increase time out of bed, PT/OT evaluation    Mobility Interventions: Assess need for specialty bed, HOB 30 degrees or less, PT/OT evaluation, Turn and reposition approx.  every two hours(pillow and wedges)    Nutrition Interventions: Document food/fluid/supplement intake, Offer support with meals,snacks and hydration    Friction and Shear Interventions: Apply protective barrier, creams and emollients, HOB 30 degrees or less, Minimize layers                Problem: Patient Education: Go to Patient Education Activity  Goal: Patient/Family Education  Outcome: Progressing Towards Goal     Problem: Patient Education: Go to Patient Education Activity  Goal: Patient/Family Education  Outcome: Progressing Towards Goal     Problem: Patient Education: Go to Patient Education Activity  Goal: Patient/Family Education  Outcome: Progressing Towards Goal

## 2021-05-06 NOTE — PROGRESS NOTES
Upon entering room after patient's call bell rang, found the patient with covers off and pure wick removed. Covers and linins were saturated. Patient states that she needs her bag so she cam get dressed and put on her depends so that she may be ready for the  and ambulance when they arrive in a few minutes.  After cleaning and changing soiled linins, I placed a diaper on the patient upon request.

## 2021-05-06 NOTE — PROGRESS NOTES
Patient has been denied at all in patient rehab facilities that referral has been sent to: [facility] in patient rehab, [facility] In patient hospital, and [facility] in patient rehab. Patient has also been declined at [facility] SN[facility]. Referrals are pending for [facility], [address] [place] [address] and [facility] SN[facility]. CM called [facility] admissions liaison, they will review patients information and get back with . CM has called multiple times to [facility] unit and left voicemails requesting return telephone calls. Rapid covid test ordered for placement.

## 2021-05-06 NOTE — PROGRESS NOTES
Patient's daughter asked the nurse to leave the information about another skilled nursing for case management to review in the morning. Chelita Levin of [facility], contact information: Ginny Burns [phone].

## 2021-05-06 NOTE — PROGRESS NOTES
Hospitalist Progress Note               Daily Progress Note: 5/3/2021    70 y.o. female with history of multiple CVA, diabetes mellitus, hypertension, heart failure presents to the emergency room complaining of not feeling good and weak. Admit s/t hypertensive malignancy, progressive decline/gait instability.     Subjective:   Patient seen and evaluated at bedside, of note patient currently was concerned about her blood pressure, patient states she wants to go home, discussed with RN and     Problem List:  Problem List as of 5/6/2021 Date Reviewed: 10/15/2019          Codes Class Noted - Resolved    CKD (chronic kidney disease) ICD-10-CM: N18.9  ICD-9-CM: 585.9  4/30/2021 - Present        Accelerated hypertension ICD-10-CM: I10  ICD-9-CM: 401.0  4/30/2021 - Present        CVA (cerebral vascular accident) [facility]) ICD-10-CM: I63.9  ICD-9-CM: 434.91  2/13/2021 - Present              Medications reviewed  Current Facility-Administered Medications   Medication Dose Route Frequency    carvediloL (COREG) tablet 25 mg  25 mg Oral BID WITH MEALS    hydrALAZINE (APRESOLINE) tablet 100 mg  100 mg Oral TID    traMADoL (ULTRAM) tablet 50 mg  50 mg Oral Q6H PRN    cyanocobalamin tablet 1,000 mcg  1,000 mcg Oral DAILY    allopurinoL (ZYLOPRIM) tablet 100 mg  100 mg Oral DAILY    atorvastatin (LIPITOR) tablet 40 mg  40 mg Oral QHS    aspirin chewable tablet 81 mg  81 mg Oral DAILY    lidocaine 4 % patch 1 Patch  1 Patch TransDERmal DAILY    albuterol (PROVENTIL HFA, VENTOLIN HFA, PROAIR HFA) inhaler 2 Puff  2 Puff Inhalation Q4H PRN    amLODIPine (NORVASC) tablet 10 mg  10 mg Oral DAILY    cholecalciferol (VITAMIN D3) (1000 Units /25 mcg) tablet 2,000 Units  2,000 Units Oral DAILY    ferrous sulfate tablet 325 mg  325 mg Oral DAILY    lisinopriL (PRINIVIL, ZESTRIL) tablet 40 mg  40 mg Oral DAILY    sodium chloride (NS) flush 5-40 mL  5-40 mL IntraVENous PRN    acetaminophen (TYLENOL) tablet 650 mg  650 mg Oral Q6H PRN    Or    acetaminophen (TYLENOL) suppository 650 mg  650 mg Rectal Q6H PRN    ondansetron (ZOFRAN ODT) tablet 4 mg  4 mg Oral Q6H PRN    Or    ondansetron (ZOFRAN) injection 4 mg  4 mg IntraVENous Q6H PRN    heparin (porcine) injection 5,000 Units  5,000 Units SubCUTAneous Q12H    insulin lispro (HUMALOG) injection   SubCUTAneous AC&HS    glucose chewable tablet 16 g  4 Tab Oral PRN    dextrose (D50W) injection syrg 12.5-25 g  25-50 mL IntraVENous PRN    glucagon (GLUCAGEN) injection 1 mg  1 mg IntraMUSCular PRN    nitroglycerin (NITRODUR) 0.2 mg/hr patch 1 Patch  1 Patch TransDERmal DAILY    donepeziL (ARICEPT) tablet 5 mg  5 mg Oral QHS       Review of Systems   Constitutional: Positive for malaise/fatigue. Negative for chills and fever. HENT: Negative. 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 Negative for cough, hemoptysis, sputum production and wheezing. Cardiovascular: Positive for leg swelling. Negative for chest pain and palpitations. Gastrointestinal: Negative for blood in stool, constipation, diarrhea, melena and nausea. Genitourinary: Negative. Musculoskeletal: Positive for falls and joint pain. Skin: Negative. Neurological: Positive for weakness. Negative for sensory change, speech change, seizures and loss of consciousness. Endo/Heme/Allergies: Negative. Psychiatric/Behavioral: The patient is nervous/anxious. Objective:   Physical Exam:     Visit Vitals  BP (!) 165/81 (BP 1 Location: Right upper arm, BP Patient Position: At rest)   Pulse 84   Temp 98.4 °F (36.9 °C)   Resp 18   Ht 5' 2\" (1.575 m)   Wt 89 kg (196 lb 3.4 oz)   LMP  (LMP Unknown)   SpO2 97%   Breastfeeding No   BMI 35.89 kg/m²      O2 Device: None (Room air)      General : alert, looks nad less anxious and chronically ill  HEENT : nc/at,eomi  Neck : Supple, no JVD, no masses noted, no carotid bruit.   Lungs :fair ae, clear and not labored. CVS : RRR, S1+, S2+, no murmur or gallop. Abdomen : Soft, mild tenderness to deep palp, bs +  Extremities:  1+edema noted,  pedal pulses palpable. Tender feet  Musculoskeletal :no joint swelling or effusion, muscle tone and power appears fair. . Pain rom bilat ankles and tender to superficial tosh, no erythema, trace edema  Skin : Moist, warm. no pathological rash. Lymphatic : No cervical lymphadenopathy. Neurological : Awake, alert, oriented x 4,nfd. Psychiatric : anxious not agitated  Data Review:       Recent Days:  Recent Labs     05/06/21  0658 05/05/21  0913 05/04/21  0720   WBC 9.0 7.5 7.0   HGB 8.6* 8.5* 8.7*   HCT 28.4* 27.8* 27.5*    231 266     Recent Labs     05/06/21 0658 05/05/21  0913 05/04/21  0720    143 142   K 3.8 4.0 3.5   * 113* 111*   CO2 21 24 24   * 154* 104*   BUN 53* 51* 49*   CREA 3.51* 3.67* 3.65*   CA 8.6 8.9 9.0     No results for input(s): PH, PCO2, PO2, HCO3, FIO2 in the last 72 hours.     24 Hour Results:  Recent Results (from the past 24 hour(s))   GLUCOSE, POC    Collection Time: 05/05/21 11:24 AM   Result Value Ref Range    Glucose (POC) 158 (H) 65 - 100 mg/dL    Performed by Darrin Jenkins (PCT)    GLUCOSE, POC    Collection Time: 05/05/21  3:29 PM   Result Value Ref Range    Glucose (POC) 229 (H) 65 - 100 mg/dL    Performed by Darrin Jenkins (PCT)    GLUCOSE, POC    Collection Time: 05/05/21  8:21 PM   Result Value Ref Range    Glucose (POC) 133 (H) 65 - 100 mg/dL    Performed by Karey Casiano    CBC W/O DIFF    Collection Time: 05/06/21  6:58 AM   Result Value Ref Range    WBC 9.0 3.6 - 11.0 K/uL    RBC 3.18 (L) 3.80 - 5.20 M/uL    HGB 8.6 (L) 11.5 - 16.0 g/dL    HCT 28.4 (L) 35.0 - 47.0 %    MCV 89.3 80.0 - 99.0 FL    MCH 27.0 26.0 - 34.0 PG    MCHC 30.3 30.0 - 36.5 g/dL    RDW 14.6 (H) 11.5 - 14.5 %    PLATELET 532 500 - 117 K/uL    MPV 12.8 8.9 - 12.9 FL    NRBC 0.0 0.0  WBC    ABSOLUTE NRBC 0.00 0.00 - 0.67 K/uL   METABOLIC PANEL, BASIC    Collection Time: 05/06/21  6:58 AM   Result Value Ref Range    Sodium 143 136 - 145 mmol/L    Potassium 3.8 3.5 - 5.1 mmol/L    Chloride 114 (H) 97 - 108 mmol/L    CO2 21 21 - 32 mmol/L    Anion gap 8 5 - 15 mmol/L    Glucose 135 (H) 65 - 100 mg/dL    BUN 53 (H) 6 - 20 mg/dL    Creatinine 3.51 (H) 0.55 - 1.02 mg/dL    BUN/Creatinine ratio 15 12 - 20      GFR est AA 15 (L) >60 ml/min/1.73m2    GFR est non-AA 13 (L) >60 ml/min/1.73m2    Calcium 8.6 8.5 - 10.1 mg/dL   GLUCOSE, POC    Collection Time: 05/06/21  7:14 AM   Result Value Ref Range    Glucose (POC) 156 (H) 65 - 100 mg/dL    Performed by Fermin Yeung (PCT)            Assessment/     Patient Active Problem List   Diagnosis Code    CVA (cerebral vascular accident) ([facility] [address] I63.9    CKD (chronic kidney disease) N18.9    Accelerated hypertension I10           Hypertensive urgency-currently significantly better controlled however patient still hypertensive currently maxed on antihypertensive medications.   Continue hydralazine 100 mg 3 times daily  Continue lisinopril 40 mg once daily  Continue Norvasc 10 mg once daily  Continue Coreg to 25 mg twice daily  Continue to monitor    Hyperglycemia type 2 diabetes-currently under much better control  Continue insulin sliding scale  Continue to monitor    Chronic kidney disease stage IV-patient has a history of stage IV chronic kidney disease with serum creatinine currently at baseline  Continue to trend serum creatinine    Gout-continue home medications    Prophylaxis-Heparin subcu  FEN-diabetic diet, replete potassium and magnesium  Full code, patient surrogate decision-maker is her daughter  Disposition-to skilled nursing facility, patient is currently stable for discharge once accepting facility is available with bed and insurance authorization, discussed with case management in detail    Kristen Richards Other Relative [address] discussed with: Patient/Family and Nurse/Case Management    Total time spent with patient: 35 minutes. This dictation was done by dragon, computer voice recognition software. Often unanticipated grammatical, syntax, phones and other interpretive errors are inadvertently transcribed. Please excuse errors that have escaped final proofreading.     Jaclyn Edward MD

## 2021-05-07 LAB
GLUCOSE BLD STRIP.AUTO-MCNC: 150 MG/DL (ref 65–100)
GLUCOSE BLD STRIP.AUTO-MCNC: 178 MG/DL (ref 65–100)
GLUCOSE BLD STRIP.AUTO-MCNC: 181 MG/DL (ref 65–100)
GLUCOSE BLD STRIP.AUTO-MCNC: 187 MG/DL (ref 65–100)
PERFORMED BY, TECHID: ABNORMAL

## 2021-05-07 PROCEDURE — 74011250636 HC RX REV CODE- 250/636: Performed by: HOSPITALIST

## 2021-05-07 PROCEDURE — 82962 GLUCOSE BLOOD TEST: CPT

## 2021-05-07 PROCEDURE — 74011250637 HC RX REV CODE- 250/637: Performed by: INTERNAL MEDICINE

## 2021-05-07 PROCEDURE — 65270000029 HC RM PRIVATE

## 2021-05-07 PROCEDURE — 74011636637 HC RX REV CODE- 636/637: Performed by: HOSPITALIST

## 2021-05-07 PROCEDURE — 74011250637 HC RX REV CODE- 250/637: Performed by: HOSPITALIST

## 2021-05-07 PROCEDURE — 74011000250 HC RX REV CODE- 250: Performed by: HOSPITALIST

## 2021-05-07 RX ADMIN — ATORVASTATIN CALCIUM 40 MG: 40 TABLET, FILM COATED ORAL at 21:37

## 2021-05-07 RX ADMIN — INSULIN LISPRO 2 UNITS: 100 INJECTION, SOLUTION INTRAVENOUS; SUBCUTANEOUS at 17:11

## 2021-05-07 RX ADMIN — ALLOPURINOL 100 MG: 100 TABLET ORAL at 09:13

## 2021-05-07 RX ADMIN — HYDRALAZINE HYDROCHLORIDE 100 MG: 50 TABLET, FILM COATED ORAL at 17:11

## 2021-05-07 RX ADMIN — ASPIRIN 81 MG CHEWABLE TABLET 81 MG: 81 TABLET CHEWABLE at 09:11

## 2021-05-07 RX ADMIN — FERROUS SULFATE TAB 325 MG (65 MG ELEMENTAL FE) 325 MG: 325 (65 FE) TAB at 09:11

## 2021-05-07 RX ADMIN — HEPARIN SODIUM 5000 UNITS: 5000 INJECTION INTRAVENOUS; SUBCUTANEOUS at 21:38

## 2021-05-07 RX ADMIN — HEPARIN SODIUM 5000 UNITS: 5000 INJECTION INTRAVENOUS; SUBCUTANEOUS at 11:32

## 2021-05-07 RX ADMIN — LISINOPRIL 40 MG: 40 TABLET ORAL at 09:12

## 2021-05-07 RX ADMIN — DONEPEZIL HYDROCHLORIDE 5 MG: 5 TABLET, FILM COATED ORAL at 21:37

## 2021-05-07 RX ADMIN — HYDRALAZINE HYDROCHLORIDE 100 MG: 50 TABLET, FILM COATED ORAL at 21:37

## 2021-05-07 RX ADMIN — HYDRALAZINE HYDROCHLORIDE 100 MG: 50 TABLET, FILM COATED ORAL at 09:11

## 2021-05-07 RX ADMIN — CYANOCOBALAMIN TAB 1000 MCG 1000 MCG: 1000 TAB at 09:12

## 2021-05-07 RX ADMIN — CARVEDILOL 25 MG: 12.5 TABLET, FILM COATED ORAL at 17:11

## 2021-05-07 RX ADMIN — INSULIN LISPRO 2 UNITS: 100 INJECTION, SOLUTION INTRAVENOUS; SUBCUTANEOUS at 08:37

## 2021-05-07 RX ADMIN — INSULIN LISPRO 2 UNITS: 100 INJECTION, SOLUTION INTRAVENOUS; SUBCUTANEOUS at 11:32

## 2021-05-07 RX ADMIN — Medication 2000 UNITS: at 09:11

## 2021-05-07 RX ADMIN — CARVEDILOL 25 MG: 12.5 TABLET, FILM COATED ORAL at 08:37

## 2021-05-07 RX ADMIN — AMLODIPINE BESYLATE 10 MG: 5 TABLET ORAL at 09:12

## 2021-05-07 NOTE — PROGRESS NOTES
Problem: Falls - Risk of  Goal: *Absence of Falls  Description: Document Duarte Fall Risk and appropriate interventions in the flowsheet. Outcome: Progressing Towards Goal  Note: Fall Risk Interventions:  Mobility Interventions: Bed/chair exit alarm, OT consult for ADLs, PT Consult for mobility concerns    Mentation Interventions: Adequate sleep, hydration, pain control, Bed/chair exit alarm, Door open when patient unattended    Medication Interventions: Bed/chair exit alarm    Elimination Interventions: Bed/chair exit alarm, Call light in reach    History of Falls Interventions: Bed/chair exit alarm, Door open when patient unattended         Problem: Pressure Injury - Risk of  Goal: *Prevention of pressure injury  Description: Document Price Scale and appropriate interventions in the flowsheet. Outcome: Progressing Towards Goal  Note: Pressure Injury Interventions:  Sensory Interventions: Assess changes in LOC, Assess need for specialty bed, Keep linens dry and wrinkle-free, Maintain/enhance activity level, Minimize linen layers    Moisture Interventions: Absorbent underpads, Apply protective barrier, creams and emollients, Minimize layers, Maintain skin hydration (lotion/cream)    Activity Interventions: Assess need for specialty bed, Increase time out of bed, PT/OT evaluation    Mobility Interventions: Assess need for specialty bed, HOB 30 degrees or less, PT/OT evaluation, Turn and reposition approx.  every two hours(pillow and wedges)    Nutrition Interventions: Document food/fluid/supplement intake, Offer support with meals,snacks and hydration    Friction and Shear Interventions: Apply protective barrier, creams and emollients, HOB 30 degrees or less, Minimize layers

## 2021-05-07 NOTE — PROGRESS NOTES
Problem: Falls - Risk of  Goal: *Absence of Falls  Description: Document Tim Shove Fall Risk and appropriate interventions in the flowsheet. Outcome: Progressing Towards Goal  Note: Fall Risk Interventions:  Mobility Interventions: Bed/chair exit alarm, OT consult for ADLs, PT Consult for mobility concerns, Assess mobility with egress test    Mentation Interventions: Adequate sleep, hydration, pain control, Bed/chair exit alarm, Door open when patient unattended    Medication Interventions: Bed/chair exit alarm, Patient to call before getting OOB    Elimination Interventions: Bed/chair exit alarm, Call light in reach    History of Falls Interventions: Bed/chair exit alarm         Problem: Patient Education: Go to Patient Education Activity  Goal: Patient/Family Education  Outcome: Progressing Towards Goal     Problem: Pressure Injury - Risk of  Goal: *Prevention of pressure injury  Description: Document Price Scale and appropriate interventions in the flowsheet. Outcome: Progressing Towards Goal  Note: Pressure Injury Interventions:  Sensory Interventions: Assess changes in LOC, Assess need for specialty bed, Keep linens dry and wrinkle-free, Maintain/enhance activity level, Minimize linen layers    Moisture Interventions: Absorbent underpads, Apply protective barrier, creams and emollients, Minimize layers, Maintain skin hydration (lotion/cream)    Activity Interventions: Assess need for specialty bed, Increase time out of bed, PT/OT evaluation    Mobility Interventions: Assess need for specialty bed, HOB 30 degrees or less, PT/OT evaluation, Turn and reposition approx.  every two hours(pillow and wedges)    Nutrition Interventions: Document food/fluid/supplement intake, Offer support with meals,snacks and hydration    Friction and Shear Interventions: Apply protective barrier, creams and emollients, HOB 30 degrees or less, Minimize layers                Problem: Patient Education: Go to Patient Education Activity  Goal: Patient/Family Education  Outcome: Progressing Towards Goal     Problem: Patient Education: Go to Patient Education Activity  Goal: Patient/Family Education  Outcome: Progressing Towards Goal     Problem: Patient Education: Go to Patient Education Activity  Goal: Patient/Family Education  Outcome: Progressing Towards Goal

## 2021-05-07 NOTE — DISCHARGE SUMMARY
Hospitalist Discharge Summary     Patient ID:  Camilo Bergeron  [id number]  47 y.o.  1945 4/30/2021    PCP on record: Jud Senior MD    Admit date: 4/30/2021  Discharge date and time: 5/7/2021    DISCHARGE DIAGNOSIS:    Hypertensive urgency/hyperglycemia type 2 diabetes/chronic kidney disease stage IV/gout/adjustment disorder with mixed emotions/combative/cognitive impairment    CONSULTATIONS:  IP CONSULT TO PSYCHIATRY    Excerpted HPI from H&P of Welton Hamman, MD:  70 y.o. female with history of multiple CVA, diabetes mellitus, hypertension, heart failure presents to the emergency room complaining of not feeling good and weak. States that she was keep falling, having trouble with ambulation. Denies any chest pain, palpitations. But as mentioned she was just covered herself and not happy to give any information. Did not notice any fever or chills. But on evaluation found that she has significantly elevated blood pressure and due to the risk factors admitted for further management. She was comfortably laying in the bed, did not notice any trouble breathing or shortness of breath. Was given hydralazine and clonidine in the emergency room with not much improvement.    ______________________________________________________________________  DISCHARGE SUMMARY/HOSPITAL COURSE:  for full details see H&P, daily progress notes, labs, consult notes.      Patient was subsequently admitted to [facility] for further evaluation as well as management, of note patient had hypertensive urgency, patient's antihypertensive medications were readjusted, Norvasc was maxed, lisinopril was continued, patient's hydralazine was uptitrated following which hypertension was under significantly better control, of note patient's fingersticks remained stable on sliding scale, patient serum creatinine remained stable, patient was evaluated by psychiatry, following which patient was deemed stable for discharge to skilled nursing facility with close outpatient follow-up with primary care physician as well as psychiatry, the plan was explained to the patient in detail who is agreeable to current plan.        _______________________________________________________________________  Patient seen and examined by me on discharge day. Pertinent Findings:  Gen:    Not in distress  Chest: Clear lungs  CVS:   Regular rhythm, s1/s2 no m/r/g  No edema  Abd:  Soft, not distended, not tender  Neuro:  Alert, Oriented x 4  _______________________________________________________________________  DISCHARGE MEDICATIONS:   Current Discharge Medication List      START taking these medications    Details   acetaminophen (TYLENOL) 325 mg tablet Take 2 Tabs by mouth every six (6) hours as needed for Pain or Fever. Qty: 60 Tab, Refills: 0      carvediloL (COREG) 25 mg tablet Take 1 Tab by mouth two (2) times daily (with meals). Qty: 60 Tab, Refills: 0      nitroglycerin (NITRODUR) 0.2 mg/hr 1 Patch by TransDERmal route daily. Qty: 3 Patch, Refills: 0      insulin lispro (HUMALOG) 100 unit/mL injection INITIATE CORRECTIVE INSULIN PROTOCOL (EDWARD):  RX EDWARD Normal Sensitivity (Average weight)    AC (before meals), Q6H, and Q4H CORRECTIONAL SCALE only For Blood Sugar (mg/dl) of :             140-199=2 units            200-249=3 units  250-299=5 units  300-349=7 units  350 or greater = Call MD  Give in addition to basal medications. Do Not Hold for NPO    BEDTIME CORRECTIONAL sliding scale when scheduled:  200-249=2 units  250-299=3 units   300-349=4 units  350 or greater = Call MD  Give in addition to basal medications. Do Not Hold for NPO Fast Acting - Administer Immediately - or within 15 minutes of start of meal, if mealtime coverage. Qty: 1 Vial, Refills: 0         CONTINUE these medications which have CHANGED    Details   hydrALAZINE (APRESOLINE) 100 mg tablet Take 1 Tab by mouth three (3) times daily.   Qty: 90 Tab, Refills: 0 CONTINUE these medications which have NOT CHANGED    Details   lidocaine (LIDODERM) 5 % 1 Patch by TransDERmal route daily. albuterol (PROVENTIL HFA, VENTOLIN HFA, PROAIR HFA) 90 mcg/actuation inhaler Take 2 Puffs by inhalation every four (4) hours as needed for Shortness of Breath. amLODIPine (NORVASC) 10 mg tablet Take 10 mg by mouth daily. cholecalciferol, vitamin D3, 50 mcg (2,000 unit) tab Take 2,000 Units by mouth daily. FeroSuL 325 mg (65 mg iron) tablet Take 325 mg by mouth daily. donepeziL (ARICEPT) 5 mg tablet Take 5 mg by mouth daily. lisinopriL (PRINIVIL, ZESTRIL) 40 mg tablet Take 40 mg by mouth daily. aspirin 81 mg chewable tablet Take 81 mg by mouth daily. doxazosin (CARDURA) 2 mg tablet Take 2 mg by mouth nightly. cyanocobalamin, vitamin B-12, 3,000 mcg cap cyanocobalamin (vitamin B-12) 3,000 mcg capsule      allopurinoL (ZYLOPRIM) 100 mg tablet allopurinol 100 mg tablet      atorvastatin (LIPITOR) 40 mg tablet atorvastatin 40 mg tablet      fluticasone propion-salmeterol (ADVAIR HFA) 115-21 mcg/actuation inhaler Take 2 Puffs by inhalation two (2) times a day. sodium bicarbonate 650 mg tablet Take 650 mg by mouth two (2) times a day.          STOP taking these medications       loratadine (CLARITIN) 10 mg tablet Comments:   Reason for Stopping:         metoprolol tartrate (LOPRESSOR) 50 mg tablet Comments:   Reason for Stopping:         insulin detemir U-100 (Levemir FlexTouch U-100 Insuln) 100 unit/mL (3 mL) inpn Comments:   Reason for Stopping:         fluticasone propionate (FLONASE) 50 mcg/actuation nasal spray Comments:   Reason for Stopping:         ferrous sulfate (IRON) 325 mg (65 mg iron) EC tablet Comments:   Reason for Stopping:         insulin aspart U-100 (NovoLOG Flexpen U-100 Insulin) 100 unit/mL (3 mL) inpn Comments:   Reason for Stopping:         metoprolol tartrate (LOPRESSOR) 25 mg tablet Comments:   Reason for Stopping: furosemide (LASIX) 40 mg tablet Comments:   Reason for Stopping:         nitroglycerin (NITROSTAT) 0.4 mg SL tablet Comments:   Reason for Stopping:         FLUTICASONE PROPIONATE (FLOVENT DISKUS IN) Comments:   Reason for Stopping:                 Patient Follow Up Instructions: Activity: PT/OT Eval and Treat  Diet: Diabetic Diet  Wound Care: As directed    Follow-up with PCP/Psychiatry in 2 weeks. Follow-up tests/labs As per above physicians  Follow-up Information     Follow up With Specialties Details Why Nellie Ortega MD Internal Medicine In 1 week  [address]      Rosemary Choudhary MD Psychiatry In 1 week  [address]  [phone]          ________________________________________________________________    Risk of deterioration: Low    Condition at Discharge:  Stable  __________________________________________________________________    Disposition  SNF/LTC    ____________________________________________________________________    Code Status: Full Code  ___________________________________________________________________      Total time in minutes spent coordinating this discharge (includes going over instructions, follow-up, prescriptions, and preparing report for sign off to her PCP) :  50 minutes    Signed:   Jaylen Venegas MD

## 2021-05-07 NOTE — PROGRESS NOTES
CM received notification from [address] that they are having trouble verifying patients insurance. In our system it is showing that patient has traditional medicare, when they are trying to verify the insurance, it is showing that patient has a managed blue cross blue shield plan and they will have to follow up with their central intake to verify. CM notified physician. CM also notified patient and family member at bedside. Insurance authorization will more than likely be needed and realistically will not be obtained this weekend.

## 2021-05-07 NOTE — PROGRESS NOTES
Hospitalist Progress Note               Daily Progress Note: 5/3/2021    70 y.o. female with history of multiple CVA, diabetes mellitus, hypertension, heart failure presents to the emergency room complaining of not feeling good and weak. Admit s/t hypertensive malignancy, progressive decline/gait instability.     Subjective:   Patient seen and evaluated at bedside, of note patient currently was concerned about her blood pressure, patient states she wants to go home, discussed with RN and     Problem List:  Problem List as of 5/7/2021 Date Reviewed: 10/15/2019          Codes Class Noted - Resolved    CKD (chronic kidney disease) ICD-10-CM: N18.9  ICD-9-CM: 585.9  4/30/2021 - Present        Accelerated hypertension ICD-10-CM: I10  ICD-9-CM: 401.0  4/30/2021 - Present        CVA (cerebral vascular accident) [facility]) ICD-10-CM: I63.9  ICD-9-CM: 434.91  2/13/2021 - Present              Medications reviewed  Current Facility-Administered Medications   Medication Dose Route Frequency    cloNIDine HCL (CATAPRES) tablet 0.1 mg  0.1 mg Oral Q4H PRN    carvediloL (COREG) tablet 25 mg  25 mg Oral BID WITH MEALS    hydrALAZINE (APRESOLINE) tablet 100 mg  100 mg Oral TID    traMADoL (ULTRAM) tablet 50 mg  50 mg Oral Q6H PRN    cyanocobalamin tablet 1,000 mcg  1,000 mcg Oral DAILY    allopurinoL (ZYLOPRIM) tablet 100 mg  100 mg Oral DAILY    atorvastatin (LIPITOR) tablet 40 mg  40 mg Oral QHS    aspirin chewable tablet 81 mg  81 mg Oral DAILY    lidocaine 4 % patch 1 Patch  1 Patch TransDERmal DAILY    albuterol (PROVENTIL HFA, VENTOLIN HFA, PROAIR HFA) inhaler 2 Puff  2 Puff Inhalation Q4H PRN    amLODIPine (NORVASC) tablet 10 mg  10 mg Oral DAILY    cholecalciferol (VITAMIN D3) (1000 Units /25 mcg) tablet 2,000 Units  2,000 Units Oral DAILY    ferrous sulfate tablet 325 mg  325 mg Oral DAILY    lisinopriL (PRINIVIL, ZESTRIL) tablet 40 mg  40 mg Oral DAILY    sodium chloride (NS) flush 5-40 mL  5-40 mL IntraVENous PRN    acetaminophen (TYLENOL) tablet 650 mg  650 mg Oral Q6H PRN    Or    acetaminophen (TYLENOL) suppository 650 mg  650 mg Rectal Q6H PRN    ondansetron (ZOFRAN ODT) tablet 4 mg  4 mg Oral Q6H PRN    Or    ondansetron (ZOFRAN) injection 4 mg  4 mg IntraVENous Q6H PRN    heparin (porcine) injection 5,000 Units  5,000 Units SubCUTAneous Q12H    insulin lispro (HUMALOG) injection   SubCUTAneous AC&HS    glucose chewable tablet 16 g  4 Tab Oral PRN    dextrose (D50W) injection syrg 12.5-25 g  25-50 mL IntraVENous PRN    glucagon (GLUCAGEN) injection 1 mg  1 mg IntraMUSCular PRN    nitroglycerin (NITRODUR) 0.2 mg/hr patch 1 Patch  1 Patch TransDERmal DAILY    donepeziL (ARICEPT) tablet 5 mg  5 mg Oral QHS       Review of Systems   Constitutional: Positive for malaise/fatigue. Negative for chills and fever. HENT: Negative. 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 Negative for cough, hemoptysis, sputum production and wheezing. Cardiovascular: Positive for leg swelling. Negative for chest pain and palpitations. Gastrointestinal: Negative for blood in stool, constipation, diarrhea, melena and nausea. Genitourinary: Negative. Musculoskeletal: Positive for falls and joint pain. Skin: Negative. Neurological: Positive for weakness. Negative for sensory change, speech change, seizures and loss of consciousness. Endo/Heme/Allergies: Negative. Psychiatric/Behavioral: The patient is nervous/anxious.           Objective:   Physical Exam:     Visit Vitals  BP (!) 179/69 (BP 1 Location: Right upper arm, BP Patient Position: At rest)   Pulse 74   Temp 98.2 °F (36.8 °C)   Resp 18   Ht 5' 2\" (1.575 m)   Wt 89 kg (196 lb 3.4 oz)   LMP  (LMP Unknown)   SpO2 98%   Breastfeeding No   BMI 35.89 kg/m²      O2 Device: None (Room air)      General : alert, looks nad less anxious and chronically ill  HEENT : nc/at,eomi  Neck : Supple, no JVD, no masses noted, no carotid bruit. Lungs :fair ae, clear and not labored. CVS : RRR, S1+, S2+, no murmur or gallop. Abdomen : Soft, mild tenderness to deep palp, bs +  Extremities:  1+edema noted,  pedal pulses palpable. Tender feet  Musculoskeletal :no joint swelling or effusion, muscle tone and power appears fair. . Pain rom bilat ankles and tender to superficial tosh, no erythema, trace edema  Skin : Moist, warm. no pathological rash. Lymphatic : No cervical lymphadenopathy. Neurological : Awake, alert, oriented x 4,nfd. Psychiatric : anxious not agitated  Data Review:       Recent Days:  Recent Labs     05/06/21  0658 05/05/21  0913   WBC 9.0 7.5   HGB 8.6* 8.5*   HCT 28.4* 27.8*    231     Recent Labs     05/06/21  0658 05/05/21  0913    143   K 3.8 4.0   * 113*   CO2 21 24   * 154*   BUN 53* 51*   CREA 3.51* 3.67*   CA 8.6 8.9     No results for input(s): PH, PCO2, PO2, HCO3, FIO2 in the last 72 hours.     24 Hour Results:  Recent Results (from the past 24 hour(s))   COVID-19 RAPID TEST    Collection Time: 05/06/21 10:15 AM   Result Value Ref Range    Specimen source Nasopharyngeal      COVID-19 rapid test Not Detected Not Detected     GLUCOSE, POC    Collection Time: 05/06/21 11:25 AM   Result Value Ref Range    Glucose (POC) 180 (H) 65 - 100 mg/dL    Performed by Nancy Phillips (PCT)    GLUCOSE, POC    Collection Time: 05/06/21  3:53 PM   Result Value Ref Range    Glucose (POC) 161 (H) 65 - 100 mg/dL    Performed by Nancy hPillips (PCT)    GLUCOSE, POC    Collection Time: 05/06/21  7:59 PM   Result Value Ref Range    Glucose (POC) 165 (H) 65 - 100 mg/dL    Performed by Jaron Luong    GLUCOSE, POC    Collection Time: 05/07/21  8:33 AM   Result Value Ref Range    Glucose (POC) 150 (H) 65 - 100 mg/dL    Performed by Hank Hinson            Assessment/     Patient Active Problem List   Diagnosis Code    CVA (cerebral vascular accident) ([facility] [address] I63.9    CKD (chronic kidney disease) N18.9    Accelerated hypertension I10           Hypertensive urgency-currently significantly better controlled however patient still hypertensive currently maxed on antihypertensive medications. Continue hydralazine 100 mg 3 times daily  Continue lisinopril 40 mg once daily  Continue Norvasc 10 mg once daily  Continue Coreg 25 mg twice daily  Continue to monitor    Hyperglycemia type 2 diabetes-currently under much better control  Continue insulin sliding scale  Continue to monitor    Chronic kidney disease stage IV-patient has a history of stage IV chronic kidney disease with serum creatinine currently at baseline  Continue to trend serum creatinine    Gout-continue home medications    Prophylaxis-Heparin subcu  FEN-diabetic diet, replete potassium and magnesium  Full code, patient surrogate decision-maker is her daughter  Disposition-to skilled nursing facility, patient is currently stable for discharge once accepting facility is available with bed and insurance authorization, discussed with case management in detail    [address] Other Relative [address] discussed with: Patient/Family and Nurse/Case Management    Total time spent with patient: 35 minutes. This dictation was done by dragon, computer voice recognition software. Often unanticipated grammatical, syntax, phones and other interpretive errors are inadvertently transcribed. Please excuse errors that have escaped final proofreading.     Davy Heath MD

## 2021-05-07 NOTE — PROGRESS NOTES
CM called [facility] and spoke with admissions [phone] and spoke with Alejandra Patterson in admissions, they will not have a bed available until Sunday or Monday. CM called [address] and rehab [phone] and they will have a bed available today for admission to their facility. CM called HAYDEE Vegas [phone], and explained above to her, she is inquiring about facility to facility transfer from [address] to [facility] in [place], CM called Alejandra Patterson in admissions and inquired about Facility to Facility transfer, she stated this could happen, CM uploaded required information for SNFs. Timbo Vegas Requesting information on visitation hours at [address], CM called [facility] [phone], spoke with , currently they are allowing scheduled out door visitation. CM notified daughter and she is accepting of this. She also gave CM choice for [facility], [facility], [facility] in [place], CM sent referrals. Patient to discharge to [address] today. Daughter will be here around 1pm,  Please set up transportation between 3-5 pm.  Address is [facility] [address] [place], [place] [address]. Awaiting bed number.

## 2021-05-07 NOTE — PROGRESS NOTES
Comprehensive Nutrition Assessment    Type and Reason for Visit: RD nutrition re-screen/LOS    Nutrition Recommendations/Plan:   Continue current diet  Nursing to monitor BM, I/Os, %PO    Nutrition Assessment:  Admitted 2/2 CKD. Current appetite reported as fine. Reports poor po PTA and ate \"like crap. \" Pt could not articulate what she meant by this. 50%PO DM CC intake. Nutrition intervention not needed at this time. Labs: -230. HH 8.6/28.4. Cl 114. BUN 53. Creat 3.51. Meds reviewed. Malnutrition Assessment:  Malnutrition Status:  No malnutrition      Nutrition Related Findings:  No NFPE performed. Appears nourished. Denies N/V/C/D. BM unknown per pt. Reports some teeth missing but denies C/S issues. Edema: RLE/LLE 1+. Wounds:    None       Current Nutrition Therapies:  DIET DIABETIC CONSISTENT CARB Regular    Anthropometric Measures:  · Height:  5' 2\" (157.5 cm)  · Current Body Wt:  89 kg (196 lb 3.4 oz)   · Admission Body Wt:  195 lb 15.8 oz    · Usual Body Wt:   180 lbs per pt    · Ideal Body Wt:  110 lbs:  178.4 %   · BMI Category:  Obese class 2 (BMI 35.0-39. 9)     Wt Readings from Last 3 Encounters:   05/03/21 89 kg (196 lb 3.4 oz)   02/17/21 88.9 kg (195 lb 15.8 oz)   02/13/21 82.1 kg (181 lb)   ·   Nutrition Interventions:   Food and/or Nutrient Delivery: Continue current diet  Nutrition Education and Counseling: No recommendations at this time  Coordination of Nutrition Care: No recommendation at this time    Nutrition Monitoring and Evaluation:   Behavioral-Environmental Outcomes: None identified  Food/Nutrient Intake Outcomes: Food and nutrient intake  Physical Signs/Symptoms Outcomes: Biochemical data, Weight    Discharge Planning:    Continue current diet     Electronically signed by Tung Argueta RD on 5/7/2021 at 2:04 PM    Contact: Ext [phone]

## 2021-05-07 NOTE — PROGRESS NOTES
CM reviewed clinical chart. Patient has not been accepted at [facility] as of yet. CM reviewed nursing note from yesterday evening. CM sent referral to [facility] in [place]. CM awaiting response.

## 2021-05-08 LAB
GLUCOSE BLD STRIP.AUTO-MCNC: 128 MG/DL (ref 65–100)
GLUCOSE BLD STRIP.AUTO-MCNC: 179 MG/DL (ref 65–100)
GLUCOSE BLD STRIP.AUTO-MCNC: 179 MG/DL (ref 65–100)
GLUCOSE BLD STRIP.AUTO-MCNC: 215 MG/DL (ref 65–100)
PERFORMED BY, TECHID: ABNORMAL

## 2021-05-08 PROCEDURE — 74011250636 HC RX REV CODE- 250/636: Performed by: HOSPITALIST

## 2021-05-08 PROCEDURE — 65270000029 HC RM PRIVATE

## 2021-05-08 PROCEDURE — 74011250637 HC RX REV CODE- 250/637: Performed by: INTERNAL MEDICINE

## 2021-05-08 PROCEDURE — 82962 GLUCOSE BLOOD TEST: CPT

## 2021-05-08 PROCEDURE — 74011250637 HC RX REV CODE- 250/637: Performed by: HOSPITALIST

## 2021-05-08 PROCEDURE — 74011636637 HC RX REV CODE- 636/637: Performed by: HOSPITALIST

## 2021-05-08 PROCEDURE — 74011000250 HC RX REV CODE- 250: Performed by: HOSPITALIST

## 2021-05-08 RX ORDER — POLYETHYLENE GLYCOL 3350 17 G/17G
17 POWDER, FOR SOLUTION ORAL DAILY PRN
Status: DISCONTINUED | OUTPATIENT
Start: 2021-05-08 | End: 2021-05-10 | Stop reason: HOSPADM

## 2021-05-08 RX ORDER — DOCUSATE SODIUM 100 MG/1
100 CAPSULE, LIQUID FILLED ORAL 2 TIMES DAILY
Status: DISCONTINUED | OUTPATIENT
Start: 2021-05-08 | End: 2021-05-10 | Stop reason: HOSPADM

## 2021-05-08 RX ADMIN — CYANOCOBALAMIN TAB 1000 MCG 1000 MCG: 1000 TAB at 08:32

## 2021-05-08 RX ADMIN — HYDRALAZINE HYDROCHLORIDE 100 MG: 50 TABLET, FILM COATED ORAL at 21:43

## 2021-05-08 RX ADMIN — HEPARIN SODIUM 5000 UNITS: 5000 INJECTION INTRAVENOUS; SUBCUTANEOUS at 11:33

## 2021-05-08 RX ADMIN — ASPIRIN 81 MG CHEWABLE TABLET 81 MG: 81 TABLET CHEWABLE at 08:32

## 2021-05-08 RX ADMIN — ATORVASTATIN CALCIUM 40 MG: 40 TABLET, FILM COATED ORAL at 22:00

## 2021-05-08 RX ADMIN — INSULIN LISPRO 2 UNITS: 100 INJECTION, SOLUTION INTRAVENOUS; SUBCUTANEOUS at 22:19

## 2021-05-08 RX ADMIN — CARVEDILOL 25 MG: 12.5 TABLET, FILM COATED ORAL at 16:25

## 2021-05-08 RX ADMIN — INSULIN LISPRO 3 UNITS: 100 INJECTION, SOLUTION INTRAVENOUS; SUBCUTANEOUS at 16:25

## 2021-05-08 RX ADMIN — HYDRALAZINE HYDROCHLORIDE 100 MG: 50 TABLET, FILM COATED ORAL at 16:25

## 2021-05-08 RX ADMIN — CARVEDILOL 25 MG: 12.5 TABLET, FILM COATED ORAL at 08:32

## 2021-05-08 RX ADMIN — Medication 2000 UNITS: at 08:32

## 2021-05-08 RX ADMIN — AMLODIPINE BESYLATE 10 MG: 5 TABLET ORAL at 08:32

## 2021-05-08 RX ADMIN — TRAMADOL HYDROCHLORIDE 50 MG: 50 TABLET ORAL at 20:40

## 2021-05-08 RX ADMIN — DONEPEZIL HYDROCHLORIDE 5 MG: 5 TABLET, FILM COATED ORAL at 21:43

## 2021-05-08 RX ADMIN — FERROUS SULFATE TAB 325 MG (65 MG ELEMENTAL FE) 325 MG: 325 (65 FE) TAB at 08:32

## 2021-05-08 RX ADMIN — DOCUSATE SODIUM 100 MG: 100 CAPSULE, LIQUID FILLED ORAL at 20:40

## 2021-05-08 RX ADMIN — HEPARIN SODIUM 5000 UNITS: 5000 INJECTION INTRAVENOUS; SUBCUTANEOUS at 22:19

## 2021-05-08 RX ADMIN — LISINOPRIL 40 MG: 40 TABLET ORAL at 08:32

## 2021-05-08 RX ADMIN — INSULIN LISPRO 2 UNITS: 100 INJECTION, SOLUTION INTRAVENOUS; SUBCUTANEOUS at 11:33

## 2021-05-08 RX ADMIN — CLONIDINE HYDROCHLORIDE 0.1 MG: 0.1 TABLET ORAL at 02:14

## 2021-05-08 RX ADMIN — ALLOPURINOL 100 MG: 100 TABLET ORAL at 08:32

## 2021-05-08 RX ADMIN — HYDRALAZINE HYDROCHLORIDE 100 MG: 50 TABLET, FILM COATED ORAL at 08:32

## 2021-05-08 NOTE — DISCHARGE SUMMARY
Hospitalist Discharge Summary     Patient ID:  Javan Richmond  [id number]  89 y.o.  1945 4/30/2021    PCP on record: Festus Camara MD    Admit date: 4/30/2021  Discharge date and time: 5/8/2021    DISCHARGE DIAGNOSIS:    Hypertensive urgency/hyperglycemia type 2 diabetes/chronic kidney disease stage IV/gout/adjustment disorder with mixed emotions/combative/cognitive impairment    CONSULTATIONS:  IP CONSULT TO PSYCHIATRY    Excerpted HPI from H&P of Nata Arellano MD:  70 y.o. female with history of multiple CVA, diabetes mellitus, hypertension, heart failure presents to the emergency room complaining of not feeling good and weak. States that she was keep falling, having trouble with ambulation. Denies any chest pain, palpitations. But as mentioned she was just covered herself and not happy to give any information. Did not notice any fever or chills. But on evaluation found that she has significantly elevated blood pressure and due to the risk factors admitted for further management. She was comfortably laying in the bed, did not notice any trouble breathing or shortness of breath. Was given hydralazine and clonidine in the emergency room with not much improvement.    ______________________________________________________________________  DISCHARGE SUMMARY/HOSPITAL COURSE:  for full details see H&P, daily progress notes, labs, consult notes.      Patient was subsequently admitted to [facility] for further evaluation as well as management, of note patient had hypertensive urgency, patient's antihypertensive medications were readjusted, Norvasc was maxed, lisinopril was continued, patient's hydralazine was uptitrated following which hypertension was under significantly better control, of note patient's fingersticks remained stable on sliding scale, patient serum creatinine remained stable, patient was evaluated by psychiatry, following which patient was deemed stable for discharge to skilled nursing facility with close outpatient follow-up with primary care physician as well as psychiatry, the plan was explained to the patient in detail who is agreeable to current plan.        _______________________________________________________________________  Patient seen and examined by me on discharge day. Pertinent Findings:  Gen:    Not in distress  Chest: Clear lungs  CVS:   Regular rhythm, s1/s2 no m/r/g  No edema  Abd:  Soft, not distended, not tender  Neuro:  Alert, Oriented x 4  _______________________________________________________________________  DISCHARGE MEDICATIONS:   Current Discharge Medication List      START taking these medications    Details   acetaminophen (TYLENOL) 325 mg tablet Take 2 Tabs by mouth every six (6) hours as needed for Pain or Fever. Qty: 60 Tab, Refills: 0  Start date: 5/6/2021      carvediloL (COREG) 25 mg tablet Take 1 Tab by mouth two (2) times daily (with meals). Qty: 60 Tab, Refills: 0  Start date: 5/6/2021      nitroglycerin (NITRODUR) 0.2 mg/hr 1 Patch by TransDERmal route daily. Qty: 3 Patch, Refills: 0  Start date: 5/6/2021      insulin lispro (HUMALOG) 100 unit/mL injection INITIATE CORRECTIVE INSULIN PROTOCOL (EDWARD):  RX EDWARD Normal Sensitivity (Average weight)    AC (before meals), Q6H, and Q4H CORRECTIONAL SCALE only For Blood Sugar (mg/dl) of :             140-199=2 units            200-249=3 units  250-299=5 units  300-349=7 units  350 or greater = Call MD  Give in addition to basal medications. Do Not Hold for NPO    BEDTIME CORRECTIONAL sliding scale when scheduled:  200-249=2 units  250-299=3 units   300-349=4 units  350 or greater = Call MD  Give in addition to basal medications. Do Not Hold for NPO Fast Acting - Administer Immediately - or within 15 minutes of start of meal, if mealtime coverage.   Qty: 1 Vial, Refills: 0  Start date: 5/6/2021         CONTINUE these medications which have CHANGED    Details   hydrALAZINE (APRESOLINE) 100 mg tablet Take 1 Tab by mouth three (3) times daily. Qty: 90 Tab, Refills: 0  Start date: 5/6/2021         CONTINUE these medications which have NOT CHANGED    Details   lidocaine (LIDODERM) 5 % 1 Patch by TransDERmal route daily. albuterol (PROVENTIL HFA, VENTOLIN HFA, PROAIR HFA) 90 mcg/actuation inhaler Take 2 Puffs by inhalation every four (4) hours as needed for Shortness of Breath. amLODIPine (NORVASC) 10 mg tablet Take 10 mg by mouth daily. cholecalciferol, vitamin D3, 50 mcg (2,000 unit) tab Take 2,000 Units by mouth daily. FeroSuL 325 mg (65 mg iron) tablet Take 325 mg by mouth daily. donepeziL (ARICEPT) 5 mg tablet Take 5 mg by mouth daily. lisinopriL (PRINIVIL, ZESTRIL) 40 mg tablet Take 40 mg by mouth daily. aspirin 81 mg chewable tablet Take 81 mg by mouth daily. doxazosin (CARDURA) 2 mg tablet Take 2 mg by mouth nightly. cyanocobalamin, vitamin B-12, 3,000 mcg cap cyanocobalamin (vitamin B-12) 3,000 mcg capsule      allopurinoL (ZYLOPRIM) 100 mg tablet allopurinol 100 mg tablet      atorvastatin (LIPITOR) 40 mg tablet atorvastatin 40 mg tablet      fluticasone propion-salmeterol (ADVAIR HFA) 115-21 mcg/actuation inhaler Take 2 Puffs by inhalation two (2) times a day. sodium bicarbonate 650 mg tablet Take 650 mg by mouth two (2) times a day.          STOP taking these medications       loratadine (CLARITIN) 10 mg tablet Comments:   Reason for Stopping:         metoprolol tartrate (LOPRESSOR) 50 mg tablet Comments:   Reason for Stopping:         insulin detemir U-100 (Levemir FlexTouch U-100 Insuln) 100 unit/mL (3 mL) inpn Comments:   Reason for Stopping:         fluticasone propionate (FLONASE) 50 mcg/actuation nasal spray Comments:   Reason for Stopping:         ferrous sulfate (IRON) 325 mg (65 mg iron) EC tablet Comments:   Reason for Stopping:         insulin aspart U-100 (NovoLOG Flexpen U-100 Insulin) 100 unit/mL (3 mL) inpn Comments:   Reason for Stopping:         metoprolol tartrate (LOPRESSOR) 25 mg tablet Comments:   Reason for Stopping:         furosemide (LASIX) 40 mg tablet Comments:   Reason for Stopping:         nitroglycerin (NITROSTAT) 0.4 mg SL tablet Comments:   Reason for Stopping:         FLUTICASONE PROPIONATE (FLOVENT DISKUS IN) Comments:   Reason for Stopping:                 Patient Follow Up Instructions: Activity: PT/OT Eval and Treat  Diet: Diabetic Diet  Wound Care: As directed    Follow-up with PCP/Psychiatry in 2 weeks. Follow-up tests/labs As per above physicians  Follow-up Information     Follow up With Specialties Details Why Tarsha Shahid MD Internal Medicine In 1 week  [address]      Emma Giordano MD Psychiatry In 1 week  [address] [phone]          ________________________________________________________________    Risk of deterioration: Low    Condition at Discharge:  Stable  __________________________________________________________________    Disposition  SNF/LTC    ____________________________________________________________________    Code Status: Full Code  ___________________________________________________________________      Total time in minutes spent coordinating this discharge (includes going over instructions, follow-up, prescriptions, and preparing report for sign off to her PCP) :  50 minutes    Signed:   Ruiz Aguila MD

## 2021-05-08 NOTE — PROGRESS NOTES
Hospitalist Progress Note               Daily Progress Note: 5/3/2021    70 y.o. female with history of multiple CVA, diabetes mellitus, hypertension, heart failure presents to the emergency room complaining of not feeling good and weak. Admit s/t hypertensive malignancy, progressive decline/gait instability.     Subjective:   Patient seen and evaluated at bedside, of note patient currently was concerned about her blood pressure, patient states she wants to go home, discussed with RN and     Problem List:  Problem List as of 5/8/2021 Date Reviewed: 10/15/2019          Codes Class Noted - Resolved    CKD (chronic kidney disease) ICD-10-CM: N18.9  ICD-9-CM: 585.9  4/30/2021 - Present        Accelerated hypertension ICD-10-CM: I10  ICD-9-CM: 401.0  4/30/2021 - Present        CVA (cerebral vascular accident) [facility]) ICD-10-CM: I63.9  ICD-9-CM: 434.91  2/13/2021 - Present              Medications reviewed  Current Facility-Administered Medications   Medication Dose Route Frequency    cloNIDine HCL (CATAPRES) tablet 0.1 mg  0.1 mg Oral Q4H PRN    carvediloL (COREG) tablet 25 mg  25 mg Oral BID WITH MEALS    hydrALAZINE (APRESOLINE) tablet 100 mg  100 mg Oral TID    traMADoL (ULTRAM) tablet 50 mg  50 mg Oral Q6H PRN    cyanocobalamin tablet 1,000 mcg  1,000 mcg Oral DAILY    allopurinoL (ZYLOPRIM) tablet 100 mg  100 mg Oral DAILY    atorvastatin (LIPITOR) tablet 40 mg  40 mg Oral QHS    aspirin chewable tablet 81 mg  81 mg Oral DAILY    lidocaine 4 % patch 1 Patch  1 Patch TransDERmal DAILY    albuterol (PROVENTIL HFA, VENTOLIN HFA, PROAIR HFA) inhaler 2 Puff  2 Puff Inhalation Q4H PRN    amLODIPine (NORVASC) tablet 10 mg  10 mg Oral DAILY    cholecalciferol (VITAMIN D3) (1000 Units /25 mcg) tablet 2,000 Units  2,000 Units Oral DAILY    ferrous sulfate tablet 325 mg  325 mg Oral DAILY    lisinopriL (PRINIVIL, ZESTRIL) tablet 40 mg  40 mg Oral DAILY    sodium chloride (NS) flush 5-40 mL  5-40 mL IntraVENous PRN    acetaminophen (TYLENOL) tablet 650 mg  650 mg Oral Q6H PRN    Or    acetaminophen (TYLENOL) suppository 650 mg  650 mg Rectal Q6H PRN    ondansetron (ZOFRAN ODT) tablet 4 mg  4 mg Oral Q6H PRN    Or    ondansetron (ZOFRAN) injection 4 mg  4 mg IntraVENous Q6H PRN    heparin (porcine) injection 5,000 Units  5,000 Units SubCUTAneous Q12H    insulin lispro (HUMALOG) injection   SubCUTAneous AC&HS    glucose chewable tablet 16 g  4 Tab Oral PRN    dextrose (D50W) injection syrg 12.5-25 g  25-50 mL IntraVENous PRN    glucagon (GLUCAGEN) injection 1 mg  1 mg IntraMUSCular PRN    nitroglycerin (NITRODUR) 0.2 mg/hr patch 1 Patch  1 Patch TransDERmal DAILY    donepeziL (ARICEPT) tablet 5 mg  5 mg Oral QHS       Review of Systems   Constitutional: Positive for malaise/fatigue. Negative for chills and fever. HENT: Negative. 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 Negative for cough, hemoptysis, sputum production and wheezing. Cardiovascular: Positive for leg swelling. Negative for chest pain and palpitations. Gastrointestinal: Negative for blood in stool, constipation, diarrhea, melena and nausea. Genitourinary: Negative. Musculoskeletal: Positive for falls and joint pain. Skin: Negative. Neurological: Positive for weakness. Negative for sensory change, speech change, seizures and loss of consciousness. Endo/Heme/Allergies: Negative. Psychiatric/Behavioral: The patient is nervous/anxious. Objective:   Physical Exam:     Visit Vitals  /70   Pulse 78   Temp 97.8 °F (36.6 °C)   Resp 18   Ht 5' 2\" (1.575 m)   Wt 89 kg (196 lb 3.4 oz)   LMP  (LMP Unknown)   SpO2 98%   Breastfeeding No   BMI 35.89 kg/m²      O2 Device: None (Room air)      General : alert, looks nad less anxious and chronically ill  HEENT : nc/at,eomi  Neck : Supple, no JVD, no masses noted, no carotid bruit.   Lungs :fair ae, clear and not labored. CVS : RRR, S1+, S2+, no murmur or gallop. Abdomen : Soft, mild tenderness to deep palp, bs +  Extremities:  1+edema noted,  pedal pulses palpable. Tender feet  Musculoskeletal :no joint swelling or effusion, muscle tone and power appears fair. . Pain rom bilat ankles and tender to superficial tosh, no erythema, trace edema  Skin : Moist, warm. no pathological rash. Lymphatic : No cervical lymphadenopathy. Neurological : Awake, alert, oriented x 4,nfd. Psychiatric : anxious not agitated  Data Review:       Recent Days:  Recent Labs     05/06/21  0658   WBC 9.0   HGB 8.6*   HCT 28.4*        Recent Labs     05/06/21  0658      K 3.8   *   CO2 21   *   BUN 53*   CREA 3.51*   CA 8.6     No results for input(s): PH, PCO2, PO2, HCO3, FIO2 in the last 72 hours. 24 Hour Results:  Recent Results (from the past 24 hour(s))   GLUCOSE, POC    Collection Time: 05/07/21 11:30 AM   Result Value Ref Range    Glucose (POC) 187 (H) 65 - 100 mg/dL    Performed by [address], POC    Collection Time: 05/07/21  5:03 PM   Result Value Ref Range    Glucose (POC) 181 (H) 65 - 100 mg/dL    Performed by [address] [place] [address], POC    Collection Time: 05/07/21  7:35 PM   Result Value Ref Range    Glucose (POC) 178 (H) 65 - 100 mg/dL    Performed by Ines Andrade    GLUCOSE, POC    Collection Time: 05/08/21  8:00 AM   Result Value Ref Range    Glucose (POC) 128 (H) 65 - 100 mg/dL    Performed by RADHA GARCIA            Assessment/     Patient Active Problem List   Diagnosis Code    CVA (cerebral vascular accident) ([facility] [address] I63.9    CKD (chronic kidney disease) N18.9    Accelerated hypertension I10           Hypertensive urgency-currently significantly better controlled however patient still hypertensive currently maxed on antihypertensive medications.   Continue hydralazine 100 mg 3 times daily  Continue lisinopril 40 mg once daily  Continue Norvasc 10 mg once daily  Continue Coreg 25 mg twice daily  Continue to monitor    Hyperglycemia type 2 diabetes-currently under much better control  Continue insulin sliding scale  Continue to monitor    Chronic kidney disease stage IV-patient has a history of stage IV chronic kidney disease with serum creatinine currently at baseline  Continue to trend serum creatinine    Gout-continue home medications    Prophylaxis-Heparin subcu  FEN-diabetic diet, replete potassium and magnesium  Full code, patient surrogate decision-maker is her daughter  Disposition-to skilled nursing facility, patient is currently stable for discharge once accepting facility is available with bed and insurance authorization, discussed with case management in detail    [address] [place] [address] Other Relative [address] discussed with: Patient/Family and Nurse/Case Management    Total time spent with patient: 35 minutes. This dictation was done by dragon, computer voice recognition software. Often unanticipated grammatical, syntax, phones and other interpretive errors are inadvertently transcribed. Please excuse errors that have escaped final proofreading.     Rolando Santos MD

## 2021-05-09 LAB
GLUCOSE BLD STRIP.AUTO-MCNC: 110 MG/DL (ref 65–100)
GLUCOSE BLD STRIP.AUTO-MCNC: 123 MG/DL (ref 65–100)
GLUCOSE BLD STRIP.AUTO-MCNC: 212 MG/DL (ref 65–100)
GLUCOSE BLD STRIP.AUTO-MCNC: 267 MG/DL (ref 65–100)
PERFORMED BY, TECHID: ABNORMAL

## 2021-05-09 PROCEDURE — 74011000250 HC RX REV CODE- 250: Performed by: HOSPITALIST

## 2021-05-09 PROCEDURE — 82962 GLUCOSE BLOOD TEST: CPT

## 2021-05-09 PROCEDURE — 74011250636 HC RX REV CODE- 250/636: Performed by: HOSPITALIST

## 2021-05-09 PROCEDURE — 74011250637 HC RX REV CODE- 250/637: Performed by: INTERNAL MEDICINE

## 2021-05-09 PROCEDURE — 74011636637 HC RX REV CODE- 636/637: Performed by: HOSPITALIST

## 2021-05-09 PROCEDURE — 65270000029 HC RM PRIVATE

## 2021-05-09 PROCEDURE — 74011250637 HC RX REV CODE- 250/637: Performed by: HOSPITALIST

## 2021-05-09 RX ADMIN — HYDRALAZINE HYDROCHLORIDE 100 MG: 50 TABLET, FILM COATED ORAL at 17:10

## 2021-05-09 RX ADMIN — HYDRALAZINE HYDROCHLORIDE 100 MG: 50 TABLET, FILM COATED ORAL at 21:40

## 2021-05-09 RX ADMIN — TRAMADOL HYDROCHLORIDE 50 MG: 50 TABLET ORAL at 21:41

## 2021-05-09 RX ADMIN — Medication 2000 UNITS: at 09:11

## 2021-05-09 RX ADMIN — ATORVASTATIN CALCIUM 40 MG: 40 TABLET, FILM COATED ORAL at 21:40

## 2021-05-09 RX ADMIN — INSULIN LISPRO 3 UNITS: 100 INJECTION, SOLUTION INTRAVENOUS; SUBCUTANEOUS at 11:29

## 2021-05-09 RX ADMIN — ALLOPURINOL 100 MG: 100 TABLET ORAL at 09:11

## 2021-05-09 RX ADMIN — HEPARIN SODIUM 5000 UNITS: 5000 INJECTION INTRAVENOUS; SUBCUTANEOUS at 11:26

## 2021-05-09 RX ADMIN — CYANOCOBALAMIN TAB 1000 MCG 1000 MCG: 1000 TAB at 09:11

## 2021-05-09 RX ADMIN — DOCUSATE SODIUM 100 MG: 100 CAPSULE, LIQUID FILLED ORAL at 21:40

## 2021-05-09 RX ADMIN — LISINOPRIL 40 MG: 40 TABLET ORAL at 09:11

## 2021-05-09 RX ADMIN — INSULIN LISPRO 3 UNITS: 100 INJECTION, SOLUTION INTRAVENOUS; SUBCUTANEOUS at 21:40

## 2021-05-09 RX ADMIN — DONEPEZIL HYDROCHLORIDE 5 MG: 5 TABLET, FILM COATED ORAL at 21:40

## 2021-05-09 RX ADMIN — HEPARIN SODIUM 5000 UNITS: 5000 INJECTION INTRAVENOUS; SUBCUTANEOUS at 21:41

## 2021-05-09 RX ADMIN — FERROUS SULFATE TAB 325 MG (65 MG ELEMENTAL FE) 325 MG: 325 (65 FE) TAB at 09:11

## 2021-05-09 RX ADMIN — SODIUM PHOSPHATE 1 ENEMA: 7; 19 ENEMA RECTAL at 15:29

## 2021-05-09 RX ADMIN — ASPIRIN 81 MG CHEWABLE TABLET 81 MG: 81 TABLET CHEWABLE at 09:11

## 2021-05-09 RX ADMIN — CARVEDILOL 25 MG: 12.5 TABLET, FILM COATED ORAL at 09:11

## 2021-05-09 RX ADMIN — CARVEDILOL 25 MG: 12.5 TABLET, FILM COATED ORAL at 17:10

## 2021-05-09 RX ADMIN — HYDRALAZINE HYDROCHLORIDE 100 MG: 50 TABLET, FILM COATED ORAL at 09:11

## 2021-05-09 RX ADMIN — AMLODIPINE BESYLATE 10 MG: 5 TABLET ORAL at 09:11

## 2021-05-09 RX ADMIN — DOCUSATE SODIUM 100 MG: 100 CAPSULE, LIQUID FILLED ORAL at 09:11

## 2021-05-09 RX ADMIN — POLYETHYLENE GLYCOL 3350 17 G: 17 POWDER, FOR SOLUTION ORAL at 17:18

## 2021-05-09 NOTE — PROGRESS NOTES
DC order noted  Chart reviewed. Telephoned accepting facility for Bed assignment and report #. [id number] [facility] has accepted the pt for care [phone]    Reception stated no bed assignment or admit notification by her admin. CM name and phone number provided. She will contact admin for call back to CM. DC order faxed via [employer] to [facility]    Update as available.

## 2021-05-09 NOTE — PROGRESS NOTES
No call back from [address] [place] [address] to this time. Call placed to [email or web address]  VM left with request for call back.   CM name and number provided for her use

## 2021-05-09 NOTE — DISCHARGE SUMMARY
Hospitalist Discharge Summary     Patient ID:  Richar Muniz  [id number]  17 y.o.  1945 4/30/2021    PCP on record: Eric Hall MD    Admit date: 4/30/2021  Discharge date and time: 5/9/2021    DISCHARGE DIAGNOSIS:    Hypertensive urgency/hyperglycemia type 2 diabetes/chronic kidney disease stage IV/gout/adjustment disorder with mixed emotions/combative/cognitive impairment    CONSULTATIONS:  IP CONSULT TO PSYCHIATRY    Excerpted HPI from H&P of Lizeth Hinton MD:  70 y.o. female with history of multiple CVA, diabetes mellitus, hypertension, heart failure presents to the emergency room complaining of not feeling good and weak. States that she was keep falling, having trouble with ambulation. Denies any chest pain, palpitations. But as mentioned she was just covered herself and not happy to give any information. Did not notice any fever or chills. But on evaluation found that she has significantly elevated blood pressure and due to the risk factors admitted for further management. She was comfortably laying in the bed, did not notice any trouble breathing or shortness of breath. Was given hydralazine and clonidine in the emergency room with not much improvement.    ______________________________________________________________________  DISCHARGE SUMMARY/HOSPITAL COURSE:  for full details see H&P, daily progress notes, labs, consult notes.      Patient was subsequently admitted to [facility] for further evaluation as well as management, of note patient had hypertensive urgency, patient's antihypertensive medications were readjusted, Norvasc was maxed, lisinopril was continued, patient's hydralazine was uptitrated following which hypertension was under significantly better control, of note patient's fingersticks remained stable on sliding scale, patient serum creatinine remained stable, patient was evaluated by psychiatry, following which patient was deemed stable for discharge to skilled nursing facility with close outpatient follow-up with primary care physician as well as psychiatry, the plan was explained to the patient in detail who is agreeable to current plan.        _______________________________________________________________________  Patient seen and examined by me on discharge day. Pertinent Findings:  Gen:    Not in distress  Chest: Clear lungs  CVS:   Regular rhythm, s1/s2 no m/r/g  No edema  Abd:  Soft, not distended, not tender  Neuro:  Alert, Oriented x 4  _______________________________________________________________________  DISCHARGE MEDICATIONS:   Current Discharge Medication List      START taking these medications    Details   acetaminophen (TYLENOL) 325 mg tablet Take 2 Tabs by mouth every six (6) hours as needed for Pain or Fever. Qty: 60 Tab, Refills: 0  Start date: 5/6/2021      carvediloL (COREG) 25 mg tablet Take 1 Tab by mouth two (2) times daily (with meals). Qty: 60 Tab, Refills: 0  Start date: 5/6/2021      nitroglycerin (NITRODUR) 0.2 mg/hr 1 Patch by TransDERmal route daily. Qty: 3 Patch, Refills: 0  Start date: 5/6/2021      insulin lispro (HUMALOG) 100 unit/mL injection INITIATE CORRECTIVE INSULIN PROTOCOL (EDWARD):  RX EDWARD Normal Sensitivity (Average weight)    AC (before meals), Q6H, and Q4H CORRECTIONAL SCALE only For Blood Sugar (mg/dl) of :             140-199=2 units            200-249=3 units  250-299=5 units  300-349=7 units  350 or greater = Call MD  Give in addition to basal medications. Do Not Hold for NPO    BEDTIME CORRECTIONAL sliding scale when scheduled:  200-249=2 units  250-299=3 units   300-349=4 units  350 or greater = Call MD  Give in addition to basal medications. Do Not Hold for NPO Fast Acting - Administer Immediately - or within 15 minutes of start of meal, if mealtime coverage.   Qty: 1 Vial, Refills: 0  Start date: 5/6/2021         CONTINUE these medications which have CHANGED    Details   hydrALAZINE (APRESOLINE) 100 mg tablet Take 1 Tab by mouth three (3) times daily. Qty: 90 Tab, Refills: 0  Start date: 5/6/2021         CONTINUE these medications which have NOT CHANGED    Details   lidocaine (LIDODERM) 5 % 1 Patch by TransDERmal route daily. albuterol (PROVENTIL HFA, VENTOLIN HFA, PROAIR HFA) 90 mcg/actuation inhaler Take 2 Puffs by inhalation every four (4) hours as needed for Shortness of Breath. amLODIPine (NORVASC) 10 mg tablet Take 10 mg by mouth daily. cholecalciferol, vitamin D3, 50 mcg (2,000 unit) tab Take 2,000 Units by mouth daily. FeroSuL 325 mg (65 mg iron) tablet Take 325 mg by mouth daily. donepeziL (ARICEPT) 5 mg tablet Take 5 mg by mouth daily. lisinopriL (PRINIVIL, ZESTRIL) 40 mg tablet Take 40 mg by mouth daily. aspirin 81 mg chewable tablet Take 81 mg by mouth daily. doxazosin (CARDURA) 2 mg tablet Take 2 mg by mouth nightly. cyanocobalamin, vitamin B-12, 3,000 mcg cap cyanocobalamin (vitamin B-12) 3,000 mcg capsule      allopurinoL (ZYLOPRIM) 100 mg tablet allopurinol 100 mg tablet      atorvastatin (LIPITOR) 40 mg tablet atorvastatin 40 mg tablet      fluticasone propion-salmeterol (ADVAIR HFA) 115-21 mcg/actuation inhaler Take 2 Puffs by inhalation two (2) times a day. sodium bicarbonate 650 mg tablet Take 650 mg by mouth two (2) times a day.          STOP taking these medications       loratadine (CLARITIN) 10 mg tablet Comments:   Reason for Stopping:         metoprolol tartrate (LOPRESSOR) 50 mg tablet Comments:   Reason for Stopping:         insulin detemir U-100 (Levemir FlexTouch U-100 Insuln) 100 unit/mL (3 mL) inpn Comments:   Reason for Stopping:         fluticasone propionate (FLONASE) 50 mcg/actuation nasal spray Comments:   Reason for Stopping:         ferrous sulfate (IRON) 325 mg (65 mg iron) EC tablet Comments:   Reason for Stopping:         insulin aspart U-100 (NovoLOG Flexpen U-100 Insulin) 100 unit/mL (3 mL) inpn Comments:   Reason for Stopping:         metoprolol tartrate (LOPRESSOR) 25 mg tablet Comments:   Reason for Stopping:         furosemide (LASIX) 40 mg tablet Comments:   Reason for Stopping:         nitroglycerin (NITROSTAT) 0.4 mg SL tablet Comments:   Reason for Stopping:         FLUTICASONE PROPIONATE (FLOVENT DISKUS IN) Comments:   Reason for Stopping:                 Patient Follow Up Instructions: Activity: PT/OT Eval and Treat  Diet: Diabetic Diet  Wound Care: As directed    Follow-up with PCP/Psychiatry in 2 weeks. Follow-up tests/labs As per above physicians  Follow-up Information     Follow up With Specialties Details Why Monica Hong MD Internal Medicine In 1 week  [address]      Cain Brumfield MD Psychiatry In 1 week  [address] [place]  [phone]          ________________________________________________________________    Risk of deterioration: Low    Condition at Discharge:  Stable  __________________________________________________________________    Disposition  SNF/LTC    ____________________________________________________________________    Code Status: Full Code  ___________________________________________________________________      Total time in minutes spent coordinating this discharge (includes going over instructions, follow-up, prescriptions, and preparing report for sign off to her PCP) :  50 minutes    Signed:   Luis Angel Sandoval MD

## 2021-05-09 NOTE — PROGRESS NOTES
Report  Given 0430 and 0445 Pt transfer to [address] [facility] room 520 via bed tolerated well. Two person assist. All belongs  taken with Pt. 1 blue duff bag and personal toiletries.

## 2021-05-09 NOTE — PROGRESS NOTES
Hospitalist Progress Note               Daily Progress Note: 5/3/2021    70 y.o. female with history of multiple CVA, diabetes mellitus, hypertension, heart failure presents to the emergency room complaining of not feeling good and weak. Admit s/t hypertensive malignancy, progressive decline/gait instability.     Subjective:   Patient seen and evaluated at bedside, of note patient currently was concerned about her blood pressure, patient states she wants to go home, discussed with RN and     Problem List:  Problem List as of 5/9/2021 Date Reviewed: 10/15/2019          Codes Class Noted - Resolved    CKD (chronic kidney disease) ICD-10-CM: N18.9  ICD-9-CM: 585.9  4/30/2021 - Present        Accelerated hypertension ICD-10-CM: I10  ICD-9-CM: 401.0  4/30/2021 - Present        CVA (cerebral vascular accident) [facility]) ICD-10-CM: I63.9  ICD-9-CM: 434.91  2/13/2021 - Present              Medications reviewed  Current Facility-Administered Medications   Medication Dose Route Frequency    docusate sodium (COLACE) capsule 100 mg  100 mg Oral BID    polyethylene glycol (MIRALAX) packet 17 g  17 g Oral DAILY PRN    cloNIDine HCL (CATAPRES) tablet 0.1 mg  0.1 mg Oral Q4H PRN    carvediloL (COREG) tablet 25 mg  25 mg Oral BID WITH MEALS    hydrALAZINE (APRESOLINE) tablet 100 mg  100 mg Oral TID    traMADoL (ULTRAM) tablet 50 mg  50 mg Oral Q6H PRN    cyanocobalamin tablet 1,000 mcg  1,000 mcg Oral DAILY    allopurinoL (ZYLOPRIM) tablet 100 mg  100 mg Oral DAILY    atorvastatin (LIPITOR) tablet 40 mg  40 mg Oral QHS    aspirin chewable tablet 81 mg  81 mg Oral DAILY    lidocaine 4 % patch 1 Patch  1 Patch TransDERmal DAILY    albuterol (PROVENTIL HFA, VENTOLIN HFA, PROAIR HFA) inhaler 2 Puff  2 Puff Inhalation Q4H PRN    amLODIPine (NORVASC) tablet 10 mg  10 mg Oral DAILY    cholecalciferol (VITAMIN D3) (1000 Units /25 mcg) tablet 2,000 Units  2,000 Units Oral DAILY    ferrous sulfate tablet 325 mg  325 mg Oral DAILY    lisinopriL (PRINIVIL, ZESTRIL) tablet 40 mg  40 mg Oral DAILY    sodium chloride (NS) flush 5-40 mL  5-40 mL IntraVENous PRN    acetaminophen (TYLENOL) tablet 650 mg  650 mg Oral Q6H PRN    Or    acetaminophen (TYLENOL) suppository 650 mg  650 mg Rectal Q6H PRN    ondansetron (ZOFRAN ODT) tablet 4 mg  4 mg Oral Q6H PRN    Or    ondansetron (ZOFRAN) injection 4 mg  4 mg IntraVENous Q6H PRN    heparin (porcine) injection 5,000 Units  5,000 Units SubCUTAneous Q12H    insulin lispro (HUMALOG) injection   SubCUTAneous AC&HS    glucose chewable tablet 16 g  4 Tab Oral PRN    dextrose (D50W) injection syrg 12.5-25 g  25-50 mL IntraVENous PRN    glucagon (GLUCAGEN) injection 1 mg  1 mg IntraMUSCular PRN    nitroglycerin (NITRODUR) 0.2 mg/hr patch 1 Patch  1 Patch TransDERmal DAILY    donepeziL (ARICEPT) tablet 5 mg  5 mg Oral QHS       Review of Systems   Constitutional: Positive for malaise/fatigue. Negative for chills and fever. HENT: Negative. 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 Negative for cough, hemoptysis, sputum production and wheezing. Cardiovascular: Positive for leg swelling. Negative for chest pain and palpitations. Gastrointestinal: Negative for blood in stool, constipation, diarrhea, melena and nausea. Genitourinary: Negative. Musculoskeletal: Positive for falls and joint pain. Skin: Negative. Neurological: Positive for weakness. Negative for sensory change, speech change, seizures and loss of consciousness. Endo/Heme/Allergies: Negative. Psychiatric/Behavioral: The patient is nervous/anxious.           Objective:   Physical Exam:     Visit Vitals  BP (!) 178/76   Pulse 71   Temp 97.4 °F (36.3 °C)   Resp 18   Ht 5' 2\" (1.575 m)   Wt 89 kg (196 lb 3.4 oz)   LMP  (LMP Unknown)   SpO2 98%   Breastfeeding No   BMI 35.89 kg/m²      O2 Device: None (Room air)      General : alert, looks nad less anxious and chronically ill  HEENT : nc/at,eomi  Neck : Supple, no JVD, no masses noted, no carotid bruit. Lungs :fair ae, clear and not labored. CVS : RRR, S1+, S2+, no murmur or gallop. Abdomen : Soft, mild tenderness to deep palp, bs +  Extremities:  1+edema noted,  pedal pulses palpable. Tender feet  Musculoskeletal :no joint swelling or effusion, muscle tone and power appears fair. . Pain rom bilat ankles and tender to superficial tosh, no erythema, trace edema  Skin : Moist, warm. no pathological rash. Lymphatic : No cervical lymphadenopathy. Neurological : Awake, alert, oriented x 4,nfd. Psychiatric : anxious not agitated  Data Review:       Recent Days:  No results for input(s): WBC, HGB, HCT, PLT, HGBEXT, HCTEXT, PLTEXT, HGBEXT, HCTEXT, PLTEXT in the last 72 hours. No results for input(s): NA, K, CL, CO2, GLU, BUN, CREA, CA, MG, PHOS, ALB, TBIL, TBILI, ALT, INR, INREXT, INREXT in the last 72 hours. No lab exists for component: SGOT  No results for input(s): PH, PCO2, PO2, HCO3, FIO2 in the last 72 hours.     24 Hour Results:  Recent Results (from the past 24 hour(s))   GLUCOSE, POC    Collection Time: 05/08/21 11:16 AM   Result Value Ref Range    Glucose (POC) 179 (H) 65 - 100 mg/dL    Performed by GARCIA RADHA    GLUCOSE, POC    Collection Time: 05/08/21  3:42 PM   Result Value Ref Range    Glucose (POC) 215 (H) 65 - 100 mg/dL    Performed by GARCIA RADHA    GLUCOSE, POC    Collection Time: 05/08/21  7:42 PM   Result Value Ref Range    Glucose (POC) 179 (H) 65 - 100 mg/dL    Performed by Carly Talavera    GLUCOSE, POC    Collection Time: 05/09/21  9:10 AM   Result Value Ref Range    Glucose (POC) 110 (H) 65 - 100 mg/dL    Performed by Cecelia LAZCANO [place])            Assessment/     Patient Active Problem List   Diagnosis Code    CVA (cerebral vascular accident) ([facility] [address] I63.9    CKD (chronic kidney disease) N18.9    Accelerated hypertension I10           Hypertensive urgency-currently significantly better controlled however patient still hypertensive currently maxed on antihypertensive medications. Continue hydralazine 100 mg 3 times daily  Continue lisinopril 40 mg once daily  Continue Norvasc 10 mg once daily  Continue Coreg 25 mg twice daily  Continue to monitor    Hyperglycemia type 2 diabetes-currently under much better control  Continue insulin sliding scale  Continue to monitor    Chronic kidney disease stage IV-patient has a history of stage IV chronic kidney disease with serum creatinine currently at baseline  Continue to trend serum creatinine    Gout-continue home medications    Prophylaxis-Heparin subcu  FEN-diabetic diet, replete potassium and magnesium  Full code, patient surrogate decision-maker is her daughter  Disposition-to skilled nursing facility, patient is currently stable for discharge once accepting facility is available with bed and insurance authorization, discussed with case management in detail    [address] Other Relative [address] discussed with: Patient/Family and Nurse/Case Management    Total time spent with patient: 35 minutes. This dictation was done by dragon, computer voice recognition software. Often unanticipated grammatical, syntax, phones and other interpretive errors are inadvertently transcribed. Please excuse errors that have escaped final proofreading.     Orquidea Johnson MD

## 2021-05-10 VITALS
HEART RATE: 65 BPM | WEIGHT: 196 LBS | DIASTOLIC BLOOD PRESSURE: 70 MMHG | HEIGHT: 62 IN | TEMPERATURE: 98.6 F | RESPIRATION RATE: 17 BRPM | OXYGEN SATURATION: 99 % | BODY MASS INDEX: 36.07 KG/M2 | SYSTOLIC BLOOD PRESSURE: 151 MMHG

## 2021-05-10 LAB
GLUCOSE BLD STRIP.AUTO-MCNC: 121 MG/DL (ref 65–100)
GLUCOSE BLD STRIP.AUTO-MCNC: 193 MG/DL (ref 65–100)
PERFORMED BY, TECHID: ABNORMAL
PERFORMED BY, TECHID: ABNORMAL

## 2021-05-10 PROCEDURE — 74011636637 HC RX REV CODE- 636/637: Performed by: HOSPITALIST

## 2021-05-10 PROCEDURE — 82962 GLUCOSE BLOOD TEST: CPT

## 2021-05-10 PROCEDURE — 74011250637 HC RX REV CODE- 250/637: Performed by: HOSPITALIST

## 2021-05-10 PROCEDURE — 74011250637 HC RX REV CODE- 250/637: Performed by: INTERNAL MEDICINE

## 2021-05-10 PROCEDURE — 74011250636 HC RX REV CODE- 250/636: Performed by: HOSPITALIST

## 2021-05-10 PROCEDURE — 74011000250 HC RX REV CODE- 250: Performed by: HOSPITALIST

## 2021-05-10 RX ADMIN — ASPIRIN 81 MG CHEWABLE TABLET 81 MG: 81 TABLET CHEWABLE at 10:36

## 2021-05-10 RX ADMIN — INSULIN LISPRO 2 UNITS: 100 INJECTION, SOLUTION INTRAVENOUS; SUBCUTANEOUS at 12:44

## 2021-05-10 RX ADMIN — HEPARIN SODIUM 5000 UNITS: 5000 INJECTION INTRAVENOUS; SUBCUTANEOUS at 10:36

## 2021-05-10 RX ADMIN — Medication 2000 UNITS: at 09:00

## 2021-05-10 RX ADMIN — HYDRALAZINE HYDROCHLORIDE 100 MG: 50 TABLET, FILM COATED ORAL at 10:36

## 2021-05-10 RX ADMIN — AMLODIPINE BESYLATE 10 MG: 5 TABLET ORAL at 10:37

## 2021-05-10 RX ADMIN — CARVEDILOL 25 MG: 12.5 TABLET, FILM COATED ORAL at 10:36

## 2021-05-10 RX ADMIN — LISINOPRIL 40 MG: 40 TABLET ORAL at 10:37

## 2021-05-10 RX ADMIN — ALLOPURINOL 100 MG: 100 TABLET ORAL at 10:36

## 2021-05-10 RX ADMIN — CYANOCOBALAMIN TAB 1000 MCG 1000 MCG: 1000 TAB at 10:36

## 2021-05-10 RX ADMIN — DOCUSATE SODIUM 100 MG: 100 CAPSULE, LIQUID FILLED ORAL at 10:37

## 2021-05-10 RX ADMIN — FERROUS SULFATE TAB 325 MG (65 MG ELEMENTAL FE) 325 MG: 325 (65 FE) TAB at 10:36

## 2021-05-10 NOTE — DISCHARGE SUMMARY
Hospitalist Discharge Summary     Patient ID:  Jared Irwin  [id number]  68 y.o.  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 female with history of multiple CVA, diabetes mellitus, hypertension, heart failure presents to the emergency room complaining of not feeling good and weak. States that she was keep falling, having trouble with ambulation. Denies any chest pain, palpitations. But as mentioned she was just covered herself and not happy to give any information. Did not notice any fever or chills. But on evaluation found that she has significantly elevated blood pressure and due to the risk factors admitted for further management. She was comfortably laying in the bed, did not notice any trouble breathing or shortness of breath. Was given hydralazine and clonidine in the emergency room with not much improvement.    ______________________________________________________________________  DISCHARGE SUMMARY/HOSPITAL COURSE:  for full details see H&P, daily progress notes, labs, consult notes.      Patient was subsequently admitted to [facility] for further evaluation as well as management, of note patient had hypertensive urgency, patient's antihypertensive medications were readjusted, Norvasc was maxed, lisinopril was continued, patient's hydralazine was uptitrated following which hypertension was under significantly better control, of note patient's fingersticks remained stable on sliding scale, patient serum creatinine remained stable, patient was evaluated by psychiatry, following which patient was deemed stable for discharge to skilled nursing facility with close outpatient follow-up with primary care physician as well as psychiatry, the plan was explained to the patient in detail who is agreeable to current plan.        _______________________________________________________________________  Patient seen and examined by me on discharge day. Pertinent Findings:  Gen:    Not in distress  Chest: Clear lungs  CVS:   Regular rhythm, s1/s2 no m/r/g  No edema  Abd:  Soft, not distended, not tender  Neuro:  Alert, Oriented x 4  _______________________________________________________________________  DISCHARGE MEDICATIONS:   Current Discharge Medication List      START taking these medications    Details   acetaminophen (TYLENOL) 325 mg tablet Take 2 Tabs by mouth every six (6) hours as needed for Pain or Fever. Qty: 60 Tab, Refills: 0  Start date: 5/6/2021      carvediloL (COREG) 25 mg tablet Take 1 Tab by mouth two (2) times daily (with meals). Qty: 60 Tab, Refills: 0  Start date: 5/6/2021      nitroglycerin (NITRODUR) 0.2 mg/hr 1 Patch by TransDERmal route daily. Qty: 3 Patch, Refills: 0  Start date: 5/6/2021      insulin lispro (HUMALOG) 100 unit/mL injection INITIATE CORRECTIVE INSULIN PROTOCOL (EDWARD):  RX EDWARD Normal Sensitivity (Average weight)    AC (before meals), Q6H, and Q4H CORRECTIONAL SCALE only For Blood Sugar (mg/dl) of :             140-199=2 units            200-249=3 units  250-299=5 units  300-349=7 units  350 or greater = Call MD  Give in addition to basal medications. Do Not Hold for NPO    BEDTIME CORRECTIONAL sliding scale when scheduled:  200-249=2 units  250-299=3 units   300-349=4 units  350 or greater = Call MD  Give in addition to basal medications. Do Not Hold for NPO Fast Acting - Administer Immediately - or within 15 minutes of start of meal, if mealtime coverage.   Qty: 1 Vial, Refills: 0  Start date: 5/6/2021         CONTINUE these medications which have CHANGED    Details   hydrALAZINE (APRESOLINE) 100 mg tablet Take 1 Tab by mouth three (3) times daily. Qty: 90 Tab, Refills: 0  Start date: 5/6/2021         CONTINUE these medications which have NOT CHANGED    Details   lidocaine (LIDODERM) 5 % 1 Patch by TransDERmal route daily. albuterol (PROVENTIL HFA, VENTOLIN HFA, PROAIR HFA) 90 mcg/actuation inhaler Take 2 Puffs by inhalation every four (4) hours as needed for Shortness of Breath. amLODIPine (NORVASC) 10 mg tablet Take 10 mg by mouth daily. cholecalciferol, vitamin D3, 50 mcg (2,000 unit) tab Take 2,000 Units by mouth daily. FeroSuL 325 mg (65 mg iron) tablet Take 325 mg by mouth daily. donepeziL (ARICEPT) 5 mg tablet Take 5 mg by mouth daily. lisinopriL (PRINIVIL, ZESTRIL) 40 mg tablet Take 40 mg by mouth daily. aspirin 81 mg chewable tablet Take 81 mg by mouth daily. doxazosin (CARDURA) 2 mg tablet Take 2 mg by mouth nightly. cyanocobalamin, vitamin B-12, 3,000 mcg cap cyanocobalamin (vitamin B-12) 3,000 mcg capsule      allopurinoL (ZYLOPRIM) 100 mg tablet allopurinol 100 mg tablet      atorvastatin (LIPITOR) 40 mg tablet atorvastatin 40 mg tablet      fluticasone propion-salmeterol (ADVAIR HFA) 115-21 mcg/actuation inhaler Take 2 Puffs by inhalation two (2) times a day. sodium bicarbonate 650 mg tablet Take 650 mg by mouth two (2) times a day.          STOP taking these medications       loratadine (CLARITIN) 10 mg tablet Comments:   Reason for Stopping:         metoprolol tartrate (LOPRESSOR) 50 mg tablet Comments:   Reason for Stopping:         insulin detemir U-100 (Levemir FlexTouch U-100 Insuln) 100 unit/mL (3 mL) inpn Comments:   Reason for Stopping:         fluticasone propionate (FLONASE) 50 mcg/actuation nasal spray Comments:   Reason for Stopping:         ferrous sulfate (IRON) 325 mg (65 mg iron) EC tablet Comments:   Reason for Stopping:         insulin aspart U-100 (NovoLOG Flexpen U-100 Insulin) 100 unit/mL (3 mL) inpn Comments:   Reason for Stopping:         metoprolol tartrate (LOPRESSOR) 25 mg tablet Comments:   Reason for Stopping:         furosemide (LASIX) 40 mg tablet Comments:   Reason for Stopping:         nitroglycerin (NITROSTAT) 0.4 mg SL tablet Comments:   Reason for Stopping:         FLUTICASONE PROPIONATE (FLOVENT DISKUS IN) Comments:   Reason for Stopping:                 Patient Follow Up Instructions: Activity: PT/OT Eval and Treat  Diet: Diabetic Diet  Wound Care: As directed    Follow-up with PCP/Psychiatry in 2 weeks. Follow-up tests/labs As per above physicians  Follow-up Information     Follow up With Specialties Details Why Luci Knott MD Internal Medicine In 1 week  [address]      David Roy MD Psychiatry In 1 week  [address]  [phone]          ________________________________________________________________    Risk of deterioration: Low    Condition at Discharge:  Stable  __________________________________________________________________    Disposition  SNF/LTC    ____________________________________________________________________    Code Status: Full Code  ___________________________________________________________________      Total time in minutes spent coordinating this discharge (includes going over instructions, follow-up, prescriptions, and preparing report for sign off to her PCP) :  50 minutes    Signed:   Yeimi Wick MD

## 2021-05-10 NOTE — PROGRESS NOTES
Report given to Darling Davenport RN at [facility]. Report included SBAR, patient education, and patient current meds.

## 2021-05-10 NOTE — PROGRESS NOTES
Hospitalist Progress Note               Daily Progress Note: 5/3/2021    70 y.o. female with history of multiple CVA, diabetes mellitus, hypertension, heart failure presents to the emergency room complaining of not feeling good and weak. Admit s/t hypertensive malignancy, progressive decline/gait instability.     Subjective:   Patient seen and evaluated at bedside, of note patient currently was concerned about her blood pressure, patient states she wants to go home, discussed with RN and     Problem List:  Problem List as of 5/10/2021 Date Reviewed: 10/15/2019          Codes Class Noted - Resolved    CKD (chronic kidney disease) ICD-10-CM: N18.9  ICD-9-CM: 585.9  4/30/2021 - Present        Accelerated hypertension ICD-10-CM: I10  ICD-9-CM: 401.0  4/30/2021 - Present        CVA (cerebral vascular accident) [facility]) ICD-10-CM: I63.9  ICD-9-CM: 434.91  2/13/2021 - Present              Medications reviewed  Current Facility-Administered Medications   Medication Dose Route Frequency    docusate sodium (COLACE) capsule 100 mg  100 mg Oral BID    polyethylene glycol (MIRALAX) packet 17 g  17 g Oral DAILY PRN    cloNIDine HCL (CATAPRES) tablet 0.1 mg  0.1 mg Oral Q4H PRN    carvediloL (COREG) tablet 25 mg  25 mg Oral BID WITH MEALS    hydrALAZINE (APRESOLINE) tablet 100 mg  100 mg Oral TID    traMADoL (ULTRAM) tablet 50 mg  50 mg Oral Q6H PRN    cyanocobalamin tablet 1,000 mcg  1,000 mcg Oral DAILY    allopurinoL (ZYLOPRIM) tablet 100 mg  100 mg Oral DAILY    atorvastatin (LIPITOR) tablet 40 mg  40 mg Oral QHS    aspirin chewable tablet 81 mg  81 mg Oral DAILY    lidocaine 4 % patch 1 Patch  1 Patch TransDERmal DAILY    albuterol (PROVENTIL HFA, VENTOLIN HFA, PROAIR HFA) inhaler 2 Puff  2 Puff Inhalation Q4H PRN    amLODIPine (NORVASC) tablet 10 mg  10 mg Oral DAILY    cholecalciferol (VITAMIN D3) (1000 Units /25 mcg) tablet 2,000 Units  2,000 Units Oral DAILY    ferrous sulfate tablet 325 mg 325 mg Oral DAILY    lisinopriL (PRINIVIL, ZESTRIL) tablet 40 mg  40 mg Oral DAILY    sodium chloride (NS) flush 5-40 mL  5-40 mL IntraVENous PRN    acetaminophen (TYLENOL) tablet 650 mg  650 mg Oral Q6H PRN    Or    acetaminophen (TYLENOL) suppository 650 mg  650 mg Rectal Q6H PRN    ondansetron (ZOFRAN ODT) tablet 4 mg  4 mg Oral Q6H PRN    Or    ondansetron (ZOFRAN) injection 4 mg  4 mg IntraVENous Q6H PRN    heparin (porcine) injection 5,000 Units  5,000 Units SubCUTAneous Q12H    insulin lispro (HUMALOG) injection   SubCUTAneous AC&HS    glucose chewable tablet 16 g  4 Tab Oral PRN    dextrose (D50W) injection syrg 12.5-25 g  25-50 mL IntraVENous PRN    glucagon (GLUCAGEN) injection 1 mg  1 mg IntraMUSCular PRN    nitroglycerin (NITRODUR) 0.2 mg/hr patch 1 Patch  1 Patch TransDERmal DAILY    donepeziL (ARICEPT) tablet 5 mg  5 mg Oral QHS       Review of Systems   Constitutional: Positive for malaise/fatigue. Negative for chills and fever. HENT: Negative. 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 Negative for cough, hemoptysis, sputum production and wheezing. Cardiovascular: Positive for leg swelling. Negative for chest pain and palpitations. Gastrointestinal: Negative for blood in stool, constipation, diarrhea, melena and nausea. Genitourinary: Negative. Musculoskeletal: Positive for falls and joint pain. Skin: Negative. Neurological: Positive for weakness. Negative for sensory change, speech change, seizures and loss of consciousness. Endo/Heme/Allergies: Negative. Psychiatric/Behavioral: The patient is nervous/anxious.           Objective:   Physical Exam:     Visit Vitals  BP (!) 151/70 (BP 1 Location: Right lower arm, BP Patient Position: At rest)   Pulse 65   Temp 98.6 °F (37 °C)   Resp 17   Ht 5' 2\" (1.575 m)   Wt 89 kg (196 lb 3.4 oz)   LMP  (LMP Unknown)   SpO2 99%   Breastfeeding No   BMI 35.89 kg/m²      O2 Device: None (Room air)      General : alert, looks nad less anxious and chronically ill  HEENT : nc/at,eomi  Neck : Supple, no JVD, no masses noted, no carotid bruit. Lungs :fair ae, clear and not labored. CVS : RRR, S1+, S2+, no murmur or gallop. Abdomen : Soft, mild tenderness to deep palp, bs +  Extremities:  1+edema noted,  pedal pulses palpable. Tender feet  Musculoskeletal :no joint swelling or effusion, muscle tone and power appears fair. . Pain rom bilat ankles and tender to superficial tosh, no erythema, trace edema  Skin : Moist, warm. no pathological rash. Lymphatic : No cervical lymphadenopathy. Neurological : Awake, alert, oriented x 4,nfd. Psychiatric : anxious not agitated  Data Review:       Recent Days:  No results for input(s): WBC, HGB, HCT, PLT, HGBEXT, HCTEXT, PLTEXT, HGBEXT, HCTEXT, PLTEXT in the last 72 hours. No results for input(s): NA, K, CL, CO2, GLU, BUN, CREA, CA, MG, PHOS, ALB, TBIL, TBILI, ALT, INR, INREXT, INREXT in the last 72 hours. No lab exists for component: SGOT  No results for input(s): PH, PCO2, PO2, HCO3, FIO2 in the last 72 hours.     24 Hour Results:  Recent Results (from the past 24 hour(s))   GLUCOSE, POC    Collection Time: 05/09/21 11:26 AM   Result Value Ref Range    Glucose (POC) 212 (H) 65 - 100 mg/dL    Performed by Sheng [facility])    GLUCOSE, POC    Collection Time: 05/09/21  4:26 PM   Result Value Ref Range    Glucose (POC) 123 (H) 65 - 100 mg/dL    Performed by Leodan, POC    Collection Time: 05/09/21  8:53 PM   Result Value Ref Range    Glucose (POC) 267 (H) 65 - 100 mg/dL    Performed by Scooter Chand, POC    Collection Time: 05/10/21  8:55 AM   Result Value Ref Range    Glucose (POC) 121 (H) 65 - 100 mg/dL    Performed by Serge Aaron            Assessment/     Patient Active Problem List   Diagnosis Code    CVA (cerebral vascular accident) ([address] I63.9    CKD (chronic kidney disease) N18.9    Accelerated hypertension I10           Hypertensive urgency-currently significantly better controlled however patient still hypertensive currently maxed on antihypertensive medications. Continue hydralazine 100 mg 3 times daily  Continue lisinopril 40 mg once daily  Continue Norvasc 10 mg once daily  Continue Coreg 25 mg twice daily  Continue to monitor    Hyperglycemia type 2 diabetes-currently under much better control  Continue insulin sliding scale  Continue to monitor    Chronic kidney disease stage IV-patient has a history of stage IV chronic kidney disease with serum creatinine currently at baseline  Continue to trend serum creatinine    Gout-continue home medications    Prophylaxis-Heparin subcu  FEN-diabetic diet, replete potassium and magnesium  Full code, patient surrogate decision-maker is her daughter  Disposition-to skilled nursing facility, patient is currently stable for discharge once accepting facility is available with bed and insurance authorization, discussed with case management in detail    [address] Other Relative [address] discussed with: Patient/Family and Nurse/Case Management    Total time spent with patient: 35 minutes. This dictation was done by dragon, computer voice recognition software. Often unanticipated grammatical, syntax, phones and other interpretive errors are inadvertently transcribed. Please excuse errors that have escaped final proofreading.     Dinh Kendall MD

## 2021-05-10 NOTE — PROGRESS NOTES
Problem: Falls - Risk of  Goal: *Absence of Falls  Description: Document Latasha Lozadacassandra Fall Risk and appropriate interventions in the flowsheet.   Outcome: Progressing Towards Goal  Note: Fall Risk Interventions:  Mobility Interventions: Bed/chair exit alarm    Mentation Interventions: Adequate sleep, hydration, pain control    Medication Interventions: Bed/chair exit alarm    Elimination Interventions: Bed/chair exit alarm, Call light in reach    History of Falls Interventions: Bed/chair exit alarm

## 2021-05-10 NOTE — PROGRESS NOTES
CHARLETTE Plan:    -d/c to [place] [employer] pt has received, reviewed, and signed 2nd IM letter informing them of their right to appeal the discharge. Signed copy has been placed on pt bedside chart. JEROME Azevedo            15:07PM Inbound call from [facility] (admissions) @ [address] @ Rehab. Patient's insurance has been verified and straightened out. Patient to go to Room 208. RN to call report to [phone]. RN to ask for [address] Writer to notify patient's daughter of bed availability. JEROME Azevedo            13:20PM Inbound call from [facility] (admissions) @ [facility]. Informed her billing/front office is still working on the issue of verification of health insurance. JEROME Azevedo          SW recv'd message from admissions coordinator @ [facility]. Message states awaiting verification of health insurance. D/C summary and MAR uploaded for accepting facility. Medicare 2nd IM letter prior to discharge.          JEROME Azevedo

## 2021-05-13 NOTE — PROGRESS NOTES
Physician Progress Note      PATIENT:               Sandy Powell  CSN #:                  [id number]  :                       1945  ADMIT DATE:       2021 2:21 PM  [address] [place] DATE:        5/10/2021 5:15 PM  RESPONDING  PROVIDER #:        David Sierra MD          QUERY TEXT:    Pt admitted with HTN URGENCY. Pt noted to have prior CVA . If possible, please document in progress notes and discharge summary if you are evaluating and/or treating any of the following: The medical record reflects the following:  Risk Factors: CVA , DM II, CKD STAGE 4, GOUT, COGNATIVE IMPAIRMENT, CHF, ANMEIA  Clinical Indicators: H&P: \"?CVA with chronic lacunar infarcts multiple and microvascular disease for MRI done February of this year. Seems to be secondary to her fluctuating blood pressure issues and ischemia\". MRI BRAIN 2/15/21: \"Acute lacunar infarct in the region of the right facial colliculus. \" BP on 21: 226/88, 230/89, 197/162. 5/2 Dr. Antonia Saldana PN: \" Active Problem list: CVA. \"  Treatment: HYDRALAZINE IV, CLONIDINE, LISINOPRIL, NORVASC, BP MONITORING, LAB MONITORING. Thank you,  HIPOLITO Santos, RN, CDI Specialist  [phone] or [email or web address]  Options provided:  -- Christine  is a sequela of prior CVA  -- Christine   is not a sequela of prior CVA  -- Other - I will add my own diagnosis  -- Disagree - Not applicable / Not valid  -- Disagree - Clinically unable to determine / Unknown  -- Refer to Clinical Documentation Reviewer    PROVIDER RESPONSE TEXT:    Christine  is not a sequela of prior CVA.     Query created by: Anthony Carter on 2021 12:06 PM      Electronically signed by:  David Sierra MD 2021 1:25 PM

## 2021-08-26 ENCOUNTER — OFFICE VISIT (OUTPATIENT)
Dept: ENT CLINIC | Age: 76
End: 2021-08-26
Payer: MEDICARE

## 2021-08-26 VITALS
OXYGEN SATURATION: 96 % | WEIGHT: 178 LBS | DIASTOLIC BLOOD PRESSURE: 78 MMHG | SYSTOLIC BLOOD PRESSURE: 148 MMHG | BODY MASS INDEX: 30.39 KG/M2 | TEMPERATURE: 97.2 F | HEART RATE: 65 BPM | HEIGHT: 64 IN | RESPIRATION RATE: 18 BRPM

## 2021-08-26 DIAGNOSIS — R09.89 PHLEGM IN THROAT: Primary | ICD-10-CM

## 2021-08-26 DIAGNOSIS — J31.0 CHRONIC RHINITIS: ICD-10-CM

## 2021-08-26 DIAGNOSIS — R09.82 POST-NASAL DRAINAGE: ICD-10-CM

## 2021-08-26 PROCEDURE — G8432 DEP SCR NOT DOC, RNG: HCPCS | Performed by: OTOLARYNGOLOGY

## 2021-08-26 PROCEDURE — 1090F PRES/ABSN URINE INCON ASSESS: CPT | Performed by: OTOLARYNGOLOGY

## 2021-08-26 PROCEDURE — G8536 NO DOC ELDER MAL SCRN: HCPCS | Performed by: OTOLARYNGOLOGY

## 2021-08-26 PROCEDURE — G9899 SCRN MAM PERF RSLTS DOC: HCPCS | Performed by: OTOLARYNGOLOGY

## 2021-08-26 PROCEDURE — 31575 DIAGNOSTIC LARYNGOSCOPY: CPT | Performed by: OTOLARYNGOLOGY

## 2021-08-26 PROCEDURE — G8400 PT W/DXA NO RESULTS DOC: HCPCS | Performed by: OTOLARYNGOLOGY

## 2021-08-26 PROCEDURE — G8427 DOCREV CUR MEDS BY ELIG CLIN: HCPCS | Performed by: OTOLARYNGOLOGY

## 2021-08-26 PROCEDURE — G8754 DIAS BP LESS 90: HCPCS | Performed by: OTOLARYNGOLOGY

## 2021-08-26 PROCEDURE — 3017F COLORECTAL CA SCREEN DOC REV: CPT | Performed by: OTOLARYNGOLOGY

## 2021-08-26 PROCEDURE — 1101F PT FALLS ASSESS-DOCD LE1/YR: CPT | Performed by: OTOLARYNGOLOGY

## 2021-08-26 PROCEDURE — G8419 CALC BMI OUT NRM PARAM NOF/U: HCPCS | Performed by: OTOLARYNGOLOGY

## 2021-08-26 PROCEDURE — 99204 OFFICE O/P NEW MOD 45 MIN: CPT | Performed by: OTOLARYNGOLOGY

## 2021-08-26 PROCEDURE — G8753 SYS BP > OR = 140: HCPCS | Performed by: OTOLARYNGOLOGY

## 2021-08-26 RX ORDER — FUROSEMIDE 40 MG/1
40 TABLET ORAL 2 TIMES DAILY
Status: ON HOLD | COMMUNITY
End: 2022-07-25

## 2021-08-26 RX ORDER — MONTELUKAST SODIUM 10 MG/1
10 TABLET ORAL DAILY
Status: ON HOLD | COMMUNITY
End: 2021-10-17

## 2021-08-26 RX ORDER — BETAMETHASONE DIPROPIONATE 0.5 MG/G
CREAM TOPICAL 2 TIMES DAILY
Status: ON HOLD | COMMUNITY
End: 2022-07-25

## 2021-08-26 RX ORDER — TRAZODONE HYDROCHLORIDE 100 MG/1
100 TABLET ORAL
Status: ON HOLD | COMMUNITY
End: 2022-07-20

## 2021-08-26 RX ORDER — CLOBETASOL PROPIONATE 0.5 MG/ML
LOTION TOPICAL 2 TIMES DAILY
Status: ON HOLD | COMMUNITY
End: 2022-07-25

## 2021-08-26 RX ORDER — IPRATROPIUM BROMIDE 21 UG/1
1 SPRAY, METERED NASAL
Qty: 30 ML | Refills: 1 | Status: SHIPPED | OUTPATIENT
Start: 2021-08-26 | End: 2021-09-24

## 2021-08-26 RX ORDER — KETOCONAZOLE 20 MG/ML
SHAMPOO TOPICAL DAILY PRN
Status: ON HOLD | COMMUNITY
End: 2022-07-25

## 2021-08-26 RX ORDER — AMOXICILLIN AND CLAVULANATE POTASSIUM 875; 125 MG/1; MG/1
1 TABLET, FILM COATED ORAL 2 TIMES DAILY
Qty: 20 TABLET | Refills: 0 | Status: SHIPPED | OUTPATIENT
Start: 2021-08-26 | End: 2021-09-05

## 2021-08-26 NOTE — LETTER
8/27/2021    Patient: Sha Marti   YOB: 1945   Date of Visit: 8/26/2021     Elinor Padron MD  [address] [facility] [address]  Via Fax: [phone]    Dear Elinor Padron MD,      Thank you for referring Ms. Sammie Garcia to [facility] EAR NOSE AND THROAT [address], THROAT AND ALLERGY CARE for evaluation. My notes for this consultation are attached. If you have questions, please do not hesitate to call me. I look forward to following your patient along with you.       Sincerely,    Ceci Betts MD

## 2021-08-26 NOTE — PROGRESS NOTES
Visit Vitals  BP (!) 148/78 (BP 1 Location: Right upper arm, BP Patient Position: Sitting, BP Cuff Size: Large adult)   Pulse 65   Temp 97.2 °F (36.2 °C) (Temporal)   Resp 18   Ht 5' 4\" (1.626 m)   Wt 178 lb (80.7 kg)   LMP  (LMP Unknown)   SpO2 96%   BMI 30.55 kg/m²     Chief Complaint   Patient presents with    New Patient     Referred by PCP for sinus drainage, mucous in throat and eyes.

## 2021-08-27 NOTE — PROGRESS NOTES
Otolaryngology-Head and Neck Surgery  New Patient Visit     Patient: Che Rivera  YOB: 1945  MRN: [id number]  Date of Service:   8/26/2021    Chief Complaint:   Chief Complaint   Patient presents with    New Patient     Referred by PCP for sinus drainage, mucous in throat and eyes. History of Present Illness: Che Rivera is a 78y.o. year old female who presents today for discussion of excess mucus. She's here with her daughter today who lives in [place]. Over the last few months has had nasal mucus, excess phlegm in her mouth and throat  Very thick  Taste seems altered  Notes drainage from eyes as well     Very bothersome    Has tried antihistamine, singulair, flonase without relief    No dysphagia, odynophagia.      Past Medical History:  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    [facility])     Chronic kidney disease     Chronic pain     SPINAL STENOSIS    DM [facility]diabetes mellitus) ([facility]     HTN (hypertension)     Stroke [facility])        Past Surgical History:   Past Surgical History:   Procedure Laterality Date    HX OOPHORECTOMY      [facility]  2001,2002    [facility]       Medications:   Current Outpatient Medications   Medication Instructions    acetaminophen (TYLENOL) 650 mg, Oral, EVERY 6 HOURS AS NEEDED    albuterol (PROVENTIL HFA, VENTOLIN HFA, PROAIR HFA) 90 mcg/actuation inhaler 2 Puffs, Inhalation, EVERY 4 HOURS AS NEEDED    allopurinoL (ZYLOPRIM) 100 mg tablet allopurinol 100 mg tablet    amLODIPine (NORVASC) 10 mg, Oral, DAILY    amoxicillin-clavulanate (Augmentin) 875-125 mg per tablet 1 Tablet, Oral, 2 TIMES DAILY    aspirin 81 mg, Oral, DAILY    atorvastatin (LIPITOR) 40 mg tablet atorvastatin 40 mg tablet    augmented betamethasone dipropionate (DIPROLENE-AF) 0.05 % topical cream Topical, 2 TIMES DAILY    carvediloL (COREG) 25 mg, Oral, 2 TIMES DAILY WITH MEALS    cholecalciferol (vitamin D3) 2,000 Units, Oral, DAILY    clobetasoL (CLOBEX) 0.05 % lotion Topical, 2 TIMES DAILY    cyanocobalamin, vitamin B-12, 3,000 mcg cap cyanocobalamin (vitamin B-12) 3,000 mcg capsule    donepeziL (ARICEPT) 5 mg, Oral, DAILY    doxazosin (CARDURA) 2 mg, Oral, EVERY BEDTIME    FeroSuL 325 mg, Oral, DAILY    fluticasone propion-salmeterol (ADVAIR HFA) 115-21 mcg/actuation inhaler 2 Puffs, Inhalation, 2 TIMES DAILY    furosemide (LASIX) 40 mg tablet Oral, DAILY    hydrALAZINE (APRESOLINE) 100 mg, Oral, 3 TIMES DAILY    insulin lispro (HUMALOG) 100 unit/mL injection INITIATE CORRECTIVE INSULIN PROTOCOL (EDWARD):<BR>RX EDWARD Normal Sensitivity (Average weight)<BR><BR>AC (before meals), Q6H, and Q4H CORRECTIONAL SCALE only For Blood Sugar (mg/dl) of :           <BR>140-199=2 units          <BR>200-249=3 units<BR>250-299=5 units<BR>300-349=7 units<BR>350 or greater = Call MD<BR>Give in addition to basal medications. <BR>Do Not Hold for NPO<BR><BR>BEDTIME CORRECTIONAL sliding scale when scheduled:<BR>200-249=2 units<BR>250-299=3 units <BR>300-349=4 units<BR>350 or greater = Call MD<BR>Give in addition to basal medications. <BR>Do Not Hold for NPO Fast Acting - Administer Immediately - or within 15 minutes of start of meal, if mealtime coverage.     ipratropium (ATROVENT) 21 mcg (0.03 %) nasal spray 1 Spray, Both Nostrils, EVERY 12 HOURS AS NEEDED    ketoconazole (NIZORAL) 2 % shampoo Topical, DAILY PRN    lidocaine (LIDODERM) 5 % 1 Patch, TransDERmal, DAILY    lisinopriL (PRINIVIL, ZESTRIL) 40 mg, Oral, DAILY    loratadine 10 mg cap Oral    montelukast (SINGULAIR) 10 mg, Oral, DAILY    nitroglycerin (NITRODUR) 0.2 mg/hr 1 Patch, TransDERmal, DAILY    sodium bicarbonate 650 mg, Oral, 2 TIMES DAILY    traZODone (DESYREL) 100 mg, Oral, EVERY BEDTIME       Allergies:   No Known Allergies    Social History:   Social History     Tobacco Use    Smoking status: Former Smoker    Smokeless tobacco: Never Used    Tobacco comment: quit 1972   Substance Use Topics    Alcohol use: No    Drug use: No        Family History:  Family History   Problem Relation Age of Onset    Breast Cancer Maternal Aunt     Breast Cancer Cousin        Review of Systems:    Consitutional: denies fever, excessive weight gain or loss. Eyes: denies diplopia, eye pain. Integumentary: denies new concerning skin lesions. Ears, Nose, Mouth, Throat: denies except as per HPI. Endocrine: denies hot or cold intolerance, increased thirst.  Respiratory: denies cough, hemoptysis, wheezing  Gastrointestinal: denies trouble swallowing, nausea, emesis, regurgitation  Musculoskeletal: denies muscle weakness or wasting  Cardiovascular: denies chest pain, shortness of breath  Neurologic: denies seizures, numbness or tingling, syncope  Hematologic: denies easy bleeding or bruising    Physical Examination:   Vitals:    08/26/21 1511   BP: (!) 148/78   BP 1 Location: Right upper arm   BP Patient Position: Sitting   BP Cuff Size: Large adult   Pulse: 65   Temp: 97.2 °F (36.2 °C)   TempSrc: Temporal   Resp: 18   Height: 5' 4\" (1.626 m)   Weight: 178 lb (80.7 kg)   SpO2: 96%        General: Comfortable, pleasant, appears stated age  Voice: Strong, speaking in full sentences, no stridor    Face: No masses or lesions, facial strength symmetric   Ears: External ears unremarkable. Bilateral ear canal clear. Tympanic membrane clear and intact, with visible landmarks. Clear middle ear space  Nose: External nose unremarkable. Dorsum midline. Anterior rhinoscopy demonstrates no lesions. Septum midline. Turbinates without hypertrophy. Oral Cavity / Oropharynx: No trismus. Mucosa pink and moist. No lesions. Tongue is midline and mobile. Palate elevates symmetrically. Uvula midline. Tonsils unremarkable. Base of tongue soft. Floor of mouth soft. Neck: Supple. No adenopathy. Thyroid unremarkable. Palpable laryngeal landmarks.  Full neck range of motion   Neurologic: CN II - XI intact. Normal gait    PROCEDURE: FLEXIBLE FIBEROPTIC LARYNGOSCOPY    Preoperative Diagnosis:  Phlegm in throat      Postoperative Diagnosis: Same    Procedure: Flexible Fiberoptic Laryngoscopy    Anesthesia: Topical 4% Lidocaine, Oxymetazoline    Description of Procedure: Verbal informed consent was obtained. After application of topical anesthetic and decongestant, the flexible fiberoptic endoscope was introduced to the patient's nare. It was passed through the nose and into the pharynx. The scope was then withdrawn and repeated on the opposing nare. The patient tolerated the procedure well. Findings: Normal nasal cavity, no polyposis or purulence. Mild clear post nasal drip. Middle meatus clear bilaterally. Nasopharynx without lesions. Base of tongue, vallecula & epiglottis unremarkable. Thick yellow green mucus pooling in R > L pyriform, does not clear well with incomplete visualization of mucosa. Vocal folds without lesions. Normal mobility. Visualized subglottis appears patent. Assessment and Plan:   1. Excess phlegm in throat  2. Post nasal drip  - Scope exam with pooling of infected appearing secretions in hypophaynx  - Etiology of this unclear  - Add atrovent nasal spray  - Use humidification  - Use plain mucinex  - Add doxycycline  - Follow up in 1 month for recheck and likely repeat scope      The patient was instructed to return to clinic if no improvement or progression of symptoms. Signs to watch out for reviewed.       MD Hailey Hyatt [facility]  [address] [place] [address] 6  [facility]  Office Phone: [phone]

## 2021-09-22 ENCOUNTER — TRANSCRIBE ORDER (OUTPATIENT)
Dept: SCHEDULING | Age: 76
End: 2021-09-22

## 2021-09-22 DIAGNOSIS — Z12.31 VISIT FOR SCREENING MAMMOGRAM: Primary | ICD-10-CM

## 2021-09-24 RX ORDER — IPRATROPIUM BROMIDE 21 UG/1
1 SPRAY, METERED NASAL
Qty: 30 ML | Refills: 1 | Status: SHIPPED | OUTPATIENT
Start: 2021-09-24 | End: 2021-10-01 | Stop reason: ALTCHOICE

## 2021-10-01 ENCOUNTER — OFFICE VISIT (OUTPATIENT)
Dept: ENT CLINIC | Age: 76
End: 2021-10-01
Payer: MEDICARE

## 2021-10-01 VITALS
HEART RATE: 60 BPM | DIASTOLIC BLOOD PRESSURE: 70 MMHG | RESPIRATION RATE: 18 BRPM | OXYGEN SATURATION: 99 % | SYSTOLIC BLOOD PRESSURE: 120 MMHG | TEMPERATURE: 97.2 F

## 2021-10-01 DIAGNOSIS — R09.82 POST-NASAL DRAINAGE: ICD-10-CM

## 2021-10-01 DIAGNOSIS — R09.89 PHLEGM IN THROAT: Primary | ICD-10-CM

## 2021-10-01 DIAGNOSIS — J31.0 CHRONIC RHINITIS: ICD-10-CM

## 2021-10-01 PROCEDURE — G9899 SCRN MAM PERF RSLTS DOC: HCPCS | Performed by: OTOLARYNGOLOGY

## 2021-10-01 PROCEDURE — G8752 SYS BP LESS 140: HCPCS | Performed by: OTOLARYNGOLOGY

## 2021-10-01 PROCEDURE — G8400 PT W/DXA NO RESULTS DOC: HCPCS | Performed by: OTOLARYNGOLOGY

## 2021-10-01 PROCEDURE — G8432 DEP SCR NOT DOC, RNG: HCPCS | Performed by: OTOLARYNGOLOGY

## 2021-10-01 PROCEDURE — G8754 DIAS BP LESS 90: HCPCS | Performed by: OTOLARYNGOLOGY

## 2021-10-01 PROCEDURE — 3017F COLORECTAL CA SCREEN DOC REV: CPT | Performed by: OTOLARYNGOLOGY

## 2021-10-01 PROCEDURE — G8417 CALC BMI ABV UP PARAM F/U: HCPCS | Performed by: OTOLARYNGOLOGY

## 2021-10-01 PROCEDURE — G8536 NO DOC ELDER MAL SCRN: HCPCS | Performed by: OTOLARYNGOLOGY

## 2021-10-01 PROCEDURE — 99214 OFFICE O/P EST MOD 30 MIN: CPT | Performed by: OTOLARYNGOLOGY

## 2021-10-01 PROCEDURE — 1090F PRES/ABSN URINE INCON ASSESS: CPT | Performed by: OTOLARYNGOLOGY

## 2021-10-01 PROCEDURE — G8427 DOCREV CUR MEDS BY ELIG CLIN: HCPCS | Performed by: OTOLARYNGOLOGY

## 2021-10-01 PROCEDURE — 1101F PT FALLS ASSESS-DOCD LE1/YR: CPT | Performed by: OTOLARYNGOLOGY

## 2021-10-01 RX ORDER — NYSTATIN 100000 [USP'U]/ML
1 SUSPENSION ORAL 4 TIMES DAILY
Qty: 200 ML | Refills: 0 | Status: SHIPPED | OUTPATIENT
Start: 2021-10-01 | End: 2021-10-11

## 2021-10-01 RX ORDER — IPRATROPIUM BROMIDE 21 UG/1
1 SPRAY, METERED NASAL 2 TIMES DAILY
Qty: 30 ML | Refills: 1 | Status: SHIPPED | OUTPATIENT
Start: 2021-10-01 | End: 2021-10-26

## 2021-10-01 NOTE — PROGRESS NOTES
Otolaryngology-Head and Neck Surgery  Follow Up Patient Visit     Patient: Domingo Chacon  YOB: 1945  MRN: [id number]  Date of Service:   10/1/2021    Chief Complaint:   Chief Complaint   Patient presents with    Follow-up     Recheck Phlegm in throat, PND, and chronic cough       Interval hx:  Presents with her daugther again  Completed antibiotic, using atrovent nasal spray once nightly and taking mucinex  Has not noticed any changes    Also per daughter, dx with neurocognitive changes - has upcoming appt       History of Present Illness: Domingo Chacon is a 78y.o. year old female who presents today for discussion of excess mucus. She's here with her daughter today who lives in [place]. Over the last few months has had nasal mucus, excess phlegm in her mouth and throat  Very thick  Taste seems altered  Notes drainage from eyes as well     Very bothersome    Has tried antihistamine, singulair, flonase without relief    No dysphagia, odynophagia.      Past Medical History:  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    CHF (congestive heart failure) ([facility])     Chronic kidney disease     Chronic pain     SPINAL STENOSIS    DM (diabetes mellitus) ([facility])     HTN (hypertension)     Stroke [facility])        Past Surgical History:   Past Surgical History:   Procedure Laterality Date    HX OOPHORECTOMY      STENT [facility]ION  2001,2002    [facility]       Medications:   Current Outpatient Medications   Medication Instructions    acetaminophen (TYLENOL) 650 mg, Oral, EVERY 6 HOURS AS NEEDED    albuterol (PROVENTIL HFA, VENTOLIN HFA, PROAIR HFA) 90 mcg/actuation inhaler 2 Puffs, Inhalation, EVERY 4 HOURS AS NEEDED    allopurinoL (ZYLOPRIM) 100 mg tablet allopurinol 100 mg tablet    amLODIPine (NORVASC) 10 mg, Oral, DAILY    aspirin 81 mg, Oral, DAILY    atorvastatin (LIPITOR) 40 mg tablet atorvastatin 40 mg tablet    augmented betamethasone dipropionate (DIPROLENE-AF) 0.05 % topical cream Topical, 2 TIMES DAILY    carvediloL (COREG) 25 mg, Oral, 2 TIMES DAILY WITH MEALS    cholecalciferol (vitamin D3) 2,000 Units, Oral, DAILY    clobetasoL (CLOBEX) 0.05 % lotion Topical, 2 TIMES DAILY    cyanocobalamin, vitamin B-12, 3,000 mcg cap cyanocobalamin (vitamin B-12) 3,000 mcg capsule    donepeziL (ARICEPT) 5 mg, Oral, DAILY    doxazosin (CARDURA) 2 mg, Oral, EVERY BEDTIME    FeroSuL 325 mg, Oral, DAILY    fluticasone propion-salmeterol (ADVAIR HFA) 115-21 mcg/actuation inhaler 2 Puffs, Inhalation, 2 TIMES DAILY    furosemide (LASIX) 40 mg tablet Oral, DAILY    hydrALAZINE (APRESOLINE) 100 mg, Oral, 3 TIMES DAILY    insulin lispro (HUMALOG) 100 unit/mL injection INITIATE CORRECTIVE INSULIN PROTOCOL (EDWARD):RX EDWARD Normal Sensitivity (Average weight)AC (before meals), Q6H, and Q4H CORRECTIONAL SCALE only For Blood Sugar (mg/dl) of :           140-199=2 units          200-249=3 [id number] [id number]=3 [id number] or greater = Call MDGive in addition to basal medications. Do Not Hold for NPOBEDTIME CORRECTIONAL sliding scale when scheduled:200-249=2 [id number]=7 units 300-349=4 [id number] or greater = Call MDGive in addition to basal medications. Do Not Hold for NPO Fast Acting - Administer Immediately - or within 15 minutes of start of meal, if mealtime coverage.     ipratropium (ATROVENT) 21 mcg (0.03 %) nasal spray 1 Spray, Both Nostrils, EVERY 12 HOURS AS NEEDED    ketoconazole (NIZORAL) 2 % shampoo Topical, DAILY PRN    lidocaine (LIDODERM) 5 % 1 Patch, TransDERmal, DAILY    lisinopriL (PRINIVIL, ZESTRIL) 40 mg, Oral, DAILY    loratadine 10 mg cap Oral    montelukast (SINGULAIR) 10 mg, Oral, DAILY    nitroglycerin (NITRODUR) 0.2 mg/hr 1 Patch, TransDERmal, DAILY    sodium bicarbonate 650 mg, Oral, 2 TIMES DAILY    traZODone (DESYREL) 100 mg, Oral, EVERY BEDTIME       Allergies:   No Known Allergies    Social History:   Social History     Tobacco Use    Smoking status: Former Smoker    Smokeless tobacco: Never Used    Tobacco comment: quit 1972   Substance Use Topics    Alcohol use: No    Drug use: No        Family History:  Family History   Problem Relation Age of Onset    Breast Cancer Maternal Aunt     Breast Cancer Cousin        Review of Systems:    Consitutional: denies fever, excessive weight gain or loss. Eyes: denies diplopia, eye pain. Integumentary: denies new concerning skin lesions. Ears, Nose, Mouth, Throat: denies except as per HPI. Endocrine: denies hot or cold intolerance, increased thirst.  Respiratory: denies cough, hemoptysis, wheezing  Gastrointestinal: denies trouble swallowing, nausea, emesis, regurgitation  Musculoskeletal: denies muscle weakness or wasting  Cardiovascular: denies chest pain, shortness of breath  Neurologic: denies seizures, numbness or tingling, syncope  Hematologic: denies easy bleeding or bruising    Physical Examination:   Vitals:    10/01/21 1315   BP: 120/70   BP 1 Location: Right upper arm   BP Patient Position: Sitting   BP Cuff Size: Adult   Pulse: 60   Temp: 97.2 °F (36.2 °C)   TempSrc: Temporal   Resp: 18   SpO2: 99%        General: Comfortable, pleasant, appears stated age  Voice: Strong, speaking in full sentences, no stridor    Face: No masses or lesions, facial strength symmetric   Ears: External ears unremarkable. Bilateral ear canal clear. Tympanic membrane clear and intact, with visible landmarks. Clear middle ear space  Nose: External nose unremarkable. Dorsum midline. Anterior rhinoscopy demonstrates no lesions. Septum midline. Turbinates without hypertrophy. Oral Cavity / Oropharynx: No trismus. Mucosa pink and moist. No lesions. Tongue is midline and mobile. Palate elevates symmetrically. Uvula midline. Tonsils unremarkable. Base of tongue soft. Floor of mouth soft. Neck: Supple. No adenopathy. Thyroid unremarkable. Palpable laryngeal landmarks.  Full neck range of motion   Neurologic: CN II - XI intact. Normal gait    PROCEDURE: FLEXIBLE FIBEROPTIC LARYNGOSCOPY    Preoperative Diagnosis:  Phlegm in throat      Postoperative Diagnosis: Same    Procedure: Flexible Fiberoptic Laryngoscopy    Anesthesia: Topical 4% Lidocaine, Oxymetazoline    Description of Procedure: Verbal informed consent was obtained. After application of topical anesthetic and decongestant, the flexible fiberoptic endoscope was introduced to the patient's nare. It was passed through the nose and into the pharynx. The scope was then withdrawn and repeated on the opposing nare. The patient tolerated the procedure well. Findings: Normal nasal cavity, no polyposis or purulence. Mild clear post nasal drip. Middle meatus clear bilaterally. Nasopharynx without lesions. Base of tongue, vallecula & epiglottis unremarkable. Tremendous improvement in amount of mucus noted, much better than prior. Vocal folds without lesions. Normal mobility. Visualized subglottis appears patent. Recent CT head reviewed from Feb 2021 without really much sinusitis etc to account for pt symptoms      Assessment and Plan:   1. Excess phlegm in throat  2. Post nasal drip  - Her scope exam is much better today  - No longer phlegm etc pooling in pharynx  - She does not feel any better though I'm not sure etiology of these symptoms  - Prior CT head does not show significant sinus disease  - Repeat scope does not show much mucus from nose  - Oral exam is also largely benign  - Will increase Atrovent to BID, stop the other sprays   - Add nystatin swish/spit in case of oral candida etc creating dry sensation in mouth   - If there's any swallowing disorder causing pooling of secretions, that may be part of this neurocognitive process, so mention it at upcoming neurology appt  - Could consider swallow eval    The patient was instructed to return to clinic if no improvement or progression of symptoms. Signs to watch out for reviewed.       MD Allyn Chan [address] [facility]  [address] [place] [address] [address]  [facility]  Office Phone: [phone]

## 2021-10-01 NOTE — LETTER
10/1/2021    Patient: April Hdez   YOB: 1945   Date of Visit: 10/1/2021     Mia Cash MD  [address] [facility] [address]  Via Fax: [phone]    Dear Mia Cash MD,      Thank you for referring Ms. Leti Armstrong to [facility] EAR NOSE AND THROAT [address], THROAT AND ALLERGY CARE for evaluation. My notes for this consultation are attached. If you have questions, please do not hesitate to call me. I look forward to following your patient along with you.       Sincerely,    Iman Ba MD

## 2021-10-01 NOTE — PROGRESS NOTES
Visit Vitals  /70 (BP 1 Location: Right upper arm, BP Patient Position: Sitting, BP Cuff Size: Adult)   Pulse 60   Temp 97.2 °F (36.2 °C) (Temporal)   Resp 18   LMP  (LMP Unknown)   SpO2 99%     Chief Complaint   Patient presents with    Follow-up     Recheck Phlegm in throat, PND, and chronic cough

## 2021-10-15 ENCOUNTER — APPOINTMENT (OUTPATIENT)
Dept: MRI IMAGING | Age: 76
DRG: 065 | End: 2021-10-15
Attending: EMERGENCY MEDICINE
Payer: MEDICARE

## 2021-10-15 ENCOUNTER — HOSPITAL ENCOUNTER (EMERGENCY)
Age: 76
Discharge: ANOTHER HOSPITAL | End: 2021-10-15
Attending: EMERGENCY MEDICINE | Admitting: EMERGENCY MEDICINE
Payer: MEDICARE

## 2021-10-15 ENCOUNTER — APPOINTMENT (OUTPATIENT)
Dept: GENERAL RADIOLOGY | Age: 76
End: 2021-10-15
Attending: EMERGENCY MEDICINE
Payer: MEDICARE

## 2021-10-15 ENCOUNTER — HOSPITAL ENCOUNTER (INPATIENT)
Age: 76
LOS: 4 days | Discharge: NURSING, REHAB OR LONG-TERM CARE | DRG: 065 | End: 2021-10-20
Attending: EMERGENCY MEDICINE | Admitting: STUDENT IN AN ORGANIZED HEALTH CARE EDUCATION/TRAINING PROGRAM
Payer: MEDICARE

## 2021-10-15 ENCOUNTER — APPOINTMENT (OUTPATIENT)
Dept: CT IMAGING | Age: 76
End: 2021-10-15
Attending: EMERGENCY MEDICINE
Payer: MEDICARE

## 2021-10-15 VITALS
OXYGEN SATURATION: 96 % | SYSTOLIC BLOOD PRESSURE: 151 MMHG | RESPIRATION RATE: 18 BRPM | HEART RATE: 75 BPM | TEMPERATURE: 99.4 F | HEIGHT: 64 IN | DIASTOLIC BLOOD PRESSURE: 47 MMHG | BODY MASS INDEX: 30.39 KG/M2 | WEIGHT: 178 LBS

## 2021-10-15 DIAGNOSIS — I63.81 LACUNAR INFARCT, ACUTE (HCC): Primary | ICD-10-CM

## 2021-10-15 DIAGNOSIS — I63.9 CEREBROVASCULAR ACCIDENT (CVA), UNSPECIFIED MECHANISM (HCC): Primary | ICD-10-CM

## 2021-10-15 LAB
ALBUMIN SERPL-MCNC: 3.1 G/DL (ref 3.5–5)
ALBUMIN/GLOB SERPL: 1.1 {RATIO} (ref 1.1–2.2)
ALP SERPL-CCNC: 49 U/L (ref 45–117)
ALT SERPL-CCNC: 9 U/L (ref 12–78)
ANION GAP SERPL CALC-SCNC: 9 MMOL/L (ref 5–15)
APPEARANCE UR: CLEAR
AST SERPL W P-5'-P-CCNC: 13 U/L (ref 15–37)
ATRIAL RATE: 69 BPM
BACTERIA URNS QL MICRO: ABNORMAL /HPF
BASOPHILS # BLD: 0.2 K/UL (ref 0–0.2)
BASOPHILS NFR BLD: 3 % (ref 0–2.5)
BILIRUB SERPL-MCNC: 0.4 MG/DL (ref 0.2–1)
BILIRUB UR QL: NEGATIVE
BUN SERPL-MCNC: 58 MG/DL (ref 6–20)
BUN/CREAT SERPL: 11 (ref 12–20)
CA-I BLD-MCNC: 8.9 MG/DL (ref 8.5–10.1)
CALCULATED P AXIS, ECG09: 13 DEGREES
CALCULATED R AXIS, ECG10: 14 DEGREES
CALCULATED T AXIS, ECG11: 99 DEGREES
CHLORIDE SERPL-SCNC: 104 MMOL/L (ref 97–108)
CO2 SERPL-SCNC: 29 MMOL/L (ref 21–32)
COLOR UR: ABNORMAL
CREAT SERPL-MCNC: 5.49 MG/DL (ref 0.55–1.02)
DIAGNOSIS, 93000: NORMAL
EOSINOPHIL # BLD: 0.1 K/UL (ref 0–0.7)
EOSINOPHIL NFR BLD: 1 % (ref 0.9–2.9)
ERYTHROCYTE [DISTWIDTH] IN BLOOD BY AUTOMATED COUNT: 15.5 % (ref 11.5–14.5)
GLOBULIN SER CALC-MCNC: 2.9 G/DL (ref 2–4)
GLUCOSE SERPL-MCNC: 112 MG/DL (ref 65–100)
GLUCOSE UR STRIP.AUTO-MCNC: NEGATIVE MG/DL
HCT VFR BLD AUTO: 24.6 % (ref 36–46)
HGB BLD-MCNC: 7.9 G/DL (ref 13.5–17.5)
HGB UR QL STRIP: NEGATIVE
INR PPP: 1.1 (ref 0.9–1.1)
KETONES UR QL STRIP.AUTO: NEGATIVE MG/DL
LEUKOCYTE ESTERASE UR QL STRIP.AUTO: NEGATIVE
LYMPHOCYTES # BLD: 1.1 K/UL (ref 1–4.8)
LYMPHOCYTES NFR BLD: 16 % (ref 20.5–51.1)
MAGNESIUM SERPL-MCNC: 2.2 MG/DL (ref 1.6–2.4)
MCH RBC QN AUTO: 27.9 PG (ref 31–34)
MCHC RBC AUTO-ENTMCNC: 32.1 G/DL (ref 31–36)
MCV RBC AUTO: 86.9 FL (ref 80–100)
MONOCYTES # BLD: 0.4 K/UL (ref 0.2–2.4)
MONOCYTES NFR BLD: 6 % (ref 1.7–9.3)
NEUTS SEG # BLD: 5 K/UL (ref 1.8–7.7)
NEUTS SEG NFR BLD: 74 % (ref 42–75)
NITRITE UR QL STRIP.AUTO: NEGATIVE
NRBC # BLD: 0.01 K/UL
NRBC BLD-RTO: 0.1 PER 100 WBC
P-R INTERVAL, ECG05: 198 MS
PH UR STRIP: 7 [PH] (ref 5–8)
PLATELET # BLD AUTO: 213 K/UL (ref 150–400)
PMV BLD AUTO: 8.8 FL (ref 6.5–11.5)
POTASSIUM SERPL-SCNC: 3.9 MMOL/L (ref 3.5–5.1)
PROT SERPL-MCNC: 6 G/DL (ref 6.4–8.2)
PROT UR STRIP-MCNC: >300 MG/DL
PROTHROMBIN TIME: 11.3 SEC (ref 9–11.1)
Q-T INTERVAL, ECG07: 427 MS
QRS DURATION, ECG06: 97 MS
QTC CALCULATION (BEZET), ECG08: 461 MS
RBC # BLD AUTO: 2.83 M/UL (ref 4.5–5.9)
RBC #/AREA URNS HPF: ABNORMAL /HPF (ref 0–3)
SODIUM SERPL-SCNC: 142 MMOL/L (ref 136–145)
SP GR UR REFRACTOMETRY: 1.02 (ref 1–1.03)
TROPONIN-HIGH SENSITIVITY: 27 NG/L (ref 0–51)
UROBILINOGEN UR QL STRIP.AUTO: 0.2 EU/DL (ref 0.2–1)
VENTRICULAR RATE, ECG03: 70 BPM
WBC # BLD AUTO: 6.8 K/UL (ref 4.4–11.3)
WBC URNS QL MICRO: ABNORMAL /HPF (ref 0–5)

## 2021-10-15 PROCEDURE — 70450 CT HEAD/BRAIN W/O DYE: CPT

## 2021-10-15 PROCEDURE — 70544 MR ANGIOGRAPHY HEAD W/O DYE: CPT

## 2021-10-15 PROCEDURE — 71045 X-RAY EXAM CHEST 1 VIEW: CPT

## 2021-10-15 PROCEDURE — 84484 ASSAY OF TROPONIN QUANT: CPT

## 2021-10-15 PROCEDURE — 80053 COMPREHEN METABOLIC PANEL: CPT

## 2021-10-15 PROCEDURE — 36415 COLL VENOUS BLD VENIPUNCTURE: CPT

## 2021-10-15 PROCEDURE — 74011250636 HC RX REV CODE- 250/636: Performed by: EMERGENCY MEDICINE

## 2021-10-15 PROCEDURE — 83735 ASSAY OF MAGNESIUM: CPT

## 2021-10-15 PROCEDURE — 99285 EMERGENCY DEPT VISIT HI MDM: CPT

## 2021-10-15 PROCEDURE — 85025 COMPLETE CBC W/AUTO DIFF WBC: CPT

## 2021-10-15 PROCEDURE — 70551 MRI BRAIN STEM W/O DYE: CPT

## 2021-10-15 PROCEDURE — 70547 MR ANGIOGRAPHY NECK W/O DYE: CPT

## 2021-10-15 PROCEDURE — 93005 ELECTROCARDIOGRAM TRACING: CPT

## 2021-10-15 PROCEDURE — 85610 PROTHROMBIN TIME: CPT

## 2021-10-15 PROCEDURE — 81001 URINALYSIS AUTO W/SCOPE: CPT

## 2021-10-15 RX ADMIN — SODIUM CHLORIDE 1000 ML: 9 INJECTION, SOLUTION INTRAVENOUS at 14:30

## 2021-10-15 RX ADMIN — SODIUM CHLORIDE 1000 ML: 9 INJECTION, SOLUTION INTRAVENOUS at 16:15

## 2021-10-15 NOTE — ED PROVIDER NOTES
EMERGENCY DEPARTMENT HISTORY AND PHYSICAL EXAM      Date: 10/15/2021  Patient Name: Domingo Chacon      History of Presenting Illness     Chief Complaint   Patient presents with    Altered mental status       History Provided By: Caregiver    HPI: Domingo Chacon, 78 y.o. female with a past medical history significant diabetes, hypertension, stroke and renal insufficiency presents to the ED with cc of slurred speech and weakness with inability for patient to utilize her walker this morning patient was last seen well by her caregiver yesterday at 1830 and this morning at 0930 hrs. when the caregiver came to the house patient had slurred speech increase decrease in her vision and inability to get out of bed and use her walker    There are no other complaints, changes, or physical findings at this time. PCP: Salome Aceves MD    Current Outpatient Medications   Medication Sig Dispense Refill    ipratropium (ATROVENT) 21 mcg (0.03 %) nasal spray 1 [place] by Both Nostrils route two (2) times a day. Indications: runny nose 30 mL 1    furosemide (LASIX) 40 mg tablet Take  by mouth daily.  loratadine 10 mg cap Take  by mouth.  montelukast (Singulair) 10 mg tablet Take 10 mg by mouth daily.  traZODone (DESYREL) 100 mg tablet Take 100 mg by mouth nightly.  augmented betamethasone dipropionate (DIPROLENE-AF) 0.05 % topical cream Apply  to affected area two (2) times a day.  clobetasoL (CLOBEX) 0.05 % lotion Apply  to affected area two (2) times a day.  ketoconazole (NIZORAL) 2 % shampoo Apply  to affected area daily as needed for Itching.  hydrALAZINE (APRESOLINE) 100 mg tablet Take 1 Tab by mouth three (3) times daily. 90 Tab 0    acetaminophen (TYLENOL) 325 mg tablet Take 2 Tabs by mouth every six (6) hours as needed for Pain or Fever. 60 Tab 0    carvediloL (COREG) 25 mg tablet Take 1 Tab by mouth two (2) times daily (with meals).  60 Tab 0    nitroglycerin (NITRODUR) 0.2 mg/hr 1 Patch by TransDERmal route daily. 3 Patch 0    insulin lispro (HUMALOG) 100 unit/mL injection INITIATE CORRECTIVE INSULIN PROTOCOL (EDWARD):  RX EDWARD Normal Sensitivity (Average weight)    AC (before meals), Q6H, and Q4H CORRECTIONAL SCALE only For Blood Sugar (mg/dl) of :             140-199=2 units            200-249=3 units  250-299=5 units  300-349=7 units  350 or greater = Call MD  Give in addition to basal medications. Do Not Hold for NPO    BEDTIME CORRECTIONAL sliding scale when scheduled:  200-249=2 units  250-299=3 units   300-349=4 units  350 or greater = Call MD  Give in addition to basal medications. Do Not Hold for NPO Fast Acting - Administer Immediately - or within 15 minutes of start of meal, if mealtime coverage. 1 Vial 0    lidocaine (LIDODERM) 5 % 1 Patch by TransDERmal route daily.  albuterol (PROVENTIL HFA, VENTOLIN HFA, PROAIR HFA) 90 mcg/actuation inhaler Take 2 Puffs by inhalation every four (4) hours as needed for Shortness of Breath.  amLODIPine (NORVASC) 10 mg tablet Take 10 mg by mouth daily.  cholecalciferol, vitamin D3, 50 mcg (2,000 unit) tab Take 2,000 Units by mouth daily.  FeroSuL 325 mg (65 mg iron) tablet Take 325 mg by mouth daily.  donepeziL (ARICEPT) 5 mg tablet Take 5 mg by mouth daily.  lisinopriL (PRINIVIL, ZESTRIL) 40 mg tablet Take 40 mg by mouth daily.  aspirin 81 mg chewable tablet Take 81 mg by mouth daily.  doxazosin (CARDURA) 2 mg tablet Take 2 mg by mouth nightly.  cyanocobalamin, vitamin B-12, 3,000 mcg cap cyanocobalamin (vitamin B-12) 3,000 mcg capsule      allopurinoL (ZYLOPRIM) 100 mg tablet allopurinol 100 mg tablet      atorvastatin (LIPITOR) 40 mg tablet atorvastatin 40 mg tablet      fluticasone propion-salmeterol (ADVAIR HFA) 115-21 mcg/actuation inhaler Take 2 Puffs by inhalation two (2) times a day.  sodium bicarbonate 650 mg tablet Take 650 mg by mouth two (2) times a day.          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)        Past Surgical History:  Past Surgical History:   Procedure Laterality Date    HX OOPHORECTOMY      STENT INSERTION  2001,2002    Jazmyn Danitabranden       Family History:  Family History   Problem Relation Age of Onset    Breast Cancer Maternal Aunt     Breast Cancer Cousin        Social History:  Social History     Tobacco Use    Smoking status: Former Smoker    Smokeless tobacco: Never Used    Tobacco comment: quit 1972   Substance Use Topics    Alcohol use: No    Drug use: No       Allergies:  No Known Allergies      Review of Systems     Review of Systems   Constitutional: Negative for chills and fever. HENT: Negative for facial swelling and sore throat. Eyes: Positive for visual disturbance. Negative for pain. Respiratory: Negative for cough and shortness of breath. Cardiovascular: Negative for chest pain and leg swelling. Gastrointestinal: Negative for abdominal pain and vomiting. Endocrine: Negative for polydipsia and polyuria. Genitourinary: Negative for dysuria and frequency. Musculoskeletal: Negative for back pain and neck pain. Skin: Negative for color change and pallor. Neurological: Positive for speech difficulty and weakness. Negative for dizziness, seizures, facial asymmetry and headaches. Psychiatric/Behavioral: Negative. Physical Exam     Physical Exam  Vitals and nursing note reviewed. Constitutional:       General: She is in acute distress. Appearance: She is obese. She is ill-appearing. She is not toxic-appearing or diaphoretic. HENT:      Head: Normocephalic and atraumatic.       Mouth/Throat:      Mouth: Mucous membranes are moist.      Pharynx: Oropharynx is clear.   Eyes:      General: Visual field deficit present. Extraocular Movements: Extraocular movements intact. Pupils: Pupils are equal, round, and reactive to light. Right eye: Pupil is round and reactive. Left eye: Pupil is round and reactive. Cardiovascular:      Rate and Rhythm: Normal rate and regular rhythm. Heart sounds: Normal heart sounds. Pulmonary:      Effort: Pulmonary effort is normal.      Breath sounds: Normal breath sounds. Abdominal:      General: Bowel sounds are normal.      Palpations: Abdomen is soft. Musculoskeletal:         General: Normal range of motion. Left upper arm: Swelling and tenderness present. Cervical back: Normal range of motion and neck supple. Comments: Left upper extremity surgical site for catheter clean and intact   Skin:     General: Skin is warm and dry. Capillary Refill: Capillary refill takes less than 2 seconds. Neurological:      Mental Status: She is alert. She is disoriented. Cranial Nerves: Cranial nerve deficit and dysarthria present. No facial asymmetry. Sensory: Sensory deficit present. Motor: No weakness.       Coordination: Coordination normal.   Psychiatric:         Mood and Affect: Mood normal.         Speech: Speech normal.         Behavior: Behavior normal.         Lab and Diagnostic Study Results     Labs -     Recent Results (from the past 12 hour(s))   EKG, 12 LEAD, INITIAL    Collection Time: 10/15/21 11:39 AM   Result Value Ref Range    Ventricular Rate 70 BPM    Atrial Rate 69 BPM    P-R Interval 198 ms    QRS Duration 97 ms    Q-T Interval 427 ms    QTC Calculation (Bezet) 461 ms    Calculated P Axis 13 degrees    Calculated R Axis 14 degrees    Calculated T Axis 99 degrees    Diagnosis       Sinus rhythm  Atrial premature complex  Abnormal R-wave progression, early transition  Nonspecific T abnormalities, lateral leads         Radiologic Studies -   [unfilled]  CT Results (Last 48 hours)    None        CXR Results  (Last 48 hours)    None          Medical Decision Making and ED Course   - I am the first and primary provider for this patient AND AM THE PRIMARY PROVIDER OF RECORD. - I reviewed the vital signs, available nursing notes, past medical history, past surgical history, family history and social history. - Initial assessment performed. The patients presenting problems have been discussed, and the staff are in agreement with the care plan formulated and outlined with them. I have encouraged them to ask questions as they arise throughout their visit. Vital Signs-Reviewed the patient's vital signs. Patient Vitals for the past 12 hrs:   Temp Pulse Resp BP SpO2   10/15/21 1143 99.4 °F (37.4 °C)       10/15/21 1129  76 18 (!) 173/72 96 %       Records Reviewed: Nursing Notes    The patient presents with altered mental status with a differential diagnosis of  CVA, hyponatremia, hypoxia and UTI    ED Course:       ED Course as of Oct 15 1638   Fri Oct 15, 2021   1147 Family friend states patient has history of CVA with left-sided weakness usually ambulates with a walker patient last known well 1830 yesterday and family friend went to patient's house this morning about 0 930 and noticed that patient had slurred speech and was not able to use her walker complaining of weakness and here in the ED patient is having decreased vision, family friend states patient just went to the eye doctor and was noted to have 20/25 vision bilaterally    [SB]   1412 Requesting nephrology consult    [SB]   1413 Potassium: 3.9 [SB]   1413 BUN(!): 58 [SB]   1413 Creatinine(!): 5.49 [SB]   1419 Dr. Alejandra Phan stated patient not in acute need of dialysis    [SB]      ED Course User Index  [SB] Lydia Fulton MD         Provider Notes (Medical Decision Making): [facility]           Consultations:       Consultations: - NONE        Procedures and Critical Care       Performed by:  Lashawn MD Dalton  PROCEDURES:  Procedures               Capri Patino MD        Disposition     Disposition: Condition stable and ongoing  Transferred to [address] [place] [address] patient verbally agreed to transfer and understand the risks involved as outlined in the EMTALA form. Remove if not discharged  DISCHARGE PLAN:  1. Current Discharge Medication List      CONTINUE these medications which have NOT CHANGED    Details   ipratropium (ATROVENT) 21 mcg (0.03 %) nasal spray 1 [place] by Both Nostrils route two (2) times a day. Indications: runny nose  Qty: 30 mL, Refills: 1      furosemide (LASIX) 40 mg tablet Take  by mouth daily. loratadine 10 mg cap Take  by mouth.      montelukast (Singulair) 10 mg tablet Take 10 mg by mouth daily. traZODone (DESYREL) 100 mg tablet Take 100 mg by mouth nightly. augmented betamethasone dipropionate (DIPROLENE-AF) 0.05 % topical cream Apply  to affected area two (2) times a day. clobetasoL (CLOBEX) 0.05 % lotion Apply  to affected area two (2) times a day.      ketoconazole (NIZORAL) 2 % shampoo Apply  to affected area daily as needed for Itching. hydrALAZINE (APRESOLINE) 100 mg tablet Take 1 Tab by mouth three (3) times daily. Qty: 90 Tab, Refills: 0      acetaminophen (TYLENOL) 325 mg tablet Take 2 Tabs by mouth every six (6) hours as needed for Pain or Fever. Qty: 60 Tab, Refills: 0      carvediloL (COREG) 25 mg tablet Take 1 Tab by mouth two (2) times daily (with meals). Qty: 60 Tab, Refills: 0      nitroglycerin (NITRODUR) 0.2 mg/hr 1 Patch by TransDERmal route daily.   Qty: 3 Patch, Refills: 0      insulin lispro (HUMALOG) 100 unit/mL injection INITIATE CORRECTIVE INSULIN PROTOCOL (EDWARD):  RX EDWARD Normal Sensitivity (Average weight)    AC (before meals), Q6H, and Q4H CORRECTIONAL SCALE only For Blood Sugar (mg/dl) of :             140-199=2 units            200-249=3 units  250-299=5 units  300-349=7 units  350 or greater = Call MD  Give in addition to basal medications. Do Not Hold for NPO    BEDTIME CORRECTIONAL sliding scale when scheduled:  200-249=2 units  250-299=3 units   300-349=4 units  350 or greater = Call MD  Give in addition to basal medications. Do Not Hold for NPO Fast Acting - Administer Immediately - or within 15 minutes of start of meal, if mealtime coverage. Qty: 1 Vial, Refills: 0      lidocaine (LIDODERM) 5 % 1 Patch by TransDERmal route daily. albuterol (PROVENTIL HFA, VENTOLIN HFA, PROAIR HFA) 90 mcg/actuation inhaler Take 2 Puffs by inhalation every four (4) hours as needed for Shortness of Breath. amLODIPine (NORVASC) 10 mg tablet Take 10 mg by mouth daily. cholecalciferol, vitamin D3, 50 mcg (2,000 unit) tab Take 2,000 Units by mouth daily. FeroSuL 325 mg (65 mg iron) tablet Take 325 mg by mouth daily. donepeziL (ARICEPT) 5 mg tablet Take 5 mg by mouth daily. lisinopriL (PRINIVIL, ZESTRIL) 40 mg tablet Take 40 mg by mouth daily. aspirin 81 mg chewable tablet Take 81 mg by mouth daily. doxazosin (CARDURA) 2 mg tablet Take 2 mg by mouth nightly. cyanocobalamin, vitamin B-12, 3,000 mcg cap cyanocobalamin (vitamin B-12) 3,000 mcg capsule      allopurinoL (ZYLOPRIM) 100 mg tablet allopurinol 100 mg tablet      atorvastatin (LIPITOR) 40 mg tablet atorvastatin 40 mg tablet      fluticasone propion-salmeterol (ADVAIR HFA) 115-21 mcg/actuation inhaler Take 2 Puffs by inhalation two (2) times a day. sodium bicarbonate 650 mg tablet Take 650 mg by mouth two (2) times a day. 2.   Follow-up Information    None       3. Return to ED if worse   4. Current Discharge Medication List          Diagnosis     Clinical Impression: No diagnosis found. Attestations:    Tunde Velazquez MD    Please note that this dictation was completed with [employer], the [employer] voice recognition software.   Quite often unanticipated grammatical, syntax, homophones, and other interpretive errors are inadvertently transcribed by the computer software. Please disregard these errors. Please excuse any errors that have escaped final proofreading. Thank you.

## 2021-10-15 NOTE — ED NOTES
Bedside shift change report given to Jamshid Olson (oncoming nurse) by Cecily Partida (offgoing nurse). Report included the following information SBAR and ED Summary.

## 2021-10-15 NOTE — ED TRIAGE NOTES
Per report pt altered from baseline and febrile, symptoms started yesterday. Per family pt vomited yesterday.

## 2021-10-15 NOTE — ED PROVIDER NOTES
Patient is a 47-year-old woman who was transferred from [facility] for slurred speech. She had an unremarkable CT of her head at that facility and then was transferred to [facility] for additional management. The patient's complaint is of decreased appetite and phlegm in her throat that has been ongoing for weeks and has caused her to lose 40 pounds; she is not complaining of any visual changes, weakness, or confusion. Urine and labs were also unremarkable.            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    CHF (congestive heart failure) (HCC)     Chronic kidney disease     Chronic pain     SPINAL STENOSIS    DM (di[facility] mellitus) ([facility] [address]     HTN (hypertension)     Stroke [facility])        Past Surgical History:   Procedure Laterality Date    HX OOPHORECTOMY      STENT INSERTION  2001,2002    Sudhir Bess         Family History:   Problem Relation Age of Onset    Breast Cancer Maternal Aunt     Breast Cancer Cousin        Social History     Socioeconomic History    Marital status:      Spouse name: Not on file    Number of children: Not on file    Years of education: Not on file    Highest education level: Not on file   Occupational History    Not on file   Tobacco Use    Smoking status: Former Smoker    Smokeless tobacco: Never Used    Tobacco comment: quit 1972   Substance and Sexual Activity    Alcohol use: No    Drug use: No    Sexual activity: Not Currently   Other Topics Concern     Service Not Asked    Blood Transfusions Not Asked    Caffeine Concern Not Asked    Occupational Exposure Not Asked    Hobby Hazards Not Asked    Sleep Concern Not Asked    Stress Concern Not Asked    Weight Concern Not Asked    Special Diet Not Asked    Back Care Not Asked    Exercise Not Asked    Bike Helmet Not Asked   [address] Not Asked    Self-Exams Not Asked   Social History Narrative    Not on file     Social Determinants of Health Financial Resource Strain:     Difficulty of Paying Living Expenses:    Food Insecurity:     Worried About Running Out of Food in the Last Year:     [address] in the Last Year:    Transportation Needs:     Lack of Transportation (Medical):  Lack of Transportation (Non-Medical):    Physical Activity:     Days of Exercise per Week:     Minutes of Exercise per Session:    Stress:     Feeling of Stress :    Social Connections:     Frequency of Communication with Friends and Family:     Frequency of Social Gatherings with Friends and Family:     Attends [employer] Services:     Active Member of Clubs or Organizations:     Attends Club or Organization Meetings:     Marital Status:    Intimate Partner Violence:     Fear of Current or Ex-Partner:     Emotionally Abused:     Physically Abused:     Sexually Abused: ALLERGIES: Patient has no known allergies. Review of Systems   Constitutional: Negative for chills and fever. HENT: Positive for congestion and rhinorrhea. Respiratory: Negative for shortness of breath. Cardiovascular: Negative for chest pain. Neurological: Positive for speech difficulty and weakness. Negative for dizziness and light-headedness. All other systems reviewed and are negative. Vitals:    10/15/21 1827   BP: (!) 151/96   Pulse: 71   Resp: 20   Temp: 98.3 °F (36.8 °C)            Physical Exam  Vitals and nursing note reviewed. Constitutional:       Appearance: She is well-developed. She is not ill-appearing. HENT:      Head: Normocephalic and atraumatic. Eyes:      Pupils: Pupils are equal, round, and reactive to light. Cardiovascular:      Rate and Rhythm: Normal rate and regular rhythm. Pulmonary:      Effort: Pulmonary effort is normal.      Breath sounds: Normal breath sounds. Abdominal:      General: There is no distension. Palpations: Abdomen is soft. Tenderness: There is no abdominal tenderness.    Musculoskeletal: Cervical back: Normal range of motion and neck supple. Skin:     General: Skin is warm and dry. Capillary Refill: Capillary refill takes less than 2 seconds. Neurological:      General: No focal deficit present. Mental Status: She is alert and oriented to person, place, and time. Psychiatric:         Mood and Affect: Mood normal.         Behavior: Behavior normal.          MDM  Number of Diagnoses or Management Options  Lacunar infarct, acute [facility]): new and requires workup         Procedures        MEDICAL DECISION MAKIN y.o. female presents with Transfer Of Care    Differential diagnosis includes but not limited to:   seizure, meningitis, stroke, sepsis, subarachnoid hemorrhage, intracranial bleeding, encephalitis         LABORATORY TESTS:  Labs Reviewed - No data to display    IMAGING RESULTS:  MRA NECK WO CONT         MRA BRAIN WO CONT         MRI BRAIN WO CONT           PROGRESS NOTE:   The patient's ED course has been uncomplicated      CONSULTS:  Hospitalist Consult: [address] for Admission  10:20 PM    ED Room Number: ER17/17  Patient Name and age:  Marya Britton 78 y.o.  female  Working Diagnosis:   1. Lacunar infarct, acute ([address]        COVID-19 Suspicion:  no  Sepsis present:  no  Reassessment needed: no  Code Status:  Full Code  Readmission: no  Isolation Requirements:  no  Recommended Level of Care:  telemetry  Department:[facility] Adult ED - 21     PLAN:  - Admit to hospitalist    Joe Balderas MD          Please note that this dictation was completed with [employer], the computer voice recognition software. Quite often unanticipated grammatical, syntax, homophones, and other interpretive errors are inadvertently transcribed by the computer software. Please disregard these errors. Please excuse any errors that have escaped final proofreading.

## 2021-10-15 NOTE — ED NOTES
Verbal shift change report given to [address] RN (oncoming nurse) by Cecil Ge (offgoing nurse). Report included the following information SBAR, ED Summary and MAR.

## 2021-10-16 LAB
ANION GAP SERPL CALC-SCNC: 4 MMOL/L (ref 5–15)
ASPIRIN TEST, ASPIRN: 435 ARU
BUN SERPL-MCNC: 55 MG/DL (ref 6–20)
BUN/CREAT SERPL: 11 (ref 12–20)
CALCIUM SERPL-MCNC: 8.7 MG/DL (ref 8.5–10.1)
CHLORIDE SERPL-SCNC: 110 MMOL/L (ref 97–108)
CHOLEST SERPL-MCNC: 106 MG/DL
CO2 SERPL-SCNC: 26 MMOL/L (ref 21–32)
CREAT SERPL-MCNC: 5.09 MG/DL (ref 0.55–1.02)
EST. AVERAGE GLUCOSE BLD GHB EST-MCNC: 91 MG/DL
FERRITIN SERPL-MCNC: 405 NG/ML (ref 26–388)
GLUCOSE BLD STRIP.AUTO-MCNC: 135 MG/DL (ref 65–117)
GLUCOSE BLD STRIP.AUTO-MCNC: 97 MG/DL (ref 65–117)
GLUCOSE SERPL-MCNC: 119 MG/DL (ref 65–100)
HBA1C MFR BLD: 4.8 % (ref 4–5.6)
HDLC SERPL-MCNC: 35 MG/DL
HDLC SERPL: 3 {RATIO} (ref 0–5)
INR PPP: 1.1 (ref 0.9–1.1)
IRON SATN MFR SERPL: 29 % (ref 20–50)
IRON SERPL-MCNC: 41 UG/DL (ref 35–150)
LDLC SERPL CALC-MCNC: 55 MG/DL (ref 0–100)
P2Y12 PLT RESPONSE,PPPR: 327 PRU (ref 194–418)
POTASSIUM SERPL-SCNC: 3.5 MMOL/L (ref 3.5–5.1)
PROTHROMBIN TIME: 11.1 SEC (ref 9–11.1)
RETICS # AUTO: 0.11 M/UL (ref 0.02–0.08)
RETICS/RBC NFR AUTO: 4 % (ref 0.7–2.1)
SERVICE CMNT-IMP: ABNORMAL
SERVICE CMNT-IMP: NORMAL
SODIUM SERPL-SCNC: 140 MMOL/L (ref 136–145)
TIBC SERPL-MCNC: 140 UG/DL (ref 250–450)
TRIGL SERPL-MCNC: 80 MG/DL (ref ?–150)
TSH SERPL DL<=0.05 MIU/L-ACNC: 1.6 UIU/ML (ref 0.36–3.74)
VLDLC SERPL CALC-MCNC: 16 MG/DL

## 2021-10-16 PROCEDURE — 84443 ASSAY THYROID STIM HORMONE: CPT

## 2021-10-16 PROCEDURE — 74011250637 HC RX REV CODE- 250/637: Performed by: STUDENT IN AN ORGANIZED HEALTH CARE EDUCATION/TRAINING PROGRAM

## 2021-10-16 PROCEDURE — 85576 BLOOD PLATELET AGGREGATION: CPT

## 2021-10-16 PROCEDURE — 99223 1ST HOSP IP/OBS HIGH 75: CPT | Performed by: PSYCHIATRY & NEUROLOGY

## 2021-10-16 PROCEDURE — 82728 ASSAY OF FERRITIN: CPT

## 2021-10-16 PROCEDURE — 65660000000 HC RM CCU STEPDOWN

## 2021-10-16 PROCEDURE — 82962 GLUCOSE BLOOD TEST: CPT

## 2021-10-16 PROCEDURE — 74011250637 HC RX REV CODE- 250/637: Performed by: PSYCHIATRY & NEUROLOGY

## 2021-10-16 PROCEDURE — 83540 ASSAY OF IRON: CPT

## 2021-10-16 PROCEDURE — 36415 COLL VENOUS BLD VENIPUNCTURE: CPT

## 2021-10-16 PROCEDURE — 83036 HEMOGLOBIN GLYCOSYLATED A1C: CPT

## 2021-10-16 PROCEDURE — 80061 LIPID PANEL: CPT

## 2021-10-16 PROCEDURE — 85610 PROTHROMBIN TIME: CPT

## 2021-10-16 PROCEDURE — 85045 AUTOMATED RETICULOCYTE COUNT: CPT

## 2021-10-16 PROCEDURE — 74011250636 HC RX REV CODE- 250/636: Performed by: STUDENT IN AN ORGANIZED HEALTH CARE EDUCATION/TRAINING PROGRAM

## 2021-10-16 PROCEDURE — 80048 BASIC METABOLIC PNL TOTAL CA: CPT

## 2021-10-16 RX ORDER — CLOPIDOGREL BISULFATE 75 MG/1
75 TABLET ORAL DAILY
Status: DISCONTINUED | OUTPATIENT
Start: 2021-10-16 | End: 2021-10-17

## 2021-10-16 RX ORDER — ACETAMINOPHEN 325 MG/1
650 TABLET ORAL
Status: DISCONTINUED | OUTPATIENT
Start: 2021-10-16 | End: 2021-10-20 | Stop reason: HOSPADM

## 2021-10-16 RX ORDER — ATORVASTATIN CALCIUM 20 MG/1
80 TABLET, FILM COATED ORAL
Status: DISCONTINUED | OUTPATIENT
Start: 2021-10-16 | End: 2021-10-16

## 2021-10-16 RX ORDER — SODIUM CHLORIDE 9 MG/ML
125 INJECTION, SOLUTION INTRAVENOUS CONTINUOUS
Status: DISPENSED | OUTPATIENT
Start: 2021-10-16 | End: 2021-10-17

## 2021-10-16 RX ORDER — IPRATROPIUM BROMIDE AND ALBUTEROL SULFATE 2.5; .5 MG/3ML; MG/3ML
3 SOLUTION RESPIRATORY (INHALATION)
Status: DISCONTINUED | OUTPATIENT
Start: 2021-10-16 | End: 2021-10-20 | Stop reason: HOSPADM

## 2021-10-16 RX ORDER — GUAIFENESIN 100 MG/5ML
81 LIQUID (ML) ORAL DAILY
Status: DISCONTINUED | OUTPATIENT
Start: 2021-10-16 | End: 2021-10-20 | Stop reason: HOSPADM

## 2021-10-16 RX ORDER — LISINOPRIL 20 MG/1
40 TABLET ORAL DAILY
Status: DISCONTINUED | OUTPATIENT
Start: 2021-10-16 | End: 2021-10-20 | Stop reason: HOSPADM

## 2021-10-16 RX ORDER — MONTELUKAST SODIUM 10 MG/1
10 TABLET ORAL DAILY
Status: DISCONTINUED | OUTPATIENT
Start: 2021-10-16 | End: 2021-10-17

## 2021-10-16 RX ORDER — ACETAMINOPHEN 650 MG/1
650 SUPPOSITORY RECTAL
Status: DISCONTINUED | OUTPATIENT
Start: 2021-10-16 | End: 2021-10-20 | Stop reason: HOSPADM

## 2021-10-16 RX ORDER — DONEPEZIL HYDROCHLORIDE 5 MG/1
5 TABLET, FILM COATED ORAL DAILY
Status: DISCONTINUED | OUTPATIENT
Start: 2021-10-16 | End: 2021-10-20 | Stop reason: HOSPADM

## 2021-10-16 RX ORDER — ONDANSETRON 2 MG/ML
4 INJECTION INTRAMUSCULAR; INTRAVENOUS
Status: DISCONTINUED | OUTPATIENT
Start: 2021-10-16 | End: 2021-10-20 | Stop reason: HOSPADM

## 2021-10-16 RX ORDER — ATORVASTATIN CALCIUM 40 MG/1
40 TABLET, FILM COATED ORAL
Status: DISCONTINUED | OUTPATIENT
Start: 2021-10-16 | End: 2021-10-20 | Stop reason: HOSPADM

## 2021-10-16 RX ORDER — HYDRALAZINE HYDROCHLORIDE 50 MG/1
100 TABLET, FILM COATED ORAL 3 TIMES DAILY
Status: DISCONTINUED | OUTPATIENT
Start: 2021-10-16 | End: 2021-10-19

## 2021-10-16 RX ORDER — TRAZODONE HYDROCHLORIDE 100 MG/1
100 TABLET ORAL
Status: DISCONTINUED | OUTPATIENT
Start: 2021-10-16 | End: 2021-10-20 | Stop reason: HOSPADM

## 2021-10-16 RX ORDER — LANOLIN ALCOHOL/MO/W.PET/CERES
325 CREAM (GRAM) TOPICAL DAILY
Status: DISCONTINUED | OUTPATIENT
Start: 2021-10-16 | End: 2021-10-20

## 2021-10-16 RX ADMIN — DONEPEZIL HYDROCHLORIDE 5 MG: 5 TABLET, FILM COATED ORAL at 09:09

## 2021-10-16 RX ADMIN — FERROUS SULFATE TAB 325 MG (65 MG ELEMENTAL FE) 325 MG: 325 (65 FE) TAB at 09:09

## 2021-10-16 RX ADMIN — HYDRALAZINE HYDROCHLORIDE 100 MG: 50 TABLET, FILM COATED ORAL at 21:21

## 2021-10-16 RX ADMIN — ASPIRIN 81 MG CHEWABLE TABLET 81 MG: 81 TABLET CHEWABLE at 04:50

## 2021-10-16 RX ADMIN — ATORVASTATIN CALCIUM 80 MG: 20 TABLET, FILM COATED ORAL at 04:50

## 2021-10-16 RX ADMIN — HYDRALAZINE HYDROCHLORIDE 100 MG: 50 TABLET, FILM COATED ORAL at 09:09

## 2021-10-16 RX ADMIN — CLOPIDOGREL BISULFATE 75 MG: 75 TABLET ORAL at 09:09

## 2021-10-16 RX ADMIN — TRAZODONE HYDROCHLORIDE 100 MG: 100 TABLET ORAL at 21:21

## 2021-10-16 RX ADMIN — SODIUM CHLORIDE 125 ML/HR: 9 INJECTION, SOLUTION INTRAVENOUS at 04:50

## 2021-10-16 RX ADMIN — HYDRALAZINE HYDROCHLORIDE 100 MG: 50 TABLET, FILM COATED ORAL at 18:24

## 2021-10-16 RX ADMIN — ATORVASTATIN CALCIUM 40 MG: 40 TABLET, FILM COATED ORAL at 21:21

## 2021-10-16 RX ADMIN — LISINOPRIL 40 MG: 20 TABLET ORAL at 09:08

## 2021-10-16 RX ADMIN — MONTELUKAST 10 MG: 10 TABLET, FILM COATED ORAL at 09:10

## 2021-10-16 NOTE — CONSULTS
INPATIENT NEUROLOGY CONSULTATION  10/16/2021     Consulted by: Georgina Feldman MD        Patient ID:  Luis Fernando Nicole  [id number]  78 y.o.  1945    CC: Weakness and slurred speech    HPI    Ms. Ulices Freeman is a 44-year-old woman with a history of diabetes, hypertension, stroke who is here from an outside hospital because of presenting with slurred speech and weakness. She had difficulty using her walker which is her baseline. Her speech is very slurred making it difficult to understand. Imaging showing a left pontine infarct. She also has right MCA stenosis.       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    CHF (congestive heart failure) (HCC)     Chronic kidney disease     Chronic pain     SPINAL STENOSIS    DM [facility] mellitus[facility]     HTN (hypertension)     [facility]      Family History   Problem Relation Age of Onset    Breast Cancer Maternal Aunt     Breast Cancer Cousin      Social History     Socioeconomic History    Marital status:      Spouse name: Not on file    Number of children: Not on file    Years of education: Not on file    Highest education level: Not on file   Occupational History    Not on file   Tobacco Use    Smoking status: Former Smoker    Smokeless tobacco: Never Used    Tobacco comment: quit 1972   Substance and Sexual Activity    Alcohol use: No    Drug use: No    Sexual activity: Not Currently   Other Topics Concern     Service Not Asked    Blood Transfusions Not Asked    Caffeine Concern Not Asked    Occupational Exposure Not Asked    Hobby Hazards Not Asked    Sleep Concern Not Asked    Stress Concern Not Asked    Weight Concern Not Asked    Special Diet Not Asked    Back Care Not Asked    Exercise Not Asked    Bike Helmet Not Asked   [address] Not Asked    Self-Exams Not Asked   Social History Narrative    Not on file Social Determinants of Health     Financial Resource Strain:     Difficulty of Paying Living Expenses:    Food Insecurity:     Worried About Running Out of Food in the Last Year:     [address] in the Last Year:    Transportation Needs:     Lack of Transportation (Medical):      Lack of Transportation (Non-Medical):    Physical Activity:     Days of Exercise per Week:     Minutes of Exercise per Session:    Stress:     Feeling of Stress :    Social Connections:     Frequency of Communication with Friends and Family:     Frequency of Social Gatherings with Friends and Family:     Attends Jainism Services:     Active Member of Clubs or Organizations:     Attends Club or Organization Meetings:     Marital Status:    Intimate Partner Violence:     Fear of Current or Ex-Partner:     Emotionally Abused:     Physically Abused:     Sexually Abused:      Current Facility-Administered Medications   Medication Dose Route Frequency    aspirin chewable tablet 81 mg  81 mg Oral DAILY    acetaminophen (TYLENOL) tablet 650 mg  650 mg Oral Q4H PRN    Or    acetaminophen (TYLENOL) solution 650 mg  650 mg Per NG tube Q4H PRN    Or    acetaminophen (TYLENOL) suppository 650 mg  650 mg Rectal Q4H PRN    0.9% sodium chloride infusion  125 mL/hr IntraVENous CONTINUOUS    ondansetron (ZOFRAN) injection 4 mg  4 mg IntraVENous Q6H PRN    atorvastatin (LIPITOR) tablet 80 mg  80 mg Oral QHS    albuterol-ipratropium (DUO-NEB) 2.5 MG-0.5 MG/3 ML  3 mL Nebulization Q6H PRN    donepeziL (ARICEPT) tablet 5 mg  5 mg Oral DAILY    ferrous sulfate tablet 325 mg  325 mg Oral DAILY    montelukast (SINGULAIR) tablet 10 mg  10 mg Oral DAILY    traZODone (DESYREL) tablet 100 mg  100 mg Oral QHS    clopidogreL (PLAVIX) tablet 75 mg  75 mg Oral DAILY    hydrALAZINE (APRESOLINE) tablet 100 mg  100 mg Oral TID    lisinopriL (PRINIVIL, ZESTRIL) tablet 40 mg  40 mg Oral DAILY     Current Outpatient Medications Medication Sig    ipratropium (ATROVENT) 21 mcg (0.03 %) nasal spray 1 [place] by Both Nostrils route two (2) times a day. Indications: runny nose    furosemide (LASIX) 40 mg tablet Take  by mouth daily.  loratadine 10 mg cap Take  by mouth.  montelukast (Singulair) 10 mg tablet Take 10 mg by mouth daily.  traZODone (DESYREL) 100 mg tablet Take 100 mg by mouth nightly.  augmented betamethasone dipropionate (DIPROLENE-AF) 0.05 % topical cream Apply  to affected area two (2) times a day.  clobetasoL (CLOBEX) 0.05 % lotion Apply  to affected area two (2) times a day.  ketoconazole (NIZORAL) 2 % shampoo Apply  to affected area daily as needed for Itching.  hydrALAZINE (APRESOLINE) 100 mg tablet Take 1 Tab by mouth three (3) times daily.  acetaminophen (TYLENOL) 325 mg tablet Take 2 Tabs by mouth every six (6) hours as needed for Pain or Fever.  carvediloL (COREG) 25 mg tablet Take 1 Tab by mouth two (2) times daily (with meals).  nitroglycerin (NITRODUR) 0.2 mg/hr 1 Patch by TransDERmal route daily.  insulin lispro (HUMALOG) 100 unit/mL injection INITIATE CORRECTIVE INSULIN PROTOCOL (EDWARD):  RX EDWARD Normal Sensitivity (Average weight)    AC (before meals), Q6H, and Q4H CORRECTIONAL SCALE only For Blood Sugar (mg/dl) of :             140-199=2 units            200-249=3 units  250-299=5 units  300-349=7 units  350 or greater = Call MD  Give in addition to basal medications. Do Not Hold for NPO    BEDTIME CORRECTIONAL sliding scale when scheduled:  200-249=2 units  250-299=3 units   300-349=4 units  350 or greater = Call MD  Give in addition to basal medications. Do Not Hold for NPO Fast Acting - Administer Immediately - or within 15 minutes of start of meal, if mealtime coverage.  lidocaine (LIDODERM) 5 % 1 Patch by TransDERmal route daily.     albuterol (PROVENTIL HFA, VENTOLIN HFA, PROAIR HFA) 90 mcg/actuation inhaler Take 2 Puffs by inhalation every four (4) hours as needed for Shortness of Breath.  amLODIPine (NORVASC) 10 mg tablet Take 10 mg by mouth daily.  cholecalciferol, vitamin D3, 50 mcg (2,000 unit) tab Take 2,000 Units by mouth daily.  FeroSuL 325 mg (65 mg iron) tablet Take 325 mg by mouth daily.  donepeziL (ARICEPT) 5 mg tablet Take 5 mg by mouth daily.  lisinopriL (PRINIVIL, ZESTRIL) 40 mg tablet Take 40 mg by mouth daily.  aspirin 81 mg chewable tablet Take 81 mg by mouth daily.  doxazosin (CARDURA) 2 mg tablet Take 2 mg by mouth nightly.  cyanocobalamin, vitamin B-12, 3,000 mcg cap cyanocobalamin (vitamin B-12) 3,000 mcg capsule    allopurinoL (ZYLOPRIM) 100 mg tablet allopurinol 100 mg tablet    atorvastatin (LIPITOR) 40 mg tablet atorvastatin 40 mg tablet    fluticasone propion-salmeterol (ADVAIR HFA) 115-21 mcg/actuation inhaler Take 2 Puffs by inhalation two (2) times a day.  sodium bicarbonate 650 mg tablet Take 650 mg by mouth two (2) times a day. No Known Allergies    Visit Vitals  BP (!) 160/59   Pulse 75   Temp 98.3 °F (36.8 °C)   Resp 29   LMP  (LMP Unknown)   SpO2 95%     Physical Exam  Vitals and nursing note reviewed. Neurologic Exam     Mental Status   Elderly woman awake. Speech is very slurred making it difficult to understand her. She can follow simple commands intermittently. I question her comprehension. Pupils are equal with a conjugate gaze reactive. Face is masked. She can squeeze using the right hand weakly I would grade 4/5  She can activate both feet on the gurney more briskly on the left compared to the right.   No abnormal movements  Gait deferred            Lab Results   Component Value Date/Time    WBC 6.8 10/15/2021 01:25 PM    HGB 7.9 (L) 10/15/2021 01:25 PM    HCT 24.6 (L) 10/15/2021 01:25 PM    PLATELET 586 60/90/6507 01:25 PM    MCV 86.9 10/15/2021 01:25 PM     Lab Results   Component Value Date/Time    Hemoglobin A1c 4.8 10/16/2021 04:50 AM    Hemoglobin A1c 6.2 (H) 02/18/2021 04:00 AM Hemoglobin A1c 6.3 (H) 02/16/2021 12:33 PM    Glucose 119 (H) 10/16/2021 04:50 AM    Glucose (POC) 97 10/16/2021 07:57 AM    Microalb/Creat ratio (ug/mg creat.) 195.3 (H) 01/31/2011 10:16 AM    LDL, calculated 55 10/16/2021 04:50 AM    Creatinine 5.09 (H) 10/16/2021 04:50 AM      Lab Results   Component Value Date/Time    Cholesterol, total 106 10/16/2021 04:50 AM    HDL Cholesterol 35 10/16/2021 04:50 AM    LDL, calculated 55 10/16/2021 04:50 AM    Triglyceride 80 10/16/2021 04:50 AM    CHOL/HDL Ratio 3.0 10/16/2021 04:50 AM     Lab Results   Component Value Date/Time    ALT (SGPT) 9 (L) 10/15/2021 01:25 PM    Alk. phosphatase 49 10/15/2021 01:25 PM    Bilirubin, total 0.4 10/15/2021 01:25 PM    Albumin 3.1 (L) 10/15/2021 01:25 PM    Protein, total 6.0 (L) 10/15/2021 01:25 PM    INR 1.1 10/16/2021 04:50 AM    Prothrombin time 11.1 10/16/2021 04:50 AM    PLATELET 084 29/78/6913 01:25 PM        CT Results (maximum last 3): Results from [place] encounter on 10/15/21    CT HEAD WO CONT    Narrative  CT dose reduction was achieved through use of a standardized protocol tailored  for this examination and automatic exposure control for dose modulation. CT Head    History:    The sulci are prominent but symmetric. Periventricular and deep white matter  hypodensity is not unexpected for age. Multiple chronic small vessel infarcts,  as seen February 16. No acute abnormality seen gray or white matter. Ventricles  are symmetric and appropriate for atrophy. There is no midline shift or mass  effect, or evidence of hemorrhage. Bone windows show no fracture. Impression  Age-appropriate atrophy. No acute findings.       Results from [place] encounter on 02/13/21    CT HEAD WO CONT    Narrative  PROCEDURE: CT HEAD WO CONT    HISTORY:Constricted pupils    COMPARISON:Head CT from 2 days ago    Department policy stipulates all CT scans at this facility are performed using  dose reduction optimization techniques as appropriate to the performed exam,  including the following: Automated exposure control, adjustments of the mA  and/or KVP according to the patient size, and the use of iterative  reconstruction technique. TECHNIQUE: Without IV contrast    Bones/extracranial soft tissues:  Within normal limits  Extra-axial spaces:  No hemorrhage, mass or focal fluid collection. Ventricles/sulci:  There is mild dilatation of the ventricles. Sulci are  prominent. Brain parenchyma:   No mass effect. There is good gray-white differentiation. There are inhomogeneous areas of mildly decreased density in periventricular  white matter. Again noted is poorly defined hypodensity in the silvina, unchanged. Old small  lacunar infarcts in the basal ganglia bilaterally, unchanged    Impression  Diffuse chronic changes which appear stable. No acute or active  intracranial process. MRI Results (maximum last 3): Results from [place] encounter on 10/15/21    MRA NECK WO CONT    Narrative  *PRELIMINARY REPORT*    No evidence of large vessel occlusion. Preliminary report was provided by Dr. Garret Tate, the on-call radiologist, at 9:45 PM  on 10/15/2021. Final report to follow. *END PRELIMINARY REPORT*    CLINICAL HISTORY: Slurred speech    INDICATION: Slurred speech. COMPARISON: None  TECHNIQUE: MR examination of the brain includes axial and sagittal T1 , axial  T2, axial FLAIR, axial gradient echo, axial DWI, coronal T2. Contrast: None  Next, 3-D time-of-flight MRA of the brain was performed. Multiplanar  reconstructions were obtained. Next, 2-D time-of-flight MRA of the neck was performed. Multiplanar  reconstructions were obtained. FINDINGS:  There is a small focus of infarction in the posterior central silvina extending  slightly to the left. Less than 10 mm in size. There is sulcal and ventricular prominence.  There are scattered foci of chronic  hemosiderin deposition, the majority of these and cerebral hemispheres with some  additional foci of chronic hemosiderin deposition in the central silvina. Extensive  periventricular and scattered foci of increased T2 signal intensity corona  radiata, central silvina, centrum semiovale. There are small scattered remote  infarctions demonstrated right and left cerebellum and left silvina right thalamus  periventricular white matter in the frontal lobes as well. IACs are symmetric in  size. There is no Chiari or syrinx. The pituitary and infundibulum are grossly  unremarkable. There is no skull base mass. Cerebellopontine angles are grossly  unremarkable. The major intracranial vascular flow-voids are unremarkable. The cavernous sinuses are symmetric. Optic chiasm and infundibulum grossly  unremarkable. Orbits are grossly symmetric. Dural venous sinuses are grossly  patent. The brain architecture is normal. There is no evidence of midline shift  or mass-effect. There are no extra-axial fluid collections. The mastoid air  cells and paranasal sinuses are well pneumatized and clear. Vertebral arteries are codominant. There is no basilar artery stenosis. There is  atherosclerotic change of the cavernous ICAs with mild right cavernous ICA  stenosis and moderate to severe supraclinoid ICA stenosis on the left. Mild  multifocal M1 segment stenoses. Arborization of M2 vessels on the left is  normal. Right M3 anterior division stenosis at 1-35 may be nearly occlusive. No  infarction in this region. . Tiny posterior communicating artery on the left. P1  segments are patent. Proximal P2 segments are patent. . The internal carotid,  anterior cerebral, and middle cerebral arteries are patent. Common origin of the innominate and left common carotid arteries. . The bilateral  subclavian, common carotid, and internal carotid arteries are patent with no  flow-limiting stenosis. Left vertebral artery slightly larger than the right  vertebral artery. .    There is 0. % stenosis in the right internal carotid artery utilizing NASCET  criteria. There is 0. % stenosis in the left internal carotid artery utilizing NASCET  criteria. Impression  Very small focus of acute infarction in the posterior silvina slightly to the left  of midline. No superimposed hemorrhage. Moderate to severe chronic microvascular ischemic change with additional small  scattered lacunar infarctions seen throughout the cerebral and cerebellar  hemispheres. Moderate to severe cerebral atrophy as well. There is no major vessel occlusion, aneurysm, dissection or large vessel  hemodynamically significant stenosis. There is atherosclerotic cerebral vasculopathy with multifocal mild to moderate  an even severe stenoses demonstrated an smaller M3/A3 segments suggested. There is no intracranial mass, hemorrhage. MRA BRAIN WO CONT    Narrative  *PRELIMINARY REPORT*    No evidence of large vessel occlusion. Preliminary report was provided by Dr. Fior Edwards, the on-call radiologist, at 9:45 PM  on 10/15/2021. Final report to follow. *END PRELIMINARY REPORT*    CLINICAL HISTORY: Slurred speech    INDICATION: Slurred speech. COMPARISON: None  TECHNIQUE: MR examination of the brain includes axial and sagittal T1 , axial  T2, axial FLAIR, axial gradient echo, axial DWI, coronal T2. Contrast: None  Next, 3-D time-of-flight MRA of the brain was performed. Multiplanar  reconstructions were obtained. Next, 2-D time-of-flight MRA of the neck was performed. Multiplanar  reconstructions were obtained. FINDINGS:  There is a small focus of infarction in the posterior central silvina extending  slightly to the left. Less than 10 mm in size. There is sulcal and ventricular prominence. There are scattered foci of chronic  hemosiderin deposition, the majority of these and cerebral hemispheres with some  additional foci of chronic hemosiderin deposition in the central silvina.  Extensive  periventricular and scattered foci of increased T2 signal intensity corona  radiata, central silvina, centrum semiovale. There are small scattered remote  infarctions demonstrated right and left cerebellum and left silvina right thalamus  periventricular white matter in the frontal lobes as well. IACs are symmetric in  size. There is no Chiari or syrinx. The pituitary and infundibulum are grossly  unremarkable. There is no skull base mass. Cerebellopontine angles are grossly  unremarkable. The major intracranial vascular flow-voids are unremarkable. The cavernous sinuses are symmetric. Optic chiasm and infundibulum grossly  unremarkable. Orbits are grossly symmetric. Dural venous sinuses are grossly  patent. The brain architecture is normal. There is no evidence of midline shift  or mass-effect. There are no extra-axial fluid collections. The mastoid air  cells and paranasal sinuses are well pneumatized and clear. Vertebral arteries are codominant. There is no basilar artery stenosis. There is  atherosclerotic change of the cavernous ICAs with mild right cavernous ICA  stenosis and moderate to severe supraclinoid ICA stenosis on the left. Mild  multifocal M1 segment stenoses. Arborization of M2 vessels on the left is  normal. Right M3 anterior division stenosis at 1-35 may be nearly occlusive. No  infarction in this region. . Tiny posterior communicating artery on the left. P1  segments are patent. Proximal P2 segments are patent. . The internal carotid,  anterior cerebral, and middle cerebral arteries are patent. Common origin of the innominate and left common carotid arteries. . The bilateral  subclavian, common carotid, and internal carotid arteries are patent with no  flow-limiting stenosis. Left vertebral artery slightly larger than the right  vertebral artery. .    There is 0. % stenosis in the right internal carotid artery utilizing NASCET  criteria. There is 0. % stenosis in the left internal carotid artery utilizing NASCET  criteria.     Impression  Very small focus of acute infarction in the posterior silvina slightly to the left  of midline. No superimposed hemorrhage. Moderate to severe chronic microvascular ischemic change with additional small  scattered lacunar infarctions seen throughout the cerebral and cerebellar  hemispheres. Moderate to severe cerebral atrophy as well. There is no major vessel occlusion, aneurysm, dissection or large vessel  hemodynamically significant stenosis. There is atherosclerotic cerebral vasculopathy with multifocal mild to moderate  an even severe stenoses demonstrated an smaller M3/A3 segments suggested. There is no intracranial mass, hemorrhage. MRI BRAIN WO CONT    Narrative  *PRELIMINARY REPORT*    There is an acute lacunar infarct in the silvina. Preliminary report was provided by Dr. Kingsley Bermudez, the on-call radiologist, at 9:45 PM  10/15/2021    Final report to follow. *END PRELIMINARY REPORT*      CLINICAL HISTORY: Slurred speech    INDICATION: Slurred speech. COMPARISON: None  TECHNIQUE: MR examination of the brain includes axial and sagittal T1 , axial  T2, axial FLAIR, axial gradient echo, axial DWI, coronal T2. Contrast: None  Next, 3-D time-of-flight MRA of the brain was performed. Multiplanar  reconstructions were obtained. Next, 2-D time-of-flight MRA of the neck was performed. Multiplanar  reconstructions were obtained. FINDINGS:  There is a small focus of infarction in the posterior central silvina extending  slightly to the left. Less than 10 mm in size. There is sulcal and ventricular prominence. There are scattered foci of chronic  hemosiderin deposition, the majority of these and cerebral hemispheres with some  additional foci of chronic hemosiderin deposition in the central silvina. Extensive  periventricular and scattered foci of increased T2 signal intensity corona  radiata, central islvina, centrum semiovale.  There are small scattered remote  infarctions demonstrated right and left cerebellum and left silvina right thalamus  periventricular white matter in the frontal lobes as well. IACs are symmetric in  size. There is no Chiari or syrinx. The pituitary and infundibulum are grossly  unremarkable. There is no skull base mass. Cerebellopontine angles are grossly  unremarkable. The major intracranial vascular flow-voids are unremarkable. The cavernous sinuses are symmetric. Optic chiasm and infundibulum grossly  unremarkable. Orbits are grossly symmetric. Dural venous sinuses are grossly  patent. The brain architecture is normal. There is no evidence of midline shift  or mass-effect. There are no extra-axial fluid collections. The mastoid air  cells and paranasal sinuses are well pneumatized and clear. Vertebral arteries are codominant. There is no basilar artery stenosis. There is  atherosclerotic change of the cavernous ICAs with mild right cavernous ICA  stenosis and moderate to severe supraclinoid ICA stenosis on the left. Mild  multifocal M1 segment stenoses. Arborization of M2 vessels on the left is  normal. Right M3 anterior division stenosis at 1-35 may be nearly occlusive. No  infarction in this region. . Tiny posterior communicating artery on the left. P1  segments are patent. Proximal P2 segments are patent. . The internal carotid,  anterior cerebral, and middle cerebral arteries are patent. Common origin of the innominate and left common carotid arteries. . The bilateral  subclavian, common carotid, and internal carotid arteries are patent with no  flow-limiting stenosis. Left vertebral artery slightly larger than the right  vertebral artery. .    There is 0. % stenosis in the right internal carotid artery utilizing NASCET  criteria. There is 0. % stenosis in the left internal carotid artery utilizing NASCET  criteria. Impression  Very small focus of acute infarction in the posterior silvina slightly to the left  of midline. No superimposed hemorrhage.     Moderate to severe chronic microvascular ischemic change with additional small  scattered lacunar infarctions seen throughout the cerebral and cerebellar  hemispheres. Moderate to severe cerebral atrophy as well. There is no major vessel occlusion, aneurysm, dissection or large vessel  hemodynamically significant stenosis. There is atherosclerotic cerebral vasculopathy with multifocal mild to moderate  an even severe stenoses demonstrated an smaller M3/A3 segments suggested. There is no intracranial mass, hemorrhage. VAS/US/Carotid Doppler Results (maximum last 3): No results found for this or any previous visit. PET Results (maximum last 3): No results found for this or any previous visit. Assessment and Plan        59-year-old woman with a left pontine infarct in the setting of intracranial vascular disease. I think because of that disease particularly in the MCA stenosis unrelated to the current infarct she should be on dual antiplatelets. She is now on aspirin and Plavix. We will need to check assays later today. LDL is 55. I reduced Lipitor back to moderate dosing. Stroke work-up. Following    This clinical note was dictated with an electronic dictation software that can make unintentional errors. If there are any questions, please contact me directly for clarification.       [address] [facility], DO  NEUROLOGIST  Diplomate JOSE  10/16/2021

## 2021-10-16 NOTE — ED NOTES
TRANSFER - OUT REPORT:    Verbal report given to Brooke on Jaret Castle  being transferred to [facility] for routine progression of care       Report consisted of patients Situation, Background, Assessment and   Recommendations(SBAR). Information from the following report(s) SBAR was reviewed with the receiving nurse. Lines:   Peripheral IV 10/16/21 Anterior; Left External jugular (Active)        Opportunity for questions and clarification was provided.       Patient transported with:   [employer]

## 2021-10-16 NOTE — H&P
History & Physical    Primary Care Provider: Suellen Moeller MD  Source of Information: Patient and chart review    History of Presenting Illness:   Dianne Mcmullen is a 78 y.o. female w/ hx of iddm ii, htn, cva, fe def anemia, ckd v, who presented to [facility] for concerns of CVA. Per chart review, pt was noted by her caregiver to have slurred speech and increased weakness, with inability to use her walker. Last well known was reportedly morning on 10/15/21. she was taken to [facility] where head ct showed no acute process. She is seen and examined at bedside. Reports episode of numbness and tingling in her right hand earlier today. Caretaker who is at bedside reports patient's speech still appears to be slurred. The patient denies any fever, chills, chest or abdominal pain, nausea, vomiting, cough, congestion, recent illness, palpitations, or dysuria. Review of Systems:  A comprehensive review of systems was negative except for that written in the History of Present Illness. 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 Authorizing Provider   ipratropium (ATROVENT) 21 mcg (0.03 %) nasal spray 1 Crane by Both Nostrils route two (2) times a day. Indications: runny nose 10/1/21   Yael Cabrera MD   furosemide (LASIX) 40 mg tablet Take  by mouth daily. Provider, Historical   loratadine 10 mg cap Take  by mouth. Provider, Historical   montelukast (Singulair) 10 mg tablet Take 10 mg by mouth daily.     Provider, Historical   traZODone (DESYREL) 100 mg tablet Take 100 mg by mouth nightly. Provider, Historical   augmented betamethasone dipropionate (DIPROLENE-AF) 0.05 % topical cream Apply  to affected area two (2) times a day. Provider, Historical   clobetasoL (CLOBEX) 0.05 % lotion Apply  to affected area two (2) times a day. Provider, Historical   ketoconazole (NIZORAL) 2 % shampoo Apply  to affected area daily as needed for Itching. Provider, Historical   hydrALAZINE (APRESOLINE) 100 mg tablet Take 1 Tab by mouth three (3) times daily. 5/6/21   Roman An MD   acetaminophen (TYLENOL) 325 mg tablet Take 2 Tabs by mouth every six (6) hours as needed for Pain or Fever. 5/6/21   Roman An MD   carvediloL (COREG) 25 mg tablet Take 1 Tab by mouth two (2) times daily (with meals). 5/6/21   Roman An MD   nitroglycerin (NITRODUR) 0.2 mg/hr 1 Patch by TransDERmal route daily. 5/6/21   Roman An MD   insulin lispro (HUMALOG) 100 unit/mL injection INITIATE CORRECTIVE INSULIN PROTOCOL (EDWARD):  RX EDWARD Normal Sensitivity (Average weight)    AC (before meals), Q6H, and Q4H CORRECTIONAL SCALE only For Blood Sugar (mg/dl) of :             140-199=2 units            200-249=3 units  250-299=5 units  300-349=7 units  350 or greater = Call MD  Give in addition to basal medications. Do Not Hold for NPO    BEDTIME CORRECTIONAL sliding scale when scheduled:  200-249=2 units  250-299=3 units   300-349=4 units  350 or greater = Call MD  Give in addition to basal medications. Do Not Hold for NPO Fast Acting - Administer Immediately - or within 15 minutes of start of meal, if mealtime coverage. 5/6/21   Roman An MD   lidocaine (LIDODERM) 5 % 1 Patch by TransDERmal route daily. 4/20/21   Provider, Historical   albuterol (PROVENTIL HFA, VENTOLIN HFA, PROAIR HFA) 90 mcg/actuation inhaler Take 2 Puffs by inhalation every four (4) hours as needed for Shortness of Breath.  4/28/21   Provider, Historical   amLODIPine (NORVASC) 10 mg tablet Take 10 mg by mouth daily. 2/5/21   Provider, Historical   cholecalciferol, vitamin D3, 50 mcg (2,000 unit) tab Take 2,000 Units by mouth daily. 3/8/21   Provider, Historical   FeroSuL 325 mg (65 mg iron) tablet Take 325 mg by mouth daily. 3/9/21   Provider, Historical   donepeziL (ARICEPT) 5 mg tablet Take 5 mg by mouth daily. Provider, Historical   lisinopriL (PRINIVIL, ZESTRIL) 40 mg tablet Take 40 mg by mouth daily. Provider, Historical   aspirin 81 mg chewable tablet Take 81 mg by mouth daily. Provider, Historical   doxazosin (CARDURA) 2 mg tablet Take 2 mg by mouth nightly. Provider, Historical   cyanocobalamin, vitamin B-12, 3,000 mcg cap cyanocobalamin (vitamin B-12) 3,000 mcg capsule    Other, MD Riana   allopurinoL (ZYLOPRIM) 100 mg tablet allopurinol 100 mg tablet    Other, MD Riana   atorvastatin (LIPITOR) 40 mg tablet atorvastatin 40 mg tablet    Other, MD Riana   fluticasone propion-salmeterol (ADVAIR HFA) 115-21 mcg/actuation inhaler Take 2 Puffs by inhalation two (2) times a day. Provider, Historical   sodium bicarbonate 650 mg tablet Take 650 mg by mouth two (2) times a day. Provider, Historical     No Known Allergies   Family History   Problem Relation Age of Onset    Breast Cancer Maternal Aunt     Breast Cancer Cousin         SOCIAL HISTORY:  Patient resides:  Independently    Assisted Living    SNF    With family care X      Smoking history:   None X   Former    Chronic      Alcohol history:   None X   Social    Chronic      Ambulates:   Independently    w/cane    w/walker X   w/wc    CODE STATUS:  DNR    Full X   Other      Objective:     Physical Exam:     Visit Vitals  BP (!) 161/72   Pulse 72   Temp 98.3 °F (36.8 °C)   Resp 21   LMP  (LMP Unknown)   SpO2 97%           General:  Alert, cooperative, no distress, appears stated age. Slurred speech. Head:  Normocephalic, without obvious abnormality, atraumatic.    Eyes: Conjunctivae/corneas clear. PERRL, EOMs intact. Nose: Nares normal. Septum midline. Mucosa normal.        Neck: Supple, symmetrical, trachea midline, no carotid bruit and no JVD. Lungs:   Clear to auscultation bilaterally. Chest wall:  No tenderness or deformity. Heart:  Regular rate and rhythm, S1, S2 normal,   Abdomen:   Soft, non-tender. Bowel sounds normal. No masses,  No organomegaly. Extremities: Extremities normal, atraumatic, no cyanosis or edema. Pulses: 2+ and symmetric all extremities. Skin: Skin color, texture, turgor normal. No rashes or lesions   Neurologic: CNII-XII intact. No dysmetria. 5 strength in bilateral upper and lower extremities. Sensation intact. Gait not tested. EKG: NSR  Data Review:     Recent Days:  Recent Labs     10/15/21  1325   WBC 6.8   HGB 7.9*   HCT 24.6*        Recent Labs     10/15/21  1325      K 3.9      CO2 29   *   BUN 58*   CREA 5.49*   CA 8.9   MG 2.2   ALB 3.1*   ALT 9*   INR 1.1     No results for input(s): PH, PCO2, PO2, HCO3, FIO2 in the last 72 hours.     24 Hour Results:  Recent Results (from the past 24 hour(s))   EKG, 12 LEAD, INITIAL    Collection Time: 10/15/21 11:39 AM   Result Value Ref Range    Ventricular Rate 70 BPM    Atrial Rate 69 BPM    P-R Interval 198 ms    QRS Duration 97 ms    Q-T Interval 427 ms    QTC Calculation (Bezet) 461 ms    Calculated P Axis 13 degrees    Calculated R Axis 14 degrees    Calculated T Axis 99 degrees    Diagnosis       Sinus rhythm  Atrial premature complex  Abnormal R-wave progression, early transition  Nonspecific T abnormalities, lateral leads    Confirmed by Lucero Maldonado ([id number]) on 10/15/2021 4:52:18 PM     CBC WITH AUTOMATED DIFF    Collection Time: 10/15/21  1:25 PM   Result Value Ref Range    WBC 6.8 4.4 - 11.3 K/uL    RBC 2.83 (L) 4.50 - 5.90 M/uL    HGB 7.9 (L) 13.5 - 17.5 g/dL    HCT 24.6 (L) 36 - 46 %    MCV 86.9 80 - 100 FL    MCH 27.9 (L) 31 - 34 PG MCHC 32.1 31.0 - 36.0 g/dL    RDW 15.5 (H) 11.5 - 14.5 %    PLATELET 074 283 - 345 K/uL    MPV 8.8 6.5 - 11.5 FL    NRBC 0.1  WBC    ABSOLUTE NRBC 0.01 K/uL    NEUTROPHILS 74 42 - 75 %    LYMPHOCYTES 16 (L) 20.5 - 51.1 %    MONOCYTES 6 1.7 - 9.3 %    EOSINOPHILS 1 0.9 - 2.9 %    BASOPHILS 3 (H) 0.0 - 2.5 %    ABS. NEUTROPHILS 5.0 1.8 - 7.7 K/UL    ABS. LYMPHOCYTES 1.1 1.0 - 4.8 K/UL    ABS. MONOCYTES 0.4 0.2 - 2.4 K/UL    ABS. EOSINOPHILS 0.1 0.0 - 0.7 K/UL    ABS. BASOPHILS 0.2 0.0 - 0.2 K/UL   PROTHROMBIN TIME + INR    Collection Time: 10/15/21  1:25 PM   Result Value Ref Range    Prothrombin time 11.3 (H) 9.0 - 11.1 sec    INR 1.1 0.9 - 1.1     METABOLIC PANEL, COMPREHENSIVE    Collection Time: 10/15/21  1:25 PM   Result Value Ref Range    Sodium 142 136 - 145 mmol/L    Potassium 3.9 3.5 - 5.1 mmol/L    Chloride 104 97 - 108 mmol/L    CO2 29 21 - 32 mmol/L    Anion gap 9 5 - 15 mmol/L    Glucose 112 (H) 65 - 100 mg/dL    BUN 58 (H) 6 - 20 mg/dL    Creatinine 5.49 (H) 0.55 - 1.02 mg/dL    BUN/Creatinine ratio 11 (L) 12 - 20      GFR est AA 9 (L) >60 ml/min/1.73m2    GFR est non-AA 8 (L) >60 ml/min/1.73m2    Calcium 8.9 8.5 - 10.1 mg/dL    Bilirubin, total 0.4 0.2 - 1.0 mg/dL    AST (SGOT) 13 (L) 15 - 37 U/L    ALT (SGPT) 9 (L) 12 - 78 U/L    Alk.  phosphatase 49 45 - 117 U/L    Protein, total 6.0 (L) 6.4 - 8.2 g/dL    Albumin 3.1 (L) 3.5 - 5.0 g/dL    Globulin 2.9 2.0 - 4.0 g/dL    A-G Ratio 1.1 1.1 - 2.2     TROPONIN-HIGH SENSITIVITY    Collection Time: 10/15/21  1:25 PM   Result Value Ref Range    Troponin-High Sensitivity 27 0 - 51 ng/L   MAGNESIUM    Collection Time: 10/15/21  1:25 PM   Result Value Ref Range    Magnesium 2.2 1.6 - 2.4 mg/dL   URINALYSIS W/ RFLX MICROSCOPIC    Collection Time: 10/15/21  1:56 PM   Result Value Ref Range    Color Yellow/Straw      Appearance Clear Clear      Specific gravity 1.020 1.003 - 1.030      pH (UA) 7.0 5.0 - 8.0      Protein >300 (A) Negative mg/dL    Glucose Negative Negative mg/dL    Ketone Negative Negative mg/dL    Bilirubin Negative Negative      Blood Negative Negative      Urobilinogen 0.2 0.2 - 1.0 EU/dL    Nitrites Negative Negative      Leukocyte Esterase Negative Negative     URINE MICROSCOPIC    Collection Time: 10/15/21  1:56 PM   Result Value Ref Range    WBC 0-4 0 - 5 /hpf    RBC 0-5 0 - 3 /hpf    Bacteria 1+ (A) Negative /hpf         Imaging:     Assessment:     Chris Mims is a 78 y.o. female w/ hx of iddm ii, htn, cva, fe def anemia, ckd v, who is admitted for acute cva.        Plan:       Acute CVA - Left Jeni  -Continue aspirin, await echo  -A1c, TSH, lipid panel  -Neurology consult  -PT OT  -Permissive hypertension    HTN  -Hold home BP meds for permissive hypertension    IDDM II   -SSI plus hypoglycemic protocols, repeat A1c    LORY on CKD IV/V  -MIVF volume resuscitation.  -Recent graft in preparation for HD    Anemia  -Currently on iron supplementation.  -Review of previous iron panel consistent with anemia of chronic disease  -Repeat iron studies, ferritin, peripheral smear             FEN/GI -  NS @ 125 ml/hr  Activity - Per PT/OT  DVT prophylaxis - SCDs  GI prophylaxis -  NI  Disposition - TBD    CODE STATUS:  Full code       Signed By: Kaleb Ordonez MD     October 16, 2021

## 2021-10-16 NOTE — PROGRESS NOTES
81-year-old female admitted for acute CVA. Seen and examined during AM rounding. Speech is unchanged since admission. Feels well has no acute complaints or concerns. Vitals and labs reviewed  24-hour post symptoms. Initiate BP control with home meds. Hydralazine plus lisinopril  Neurology consult pending.

## 2021-10-16 NOTE — PROGRESS NOTES
Occupational Therapy  10/16/21    Order acknowledged, chart reviewed. Patient admitted with new onset CVA and is GREGORY, (anticipate in transit to the neuro unit). Will defer at this time, follow and see as able and appropriate.     Thank you,   Sebastian Be, OTD, OTR/L

## 2021-10-16 NOTE — PROGRESS NOTES
Consult received, chart reviewed, pt was admitted with new onset CVA and is GREGORY, (in transit to the neuro unit). PT will defer at this time, follow and see as able and appropriate.  Thank you, Ismael Wall, PT

## 2021-10-17 LAB
CHOLEST SERPL-MCNC: 92 MG/DL
GLUCOSE BLD STRIP.AUTO-MCNC: 122 MG/DL (ref 65–117)
GLUCOSE BLD STRIP.AUTO-MCNC: 145 MG/DL (ref 65–117)
GLUCOSE BLD STRIP.AUTO-MCNC: 149 MG/DL (ref 65–117)
GLUCOSE BLD STRIP.AUTO-MCNC: 90 MG/DL (ref 65–117)
HDLC SERPL-MCNC: 33 MG/DL
HDLC SERPL: 2.8 {RATIO} (ref 0–5)
LDLC SERPL CALC-MCNC: 43.2 MG/DL (ref 0–100)
P2Y12 PLT RESPONSE,PPPR: 164 PRU (ref 194–418)
SERVICE CMNT-IMP: ABNORMAL
SERVICE CMNT-IMP: NORMAL
TRIGL SERPL-MCNC: 79 MG/DL (ref ?–150)
VLDLC SERPL CALC-MCNC: 15.8 MG/DL

## 2021-10-17 PROCEDURE — 99233 SBSQ HOSP IP/OBS HIGH 50: CPT | Performed by: PSYCHIATRY & NEUROLOGY

## 2021-10-17 PROCEDURE — 97165 OT EVAL LOW COMPLEX 30 MIN: CPT

## 2021-10-17 PROCEDURE — 97530 THERAPEUTIC ACTIVITIES: CPT

## 2021-10-17 PROCEDURE — 74011250637 HC RX REV CODE- 250/637: Performed by: PSYCHIATRY & NEUROLOGY

## 2021-10-17 PROCEDURE — 82962 GLUCOSE BLOOD TEST: CPT

## 2021-10-17 PROCEDURE — 80061 LIPID PANEL: CPT

## 2021-10-17 PROCEDURE — 74011000250 HC RX REV CODE- 250: Performed by: INTERNAL MEDICINE

## 2021-10-17 PROCEDURE — 74011250636 HC RX REV CODE- 250/636: Performed by: INTERNAL MEDICINE

## 2021-10-17 PROCEDURE — 97161 PT EVAL LOW COMPLEX 20 MIN: CPT

## 2021-10-17 PROCEDURE — 74011250637 HC RX REV CODE- 250/637: Performed by: STUDENT IN AN ORGANIZED HEALTH CARE EDUCATION/TRAINING PROGRAM

## 2021-10-17 PROCEDURE — 97112 NEUROMUSCULAR REEDUCATION: CPT

## 2021-10-17 PROCEDURE — 65660000000 HC RM CCU STEPDOWN

## 2021-10-17 PROCEDURE — 36415 COLL VENOUS BLD VENIPUNCTURE: CPT

## 2021-10-17 PROCEDURE — 85576 BLOOD PLATELET AGGREGATION: CPT

## 2021-10-17 RX ADMIN — HYDRALAZINE HYDROCHLORIDE 100 MG: 50 TABLET, FILM COATED ORAL at 21:33

## 2021-10-17 RX ADMIN — TICAGRELOR 60 MG: 60 TABLET ORAL at 21:33

## 2021-10-17 RX ADMIN — ASPIRIN 81 MG CHEWABLE TABLET 81 MG: 81 TABLET CHEWABLE at 10:27

## 2021-10-17 RX ADMIN — HYDRALAZINE HYDROCHLORIDE 100 MG: 50 TABLET, FILM COATED ORAL at 10:27

## 2021-10-17 RX ADMIN — DONEPEZIL HYDROCHLORIDE 5 MG: 5 TABLET, FILM COATED ORAL at 10:27

## 2021-10-17 RX ADMIN — FERROUS SULFATE TAB 325 MG (65 MG ELEMENTAL FE) 325 MG: 325 (65 FE) TAB at 10:27

## 2021-10-17 RX ADMIN — LISINOPRIL 40 MG: 20 TABLET ORAL at 10:27

## 2021-10-17 RX ADMIN — HYDRALAZINE HYDROCHLORIDE 100 MG: 50 TABLET, FILM COATED ORAL at 15:40

## 2021-10-17 RX ADMIN — TICAGRELOR 60 MG: 60 TABLET ORAL at 10:27

## 2021-10-17 RX ADMIN — CEFTRIAXONE SODIUM 1 G: 1 INJECTION, POWDER, FOR SOLUTION INTRAMUSCULAR; INTRAVENOUS at 19:12

## 2021-10-17 RX ADMIN — ATORVASTATIN CALCIUM 40 MG: 40 TABLET, FILM COATED ORAL at 21:34

## 2021-10-17 RX ADMIN — TRAZODONE HYDROCHLORIDE 100 MG: 100 TABLET ORAL at 21:33

## 2021-10-17 NOTE — PROGRESS NOTES
A Spiritual Care Partner Volunteer visited patient in  675 on 10/17/2021.   Documented by:  Chaplain Benavidez MDiv, MS, [facility]

## 2021-10-17 NOTE — CONSULTS
Neurology Progress Note    Patient ID:  Dianne Mcmullen  [id number]  78 y.o.  1945    Chief Complaint: Stroke    Subjective:     72-year-old woman with a new left pontine infarct. Aspirin effective however Plavix ineffective. She is working with PT and OT this morning. Speech is a little bit better. Objective:       ROS:  Per HPI  All other 12 pt ROS negative    Meds:  Current Facility-Administered Medications   Medication Dose Route Frequency    ticagrelor (BRILINTA) tablet 60 mg  60 mg Oral Q12H    aspirin chewable tablet 81 mg  81 mg Oral DAILY    acetaminophen (TYLENOL) tablet 650 mg  650 mg Oral Q4H PRN    Or    acetaminophen (TYLENOL) solution 650 mg  650 mg Per NG tube Q4H PRN    Or    acetaminophen (TYLENOL) suppository 650 mg  650 mg Rectal Q4H PRN    ondansetron (ZOFRAN) injection 4 mg  4 mg IntraVENous Q6H PRN    albuterol-ipratropium (DUO-NEB) 2.5 MG-0.5 MG/3 ML  3 mL Nebulization Q6H PRN    donepeziL (ARICEPT) tablet 5 mg  5 mg Oral DAILY    ferrous sulfate tablet 325 mg  325 mg Oral DAILY    montelukast (SINGULAIR) tablet 10 mg  10 mg Oral DAILY    traZODone (DESYREL) tablet 100 mg  100 mg Oral QHS    hydrALAZINE (APRESOLINE) tablet 100 mg  100 mg Oral TID    lisinopriL (PRINIVIL, ZESTRIL) tablet 40 mg  40 mg Oral DAILY    atorvastatin (LIPITOR) tablet 40 mg  40 mg Oral QHS       MRI Results (maximum last 3): Results from [place] encounter on 10/15/21    MRA NECK WO CONT    Narrative  *PRELIMINARY REPORT*    No evidence of large vessel occlusion. Preliminary report was provided by Dr. Kingsley Bermudez, the on-call radiologist, at 9:45 PM  on 10/15/2021. Final report to follow. *END PRELIMINARY REPORT*    CLINICAL HISTORY: Slurred speech    INDICATION: Slurred speech. COMPARISON: None  TECHNIQUE: MR examination of the brain includes axial and sagittal T1 , axial  T2, axial FLAIR, axial gradient echo, axial DWI, coronal T2.   Contrast: None  Next, 3-D time-of-flight MRA of the brain was performed. Multiplanar  reconstructions were obtained. Next, 2-D time-of-flight MRA of the neck was performed. Multiplanar  reconstructions were obtained. FINDINGS:  There is a small focus of infarction in the posterior central silvina extending  slightly to the left. Less than 10 mm in size. There is sulcal and ventricular prominence. There are scattered foci of chronic  hemosiderin deposition, the majority of these and cerebral hemispheres with some  additional foci of chronic hemosiderin deposition in the central silvina. Extensive  periventricular and scattered foci of increased T2 signal intensity corona  radiata, central silvina, centrum semiovale. There are small scattered remote  infarctions demonstrated right and left cerebellum and left silvina right thalamus  periventricular white matter in the frontal lobes as well. IACs are symmetric in  size. There is no Chiari or syrinx. The pituitary and infundibulum are grossly  unremarkable. There is no skull base mass. Cerebellopontine angles are grossly  unremarkable. The major intracranial vascular flow-voids are unremarkable. The cavernous sinuses are symmetric. Optic chiasm and infundibulum grossly  unremarkable. Orbits are grossly symmetric. Dural venous sinuses are grossly  patent. The brain architecture is normal. There is no evidence of midline shift  or mass-effect. There are no extra-axial fluid collections. The mastoid air  cells and paranasal sinuses are well pneumatized and clear. Vertebral arteries are codominant. There is no basilar artery stenosis. There is  atherosclerotic change of the cavernous ICAs with mild right cavernous ICA  stenosis and moderate to severe supraclinoid ICA stenosis on the left. Mild  multifocal M1 segment stenoses. Arborization of M2 vessels on the left is  normal. Right M3 anterior division stenosis at 1-35 may be nearly occlusive. No  infarction in this region. . Tiny posterior communicating artery on the left. P1  segments are patent. Proximal P2 segments are patent. . The internal carotid,  anterior cerebral, and middle cerebral arteries are patent. Common origin of the innominate and left common carotid arteries. . The bilateral  subclavian, common carotid, and internal carotid arteries are patent with no  flow-limiting stenosis. Left vertebral artery slightly larger than the right  vertebral artery. .    There is 0. % stenosis in the right internal carotid artery utilizing NASCET  criteria. There is 0. % stenosis in the left internal carotid artery utilizing NASCET  criteria. Impression  Very small focus of acute infarction in the posterior silvina slightly to the left  of midline. No superimposed hemorrhage. Moderate to severe chronic microvascular ischemic change with additional small  scattered lacunar infarctions seen throughout the cerebral and cerebellar  hemispheres. Moderate to severe cerebral atrophy as well. There is no major vessel occlusion, aneurysm, dissection or large vessel  hemodynamically significant stenosis. There is atherosclerotic cerebral vasculopathy with multifocal mild to moderate  an even severe stenoses demonstrated an smaller M3/A3 segments suggested. There is no intracranial mass, hemorrhage. MRA BRAIN WO CONT    Narrative  *PRELIMINARY REPORT*    No evidence of large vessel occlusion. Preliminary report was provided by Dr. Thomas Patel, the on-call radiologist, at 9:45 PM  on 10/15/2021. Final report to follow. *END PRELIMINARY REPORT*    CLINICAL HISTORY: Slurred speech    INDICATION: Slurred speech. COMPARISON: None  TECHNIQUE: MR examination of the brain includes axial and sagittal T1 , axial  T2, axial FLAIR, axial gradient echo, axial DWI, coronal T2. Contrast: None  Next, 3-D time-of-flight MRA of the brain was performed. Multiplanar  reconstructions were obtained. Next, 2-D time-of-flight MRA of the neck was performed.  Multiplanar  reconstructions were obtained. FINDINGS:  There is a small focus of infarction in the posterior central silvina extending  slightly to the left. Less than 10 mm in size. There is sulcal and ventricular prominence. There are scattered foci of chronic  hemosiderin deposition, the majority of these and cerebral hemispheres with some  additional foci of chronic hemosiderin deposition in the central silvina. Extensive  periventricular and scattered foci of increased T2 signal intensity corona  radiata, central silvina, centrum semiovale. There are small scattered remote  infarctions demonstrated right and left cerebellum and left silvina right thalamus  periventricular white matter in the frontal lobes as well. IACs are symmetric in  size. There is no Chiari or syrinx. The pituitary and infundibulum are grossly  unremarkable. There is no skull base mass. Cerebellopontine angles are grossly  unremarkable. The major intracranial vascular flow-voids are unremarkable. The cavernous sinuses are symmetric. Optic chiasm and infundibulum grossly  unremarkable. Orbits are grossly symmetric. Dural venous sinuses are grossly  patent. The brain architecture is normal. There is no evidence of midline shift  or mass-effect. There are no extra-axial fluid collections. The mastoid air  cells and paranasal sinuses are well pneumatized and clear. Vertebral arteries are codominant. There is no basilar artery stenosis. There is  atherosclerotic change of the cavernous ICAs with mild right cavernous ICA  stenosis and moderate to severe supraclinoid ICA stenosis on the left. Mild  multifocal M1 segment stenoses. Arborization of M2 vessels on the left is  normal. Right M3 anterior division stenosis at 1-35 may be nearly occlusive. No  infarction in this region. . Tiny posterior communicating artery on the left. P1  segments are patent. Proximal P2 segments are patent. . The internal carotid,  anterior cerebral, and middle cerebral arteries are patent.       Common origin of the innominate and left common carotid arteries. . The bilateral  subclavian, common carotid, and internal carotid arteries are patent with no  flow-limiting stenosis. Left vertebral artery slightly larger than the right  vertebral artery. .    There is 0. % stenosis in the right internal carotid artery utilizing NASCET  criteria. There is 0. % stenosis in the left internal carotid artery utilizing NASCET  criteria. Impression  Very small focus of acute infarction in the posterior silvina slightly to the left  of midline. No superimposed hemorrhage. Moderate to severe chronic microvascular ischemic change with additional small  scattered lacunar infarctions seen throughout the cerebral and cerebellar  hemispheres. Moderate to severe cerebral atrophy as well. There is no major vessel occlusion, aneurysm, dissection or large vessel  hemodynamically significant stenosis. There is atherosclerotic cerebral vasculopathy with multifocal mild to moderate  an even severe stenoses demonstrated an smaller M3/A3 segments suggested. There is no intracranial mass, hemorrhage. MRI BRAIN WO CONT    Narrative  *PRELIMINARY REPORT*    There is an acute lacunar infarct in the silvina. Preliminary report was provided by Dr. Lia Morin, the on-call radiologist, at 9:45 PM  10/15/2021    Final report to follow. *END PRELIMINARY REPORT*      CLINICAL HISTORY: Slurred speech    INDICATION: Slurred speech. COMPARISON: None  TECHNIQUE: MR examination of the brain includes axial and sagittal T1 , axial  T2, axial FLAIR, axial gradient echo, axial DWI, coronal T2. Contrast: None  Next, 3-D time-of-flight MRA of the brain was performed. Multiplanar  reconstructions were obtained. Next, 2-D time-of-flight MRA of the neck was performed. Multiplanar  reconstructions were obtained. FINDINGS:  There is a small focus of infarction in the posterior central silvina extending  slightly to the left.  Less than 10 mm in size.  There is sulcal and ventricular prominence. There are scattered foci of chronic  hemosiderin deposition, the majority of these and cerebral hemispheres with some  additional foci of chronic hemosiderin deposition in the central silvina. Extensive  periventricular and scattered foci of increased T2 signal intensity corona  radiata, central silvina, centrum semiovale. There are small scattered remote  infarctions demonstrated right and left cerebellum and left silvina right thalamus  periventricular white matter in the frontal lobes as well. IACs are symmetric in  size. There is no Chiari or syrinx. The pituitary and infundibulum are grossly  unremarkable. There is no skull base mass. Cerebellopontine angles are grossly  unremarkable. The major intracranial vascular flow-voids are unremarkable. The cavernous sinuses are symmetric. Optic chiasm and infundibulum grossly  unremarkable. Orbits are grossly symmetric. Dural venous sinuses are grossly  patent. The brain architecture is normal. There is no evidence of midline shift  or mass-effect. There are no extra-axial fluid collections. The mastoid air  cells and paranasal sinuses are well pneumatized and clear. Vertebral arteries are codominant. There is no basilar artery stenosis. There is  atherosclerotic change of the cavernous ICAs with mild right cavernous ICA  stenosis and moderate to severe supraclinoid ICA stenosis on the left. Mild  multifocal M1 segment stenoses. Arborization of M2 vessels on the left is  normal. Right M3 anterior division stenosis at 1-35 may be nearly occlusive. No  infarction in this region. . Tiny posterior communicating artery on the left. P1  segments are patent. Proximal P2 segments are patent. . The internal carotid,  anterior cerebral, and middle cerebral arteries are patent. Common origin of the innominate and left common carotid arteries. . The bilateral  subclavian, common carotid, and internal carotid arteries are patent with no  flow-limiting stenosis. Left vertebral artery slightly larger than the right  vertebral artery. .    There is 0. % stenosis in the right internal carotid artery utilizing NASCET  criteria. There is 0. % stenosis in the left internal carotid artery utilizing NASCET  criteria. Impression  Very small focus of acute infarction in the posterior silvina slightly to the left  of midline. No superimposed hemorrhage. Moderate to severe chronic microvascular ischemic change with additional small  scattered lacunar infarctions seen throughout the cerebral and cerebellar  hemispheres. Moderate to severe cerebral atrophy as well. There is no major vessel occlusion, aneurysm, dissection or large vessel  hemodynamically significant stenosis. There is atherosclerotic cerebral vasculopathy with multifocal mild to moderate  an even severe stenoses demonstrated an smaller M3/A3 segments suggested. There is no intracranial mass, hemorrhage. Lab Review   Recent Results (from the past 24 hour(s))   ASPIRIN TEST    Collection Time: 10/16/21  1:25 PM   Result Value Ref Range    Aspirin test 435 ARU   PLATELET FUNCTION, VERIFY NOW P2Y12    Collection Time: 10/16/21  1:25 PM   Result Value Ref Range    P2Y12 Plt response 327 194 - 418 PRU   GLUCOSE, POC    Collection Time: 10/16/21  4:03 PM   Result Value Ref Range    Glucose (POC) 135 (H) 65 - 117 mg/dL    Performed by Loretta Aguilar (CON)    LIPID PANEL    Collection Time: 10/17/21  2:46 AM   Result Value Ref Range    Cholesterol, total 92 <200 MG/DL    Triglyceride 79 <150 MG/DL    HDL Cholesterol 33 MG/DL    LDL, calculated 43.2 0 - 100 MG/DL    VLDL, calculated 15.8 MG/DL    CHOL/HDL Ratio 2.8 0.0 - 5.0     GLUCOSE, POC    Collection Time: 10/17/21  8:57 AM   Result Value Ref Range    Glucose (POC) 90 65 - 117 mg/dL    Performed by Berta YUNG        Additional comments:I reviewed the patient's new clinical lab test results.   and I reviewed the patients new imaging test results. Patient Vitals for the past 8 hrs:   BP Temp Pulse Resp SpO2   10/17/21 0950 (!) 160/62  84     10/17/21 0900 (!) 157/74  66     10/17/21 0604  97.9 °F (36.6 °C) 69 18 98 %   10/17/21 0603   67     10/17/21 0402   67         No intake/output data recorded. 10/15 1901 - 10/17 0700  In: 300 [P.O.:300]  Out: -     Exam:  Visit Vitals  BP (!) 160/62 (BP 1 Location: Left upper arm, BP Patient Position: Sitting)   Pulse 84   Temp 97.9 °F (36.6 °C)   Resp 18   Wt 188 lb (85.3 kg)   SpO2 98%   BMI 32.27 kg/m²     Gen: Well developed  CV: RRR  Lungs: non labored breathing  Abd: soft, non distended  Neuro: A&O follows commands, working with PT OT actively  CN II-XII: PERRL, face mask, tongue/palate midline  Motor: Right-sided weakness  Sensory: Not tested   COOR: no limb/truncal ataxia  Gait: Wide with walker and assistant    PROBLEM LIST:     Patient Active Problem List   Diagnosis Code    CVA (cerebral vascular accident) ([facility] [address] I63.9    CKD (chronic kidney disease) N18.9    Accelerated hypertension I10       Assessment/Plan:      62-year-old woman with a new left pontine infarct. She has intracranial vascular atherosclerotic disease. Aspirin is effective so we will continue however I replaced Plavix or Brilinta. We will check another assay today. Continue PT OT needs. I spoke with her daughter extensively at the bedside and we personally reviewed the imaging. She needs new echo. 35 minutes of time was spent in total today reviewing the medical record, face-to-face time with the patient and her daughter discussing imaging and plans, and time completing documentation. During this evaluation, we also discussed stroke education to include signs and symptoms of stroke and TIA. This clinical note was dictated with an electronic dictation software that can make unintentional errors.   If there are any questions, please contact me directly for clarification.       Signed:  [address] [facility], DO  10/17/2021  10:09 AM

## 2021-10-17 NOTE — PROGRESS NOTES
Problem: Mobility Impaired (Adult and Pediatric)  Goal: *Acute Goals and Plan of Care (Insert Text)  Outcome: Not Met  Note: FUNCTIONAL STATUS PRIOR TO ADMISSION:  The required caregivers for assitance with ADLs and was only mobilizing at a household distance level. HOME SUPPORT PRIOR TO ADMISSION: The patient lived alone with caregivers  to provide assistance. Physical Therapy Goals  Initiated 10/17/2021  1. Patient will move from supine to sit and sit to supine  in bed with modified independence within 7 day(s). 2.  Patient will transfer from bed to chair and chair to bed with supervision/set-up using the least restrictive device within 7 day(s). 3.  Patient will perform sit to stand with modified independence within 7 day(s). 4.  Patient will ambulate with minimal assistance/contact guard assist for 125 feet with the least restrictive device within 7 day(s). PHYSICAL THERAPY EVALUATION  Patient: Ely Lau (6 y.o. female)  Date: 10/17/2021  Primary Diagnosis: CVA (cerebral vascular accident) [facility]) [I63.9]        Precautions:   WBAT, Fall    ASSESSMENT  Based on the objective data described below, the patient presents with decreased mobility, decreased balance, decreased ambulation potential and increased fall risk. Pt received in bed and initially required Max Ax2 to go sit>stand before transferring to [facility]. Once seated in chair, pt performed sit>stand at a Min A level with use of armrests. Pt required Mod A for stand>sit to due to pts uncontrolled descent despite VC from PT/OT. Pt would benefit from skilled PT to address deficits, maximize functial mobility, decrease fall risk, and decrease caregiver burden. Will continue to follow while acute. Current Level of Function Impacting Discharge (mobility/balance): Min/Mod A for mobility    Functional Outcome Measure: The patient scored 15/56 on the [place] outcome measure which is indicative of high risk of falls.       Other factors to consider for discharge: lives alone, has caregivers however not 24/7     Patient will benefit from skilled therapy intervention to address the above noted impairments. PLAN :  Recommendations and Planned Interventions: bed mobility training, transfer training, gait training, therapeutic exercises, neuromuscular re-education, patient and family training/education, and therapeutic activities      Frequency/Duration: Patient will be followed by physical therapy:  5 times a week to address goals. Recommendation for discharge: (in order for the patient to meet his/her long term goals)  Therapy 3 hours per day 5-7 days per week    This discharge recommendation:  Has not yet been discussed the attending provider and/or case management    IF patient discharges home will need the following DME: patient owns DME required for discharge         SUBJECTIVE:   Patient stated lets try.     OBJECTIVE DATA SUMMARY:   HISTORY:    Past Medical History:   Diagnosis Date    Blindness/low vision 2008    due to dm    CHF (congestive heart failure) ([facility])     Chronic kidney disease     Chronic pain     SPINAL STENOSIS    DM (diabetes mellitus) ([facility] [address]     HTN (hypertension)     Stroke [facility])      Past Surgical History:   Procedure Laterality Date    HX OOPHORECTOMY      STENT INSERTION  2001,2002    Bimal Schumacher       Personal factors and/or comorbidities impacting plan of care: good family support, history of stroke    Home Situation  Home Environment: Private residence  # Steps to Enter: 3  Rails to Enter: Yes  Wheelchair Ramp: Yes  One/Two Story Residence: One story  Living Alone: Yes  Support Systems: Child(jennifer), Caregiver/Home Care Staff (approx 8 hours a day 7 days a week caregivers)  Current DME Used/Available at Home: [address], [address] Bob, [address], [employer]ly[employer] chair, Wheelchair, Grab bars, [place] [employer]ins, straight, Commode, bedside  Tub or Shower Type: Shower    EXAMINATION/PRESENTATION/DECISION MAKING:   Critical Behavior:  Neurologic State: Alert  Orientation Level: Oriented X4  Cognition: Follows commands  Safety/Judgement: Awareness of environment, Decreased awareness of need for safety, Decreased insight into deficits  Hearing: Auditory  Auditory Impairment: None    Range Of Motion:  AROM: Generally decreased, functional     PROM: Generally decreased, functional    Strength:    Strength: Generally decreased, functional    Tone & Sensation:   Tone: Normal    Sensation: Intact (intact to light touch RD but deficits with word finding)    Coordination:  Coordination: Generally decreased, functional  Vision:   Acuity: Within Defined Limits;Able to read clock/calendar on wall without difficulty (but reports visual deficits, none observed during testing)  Functional Mobility:  Bed Mobility:     Supine to Sit: Additional time;Supervision (HOB elevated )  Sit to Supine:  (remained OOB in chair; recommend assist x 2 )     Transfers:  Sit to Stand: Moderate assistance;Assist x2 (improved to min A x 2 from the chair with both arms)  Stand to Sit: Minimum assistance;Assist x2        Bed to Chair: Moderate assistance;Assist x2 (sudden sitting in the chair)              Balance:   Sitting: Intact  Standing: Impaired; With support  Standing - Static: Fair  Standing - Dynamic : Fair;Poor (sudden sitting when transferring to the chair)      Therapeutic Exercises:   LAQ, Ankle pumps    Functional Measure:  [place] Balance Test:    Sitting to Standin  Standing Unsupported: 2  Sitting with Back Unsupported: 3  Standing to Sittin  Transfers: 1  Standing Unsupported with Eyes Closed: 1  Standing Unsupported with Feet Together: 0  Reach Forward with Outstretched Arm: 2   Object: 0  Turn to Look Over Shoulders: 1  Turn 360 Degrees: 1  Alternate Foot on Step/Stool: 1  Standing Unsupported One Foot in Front: 0  Stand on One Le  Total: 15/56         56=Maximum possible score;   0-20=High fall risk  21-40=Moderate fall risk   41-56=Low fall risk Physical Therapy Evaluation Charge Determination   History Examination Presentation Decision-Making   HIGH Complexity :3+ comorbidities / personal factors will impact the outcome/ POC  MEDIUM Complexity : 3 Standardized tests and measures addressing body structure, function, activity limitation and / or participation in recreation  LOW Complexity : Stable, uncomplicated  Other outcome measures weber  HIGH       Based on the above components, the patient evaluation is determined to be of the following complexity level: LOW     Pain Rating:  Pt did not relate any pain during session    Activity Tolerance:   Fair and SpO2 stable on RA    After treatment patient left in no apparent distress:   Sitting in chair, Call bell within reach, and Caregiver / family present    COMMUNICATION/EDUCATION:   The patients plan of care was discussed with: Occupational therapist and Registered nurse. Fall prevention education was provided and the patient/caregiver indicated understanding., Patient/family have participated as able in goal setting and plan of care. , and Patient/family agree to work toward stated goals and plan of care.     Thank you for this referral.  Flavio Thomson, PT   Time Calculation: 19 mins

## 2021-10-17 NOTE — PROGRESS NOTES
Problem: Self Care Deficits Care Plan (Adult)  Goal: *Acute Goals and Plan of Care (Insert Text)  Description: FUNCTIONAL STATUS PRIOR TO ADMISSION: Patient was modified independent using a walker for functional mobility. HOME SUPPORT: Patient has care aid 8 hours a day/7 days a week to assist with ADLs as needed and with iADLs. Patient spends most of her time in bed. Occupational Therapy Goals  Initiated 10/17/2021   1. Patient will perform self-feeding with moderate assistance  within 7 day(s). 2.  Patient will perform upper body dressing with minimal assistance within 7 day(s). 3.  Patient will perform lower body dressing with moderate assistance within 7 day(s). 4.  Patient will perform toilet transfers with minimal assistance within 7 day(s). 5.  Patient will perform all aspects of toileting with moderate assistance  within 7 day(s). 6.  Patient will participate in upper extremity therapeutic exercise/activities with moderate assistance  within 7 day(s). 7.  Patient will utilize energy conservation techniques during functional activities with moderate verbal cues within 7 day(s). 8.  Patient will improve their Fugl Sheppard score by 5 points in prep for ADLs within 7 days. OCCUPATIONAL THERAPY EVALUATION  Patient: Juanjose Max (51 y.o. female)  Date: 10/17/2021  Primary Diagnosis: CVA (cerebral vascular accident) [facility]) [I63.9]        Precautions: Fall, WBAT    ASSESSMENT  Based on the objective data described below, the patient presents with decreased dynamic standing balance/tolerance, limited functional mobility, decreased coordination and dysmetria (LUE better than RUE) as well as difficulty with crossing midline bilaterally; patient is also demonstrating cognitive deficits including difficulty with word finding and information processing s/p acute CVA.  Patient completed bed to chair transfer this session with up to moderate assistance x 2 and patient requiring rest breaks due to balance deficits and generalized weakness. At baseline, patient has care aids 8 hrs day/7 days a week to assist primarily with iADLs and some ADLs as needed. Patient is currently presenting below baseline and needing moderate- total assistance for ADLs. Patient would benefit from acute OT skilled services to address the above functional limitations with recommendation of IPR upon discharge. Current Level of Function Impacting Discharge (ADLs/self-care): moderate assistance bathing, maximum assistance UB dressing/grooming, total assistance toileting/LB dressing    Functional Outcome Measure: The patient scored Total A-D  Total A-D (Motor Function): 45/66 on the Fugl-Sheppard Assessment which is indicative of moderate severe impairment in upper extremity functional status. Other factors to consider for discharge: recommend 24/7 care support     Patient will benefit from skilled therapy intervention to address the above noted impairments. PLAN :  Recommendations and Planned Interventions: self care training, functional mobility training, therapeutic exercise, balance training, visual/perceptual training, therapeutic activities, cognitive retraining, endurance activities, patient education, home safety training, and family training/education    Frequency/Duration: Patient will be followed by occupational therapy 5 times a week to address goals. Recommendation for discharge: (in order for the patient to meet his/her long term goals)  Therapy 3 hours per day 5-7 days per week    This discharge recommendation:  Has not yet been discussed the attending provider and/or case management    IF patient discharges home will need the following DME: patient owns DME required for discharge       SUBJECTIVE:   Patient stated Dillard Alpers, break.  patient reports needing a break after ambulating from bed to chair    OBJECTIVE DATA SUMMARY:   HISTORY:   Past Medical History:   Diagnosis Date    Blindness/low vision 2008    due to dm CHF (congestive heart failure) (HCC)     Chronic kidney disease     Chronic pain     SPINAL STENOSIS    DM (diabetes mellitus) ([facility] [address]     HTN (hypertension)     Stroke [facility])      Past Surgical History:   Procedure Laterality Date    HX OOPHORECTOMY      STENT INSERTION  [id number]    Read Manny     Expanded or extensive additional review of patient history:     Home Situation  Home Environment: Private residence  # Steps to Enter: 3  Rails to Enter: Yes  Wheelchair Ramp: Yes  One/Two Story Residence: One story  Living Alone: Yes  Support Systems: Child(jennifer) (Home care aide)  Current DME Used/Available at Home: Walker, rolling, Commode, bedside, Wheelchair, Jan Blas, straight, Fredy Moros, rollator, Shower chair  Tub or Shower Type: Shower    Hand dominance: Right    EXAMINATION OF PERFORMANCE DEFICITS:  Cognitive/Behavioral Status:  Neurologic State: Alert  Orientation Level: Oriented X4  Cognition: Follows commands  Perception: Appears intact (opthamalogist visit last week; no new glasses)  Perseveration: No perseveration noted  Safety/Judgement: Awareness of environment;Decreased awareness of need for safety;Decreased insight into deficits      Hearing: Auditory  Auditory Impairment: None    Vision/Perceptual:         Acuity: Within Defined Limits;Able to read clock/calendar on wall without difficulty (but reports visual deficits, none observed during testing)         Range of Motion:  AROM: Generally decreased, functional  PROM: Generally decreased, functional   Note: Patient's RUE is more affected than LUE.  Refer to Wade Swann for ROM screening; below notes the partial ranges-    Shoulder Flexion: R-150 degrees, L- 170 degrees  Shoulder External Rotation: R- 45 degrees, L- 70 degrees  Wrist Extension: 10-20 degrees bilaterally          Strength:  Strength: Generally decreased, functional      3/5 bilateral , elbow flexion/extension, L shoulder flexion/external rotation  2+/5 bilateral wrist extension, R shoulder flexion/external rotation        Coordination:  Coordination: Generally decreased, functional  Fine Motor Skills-Upper: Right Impaired;Left Intact    Gross Motor Skills-Upper: Right Impaired;Left Intact (difficulty crossing midline RD)    Tone & Sensation:  Tone: Normal  Sensation: Intact (intact to light touch RD but deficits with word finding)       Balance:  Sitting: Intact  Standing: Impaired; With support  Standing - Static: Fair  Standing - Dynamic : Fair;Poor (sudden sitting when transferring to the chair)    Functional Mobility and Transfers for ADLs:  Bed Mobility:  Supine to Sit: Additional time;Supervision (HOB elevated )  Sit to Supine:  (remained OOB in chair; recommend assist x 2 )    Transfers:  Sit to Stand: Moderate assistance;Assist x2 (improved to min A x 2 from the chair with both arms)  Stand to Sit: Minimum assistance;Assist x2  Bed to Chair: Moderate assistance;Assist x2 (sudden sitting in the chair)  Bathroom Mobility:  (BSC use only)  Toilet Transfer : Moderate assistance;Assist x2    ADL Assessment:  Feeding: Total assistance (daughter fed her breakfast)    Oral Facial Hygiene/Grooming: Maximum assistance    Bathing: Moderate assistance    Upper Body Dressing: Maximum assistance    Lower Body Dressing: Total assistance    Toileting: Total assistance       ADL Intervention and task modifications: In prep for toilet transfers to [facility], patient completed bed to chair transfer with moderate assistance x 2 with frequent breaks needed. Patient demonstrated improved sit to stand upon second stand with minimum assistance x 2 needed.     Cognitive Retraining  Safety/Judgement: Awareness of environment;Decreased awareness of need for safety;Decreased insight into deficits    Functional Measure:  Fugl-Sheppard Assessment of Motor Recovery after Stroke:   Reflex Activity  Flexors/Biceps/Fingers: Can be elicited  Extensors/Triceps: Can be elicited  Reflex Subtotal: 4    Volitional Movement Within Synergies  Shoulder Retraction: Partial  Shoulder Elevation: Full  Shoulder Abduction (90 degrees): Full  Shoulder External Rotation: Partial  Elbow Flexion: Full  Forearm Supination: Full  Shoulder Adduction/Internal Rotation: Full  Elbow Extension: Partial  Forearm Pronation: Full  Subtotal: 15    Volitional Movement Mixing Synergies  Hand to Lumbar Spine: Full  Shoulder Flexion (0-90 degrees): Partial  Pronation-Supination: Full  Subtotal: 5    Volitional Movement With Little or No Synergy  Shoulder Abduction (0-90 degrees): Partial  Shoulder Flexion ( degrees): Partial  Pronation/Supination: Partial  Subtotal : 3    Normal Reflex Activity  Biceps, Triceps, Finger Flexors: Full  Subtotal : 2    Upper Extremity Total   Upper Extremity Total: 29    Wrist  Stability at 15 Degree Dorsiflexion: None (wrist resting mostly at neutral)  Repeated Dorsiflexion/ Volar Flexion: Partial  Stability at 15 Degree Dorsiflexion: None  Repeated Dorsiflexion/ Volar Flexion: Partial  Circumduction: Full  Wrist Total: 4    Hand  Mass Flexion: Full  Mass Extension: Full  Grasp A: Partial  Grasp B: Partial  Grasp C: Partial  Grasp D: Partial  Grasp E: Partial  Hand Total: 9    Coordination/Speed  Tremor: None  Dysmetria: Slight  Time: >5s  Coordination/Speed Total : 3    Total A-D  Total A-D (Motor Function): 45/66     This is a reliable/valid measure of arm function after a neurological event. It has established value to characterize functional status and for measuring spontaneous and therapy-induced recovery; tests proximal and distal motor functions. Fugl-Sheppard Assessment - UE scores recorded between five and 30 days post neurologic event can be used to predict UE recovery at six months post neurologic event. Severe = 0-21 points   Moderately Severe = 22-33 points   Moderate = 34-47 points   Mild = 48-66 points  NADIA Fernandez, VIOLETTA Henderson, & KE Boyd (1992).  Measurement of motor recovery after stroke: Outcome assessment and sample size requirements. Stroke, 23, pp. 4260-4294.   ------------------------------------------------------------------------------------------------------------------------------------------------------------------  MCID:  Stroke:   Marisa Rincon et al, 2001; n = 171; mean age 79 (6) years; assessed within 16 (12) days of stroke, Acute Stroke)  FMA Motor Scores from Admission to Discharge   10 point increase in FMA Upper Extremity = 1.5 change in discharge FIM   10 point increase in FMA Lower Extremity = 1.9 change in discharge FIM  MDC:   Stroke:   Shayy Luna et al, 2008, n = 14, mean age = 59.9 (14.6) years, assessed on average 14 (6.5) months post stroke, Chronic Stroke)   FMA = 5.2 points for the Upper Extremity portion of the assessment     Occupational Therapy Evaluation Charge Determination   History Examination Decision-Making   MEDIUM Complexity : Expanded review of history including physical, cognitive and psychosocial  history  HIGH Complexity : 5 or more performance deficits relating to physical, cognitive , or psychosocial skils that result in activity limitations and / or participation restrictions LOW Complexity : No comorbidities that affect functional and no verbal or physical assistance needed to complete eval tasks       Based on the above components, the patient evaluation is determined to be of the following complexity level: MEDIUM  Pain Rating:  No c/o pain    Activity Tolerance:   Poor and requires rest breaks    After treatment patient left in no apparent distress:    Sitting in chair, Call bell within reach, Bed / chair alarm activated, and Caregiver / family present    COMMUNICATION/EDUCATION:   The patients plan of care was discussed with: Physical therapist and Registered nurse.      Patient was educated regarding her deficit(s) of right sided hemiparesis, dysmetria, decreased coordination, decreased word finding/information processing, and dynamic balance deficits as this relates to her diagnosis of acute left CVA. She demonstrated Fair understanding as evidenced by engagement during education. Patient and/or family was verbally educated on the BE FAST acronym for signs/symptoms of CVA and TIA. BE FAST was written on patient's communication board  for visual education and reinforcement. All questions answered with patient indicating fair understanding. Home safety education was provided and the patient/caregiver indicated understanding. This patients plan of care is appropriate for delegation to \[facility]\"". Thank you for this referral.  Leo Montalvo  Time Calculation: 41 mins      Regarding student involvement in patient care:  A student participated in this treatment session. Per CMS Medicare statements and AOTA guidelines I certify that the following was true:  1. I was present and directly observed the entire session. 2. I made all skilled judgments and clinical decisions regarding care. 3. I am the practitioner responsible for assessment, treatment, and documentation.

## 2021-10-17 NOTE — PROGRESS NOTES
Transition of Care Plan  RUR 23%    COVID 19 vaccinated  Neurology following    Medicare pt has received, reviewed, and signed 1st IM letter informing them of their right to appeal the discharge. Signed copy has been placed on pt bedside chart. Disposition  TBD pending medical and therapy progress and recommendations  IPR--choice [facility]  SNF choice  Daughter investigating facilities     Transportation BLS  [facility] phone [phone]    Medical follow up   PCP  And specialist    Contact  Daughter SABAS Villarreal  [phone]    Reason for Admission:   Weakness, slurred speech  CVA  Hx diabetes, hypetension, stroke, CHF                   RUR Score:  23%                PCP: First and Last name:   Omid Diaz MD     Name of Practice:    Are you a current patient: Yes/No:    Approximate date of last visit:    Can you participate in a virtual visit if needed:     Do you (patient/family) have any concerns for transition/discharge? Plan for utilizing home health: Will arrange if ordered-- patient likely being discharged to rehab--Has been serviced by [facility] in the past    Current Advanced Directive/Advance Care Plan:  Full Code      Healthcare Decision Maker:   Click here to complete [place] including selection of the [place] Relationship (ie \"Primary\")            Primary Decision Maker: Karen Conn - Other Relative - [phone]    Transition of Care Plan:   TBD  IPR/vs SNF  IPR  [facility] in [place] in the past   [address] in the past 9//21)  [facility]-- Services by Sigrid [address] met with patient and daughter Thelma Hall in patient's room to introduce self and explain role. Patient was alert,but had some difficulty in finding words.  She did  and confirmed demographics, PCP and insurance Medicare and  [address]    Patient lives in her own one level home with 3 steps to enter,  with caretakers 7 days a week from 8 am- 4 pm and 4 pm to 8 pm on some nights. The the caretakers are self paid-  Daughter handles the schedules of caretakers   They assist patient with iadl's and some ADL's as needed. The caretakers assist with meal preparation and cleaning as well. Patient has RW for mobility BSC, cane and shower chair. Patient has poor vision   . Ramp has been placed into the home. Daughter is planning to have railing installed on the ramp. Daughter resides in [place]-- she has monitor camera in the house from [place]. Daughter is a SW. Daughter said patient is below her baseline. Daughter said she is in [facility] once a month but is planning to relocate to the [facility] as patient does not want to leave her home to move to [place] . Rama Son is an only child. She is POA  Patient is a retired . Therapy will evaluate patient and make recommendations. CM discussed discharge options with patient and daughter. Both are in agreement with rehab-  Both prefer IPR-- [facility] is the first choice. CM provided daughter with a list of SNF's  in the area. She is going to investigate facilities and will give CM choice tomorrow. The Plan for Transition of Care is related to the following treatment goals: Rehab  IPR/ SNF    The Patient and daughter were provided with a choice of provider and agrees  with the discharge plan. Yes [x] No []    A Freedom of choice list was provided with basic dialogue that supports the patient's individualized plan of care/goals and shares the quality data associated with the providers. Yes [x] No []    CM will send referrals out when therapy evaluates and physician gives CM permission to send to IPR/Snf    CM to follow and assist with transition of care planning-- IPR/SNF      Care Management Interventions  PCP Verified by CM: Yes  Mode of Transport at Discharge: BLS  Transition of Care Consult (CM Consult):  Other  Discharge Durable Medical Equipment: No  Physical Therapy Consult: Yes  Occupational Therapy Consult: Yes  Speech Therapy Consult: Yes  Support Systems: Child(jennifer) (lives in one level home with caretakers 7 days a week 8 am - 4 pm   No AMD )  Confirm Follow Up Transport: Family  The Plan for Transition of Care is Related to the Following Treatment Goals : rehab  The Patient and/or Patient Representative was Provided with a Choice of Provider and Agrees with the Discharge Plan?: Yes  Freedom of Choice List was Provided with Basic Dialogue that Supports the Patient's Individualized Plan of Care/Goals, Treatment Preferences and Shares the Quality Data Associated with the Providers?: Yes  Discharge Location  Discharge Placement: Rehab hospital/unit acute

## 2021-10-17 NOTE — PROGRESS NOTES
2000: Bedside and Verbal shift change report given to Isela Gary RN (oncoming nurse) by Basim Diaz RN (offgoing nurse). Report included the following information SBAR, Kardex, Intake/Output, MAR, Recent Results, Med Rec Status, Cardiac Rhythm SR and Dual Neuro Assessment.      Problem: TIA/CVA Stroke: Day 2 Until Discharge  Goal: Activity/Safety  Outcome: Progressing Towards Goal  Goal: Diagnostic Test/Procedures  Outcome: Progressing Towards Goal  Goal: Nutrition/Diet  Outcome: Progressing Towards Goal  Goal: Discharge Planning  Outcome: Progressing Towards Goal  Goal: Medications  Outcome: Progressing Towards Goal  Goal: Respiratory  Outcome: Progressing Towards Goal  Goal: Treatments/Interventions/Procedures  Outcome: Progressing Towards Goal  Goal: Psychosocial  Outcome: Progressing Towards Goal  Goal: *Verbalizes anxiety and depression are reduced or absent  Outcome: Progressing Towards Goal  Goal: *Absence of aspiration  Outcome: Progressing Towards Goal  Goal: *Absence of deep venous thrombosis signs and symptoms(Stroke Metric)  Outcome: Progressing Towards Goal  Goal: *Optimal pain control at patient's stated goal  Outcome: Progressing Towards Goal  Goal: *Tolerating diet  Outcome: Progressing Towards Goal  Goal: *Ability to perform ADLs and demonstrates progressive mobility and function  Outcome: Progressing Towards Goal  Goal: *Stroke education continued(Stroke Metric)  Outcome: Progressing Towards Goal

## 2021-10-18 ENCOUNTER — APPOINTMENT (OUTPATIENT)
Dept: GENERAL RADIOLOGY | Age: 76
DRG: 065 | End: 2021-10-18
Attending: INTERNAL MEDICINE
Payer: MEDICARE

## 2021-10-18 ENCOUNTER — APPOINTMENT (OUTPATIENT)
Dept: NON INVASIVE DIAGNOSTICS | Age: 76
DRG: 065 | End: 2021-10-18
Attending: STUDENT IN AN ORGANIZED HEALTH CARE EDUCATION/TRAINING PROGRAM
Payer: MEDICARE

## 2021-10-18 LAB
GLUCOSE BLD STRIP.AUTO-MCNC: 115 MG/DL (ref 65–117)
GLUCOSE BLD STRIP.AUTO-MCNC: 167 MG/DL (ref 65–117)
GLUCOSE BLD STRIP.AUTO-MCNC: 95 MG/DL (ref 65–117)
SERVICE CMNT-IMP: ABNORMAL
SERVICE CMNT-IMP: NORMAL
SERVICE CMNT-IMP: NORMAL

## 2021-10-18 PROCEDURE — 99232 SBSQ HOSP IP/OBS MODERATE 35: CPT | Performed by: PSYCHIATRY & NEUROLOGY

## 2021-10-18 PROCEDURE — 87086 URINE CULTURE/COLONY COUNT: CPT

## 2021-10-18 PROCEDURE — 74011250636 HC RX REV CODE- 250/636: Performed by: INTERNAL MEDICINE

## 2021-10-18 PROCEDURE — 74011250637 HC RX REV CODE- 250/637: Performed by: STUDENT IN AN ORGANIZED HEALTH CARE EDUCATION/TRAINING PROGRAM

## 2021-10-18 PROCEDURE — 92611 MOTION FLUOROSCOPY/SWALLOW: CPT

## 2021-10-18 PROCEDURE — 92522 EVALUATE SPEECH PRODUCTION: CPT

## 2021-10-18 PROCEDURE — 74011000250 HC RX REV CODE- 250: Performed by: INTERNAL MEDICINE

## 2021-10-18 PROCEDURE — 74230 X-RAY XM SWLNG FUNCJ C+: CPT

## 2021-10-18 PROCEDURE — 65660000000 HC RM CCU STEPDOWN

## 2021-10-18 PROCEDURE — 92610 EVALUATE SWALLOWING FUNCTION: CPT

## 2021-10-18 PROCEDURE — 97535 SELF CARE MNGMENT TRAINING: CPT

## 2021-10-18 PROCEDURE — 97112 NEUROMUSCULAR REEDUCATION: CPT

## 2021-10-18 PROCEDURE — 82962 GLUCOSE BLOOD TEST: CPT

## 2021-10-18 PROCEDURE — 74011250637 HC RX REV CODE- 250/637: Performed by: PSYCHIATRY & NEUROLOGY

## 2021-10-18 RX ADMIN — HYDRALAZINE HYDROCHLORIDE 100 MG: 50 TABLET, FILM COATED ORAL at 21:36

## 2021-10-18 RX ADMIN — TICAGRELOR 60 MG: 60 TABLET ORAL at 21:39

## 2021-10-18 RX ADMIN — LISINOPRIL 40 MG: 20 TABLET ORAL at 08:07

## 2021-10-18 RX ADMIN — ASPIRIN 81 MG CHEWABLE TABLET 81 MG: 81 TABLET CHEWABLE at 08:07

## 2021-10-18 RX ADMIN — HYDRALAZINE HYDROCHLORIDE 100 MG: 50 TABLET, FILM COATED ORAL at 08:07

## 2021-10-18 RX ADMIN — FERROUS SULFATE TAB 325 MG (65 MG ELEMENTAL FE) 325 MG: 325 (65 FE) TAB at 08:07

## 2021-10-18 RX ADMIN — CEFTRIAXONE SODIUM 1 G: 1 INJECTION, POWDER, FOR SOLUTION INTRAMUSCULAR; INTRAVENOUS at 18:11

## 2021-10-18 RX ADMIN — HYDRALAZINE HYDROCHLORIDE 100 MG: 50 TABLET, FILM COATED ORAL at 15:11

## 2021-10-18 RX ADMIN — TRAZODONE HYDROCHLORIDE 100 MG: 100 TABLET ORAL at 21:36

## 2021-10-18 RX ADMIN — DONEPEZIL HYDROCHLORIDE 5 MG: 5 TABLET, FILM COATED ORAL at 08:07

## 2021-10-18 RX ADMIN — TICAGRELOR 60 MG: 60 TABLET ORAL at 08:07

## 2021-10-18 RX ADMIN — ATORVASTATIN CALCIUM 40 MG: 40 TABLET, FILM COATED ORAL at 21:36

## 2021-10-18 NOTE — PROGRESS NOTES
Problem: Self Care Deficits Care Plan (Adult)  Goal: *Acute Goals and Plan of Care (Insert Text)  Description: FUNCTIONAL STATUS PRIOR TO ADMISSION: Patient was modified independent using a walker for functional mobility. HOME SUPPORT: Patient has care aid 8 hours a day/7 days a week to assist with ADLs as needed and with iADLs. Patient spends most of her time in bed. Occupational Therapy Goals  Initiated 10/17/2021   1. Patient will perform self-feeding with moderate assistance  within 7 day(s). 2.  Patient will perform upper body dressing with minimal assistance within 7 day(s). 3.  Patient will perform lower body dressing with moderate assistance within 7 day(s). 4.  Patient will perform toilet transfers with minimal assistance within 7 day(s). 5.  Patient will perform all aspects of toileting with moderate assistance  within 7 day(s). 6.  Patient will participate in upper extremity therapeutic exercise/activities with moderate assistance  within 7 day(s). 7.  Patient will utilize energy conservation techniques during functional activities with moderate verbal cues within 7 day(s). 8.  Patient will improve their Fugl Sheppard score by 5 points in prep for ADLs within 7 days. Outcome: Progressing Towards Goal     OCCUPATIONAL THERAPY TREATMENT  Patient: Nadia Cummins (11 y.o. female)  Date: 10/18/2021  Diagnosis: CVA (cerebral vascular accident) ([facility] [[email or web address]]ca 75.) [I63.9] <principal problem not specified>       Precautions: WBAT, Fall  Chart, occupational therapy assessment, plan of care, and goals were reviewed. ASSESSMENT  Patient continues with skilled OT services and is progressing towards goals however remains limited by decreased cognition, strength, RUE>RLE function & initiation, coordination, safety awareness, activity tolerance, and balance with OOB mobility and EOB grooming tasks completed this session.  Improved mobility with CGA-Mod A and RW use, however MAX cues required for safety with RW, initiation of RUE with all functional tasks (danielle bimanual), and attention to the R. With brief standing & sidestepping tasks, patient reporting dizziness, SBP dropping >40 points with sway, slowly improving with seated rest break and return to bed in chair position (see vitals below). Patient reporting blurred vision in the R, able to target and correctly ID 3/3 targets on R side. Despite max education, patient declining to transfer to the chair, agreeable to get to the chair with nursing for dinner. Considering her high PLOF, continue to recommend d/c to IPR to maximize safety & functional independence. Vitals:    10/18/21 1530 10/18/21 1532 10/18/21 1540 10/18/21 1550   BP: (!) 180/68 136/63 (!) 151/69 (!) 156/80   BP 1 Location: Right upper arm Right upper arm Right upper arm Right upper arm   BP Patient Position: Supine Sitting  Comment: unable to tolerate standing BP d/t dizziness Standing Sitting  Comment: modified bed in chair position     Current Level of Function Impacting Discharge (ADLs): Min-Max A for ADLs, CGA-Mod A for mobility with RW    Other factors to consider for discharge: fall risk, lives alone, high PLOF, acute CVA         PLAN :  Patient continues to benefit from skilled intervention to address the above impairments. Continue treatment per established plan of care to address goals. Recommend with staff: Recommend with nursing, ADLs with assist PRN, OOB to chair 3x/day via rolling walker & 1 assist, and toileting via bedside commode. Thank you for completing as able in order to maintain patient strength, endurance and independence. Recommendation for discharge: (in order for the patient to meet his/her long term goals)  Therapy 3 hours per day 5-7 days per week    This discharge recommendation:  Has been made in collaboration with the attending provider and/or case management    IF patient discharges home will need the following DME:  To be determined (TBD)       SUBJECTIVE: Patient stated I need mouthwash and a tissue for my nose.     OBJECTIVE DATA SUMMARY:   Cognitive/Behavioral Status:  Neurologic State: Alert  Orientation Level: Oriented X4  Cognition: Decreased attention/concentration; Follows commands; Impaired decision making; Impulsive;Poor safety awareness  Perception: Appears intact;Cues to attend to right side of body;Cues to maintain midline in sitting (R lateral lean)  Perseveration: No perseveration noted  Safety/Judgement: Decreased awareness of environment;Decreased awareness of need for assistance;Decreased awareness of need for safety;Decreased insight into deficits    Functional Mobility and Transfers for ADLs:  Bed Mobility:  Rolling: Stand-by assistance  Supine to Sit: Contact guard assistance; Adaptive equipment; Additional time  Sit to Supine: Contact guard assistance; Additional time  Scooting: Minimum assistance; Moderate assistance;Assist x1    Transfers:  Sit to Stand: Minimum assistance; Adaptive equipment (RW)  Bed to Chair:  (patient declined despite max education)    Balance:  Sitting: Impaired  Sitting - Static: Fair (occasional)  Sitting - Dynamic: Fair (occasional)  Standing: Impaired; With support (RW)  Standing - Static: Fair;Constant support  Standing - Dynamic : Fair;Constant support    ADL Intervention:       Grooming  Grooming Assistance: Minimum assistance  Position Performed: Seated edge of bed  Brushing Teeth: Minimum assistance;Training to use affected extremity as a fine motor assistance;Training to use affected extremity as a gross motor assistance (rinsing mouth, MAX cues for RUE initiation/use)  Cues: Physical assistance;Visual cues provided; Tactile cues provided;Verbal cues provided;Visual/perceptual training/retraining    Cognitive Retraining  Orientation Retraining: Awareness of environment  Problem Solving: Awareness of environment  Executive Functions: Executing cognitive plans  Organizing/Sequencing: Breaking task down  Attention to Task: Single task  Following Commands: Follows one step commands/directions  Safety/Judgement: Decreased awareness of environment;Decreased awareness of need for assistance;Decreased awareness of need for safety;Decreased insight into deficits  Cues: Verbal cues provided; Tactile cues provided;Visual cues provided    Neuro Re-Education:   - Max cues for initiation of RUE and utilization with all basic tasks, fair-poor carryover, quick to compensate with LUE despite education provided throughout. - Able to twist mouthwash top back on with increased time and cues for continued tightening. Pain:  None reported    Activity Tolerance:   Fair, limited by orthostatic hypotension    After treatment patient left in no apparent distress:   Call bell within reach, Bed / chair alarm activated, Side rails x 3, and in modified bed in chair position    COMMUNICATION/COLLABORATION:   The patients plan of care was discussed with: Registered nurse. Patient was educated regarding Her deficit(s) of impaired balance, attention to R, RUE/RLE function, vision, cognition, and activity tolerance as this relates to Her diagnosis of CVA. She demonstrated Fair understanding as evidenced by verbal discussion & limited carryover.     LM Stallworth, OTR/L  Time Calculation: 33 mins

## 2021-10-18 NOTE — PROGRESS NOTES
Transition of Care Plan: Likely IPR-[facility]-Status pending    RUR: 23%    PCP F/U:Teresa Willson MD    Disposition: Likely IPR    Transportation: BLS    Main Contact: Liyah Slater    1300: Confirmed in IDR disposition plan is for IPR. Referral sent to [address] [place] [address] per family request. Will need PCR prior to discharge. Will continue to follow. 1629: [facility] states that they are checking benefits and will get back to this CM. Will continue to follow. Jose Kwok RN, CRM    Transition of Care Plan:      The Plan for Transition of Care is related to the following treatment goals: IPR    The Patient and/or patient representative was provided with a choice of provider and agrees  with the discharge plan. Yes [x] No []    A Freedom of choice list was provided with basic dialogue that supports the patient's individualized plan of care/goals and shares the quality data associated with the providers.        Yes [x] No []

## 2021-10-18 NOTE — PROGRESS NOTES
Speech pathology brief note; full note to follow. Recommend continue current diet, and MBS planned for 1330 today. Patient also with dysarthria and would benefit from SLP treatment for functional communication.

## 2021-10-18 NOTE — PROGRESS NOTES
Hospitalist Progress Note  Yoana Rodriguez MD  Answering service: [phone] from in house phone      Date of Service:  10/18/2021  NAME:  Dianne Mcmullen  :  1945  MRN:  [id number]    Admission Summary:   76F p/w new L pontine CVA    Interval history / Subjective:   Patient seen and examined at bedside, feels ok, in better mood this morning, daughter at bedside, still feels little numbness on R side of face. Speech well understood. Assessment & Plan:     Acute CVA - Left Jeni  - Continue aspirin, switched plavix 'not effective on testing' to brilinta  - A1c 4.8, TSH 1.6, LDL 43. TTE pending.  - Neurology following- PT/OT- Permissive hypertension. PT/OT     HTN- Hold home BP meds for permissive hypertension  DM II - SSI plus hypoglycemic protocols, A1c 4.8  LORY on CKD IV/V- IVF - Recent graft in preparation for HD  Anemia- on iron supplementation. Likely 2nd to abve. Code status: Full  DVT prophylaxis: SCDs  Care Plan discussed with: Patient/Family and Nurse  Disposition: [facility] [place] Problems  Date Reviewed: 10/1/2021        Codes Class Noted POA    CVA (cerebral vascular accident) [facility]) ICD-10-CM: I63.9  ICD-9-CM: 434.91  2021 Unknown            Review of Systems:   Pertinent items are mentioned in interval history. Vital Signs:    Last 24hrs VS reviewed since prior progress note. Most recent are:  Visit Vitals  BP (!) 140/78   Pulse 69   Temp 98.6 °F (37 °C)   Resp 14   Ht 5' 4\" (1.626 m)   Wt 85.3 kg (188 lb)   SpO2 95%   BMI 32.27 kg/m²         Intake/Output Summary (Last 24 hours) at 10/18/2021 0907  Last data filed at 10/18/2021 0747  Gross per 24 hour   Intake    Output 1350 ml   Net -1350 ml        Physical Examination:   Evaluated face to face and examined 10/18/21    General:  Alert, oriented, No acute distress. Elderly, frail BW  Resp:  No accessory muscle use, Good AE, no wheezes.  no crepitations  Abd:  Soft, non-tender, non-distended, BS+  Extremities:  No cyanosis or clubbing, no significant edema  Neuro:  Grossly normal, no focal neuro deficits, follows commands, speech wnl  Psych:  fair insight, not agitated. Data Review:    Review and/or order of clinical lab test  Review and/or order of tests in the radiology section of CPT  Review and/or order of tests in the medicine section of CPT  Labs:     Recent Labs     10/15/21  1325   WBC 6.8   HGB 7.9*   HCT 24.6*        Recent Labs     10/16/21  0450 10/15/21  1325    142   K 3.5 3.9   * 104   CO2 26 29   BUN 55* 58*   CREA 5.09* 5.49*   * 112*   CA 8.7 8.9   MG  --  2.2     Recent Labs     10/15/21  1325   ALT 9*   AP 49   TBILI 0.4   TP 6.0*   ALB 3.1*   GLOB 2.9     Recent Labs     10/16/21  0450 10/15/21  1325   INR 1.1 1.1   PTP 11.1 11.3*      Recent Labs     10/16/21  0450   TIBC 140*   PSAT 29   FERR 405*      Lab Results   Component Value Date/Time    Folate 16.2 05/01/2021 06:41 AM      No results for input(s): PH, PCO2, PO2 in the last 72 hours. No results for input(s): CPK, CKNDX, TROIQ in the last 72 hours.     No lab exists for component: CPKMB  Lab Results   Component Value Date/Time    Cholesterol, total 92 10/17/2021 02:46 AM    HDL Cholesterol 33 10/17/2021 02:46 AM    LDL, calculated 43.2 10/17/2021 02:46 AM    Triglyceride 79 10/17/2021 02:46 AM    CHOL/HDL Ratio 2.8 10/17/2021 02:46 AM     Lab Results   Component Value Date/Time    Glucose (POC) 95 10/18/2021 06:17 AM    Glucose (POC) 149 (H) 10/17/2021 09:47 PM    Glucose (POC) 145 (H) 10/17/2021 04:14 PM    Glucose (POC) 122 (H) 10/17/2021 12:44 PM    Glucose (POC) 90 10/17/2021 08:57 AM     Lab Results   Component Value Date/Time    Color Yellow/Straw 10/15/2021 01:56 PM    Appearance Clear 10/15/2021 01:56 PM    Specific gravity 1.020 10/15/2021 01:56 PM    pH (UA) 7.0 10/15/2021 01:56 PM    Protein >300 (A) 10/15/2021 01:56 PM    Glucose Negative 10/15/2021 01:56 PM    Ketone Negative 10/15/2021 01:56 PM    Bilirubin Negative 10/15/2021 01:56 PM    Urobilinogen 0.2 10/15/2021 01:56 PM    Nitrites Negative 10/15/2021 01:56 PM    Leukocyte Esterase Negative 10/15/2021 01:56 PM    Bacteria 1+ (A) 10/15/2021 01:56 PM    WBC 0-4 10/15/2021 01:56 PM    RBC 0-5 10/15/2021 01:56 PM     Medications Reviewed:     Current Facility-Administered Medications   Medication Dose Route Frequency    ticagrelor (BRILINTA) tablet 60 mg  60 mg Oral Q12H    cefTRIAXone (ROCEPHIN) 1 g in sterile water (preservative free) 10 mL IV syringe  1 g IntraVENous Q24H    aspirin chewable tablet 81 mg  81 mg Oral DAILY    acetaminophen (TYLENOL) tablet 650 mg  650 mg Oral Q4H PRN    Or    acetaminophen (TYLENOL) solution 650 mg  650 mg Per NG tube Q4H PRN    Or    acetaminophen (TYLENOL) suppository 650 mg  650 mg Rectal Q4H PRN    ondansetron (ZOFRAN) injection 4 mg  4 mg IntraVENous Q6H PRN    albuterol-ipratropium (DUO-NEB) 2.5 MG-0.5 MG/3 ML  3 mL Nebulization Q6H PRN    donepeziL (ARICEPT) tablet 5 mg  5 mg Oral DAILY    ferrous sulfate tablet 325 mg  325 mg Oral DAILY    traZODone (DESYREL) tablet 100 mg  100 mg Oral QHS    hydrALAZINE (APRESOLINE) tablet 100 mg  100 mg Oral TID    lisinopriL (PRINIVIL, ZESTRIL) tablet 40 mg  40 mg Oral DAILY    atorvastatin (LIPITOR) tablet 40 mg  40 mg Oral QHS   ______________________________________________________________________  EXPECTED LENGTH OF STAY: - - -  ACTUAL LENGTH OF STAY:          2               Claudeen Jack, MD

## 2021-10-18 NOTE — PROGRESS NOTES
1930: Bedside and Verbal shift change report given to Kimberli Riddle RN (oncoming nurse) by George Tiwari RN (offgoing nurse). Report included the following information SBAR, Kardex, Intake/Output, MAR, Recent Results, Med Rec Status, Cardiac Rhythm SR/SB and Dual Neuro Assessment.        Problem: TIA/CVA Stroke: Day 2 Until Discharge  Goal: Activity/Safety  Outcome: Progressing Towards Goal  Goal: Diagnostic Test/Procedures  Outcome: Progressing Towards Goal  Goal: Nutrition/Diet  Outcome: Progressing Towards Goal  Goal: Discharge Planning  Outcome: Progressing Towards Goal  Goal: Medications  Outcome: Progressing Towards Goal  Goal: Respiratory  Outcome: Progressing Towards Goal  Goal: Treatments/Interventions/Procedures  Outcome: Progressing Towards Goal  Goal: Psychosocial  Outcome: Progressing Towards Goal  Goal: *Verbalizes anxiety and depression are reduced or absent  Outcome: Progressing Towards Goal  Goal: *Absence of aspiration  Outcome: Progressing Towards Goal  Goal: *Absence of deep venous thrombosis signs and symptoms(Stroke Metric)  Outcome: Progressing Towards Goal  Goal: *Optimal pain control at patient's stated goal  Outcome: Progressing Towards Goal  Goal: *Tolerating diet  Outcome: Progressing Towards Goal  Goal: *Ability to perform ADLs and demonstrates progressive mobility and function  Outcome: Progressing Towards Goal  Goal: *Stroke education continued(Stroke Metric)  Outcome: Progressing Towards Goal

## 2021-10-18 NOTE — PROGRESS NOTES
Problem: Dysphagia (Adult)  Goal: *Acute Goals and Plan of Care (Insert Text)  Description: Speech pathology goals  Initiated 10/18/2021  1. Patient will participate in [address] today  Outcome: Progressing Towards Goal     Problem: Motor Speech Impaired (Adult)  Goal: *Acute Goals and Plan of Care (Insert Text)  Outcome: Progressing Towards Goal     SPEECH LANGUAGE PATHOLOGY BEDSIDE SWALLOW AND SPEECH EVALUATIONS  Patient: Simon Hernandez (38 y.o. female)  Date: 10/18/2021  Primary Diagnosis: CVA (cerebral vascular accident) [facility]) [I63.9]        Precautions: aspiration,  WBAT, Fall    ASSESSMENT :  Based on the objective data described below, the patient presents with suspected WFL oral/pharyngeal swallow, however risk for aspiration due to L silvina CVA. Daughter also reported baseline issues with mucous, and ENT had recommended swallow study. Therefore, plan to complete MBS while patient in hospital.    Patient also with moderate dysarthria characterized by increased rate of speech with decreased prosody, blended word boundaries, and imprecise articulation that impacted intelligibility. 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 Patient then with significantly improved speech function and intelligibility. Recommend SLP treatment to improve functional communication. Patient will benefit from skilled intervention to address the above impairments. Patients rehabilitation potential is considered to be Good     PLAN :  Recommendations and Planned Interventions:  -MBS today at 1330  -Slow, loud, clear speech  Frequency/Duration: Patient will be followed by speech-language pathology 3 times a week to address goals. 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    CHF (congestive heart failure) (HCC)     Chronic kidney disease     Chronic pain     SPINAL STENOSIS    DM (diabetes mellitus) (HCC)     HTN (hypertension)     Stroke [facility])      Past Surgical History:   Procedure Laterality Date    HX OOPHORECTOMY      STENT INSERTION  2001,2002    Bimal Schumacher     Prior Level of Function/Home Situation:   Home Situation  Home Environment: Private residence  # Steps to Enter: 3  Rails to Enter: Yes  Wheelchair Ramp: Yes  One/Two Story Residence: One story  Living Alone: Yes  Support Systems: Child(jennifer), Caregiver/Home Care Staff (approx 8 hours a day 7 days a week caregivers)  Current DME Used/Available at Home: Laurence Dakotah, [place], Laurence Dakotah, rollator, [address] [facility] Memrise Kameron chair, Wheelchair, Grab bars, [address], [place], straight, Commode, bedside  Tub or Shower Type: Shower  Diet prior to admission: regular/thin  Current Diet:  Regular/thin   Cognitive and Communication Status:  Neurologic State: Alert  Orientation Level: Oriented X4  Cognition: Follows commands  Perception: Appears intact (opthamalogist visit last week; no new glasses)  Perseveration: No perseveration noted  Safety/Judgement: Awareness of environment, Decreased awareness of need for safety, Decreased insight into deficits  Swallowing Evaluation:   Oral Assessment:  Oral Assessment  Labial: Left droop (at rest, none with retraction, reported R pain)  Dentition: Natural  Oral Hygiene: moist oral mucosa  Lingual: Left deviation  Velum: No impairment  Mandible: No impairment  P.O. Trials:  Patient Position: upright in bed  Vocal quality prior to P.O.: Strain;Hoarse  Consistency Presented: Solid; Thin liquid  How Presented: Self-fed/presented;Straw     Bolus Acceptance: No impairment  Bolus Formation/Control: No impairment     Propulsion: No impairment  Oral Residue: None  Initiation of Swallow: No impairment  Laryngeal Elevation: Functional  Aspiration Signs/Symptoms: Delayed cough/throat clear  Pharyngeal Phase Characteristics: No impairment, issues, or problems              Oral Phase Severity: No impairment  Pharyngeal Phase Severity : No impairment    NOMS:   The NOMS functional outcome measure was used to quantify this patient's level of swallowing impairment. Based on the NOMS, the patient was determined to be at level 6 for swallow function       NOMS Swallowing Levels:  Level 1 (CN): NPO  Level 2 (CM): NPO but takes consistency in therapy  Level 3 (CL): Takes less than 50% of nutrition p.o. and continues with nonoral feedings; and/or safe with mod cues; and/or max diet restriction  Level 4 (CK): Safe swallow but needs mod cues; and/or mod diet restriction; and/or still requires some nonoral feeding/supplements  Level 5 (CJ): Safe swallow with min diet restriction; and/or needs min cues  Level 6 (CI): Independent with p.o.; rare cues; usually self cues; may need to avoid some foods or needs extra time  Level 7 ([address] [place] [address]): Independent for all p.o.  GORGE (2003). National Outcomes Measurement System (NOMS): Adult Speech-Language Pathology User's Guide. Speech/Language Evaluation  Motor Speech:  Oral-Motor Structure/Motor Speech  Labial: Left droop (at rest, none with retraction, reported R pain)  Dentition: Natural  Oral Hygiene: moist oral mucosa  Lingual: Left deviation  Velum: No impairment  Mandible: No impairment  Apraxic Characteristics: None  Dysarthric Characteristics: Decreased prosody;Blended word boundaries; Imprecise; Increased rate  Intelligibility: Impaired  Word Intelligibility (%): 70 %  Conversation Intelligibility (%): 20 %  Rate: increased  Prosody: decreased  Overall Impairment Severity: Moderate  Compensatory Strategies for Motor Speech: slow, clear              Vocal Quality: Strain;Hoarse          NOMS:   The NOMS functional outcome measure was used to quantify this patient's level of motor speech impairment.   Based on the NOMS, the patient was determined to be at level 4 for motor speech function. NOMS Motor Speech:  Level 1 (CN):  100% unintelligible  Level 2 (CM):  Communication partner responsible for message; can do CV or automatic words w/ max cues but rarely intelligible in context  Level 3 (CL): communication partner primarily responsible for message but says CV/automatic words intelligibly; mod cues to say simple words/phrases  Level 4 (CK): In structured conversation with familiar listener can say simple words and phrases. Mod cues for simple sentences  Level 5 (CJ):  Uses simple sentences for ADLs with familiar and unfamiliar listener; min cues for complex sentences  Level 6 (CI):  Intelligible in ADLs; difficulty in voc/social activites; rare cues for complex message; uses comp strategies  Level 7 ([address] [place] [address]):  Intelligible in all activities. May occasionally use compensatory strategies. ERIC. (2003). National Outcomes Measurement System (NOMS): Adult Speech-Language Pathology User's Guide. Pain:  Pain Scale 1: Numeric (0 - 10)  Pain Intensity 1: 0       After treatment:   Patient left in no apparent distress in bed, Call bell within reach, Nursing notified and Caregiver / family present    COMMUNICATION/EDUCATION:   Patient was educated regarding her deficit(s) of dysphagia, dysarthria as this relates to her diagnosis of CVA. She demonstrated Good understanding as evidenced by verbalizing understanding. The patient's plan of care including recommendations, planned interventions, and recommended diet changes were discussed with: Registered nurse. Patient/family have participated as able in goal setting and plan of care. Patient/family agree to work toward stated goals and plan of care.     Thank you for this referral.  Easton Gruber, SLP  Time Calculation: 30 mins

## 2021-10-18 NOTE — PROGRESS NOTES
Problem: Dysphagia (Adult)  Goal: *Acute Goals and Plan of Care (Insert Text)  Description: Speech pathology goals  Initiated 10/18/2021  1. Patient will participate in [address] today. MET  10/18/2021 1354 by Joan Jung SLP  Outcome: Resolved/Met  10/18/2021 1040 by Joan Jung, SLP  Outcome: Progressing Towards Goal     SPEECH PATHOLOGY MODIFIED BARIUM SWALLOW STUDY/DISCHARGE  Patient: Clemente Moritz (89 y.o. female)  Date: 10/18/2021  Primary Diagnosis: CVA (cerebral vascular accident) [facility]) [I63.9]        Precautions:   WBAT, Fall    ASSESSMENT :  Based on the objective data described below, the [facility], and no penetration, aspiration, or pharyngeal residue observed. Patient utilizing motor speech compensatory strategies well during MBS. Further skilled acute therapy for swallowing provided by a speech-language pathologist is not indicated at this time. Will continue to follow for motor speech. PLAN :  Recommendations and Planned Interventions:  -Regular/thin liquid diet  -SLP to follow 2x/week for motor speech treatment  Discharge Recommendations: To Be Determined     SUBJECTIVE:   Patient stated that she did not like the taste of Barium.     OBJECTIVE:     Past Medical History:   Diagnosis Date    Blindness/low vision 2008    due to dm    CHF (congestive heart failure) ([facility])     Chronic kidney disease     Chronic pain     SPINAL STENOSIS    DM (diabetes mellitus) ([facility] [address]     HTN (hypertension)     Stroke [facility])      Past Surgical History:   Procedure Laterality Date    HX OOPHORECTOMY      STENT INSERTION  2001,2002    Loretta Biggs     Prior Level of Function/Home Situation:   Home Situation  Home Environment: Private residence  # Steps to Enter: 3  Rails to Enter: Yes  Wheelchair Ramp: Yes  One/Two Story Residence: One story  Living Alone: Yes  Support Systems: Child(jennifer), Caregiver/Home Care Staff (approx 8 hours a day 7 days a week caregivers)  Current DME Used/Available at Home: Mardee Shallow, [place], Walker, rollator, Shower chair, Wheelchair, [employer], [place] [employer], straight, Commode, bedside  Tub or Shower Type: Shower  Diet prior to admission: regular/thin  Current Diet:  regular/thin   Radiologist: Dr. Ivory Ego Views: Lateral;Fluoro  Patient Position: upright in hausted chair    Trial 1:   Consistency Presented: Thin liquid;Puree; Solid   How Presented: Self-fed/presented;Cup/sip;Cup/gulp; Spoon;Straw; Other (comment) (did not take successive swallows)       Bolus Acceptance: No impairment   Bolus Formation/Control: No impairment (prolonged, ?due to taste of Barium):     Propulsion: No impairment   Oral Residue: None   Initiation of Swallow: No impairment   Timing: No impairment   Penetration: None   Aspiration/Timing: No evidence of aspiration   Pharyngeal Clearance: No residue                       Decreased Tongue Base Retraction?: No  Laryngeal Elevation: WFL (within functional limits)  Aspiration/Penetration Score: 1 (No penetration or aspiration-Contrast does not enter the airway)  Pharyngeal Symmetry: Not assessed  Pharyngeal-Esophageal Segment: No impairment  Pharyngeal Dysfunction: None    Oral Phase Severity: No impairment  Pharyngeal Phase Severity: N/A      NOMS:   The NOMS functional outcome measure was used to quantify this patient's level of swallowing impairment. Based on the NOMS, the patient was determined to be at level 7 for swallow function       NOMS Swallowing Levels:  Level 1 (CN): NPO  Level 2 (CM): NPO but takes consistency in therapy  Level 3 (CL): Takes less than 50% of nutrition p.o. and continues with nonoral feedings; and/or safe with mod cues; and/or max diet restriction  Level 4 (CK):  Safe swallow but needs mod cues; and/or mod diet restriction; and/or still requires some nonoral feeding/supplements  Level 5 (CJ): Safe swallow with min diet restriction; and/or needs min cues  Level 6 (CI): Independent with p.o.; rare cues; usually self cues; may need to avoid some foods or needs extra time  Level 7 [facility]): Independent for all p.o.  ERIC. (2003). National Outcomes Measurement System (NOMS): Adult Speech-Language Pathology User's Guide. COMMUNICATION/EDUCATION:   Patient was educated regarding her deficit(s) of WFL swallow as this relates to her diagnosis of CVA. She demonstrated Excellent understanding as evidenced by verbalizing understanding. The patient's plan of care including recommendations, planned interventions, and recommended diet changes were discussed with:  radiologist .     Patient/family have participated as able in goal setting and plan of care. Patient/family agree to work toward stated goals and plan of care.     Thank you for this referral.  Reddy Coffey, SLP  Time Calculation: 20 mins

## 2021-10-18 NOTE — CONSULTS
Neurology Progress Note    Patient ID:  Ely Lau  [id number]  78 y.o.  1945    Chief Complaint: Stroke    Subjective:     73-year-old woman with a new left pontine infarct. Aspirin and Brilinta effective. She is working with speech this morning. Speech is a little bit better. Still having numbness on the right side. Objective:       ROS:  Per HPI  All other 12 pt ROS negative    Meds:  Current Facility-Administered Medications   Medication Dose Route Frequency    ticagrelor (BRILINTA) tablet 60 mg  60 mg Oral Q12H    cefTRIAXone (ROCEPHIN) 1 g in sterile water (preservative free) 10 mL IV syringe  1 g IntraVENous Q24H    aspirin chewable tablet 81 mg  81 mg Oral DAILY    acetaminophen (TYLENOL) tablet 650 mg  650 mg Oral Q4H PRN    Or    acetaminophen (TYLENOL) solution 650 mg  650 mg Per NG tube Q4H PRN    Or    acetaminophen (TYLENOL) suppository 650 mg  650 mg Rectal Q4H PRN    ondansetron (ZOFRAN) injection 4 mg  4 mg IntraVENous Q6H PRN    albuterol-ipratropium (DUO-NEB) 2.5 MG-0.5 MG/3 ML  3 mL Nebulization Q6H PRN    donepeziL (ARICEPT) tablet 5 mg  5 mg Oral DAILY    ferrous sulfate tablet 325 mg  325 mg Oral DAILY    traZODone (DESYREL) tablet 100 mg  100 mg Oral QHS    hydrALAZINE (APRESOLINE) tablet 100 mg  100 mg Oral TID    lisinopriL (PRINIVIL, ZESTRIL) tablet 40 mg  40 mg Oral DAILY    atorvastatin (LIPITOR) tablet 40 mg  40 mg Oral QHS       MRI Results (maximum last 3): Results from [place] encounter on 10/15/21    MRA NECK WO CONT    Narrative  *PRELIMINARY REPORT*    No evidence of large vessel occlusion. Preliminary report was provided by Dr. Esperanza Ramirez, the on-call radiologist, at 9:45 PM  on 10/15/2021. Final report to follow. *END PRELIMINARY REPORT*    CLINICAL HISTORY: Slurred speech    INDICATION: Slurred speech.     COMPARISON: None  TECHNIQUE: MR examination of the brain includes axial and sagittal T1 , axial  T2, axial FLAIR, axial gradient echo, axial DWI, coronal T2. Contrast: None  Next, 3-D time-of-flight MRA of the brain was performed. Multiplanar  reconstructions were obtained. Next, 2-D time-of-flight MRA of the neck was performed. Multiplanar  reconstructions were obtained. FINDINGS:  There is a small focus of infarction in the posterior central silvina extending  slightly to the left. Less than 10 mm in size. There is sulcal and ventricular prominence. There are scattered foci of chronic  hemosiderin deposition, the majority of these and cerebral hemispheres with some  additional foci of chronic hemosiderin deposition in the central silvina. Extensive  periventricular and scattered foci of increased T2 signal intensity corona  radiata, central silvina, centrum semiovale. There are small scattered remote  infarctions demonstrated right and left cerebellum and left silvina right thalamus  periventricular white matter in the frontal lobes as well. IACs are symmetric in  size. There is no Chiari or syrinx. The pituitary and infundibulum are grossly  unremarkable. There is no skull base mass. Cerebellopontine angles are grossly  unremarkable. The major intracranial vascular flow-voids are unremarkable. The cavernous sinuses are symmetric. Optic chiasm and infundibulum grossly  unremarkable. Orbits are grossly symmetric. Dural venous sinuses are grossly  patent. The brain architecture is normal. There is no evidence of midline shift  or mass-effect. There are no extra-axial fluid collections. The mastoid air  cells and paranasal sinuses are well pneumatized and clear. Vertebral arteries are codominant. There is no basilar artery stenosis. There is  atherosclerotic change of the cavernous ICAs with mild right cavernous ICA  stenosis and moderate to severe supraclinoid ICA stenosis on the left. Mild  multifocal M1 segment stenoses. Arborization of M2 vessels on the left is  normal. Right M3 anterior division stenosis at 1-35 may be nearly occlusive. No  infarction in this region. . Tiny posterior communicating artery on the left. P1  segments are patent. Proximal P2 segments are patent. . The internal carotid,  anterior cerebral, and middle cerebral arteries are patent. Common origin of the innominate and left common carotid arteries. . The bilateral  subclavian, common carotid, and internal carotid arteries are patent with no  flow-limiting stenosis. Left vertebral artery slightly larger than the right  vertebral artery. .    There is 0. % stenosis in the right internal carotid artery utilizing NASCET  criteria. There is 0. % stenosis in the left internal carotid artery utilizing NASCET  criteria. Impression  Very small focus of acute infarction in the posterior silvina slightly to the left  of midline. No superimposed hemorrhage. Moderate to severe chronic microvascular ischemic change with additional small  scattered lacunar infarctions seen throughout the cerebral and cerebellar  hemispheres. Moderate to severe cerebral atrophy as well. There is no major vessel occlusion, aneurysm, dissection or large vessel  hemodynamically significant stenosis. There is atherosclerotic cerebral vasculopathy with multifocal mild to moderate  an even severe stenoses demonstrated an smaller M3/A3 segments suggested. There is no intracranial mass, hemorrhage. MRA BRAIN WO CONT    Narrative  *PRELIMINARY REPORT*    No evidence of large vessel occlusion. Preliminary report was provided by Dr. Garret Tate, the on-call radiologist, at 9:45 PM  on 10/15/2021. Final report to follow. *END PRELIMINARY REPORT*    CLINICAL HISTORY: Slurred speech    INDICATION: Slurred speech. COMPARISON: None  TECHNIQUE: MR examination of the brain includes axial and sagittal T1 , axial  T2, axial FLAIR, axial gradient echo, axial DWI, coronal T2. Contrast: None  Next, 3-D time-of-flight MRA of the brain was performed. Multiplanar  reconstructions were obtained.   Next, 2-D time-of-flight MRA of the neck was performed. Multiplanar  reconstructions were obtained. FINDINGS:  There is a small focus of infarction in the posterior central silvina extending  slightly to the left. Less than 10 mm in size. There is sulcal and ventricular prominence. There are scattered foci of chronic  hemosiderin deposition, the majority of these and cerebral hemispheres with some  additional foci of chronic hemosiderin deposition in the central silvina. Extensive  periventricular and scattered foci of increased T2 signal intensity corona  radiata, central silvina, centrum semiovale. There are small scattered remote  infarctions demonstrated right and left cerebellum and left silvina right thalamus  periventricular white matter in the frontal lobes as well. IACs are symmetric in  size. There is no Chiari or syrinx. The pituitary and infundibulum are grossly  unremarkable. There is no skull base mass. Cerebellopontine angles are grossly  unremarkable. The major intracranial vascular flow-voids are unremarkable. The cavernous sinuses are symmetric. Optic chiasm and infundibulum grossly  unremarkable. Orbits are grossly symmetric. Dural venous sinuses are grossly  patent. The brain architecture is normal. There is no evidence of midline shift  or mass-effect. There are no extra-axial fluid collections. The mastoid air  cells and paranasal sinuses are well pneumatized and clear. Vertebral arteries are codominant. There is no basilar artery stenosis. There is  atherosclerotic change of the cavernous ICAs with mild right cavernous ICA  stenosis and moderate to severe supraclinoid ICA stenosis on the left. Mild  multifocal M1 segment stenoses. Arborization of M2 vessels on the left is  normal. Right M3 anterior division stenosis at 1-35 may be nearly occlusive. No  infarction in this region. . Tiny posterior communicating artery on the left. P1  segments are patent. Proximal P2 segments are patent. . The internal carotid,  anterior cerebral, and middle cerebral arteries are patent. Common origin of the innominate and left common carotid arteries. . The bilateral  subclavian, common carotid, and internal carotid arteries are patent with no  flow-limiting stenosis. Left vertebral artery slightly larger than the right  vertebral artery. .    There is 0. % stenosis in the right internal carotid artery utilizing NASCET  criteria. There is 0. % stenosis in the left internal carotid artery utilizing NASCET  criteria. Impression  Very small focus of acute infarction in the posterior silvina slightly to the left  of midline. No superimposed hemorrhage. Moderate to severe chronic microvascular ischemic change with additional small  scattered lacunar infarctions seen throughout the cerebral and cerebellar  hemispheres. Moderate to severe cerebral atrophy as well. There is no major vessel occlusion, aneurysm, dissection or large vessel  hemodynamically significant stenosis. There is atherosclerotic cerebral vasculopathy with multifocal mild to moderate  an even severe stenoses demonstrated an smaller M3/A3 segments suggested. There is no intracranial mass, hemorrhage. MRI BRAIN WO CONT    Narrative  *PRELIMINARY REPORT*    There is an acute lacunar infarct in the silvina. Preliminary report was provided by Dr. Paola Atkins, the on-call radiologist, at 9:45 PM  10/15/2021    Final report to follow. *END PRELIMINARY REPORT*      CLINICAL HISTORY: Slurred speech    INDICATION: Slurred speech. COMPARISON: None  TECHNIQUE: MR examination of the brain includes axial and sagittal T1 , axial  T2, axial FLAIR, axial gradient echo, axial DWI, coronal T2. Contrast: None  Next, 3-D time-of-flight MRA of the brain was performed. Multiplanar  reconstructions were obtained. Next, 2-D time-of-flight MRA of the neck was performed. Multiplanar  reconstructions were obtained.   FINDINGS:  There is a small focus of infarction in the posterior central silvina extending  slightly to the left. Less than 10 mm in size. There is sulcal and ventricular prominence. There are scattered foci of chronic  hemosiderin deposition, the majority of these and cerebral hemispheres with some  additional foci of chronic hemosiderin deposition in the central silvina. Extensive  periventricular and scattered foci of increased T2 signal intensity corona  radiata, central silvina, centrum semiovale. There are small scattered remote  infarctions demonstrated right and left cerebellum and left silvina right thalamus  periventricular white matter in the frontal lobes as well. IACs are symmetric in  size. There is no Chiari or syrinx. The pituitary and infundibulum are grossly  unremarkable. There is no skull base mass. Cerebellopontine angles are grossly  unremarkable. The major intracranial vascular flow-voids are unremarkable. The cavernous sinuses are symmetric. Optic chiasm and infundibulum grossly  unremarkable. Orbits are grossly symmetric. Dural venous sinuses are grossly  patent. The brain architecture is normal. There is no evidence of midline shift  or mass-effect. There are no extra-axial fluid collections. The mastoid air  cells and paranasal sinuses are well pneumatized and clear. Vertebral arteries are codominant. There is no basilar artery stenosis. There is  atherosclerotic change of the cavernous ICAs with mild right cavernous ICA  stenosis and moderate to severe supraclinoid ICA stenosis on the left. Mild  multifocal M1 segment stenoses. Arborization of M2 vessels on the left is  normal. Right M3 anterior division stenosis at 1-35 may be nearly occlusive. No  infarction in this region. . Tiny posterior communicating artery on the left. P1  segments are patent. Proximal P2 segments are patent. . The internal carotid,  anterior cerebral, and middle cerebral arteries are patent. Common origin of the innominate and left common carotid arteries. . The bilateral  subclavian, common carotid, and internal carotid arteries are patent with no  flow-limiting stenosis. Left vertebral artery slightly larger than the right  vertebral artery. .    There is 0. % stenosis in the right internal carotid artery utilizing NASCET  criteria. There is 0. % stenosis in the left internal carotid artery utilizing NASCET  criteria. Impression  Very small focus of acute infarction in the posterior silvina slightly to the left  of midline. No superimposed hemorrhage. Moderate to severe chronic microvascular ischemic change with additional small  scattered lacunar infarctions seen throughout the cerebral and cerebellar  hemispheres. Moderate to severe cerebral atrophy as well. There is no major vessel occlusion, aneurysm, dissection or large vessel  hemodynamically significant stenosis. There is atherosclerotic cerebral vasculopathy with multifocal mild to moderate  an even severe stenoses demonstrated an smaller M3/A3 segments suggested. There is no intracranial mass, hemorrhage. Lab Review   Recent Results (from the past 24 hour(s))   GLUCOSE, POC    Collection Time: 10/17/21 12:44 PM   Result Value Ref Range    Glucose (POC) 122 (H) 65 - 117 mg/dL    Performed by DEL Uriostegui  PCT    PLATELET FUNCTION, VERIFY NOW P2Y12    Collection Time: 10/17/21  1:09 PM   Result Value Ref Range    P2Y12 Plt response 164 (L) 194 - 418 PRU   GLUCOSE, POC    Collection Time: 10/17/21  4:14 PM   Result Value Ref Range    Glucose (POC) 145 (H) 65 - 117 mg/dL    Performed by Erma GARZA (CON)    GLUCOSE, POC    Collection Time: 10/17/21  9:47 PM   Result Value Ref Range    Glucose (POC) 149 (H) 65 - 117 mg/dL    Performed by Idella Gas    GLUCOSE, POC    Collection Time: 10/18/21  6:17 AM   Result Value Ref Range    Glucose (POC) 95 65 - 117 mg/dL    Performed by Idella Gas        Additional comments:I reviewed the patient's new clinical lab test results.   and I reviewed the patients new imaging test results. Patient Vitals for the past 8 hrs:   BP Temp Pulse Resp   10/18/21 0807 (!) 140/78  69    10/18/21 0602 (!) 158/94 98.6 °F (37 °C) 68 14   10/18/21 0600   70    10/18/21 0400   69    10/18/21 0203 (!) 154/77 99.1 °F (37.3 °C) 76 18   10/18/21 0200   70        10/18 0701 - 10/18 1900  In: -   Out: 500 [Urine:500]  10/16 1901 - 10/18 0700  In: -   Out: 850 [Urine:850]    Exam:  Visit Vitals  BP (!) 140/78   Pulse 69   Temp 98.6 °F (37 °C)   Resp 14   Ht 5' 4\" (1.626 m)   Wt 188 lb (85.3 kg)   SpO2 95%   BMI 32.27 kg/m²     Gen: Well developed  CV: RRR  Lungs: non labored breathing  Abd: soft, non distended  Neuro: A&O follows commands, working with speech actively  CN II-XII: PERRL, face asymmetric, tongue/palate midline  Motor: Right-sided weakness  Sensory: Not tested   COOR: no limb/truncal ataxia  Gait: Deferredworking with speech therapy    PROBLEM LIST:     Patient Active Problem List   Diagnosis Code    CVA (cerebral vascular accident) ([facility] [address] I63.9    CKD (chronic kidney disease) N18.9    Accelerated hypertension I10       Assessment/Plan:      82-year-old woman with a new left pontine infarct. She has intracranial vascular atherosclerotic disease. Aspirin and Brilinta effective, no changes. Continue with rehab needs. I spoke with the daughter at the bedside. Needs echo. During this evaluation, we also discussed stroke education to include signs and symptoms of stroke and TIA. This clinical note was dictated with an electronic dictation software that can make unintentional errors. If there are any questions, please contact me directly for clarification.       Signed:  [address] [facility], DO  10/18/2021  10:09 AM

## 2021-10-19 ENCOUNTER — APPOINTMENT (OUTPATIENT)
Dept: NON INVASIVE DIAGNOSTICS | Age: 76
DRG: 065 | End: 2021-10-19
Attending: STUDENT IN AN ORGANIZED HEALTH CARE EDUCATION/TRAINING PROGRAM
Payer: MEDICARE

## 2021-10-19 LAB
AV R PG: 61.08 MMHG
BACTERIA SPEC CULT: NORMAL
CC UR VC: NORMAL
ECHO AO ROOT DIAM: 3.17 CM
ECHO AR MAX VEL PISA: 390.51 CM/S
ECHO AV MEAN GRADIENT: 10.41 MMHG
ECHO AV PEAK GRADIENT: 18.99 MMHG
ECHO AV PEAK VELOCITY: 217.86 CM/S
ECHO AV REGURGITANT PHT: 500.29 MS
ECHO AV VTI: 55.02 CM
ECHO LA MAJOR AXIS: 3.82 CM
ECHO LA MINOR AXIS: 2.03 CM
ECHO LV E' LATERAL VELOCITY: 6.32 CM/S
ECHO LV E' SEPTAL VELOCITY: 5.62 CM/S
ECHO LV EDV A2C: 63.62 ML
ECHO LV EDV A4C: 99.6 ML
ECHO LV EDV BP: 79.36 ML (ref 56–104)
ECHO LV EDV INDEX A4C: 53 ML/M2
ECHO LV EDV INDEX BP: 42.2 ML/M2
ECHO LV EDV NDEX A2C: 33.8 ML/M2
ECHO LV EJECTION FRACTION A2C: 57 PERCENT
ECHO LV EJECTION FRACTION A4C: 56 PERCENT
ECHO LV EJECTION FRACTION BIPLANE: 54.9 PERCENT (ref 55–100)
ECHO LV ESV A2C: 27.49 ML
ECHO LV ESV A4C: 43.79 ML
ECHO LV ESV BP: 35.79 ML (ref 19–49)
ECHO LV ESV INDEX A2C: 14.6 ML/M2
ECHO LV ESV INDEX A4C: 23.3 ML/M2
ECHO LV ESV INDEX BP: 19 ML/M2
ECHO LV INTERNAL DIMENSION DIASTOLIC: 5.12 CM (ref 3.9–5.3)
ECHO LV INTERNAL DIMENSION SYSTOLIC: 3.42 CM
ECHO LV IVSD: 1.24 CM (ref 0.6–0.9)
ECHO LV MASS 2D: 233 G (ref 67–162)
ECHO LV MASS INDEX 2D: 123.9 G/M2 (ref 43–95)
ECHO LV POSTERIOR WALL DIASTOLIC: 1.09 CM (ref 0.6–0.9)
ECHO LVOT PEAK GRADIENT: 4.03 MMHG
ECHO LVOT PEAK VELOCITY: 100.39 CM/S
ECHO LVOT VTI: 27.06 CM
ECHO MV A VELOCITY: 119.37 CM/S
ECHO MV AREA PHT: 2.89 CM2
ECHO MV E DECELERATION TIME (DT): 262.23 MS
ECHO MV E VELOCITY: 112.24 CM/S
ECHO MV E/A RATIO: 0.94
ECHO MV E/E' LATERAL: 17.76
ECHO MV E/E' RATIO (AVERAGED): 18.87
ECHO MV E/E' SEPTAL: 19.97
ECHO MV PRESSURE HALF TIME (PHT): 76.05 MS
ECHO RV TAPSE: 1.72 CM (ref 1.5–2)
GLUCOSE BLD STRIP.AUTO-MCNC: 112 MG/DL (ref 65–117)
GLUCOSE BLD STRIP.AUTO-MCNC: 187 MG/DL (ref 65–117)
GLUCOSE BLD STRIP.AUTO-MCNC: 94 MG/DL (ref 65–117)
SERVICE CMNT-IMP: ABNORMAL
SERVICE CMNT-IMP: NORMAL

## 2021-10-19 PROCEDURE — 65660000000 HC RM CCU STEPDOWN

## 2021-10-19 PROCEDURE — 74011250636 HC RX REV CODE- 250/636: Performed by: INTERNAL MEDICINE

## 2021-10-19 PROCEDURE — 74011250637 HC RX REV CODE- 250/637: Performed by: STUDENT IN AN ORGANIZED HEALTH CARE EDUCATION/TRAINING PROGRAM

## 2021-10-19 PROCEDURE — 99233 SBSQ HOSP IP/OBS HIGH 50: CPT | Performed by: NURSE PRACTITIONER

## 2021-10-19 PROCEDURE — 97530 THERAPEUTIC ACTIVITIES: CPT

## 2021-10-19 PROCEDURE — 74011000250 HC RX REV CODE- 250: Performed by: INTERNAL MEDICINE

## 2021-10-19 PROCEDURE — 74011250637 HC RX REV CODE- 250/637: Performed by: PSYCHIATRY & NEUROLOGY

## 2021-10-19 PROCEDURE — 82962 GLUCOSE BLOOD TEST: CPT

## 2021-10-19 PROCEDURE — 77030038269 HC DRN EXT URIN PURWCK BARD -A

## 2021-10-19 PROCEDURE — 74011250637 HC RX REV CODE- 250/637: Performed by: INTERNAL MEDICINE

## 2021-10-19 PROCEDURE — 93306 TTE W/DOPPLER COMPLETE: CPT

## 2021-10-19 PROCEDURE — 97535 SELF CARE MNGMENT TRAINING: CPT | Performed by: OCCUPATIONAL THERAPIST

## 2021-10-19 PROCEDURE — 92507 TX SP LANG VOICE COMM INDIV: CPT | Performed by: SPEECH-LANGUAGE PATHOLOGIST

## 2021-10-19 PROCEDURE — 93306 TTE W/DOPPLER COMPLETE: CPT | Performed by: SPECIALIST

## 2021-10-19 RX ORDER — HYDRALAZINE HYDROCHLORIDE 50 MG/1
50 TABLET, FILM COATED ORAL 3 TIMES DAILY
Status: DISCONTINUED | OUTPATIENT
Start: 2021-10-19 | End: 2021-10-20

## 2021-10-19 RX ORDER — AMLODIPINE BESYLATE 5 MG/1
10 TABLET ORAL DAILY
Status: DISCONTINUED | OUTPATIENT
Start: 2021-10-19 | End: 2021-10-20 | Stop reason: HOSPADM

## 2021-10-19 RX ADMIN — DONEPEZIL HYDROCHLORIDE 5 MG: 5 TABLET, FILM COATED ORAL at 09:50

## 2021-10-19 RX ADMIN — TRAZODONE HYDROCHLORIDE 100 MG: 100 TABLET ORAL at 22:39

## 2021-10-19 RX ADMIN — HYDRALAZINE HYDROCHLORIDE 50 MG: 50 TABLET, FILM COATED ORAL at 22:39

## 2021-10-19 RX ADMIN — ATORVASTATIN CALCIUM 40 MG: 40 TABLET, FILM COATED ORAL at 22:39

## 2021-10-19 RX ADMIN — TICAGRELOR 60 MG: 60 TABLET ORAL at 21:30

## 2021-10-19 RX ADMIN — LISINOPRIL 40 MG: 20 TABLET ORAL at 09:50

## 2021-10-19 RX ADMIN — TICAGRELOR 60 MG: 60 TABLET ORAL at 09:50

## 2021-10-19 RX ADMIN — ASPIRIN 81 MG CHEWABLE TABLET 81 MG: 81 TABLET CHEWABLE at 09:49

## 2021-10-19 RX ADMIN — AMLODIPINE BESYLATE 10 MG: 5 TABLET ORAL at 10:13

## 2021-10-19 RX ADMIN — HYDRALAZINE HYDROCHLORIDE 50 MG: 50 TABLET, FILM COATED ORAL at 16:29

## 2021-10-19 RX ADMIN — FERROUS SULFATE TAB 325 MG (65 MG ELEMENTAL FE) 325 MG: 325 (65 FE) TAB at 09:50

## 2021-10-19 RX ADMIN — CEFTRIAXONE SODIUM 1 G: 1 INJECTION, POWDER, FOR SOLUTION INTRAMUSCULAR; INTRAVENOUS at 18:29

## 2021-10-19 NOTE — PROGRESS NOTES
Problem: Self Care Deficits Care Plan (Adult)  Goal: *Acute Goals and Plan of Care (Insert Text)  Description: FUNCTIONAL STATUS PRIOR TO ADMISSION: Patient was modified independent using a walker for functional mobility. HOME SUPPORT: Patient has care aid 8 hours a day/7 days a week to assist with ADLs as needed and with iADLs. Patient spends most of her time in bed. Occupational Therapy Goals  Initiated 10/17/2021   1. Patient will perform self-feeding with moderate assistance  within 7 day(s). 2.  Patient will perform upper body dressing with minimal assistance within 7 day(s). 3.  Patient will perform lower body dressing with moderate assistance within 7 day(s). 4.  Patient will perform toilet transfers with minimal assistance within 7 day(s). 5.  Patient will perform all aspects of toileting with moderate assistance  within 7 day(s). 6.  Patient will participate in upper extremity therapeutic exercise/activities with moderate assistance  within 7 day(s). 7.  Patient will utilize energy conservation techniques during functional activities with moderate verbal cues within 7 day(s). 8.  Patient will improve their Fugl Sheppard score by 5 points in prep for ADLs within 7 days. Outcome: Progressing Towards Goal     OCCUPATIONAL THERAPY TREATMENT  Patient: Dianne Mcmullen (65 y.o. female)  Date: 10/19/2021  Diagnosis: CVA (cerebral vascular accident) ([facility] [I63.9] <principal problem not specified>       Precautions: WBAT, Fall  Chart, occupational therapy assessment, plan of care, and goals were reviewed. ASSESSMENT  Patient continues with skilled OT services and is progressing towards goals. She demonstrated limited activity tolerance, irritable affect and terminated tasks when she was done regardless of wether the task was complete. Recommend rehab at discharge.     Current Level of Function Impacting Discharge (ADLs): min A UE ADLs, mod A LE ADLs, min A brief amb using RW    Other factors to consider for discharge: see above         PLAN :  Patient continues to benefit from skilled intervention to address the above impairments. Continue treatment per established plan of care to address goals. Recommend with staff: up to chair for all meals and BSC for toileting    Recommend next OT session: any ADLs, standing balance, toileting    Recommendation for discharge: (in order for the patient to meet his/her long term goals)  Therapy 3 hours per day 5-7 days per week    This discharge recommendation:  Has been made in collaboration with the attending provider and/or case management    IF patient discharges home will need the following DME: TBD       SUBJECTIVE:   Patient stated Stop talking.     OBJECTIVE DATA SUMMARY:   Cognitive/Behavioral Status:  Neurologic State: Alert;Confused  Orientation Level: Oriented to person;Oriented to place;Oriented to situation  Cognition: Decreased attention/concentration; Follows commands  Perception: Cues to attend to right side of body;Cues to maintain midline in standing; Tactile;Verbal;Visual  Perseveration: No perseveration noted  Safety/Judgement: Awareness of environment;Decreased awareness of need for assistance;Decreased awareness of need for safety;Decreased insight into deficits; Fall prevention    Functional Mobility and Transfers for ADLs:  Bed Mobility:  Supine to Sit: Stand-by assistance  Scooting: Minimum assistance    Transfers:  Sit to Stand: Minimum assistance          Balance:  Sitting: Impaired; With support  Sitting - Static: Fair (occasional)  Sitting - Dynamic: Fair (occasional)  Standing: Impaired; With support  Standing - Static: Constant support; Fair  Standing - Dynamic : Constant support; Fair    ADL Intervention:  Lower Body Dressing Assistance  Socks: Minimum assistance  Leg Crossed Method Used: Yes  Position Performed: Seated edge of bed  Cues: Don;Physical assistance; Tactile cues provided;Verbal cues provided;Visual cues provided      Cognitive Retraining  Safety/Judgement: Awareness of environment;Decreased awareness of need for assistance;Decreased awareness of need for safety;Decreased insight into deficits; Fall prevention    Pain:  No signs of pain    Activity Tolerance:   Fair and requires frequent rest breaks    After treatment patient left in no apparent distress:   Sitting in chair, Call bell within reach, and Bed / chair alarm activated    COMMUNICATION/COLLABORATION:   The patients plan of care was discussed with: Physical therapist and Registered nurse. Patient was educated regarding Her deficit(s) of imp cognition, balance, strength and safety as this relates to Her diagnosis of L [place] CVA. She demonstrated Guarded understanding as evidenced by some awareness of situation. Patient and/or family was verbally educated on the BE FAST acronym for signs/symptoms of CVA and TIA. BE FAST was written on patient's communication board  for visual education and reinforcement. All questions answered with patient indicating fair understanding.      Selina Clark OT  Time Calculation: 14 mins

## 2021-10-19 NOTE — PROGRESS NOTES
Problem: Motor Speech Impaired (Adult)  Goal: *Acute Goals and Plan of Care (Insert Text)  Outcome: Progressing Towards Goal   SPEECH LANGUAGE PATHOLOGY TREATMENT  Patient: Dianne Mcmullen (69 y.o. female)  Date: 10/19/2021  Diagnosis: CVA (cerebral vascular accident) ([facility] [I63.9] <principal problem not specified>         ASSESSMENT:  Patient continues with mild to moderate dysarthria characterized by rapid speech, imprecise consonants, and blended word boundaries. She required frequent cues for slower speech. She was also initially speaking with a pillow over her face (she didn't like the light). This only slightly impacted her intelligibility, as speech was fairly consistently unintelligible with the pillow off. PLAN:  Patient continues to benefit from skilled intervention to address the above impairments. Continue treatment per established plan of care. Discharge Recommendations: To Be Determined     SUBJECTIVE:   Patient stated I just don't like the light. OBJECTIVE:   Mental Status:  Neurologic State: Alert, Confused  Orientation Level: Oriented to person, Oriented to place, Oriented to situation  Cognition: Decreased attention/concentration, Follows commands  Perception: Cues to attend to right side of body, Cues to maintain midline in standing, Tactile, Verbal, Visual  Perseveration: No perseveration noted  Safety/Judgement: Awareness of environment, Decreased awareness of need for assistance, Decreased awareness of need for safety, Decreased insight into deficits, Fall prevention  Treatment & Interventions: Motor Speech:      Reviewed strategies  Conversational intelligibility 65%    Multisyllabic words at the sentence level 80% with cues.                                                                                      After treatment:   Patient left in no apparent distress in bed and Call bell within reach    COMMUNICATION/EDUCATION:   Patient was educated regarding strategies for motor speech. She used them appropriately with cues. The patient's plan of care including recommendations, planned interventions, and recommended diet changes were discussed with: Registered nurse.      NILS Belcher  Time Calculation: 15 mins

## 2021-10-19 NOTE — PROGRESS NOTES
Neurology Progress Note  Bimal Diaz NP      Date of admission: 10/15/2021    Patient: Juanjose Max MRN: [id number]  SSN: [id number]    YOB: 1945  Age: 78 y.o. Sex: female        Subjective:     HPI: Juanjose Max is a 78 y.o. female  with a history of diabetes, hypertension, stroke who is here from an outside hospital because of presenting with slurred speech and weakness. She had difficulty using her walker which is her baseline. Her speech is very slurred making it difficult to understand. Imaging showing a left pontine infarct. She also has right MCA stenosis. Interval 10/19/21:   She was a little frustrated because her daughter was not in the room and she was unable to use her cell phone. She denies headache, dizziness, chest pain, chest tightness, worsening weakness or numbness    Review of systems  Review of systems negative except as detailed in the HPI, interval, PMH and A&P    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    [facility]congestive [facility] failure) [facility]     Chronic kidney disease     Chronic pain     SPINAL STENOSIS    DM (diabetes mellitus) ([facility] [address]     HTN (hypertension)     Stroke [facility])      Past Surgical History:   Procedure Laterality Date    HX OOPHORECTOMY      STENT INSERTION  2001,2002    [facility]      Family History   Problem Relation Age of Onset    Breast Cancer Maternal Aunt     Breast Cancer Cousin      Social History     Tobacco Use    Smoking status: Former Smoker    Smokeless tobacco: Never Used    Tobacco comment: quit 1972   Substance Use Topics    Alcohol use: No      Prior to Admission medications    Medication Sig Start Date End Date Taking? Authorizing Provider   ipratropium (ATROVENT) 21 mcg (0.03 %) nasal spray 1 [place] by Both Nostrils route two (2) times a day. Indications: runny nose 10/1/21   Teodoro Solitario MD   furosemide (LASIX) 40 mg tablet Take 40 mg by mouth two (2) times a day. Provider, Historical   loratadine 10 mg cap Take  by mouth. Provider, Historical   traZODone (DESYREL) 100 mg tablet Take 100 mg by mouth nightly. Provider, Historical   augmented betamethasone dipropionate (DIPROLENE-AF) 0.05 % topical cream Apply  to affected area two (2) times a day. Provider, Historical   clobetasoL (CLOBEX) 0.05 % lotion Apply  to affected area two (2) times a day. Provider, Historical   ketoconazole (NIZORAL) 2 % shampoo Apply  to affected area daily as needed for Itching. Provider, Historical   hydrALAZINE (APRESOLINE) 100 mg tablet Take 1 Tab by mouth three (3) times daily. 5/6/21   Sally Granda MD   acetaminophen (TYLENOL) 325 mg tablet Take 2 Tabs by mouth every six (6) hours as needed for Pain or Fever. 5/6/21   Sally Granda MD   carvediloL (COREG) 25 mg tablet Take 1 Tab by mouth two (2) times daily (with meals). 5/6/21   Sally Granda MD   nitroglycerin (NITRODUR) 0.2 mg/hr 1 Patch by TransDERmal route daily. 5/6/21   Sally Granda MD   insulin lispro (HUMALOG) 100 unit/mL injection INITIATE CORRECTIVE INSULIN PROTOCOL (EDWARD):  RX EDWARD Normal Sensitivity (Average weight)    AC (before meals), Q6H, and Q4H CORRECTIONAL SCALE only For Blood Sugar (mg/dl) of :             140-199=2 units            200-249=3 units  250-299=5 units  300-349=7 units  350 or greater = Call MD  Give in addition to basal medications. Do Not Hold for NPO    BEDTIME CORRECTIONAL sliding scale when scheduled:  200-249=2 units  250-299=3 units   300-349=4 units  350 or greater = Call MD  Give in addition to basal medications. Do Not Hold for NPO Fast Acting - Administer Immediately - or within 15 minutes of start of meal, if mealtime coverage. 5/6/21   Sally Granda MD   lidocaine (LIDODERM) 5 % 1 Patch by TransDERmal route daily.  4/20/21   Provider, Historical   albuterol (PROVENTIL HFA, VENTOLIN HFA, PROAIR HFA) 90 mcg/actuation inhaler Take 2 Puffs by inhalation every four (4) hours as needed for Shortness of Breath. 4/28/21   Provider, Historical   amLODIPine (NORVASC) 10 mg tablet Take 10 mg by mouth daily. 2/5/21   Provider, Historical   cholecalciferol, vitamin D3, 50 mcg (2,000 unit) tab Take 2,000 Units by mouth daily. 3/8/21   Provider, Historical   FeroSuL 325 mg (65 mg iron) tablet Take 325 mg by mouth daily. 3/9/21   Provider, Historical   donepeziL (ARICEPT) 5 mg tablet Take 5 mg by mouth daily. Provider, Historical   lisinopriL (PRINIVIL, ZESTRIL) 40 mg tablet Take 40 mg by mouth daily. Provider, Historical   aspirin 81 mg chewable tablet Take 81 mg by mouth daily. Provider, Historical   doxazosin (CARDURA) 2 mg tablet Take 2 mg by mouth nightly. Provider, Historical   cyanocobalamin, vitamin B-12, 3,000 mcg cap cyanocobalamin (vitamin B-12) 3,000 mcg capsule    Other, MD Riana   allopurinoL (ZYLOPRIM) 100 mg tablet allopurinol 100 mg tablet    Other, MD Riana   atorvastatin (LIPITOR) 40 mg tablet atorvastatin 40 mg tablet    Other, MD Riana   fluticasone propion-salmeterol (ADVAIR HFA) 115-21 mcg/actuation inhaler Take 2 Puffs by inhalation two (2) times a day. Provider, Historical   sodium bicarbonate 650 mg tablet Take 650 mg by mouth two (2) times a day.     Provider, Historical     Current Facility-Administered Medications   Medication Dose Route Frequency Provider Last Rate Last Admin    amLODIPine (NORVASC) tablet 10 mg  10 mg Oral DAILY Wes Garcia MD   10 mg at 10/19/21 1013    hydrALAZINE (APRESOLINE) tablet 50 mg  50 mg Oral TID Wes Garcia MD        ticagrelor (BRILINTA) tablet 60 mg  60 mg Oral Q12H Karen Spann DO   60 mg at 10/19/21 0950    cefTRIAXone (ROCEPHIN) 1 g in sterile water (preservative free) 10 mL IV syringe  1 g IntraVENous Q24H Wes Garcia MD   1 g at 10/18/21 1811    aspirin chewable tablet 81 mg  81 mg Oral DAILY Chastity NAIR MD   81 mg at 10/19/21 0949    acetaminophen (TYLENOL) tablet 650 mg  650 mg Oral Q4H PRN Freida Parish MD        Or    acetaminophen (TYLENOL) solution 650 mg  650 mg Per NG tube Q4H PRN Freida Parish MD        Or    acetaminophen (TYLENOL) suppository 650 mg  650 mg Rectal Q4H PRN Freida Parish MD        ondansetron (ZOFRAN) injection 4 mg  4 mg IntraVENous Q6H PRN Freida Parish MD        albuterol-ipratropium (DUO-NEB) 2.5 MG-0.5 MG/3 ML  3 mL Nebulization Q6H PRN Freida Parish MD        donepeziL (ARICEPT) tablet 5 mg  5 mg Oral DAILY Freida Parish MD   5 mg at 10/19/21 0950    ferrous sulfate tablet 325 mg  325 mg Oral DAILY Freida Parish MD   325 mg at 10/19/21 0950    traZODone (DESYREL) tablet 100 mg  100 mg Oral QHS Freida Parish MD   100 mg at 10/18/21 2136    lisinopriL (PRINIVIL, ZESTRIL) tablet 40 mg  40 mg Oral DAILY Freida Parish MD   40 mg at 10/19/21 0950    atorvastatin (LIPITOR) tablet 40 mg  40 mg Oral QHS Karen Spann K, DO   40 mg at 10/18/21 2136        No Known Allergies    Objective:     Vitals:    10/19/21 1120 10/19/21 1125 10/19/21 1129 10/19/21 1230   BP: (!) 185/76 (!) 171/65 (!) 197/69 (!) 197/69   Pulse: 70 72 89    Resp:       Temp:       SpO2: 99%           Temp (24hrs), Av.2 °F (36.8 °C), Min:97.8 °F (36.6 °C), Max:98.4 °F (36.9 °C)        O2 Device: None (Room air)       No intake or output data in the 24 hours ending 10/19/21 1245    General: In NAD. Cardiac: RRR  Lungs: Unlabored breathing. Abdomen: Soft/NT/non-distended. Extremities. No edema. Neurologic Exam:  Mental Status:  Alert and oriented x 4. Language:    Grossly intact fluency and comprehension. No dysarthria. Cranial Nerves:   Pupils equal, round and reactive to light. Visual fields intact. Extraocular movements intact w/o nystagmus      Facial sensation intact to LT     Facial activation symmetric.       Hearing grossly intact. Motor:    Bulk and tone normal.      5/5 strength in all extremities. No pronator drift. No involuntary movements. Sensation:    Sensation intact throughout to light touch. Coordination & Gait: No ataxia with finger to nose. Gait deferred    LABS:  No results for input(s): WBC, HGB, HCT, PLT, HGBEXT, HCTEXT, PLTEXT in the last 72 hours. No results for input(s): NA, K, CL, CO2, BUN, CREA, GLU, CA, MG, PHOS, URICA in the last 72 hours. No results for input(s): ALT, AP, TBIL, TBILI, TP, ALB, GLOB, GGT, AML, LPSE in the last 72 hours. No lab exists for component: SGOT, GPT, AMYP, HLPSE  No results for input(s): INR, PTP, APTT, INREXT in the last 72 hours. No results for input(s): PHI, PCO2I, PO2I, HCO3I in the last 72 hours. No results for input(s): CPK, CKNDX, TROIQ in the last 72 hours.     No lab exists for component: CPKMB  Lab Results   Component Value Date/Time    Cholesterol, total 92 10/17/2021 02:46 AM    HDL Cholesterol 33 10/17/2021 02:46 AM    LDL, calculated 43.2 10/17/2021 02:46 AM    Triglyceride 79 10/17/2021 02:46 AM    CHOL/HDL Ratio 2.8 10/17/2021 02:46 AM     Lab Results   Component Value Date/Time    Glucose (POC) 112 10/19/2021 11:22 AM    Glucose (POC) 115 10/18/2021 04:18 PM    Glucose (POC) 167 (H) 10/18/2021 11:34 AM    Glucose (POC) 95 10/18/2021 06:17 AM    Glucose (POC) 149 (H) 10/17/2021 09:47 PM     Lab Results   Component Value Date/Time    Color Yellow/Straw 10/15/2021 01:56 PM    Appearance Clear 10/15/2021 01:56 PM    Specific gravity 1.020 10/15/2021 01:56 PM    pH (UA) 7.0 10/15/2021 01:56 PM    Protein >300 (A) 10/15/2021 01:56 PM    Glucose Negative 10/15/2021 01:56 PM    Ketone Negative 10/15/2021 01:56 PM    Bilirubin Negative 10/15/2021 01:56 PM    Urobilinogen 0.2 10/15/2021 01:56 PM    Nitrites Negative 10/15/2021 01:56 PM    Leukocyte Esterase Negative 10/15/2021 01:56 PM    Bacteria 1+ (A) 10/15/2021 01:56 PM    WBC 0-4 10/15/2021 01:56 PM    RBC 0-5 10/15/2021 01:56 PM       No results for input(s): FE, TIBC, PSAT, FERR in the last 72 hours. Lab Results   Component Value Date/Time    Folate 16.2 05/01/2021 06:41 AM      No results for input(s): PH, PCO2, PO2 in the last 72 hours. No results for input(s): CPK, CKNDX, TROIQ in the last 72 hours. No lab exists for component: CPKMB    Imaging:  XR SWALLOW FUNC VIDEO    Result Date: 10/18/2021  No significant pooling, penetration or aspiration. CT Results:  Results from [place] encounter on 10/15/21    CT HEAD WO CONT    Narrative  CT dose reduction was achieved through use of a standardized protocol tailored  for this examination and automatic exposure control for dose modulation. CT Head    History:    The sulci are prominent but symmetric. Periventricular and deep white matter  hypodensity is not unexpected for age. Multiple chronic small vessel infarcts,  as seen February 16. No acute abnormality seen gray or white matter. Ventricles  are symmetric and appropriate for atrophy. There is no midline shift or mass  effect, or evidence of hemorrhage. Bone windows show no fracture. Impression  Age-appropriate atrophy. No acute findings. Results from [place] encounter on 02/13/21    CT HEAD WO CONT    Narrative  PROCEDURE: CT HEAD WO CONT    HISTORY:Constricted pupils    COMPARISON:Head CT from 2 days ago    Department policy stipulates all CT scans at this facility are performed using  dose reduction optimization techniques as appropriate to the performed exam,  including the following: Automated exposure control, adjustments of the mA  and/or KVP according to the patient size, and the use of iterative  reconstruction technique. TECHNIQUE: Without IV contrast    Bones/extracranial soft tissues:  Within normal limits  Extra-axial spaces:  No hemorrhage, mass or focal fluid collection. Ventricles/sulci:  There is mild dilatation of the ventricles.  Sulci are  prominent. Brain parenchyma:   No mass effect. There is good gray-white differentiation. There are inhomogeneous areas of mildly decreased density in periventricular  white matter. Again noted is poorly defined hypodensity in the silvina, unchanged. Old small  lacunar infarcts in the basal ganglia bilaterally, unchanged    Impression  Diffuse chronic changes which appear stable. No acute or active  intracranial process. Results from [place] encounter on 02/13/21    CT HEAD WO CONT    Narrative  Dose Reduction:  All CT scans at this facility are performed using dose reduction optimization  techniques as appropriate to a performed exam including the following: Automated  exposure control, adjustments of the mA and/or kV according to patient size, or  use of iterative reconstruction technique. Unenhanced images were obtained and compared with 10/15/2020    Prominent calcification of bilateral distal vertebral and cavernous internal  carotid arteries. Internal calvarial hyperostosis, similar to 10/15/2020. Normal  gray-white differentiation. Small vessel disease involving brainstem,  periventricular deep white matter and bilateral thalami. There is diffuse  atrophy. No evidence of brain mass, hemorrhage, or acute large vessel  distribution infarct. No abnormal extra-axial fluid collection. Thinning of  corpus callosum. Normal appearance of craniovertebral junction and pituitary  gland. Impression  Atrophy. Microangiopathy. No evidence of intracranial hemorrhage or  acute large vessel distribution infarct. If there is clinical suspicion of acute lacunar infarction, consider additional  imaging evaluation with MRI. MRI Results:  Results from [place] encounter on 10/15/21    MRA NECK WO CONT    Narrative  *PRELIMINARY REPORT*    No evidence of large vessel occlusion. Preliminary report was provided by Dr. Katarina Buchanan, the on-call radiologist, at 9:45 PM  on 10/15/2021.     Final report to follow. *END PRELIMINARY REPORT*    CLINICAL HISTORY: Slurred speech    INDICATION: Slurred speech. COMPARISON: None  TECHNIQUE: MR examination of the brain includes axial and sagittal T1 , axial  T2, axial FLAIR, axial gradient echo, axial DWI, coronal T2. Contrast: None  Next, 3-D time-of-flight MRA of the brain was performed. Multiplanar  reconstructions were obtained. Next, 2-D time-of-flight MRA of the neck was performed. Multiplanar  reconstructions were obtained. FINDINGS:  There is a small focus of infarction in the posterior central silvina extending  slightly to the left. Less than 10 mm in size. There is sulcal and ventricular prominence. There are scattered foci of chronic  hemosiderin deposition, the majority of these and cerebral hemispheres with some  additional foci of chronic hemosiderin deposition in the central silvina. Extensive  periventricular and scattered foci of increased T2 signal intensity corona  radiata, central silvina, centrum semiovale. There are small scattered remote  infarctions demonstrated right and left cerebellum and left silvina right thalamus  periventricular white matter in the frontal lobes as well. IACs are symmetric in  size. There is no Chiari or syrinx. The pituitary and infundibulum are grossly  unremarkable. There is no skull base mass. Cerebellopontine angles are grossly  unremarkable. The major intracranial vascular flow-voids are unremarkable. The cavernous sinuses are symmetric. Optic chiasm and infundibulum grossly  unremarkable. Orbits are grossly symmetric. Dural venous sinuses are grossly  patent. The brain architecture is normal. There is no evidence of midline shift  or mass-effect. There are no extra-axial fluid collections. The mastoid air  cells and paranasal sinuses are well pneumatized and clear. Vertebral arteries are codominant. There is no basilar artery stenosis.  There is  atherosclerotic change of the cavernous ICAs with mild right cavernous ICA  stenosis and moderate to severe supraclinoid ICA stenosis on the left. Mild  multifocal M1 segment stenoses. Arborization of M2 vessels on the left is  normal. Right M3 anterior division stenosis at 1-35 may be nearly occlusive. No  infarction in this region. . Tiny posterior communicating artery on the left. P1  segments are patent. Proximal P2 segments are patent. . The internal carotid,  anterior cerebral, and middle cerebral arteries are patent. Common origin of the innominate and left common carotid arteries. . The bilateral  subclavian, common carotid, and internal carotid arteries are patent with no  flow-limiting stenosis. Left vertebral artery slightly larger than the right  vertebral artery. .    There is 0. % stenosis in the right internal carotid artery utilizing NASCET  criteria. There is 0. % stenosis in the left internal carotid artery utilizing NASCET  criteria. Impression  Very small focus of acute infarction in the posterior silvina slightly to the left  of midline. No superimposed hemorrhage. Moderate to severe chronic microvascular ischemic change with additional small  scattered lacunar infarctions seen throughout the cerebral and cerebellar  hemispheres. Moderate to severe cerebral atrophy as well. There is no major vessel occlusion, aneurysm, dissection or large vessel  hemodynamically significant stenosis. There is atherosclerotic cerebral vasculopathy with multifocal mild to moderate  an even severe stenoses demonstrated an smaller M3/A3 segments suggested. There is no intracranial mass, hemorrhage. MRA BRAIN WO CONT    Narrative  *PRELIMINARY REPORT*    No evidence of large vessel occlusion. Preliminary report was provided by Dr. Trenton Portillo, the on-call radiologist, at 9:45 PM  on 10/15/2021. Final report to follow. *END PRELIMINARY REPORT*    CLINICAL HISTORY: Slurred speech    INDICATION: Slurred speech.     COMPARISON: None  TECHNIQUE: MR examination of the brain includes axial and sagittal T1 , axial  T2, axial FLAIR, axial gradient echo, axial DWI, coronal T2. Contrast: None  Next, 3-D time-of-flight MRA of the brain was performed. Multiplanar  reconstructions were obtained. Next, 2-D time-of-flight MRA of the neck was performed. Multiplanar  reconstructions were obtained. FINDINGS:  There is a small focus of infarction in the posterior central silvina extending  slightly to the left. Less than 10 mm in size. There is sulcal and ventricular prominence. There are scattered foci of chronic  hemosiderin deposition, the majority of these and cerebral hemispheres with some  additional foci of chronic hemosiderin deposition in the central silvina. Extensive  periventricular and scattered foci of increased T2 signal intensity corona  radiata, central silvina, centrum semiovale. There are small scattered remote  infarctions demonstrated right and left cerebellum and left silvina right thalamus  periventricular white matter in the frontal lobes as well. IACs are symmetric in  size. There is no Chiari or syrinx. The pituitary and infundibulum are grossly  unremarkable. There is no skull base mass. Cerebellopontine angles are grossly  unremarkable. The major intracranial vascular flow-voids are unremarkable. The cavernous sinuses are symmetric. Optic chiasm and infundibulum grossly  unremarkable. Orbits are grossly symmetric. Dural venous sinuses are grossly  patent. The brain architecture is normal. There is no evidence of midline shift  or mass-effect. There are no extra-axial fluid collections. The mastoid air  cells and paranasal sinuses are well pneumatized and clear. Vertebral arteries are codominant. There is no basilar artery stenosis. There is  atherosclerotic change of the cavernous ICAs with mild right cavernous ICA  stenosis and moderate to severe supraclinoid ICA stenosis on the left. Mild  multifocal M1 segment stenoses.  Arborization of M2 vessels on the left is  normal. Right M3 anterior division stenosis at 1-35 may be nearly occlusive. No  infarction in this region. . Tiny posterior communicating artery on the left. P1  segments are patent. Proximal P2 segments are patent. . The internal carotid,  anterior cerebral, and middle cerebral arteries are patent. Common origin of the innominate and left common carotid arteries. . The bilateral  subclavian, common carotid, and internal carotid arteries are patent with no  flow-limiting stenosis. Left vertebral artery slightly larger than the right  vertebral artery. .    There is 0. % stenosis in the right internal carotid artery utilizing NASCET  criteria. There is 0. % stenosis in the left internal carotid artery utilizing NASCET  criteria. Impression  Very small focus of acute infarction in the posterior silvina slightly to the left  of midline. No superimposed hemorrhage. Moderate to severe chronic microvascular ischemic change with additional small  scattered lacunar infarctions seen throughout the cerebral and cerebellar  hemispheres. Moderate to severe cerebral atrophy as well. There is no major vessel occlusion, aneurysm, dissection or large vessel  hemodynamically significant stenosis. There is atherosclerotic cerebral vasculopathy with multifocal mild to moderate  an even severe stenoses demonstrated an smaller M3/A3 segments suggested. There is no intracranial mass, hemorrhage. MRI BRAIN WO CONT    Narrative*PRELIMINARY REPOR*    There is an acute lacunar infarct in the silvina. Preliminary report was provided by Dr. Coral Dumas, the on-call radiologist, at 9:45 PM  10/15/2021    Final report to follow. *END PRELIMINARY REPORT*      CLINICAL HISTORY: Slurred speech    INDICATION: Slurred speech. COMPARISON: None  TECHNIQUE: MR examination of the brain includes axial and sagittal T1 , axial  T2, axial FLAIR, axial gradient echo, axial DWI, coronal T2.   Contrast: None  Next, 3-D time-of-flight MRA of the brain was performed. Multiplanar  reconstructions were obtained. Next, 2-D time-of-flight MRA of the neck was performed. Multiplanar  reconstructions were obtained. FINDINGS:  There is a small focus of infarction in the posterior central silvina extending  slightly to the left. Less than 10 mm in size. There is sulcal and ventricular prominence. There are scattered foci of chronic  hemosiderin deposition, the majority of these and cerebral hemispheres with some  additional foci of chronic hemosiderin deposition in the central silvina. Extensive  periventricular and scattered foci of increased T2 signal intensity corona  radiata, central silvina, centrum semiovale. There are small scattered remote  infarctions demonstrated right and left cerebellum and left silvina right thalamus  periventricular white matter in the frontal lobes as well. IACs are symmetric in  size. There is no Chiari or syrinx. The pituitary and infundibulum are grossly  unremarkable. There is no skull base mass. Cerebellopontine angles are grossly  unremarkable. The major intracranial vascular flow-voids are unremarkable. The cavernous sinuses are symmetric. Optic chiasm and infundibulum grossly  unremarkable. Orbits are grossly symmetric. Dural venous sinuses are grossly  patent. The brain architecture is normal. There is no evidence of midline shift  or mass-effect. There are no extra-axial fluid collections. The mastoid air  cells and paranasal sinuses are well pneumatized and clear. Vertebral arteries are codominant. There is no basilar artery stenosis. There is  atherosclerotic change of the cavernous ICAs with mild right cavernous ICA  stenosis and moderate to severe supraclinoid ICA stenosis on the left. Mild  multifocal M1 segment stenoses. Arborization of M2 vessels on the left is  normal. Right M3 anterior division stenosis at 1-35 may be nearly occlusive. No  infarction in this region. . Tiny posterior communicating artery on the left. P1  segments are patent. Proximal P2 segments are patent. . The internal carotid,  anterior cerebral, and middle cerebral arteries are patent. Common origin of the innominate and left common carotid arteries. . The bilateral  subclavian, common carotid, and internal carotid arteries are patent with no  flow-limiting stenosis. Left vertebral artery slightly larger than the right  vertebral artery. .    There is 0. % stenosis in the right internal carotid artery utilizing NASCET  criteria. There is 0. % stenosis in the left internal carotid artery utilizing NASCET  criteria. Impression  Very small focus of acute infarction in the posterior silvina slightly to the left  of midline. No superimposed hemorrhage. Moderate to severe chronic microvascular ischemic change with additional small  scattered lacunar infarctions seen throughout the cerebral and cerebellar  hemispheres. Moderate to severe cerebral atrophy as well. There is no major vessel occlusion, aneurysm, dissection or large vessel  hemodynamically significant stenosis. There is atherosclerotic cerebral vasculopathy with multifocal mild to moderate  an even severe stenoses demonstrated an smaller M3/A3 segments suggested. There is no intracranial mass, hemorrhage. XR Results   Results from [place] encounter on 10/15/21    XR SWALLOW FUNC VIDEO    Impression  No significant pooling, penetration or aspiration. Results from Hospital Encounter encounter on 10/15/21    XR CHEST PORT    Impression  The cardiomediastinal silhouette is appropriate for age, technique,  and lung expansion. Pulmonary vasculature is not congested. The lungs are  essentially clear. No effusion or pneumothorax is seen. Results from [place] encounter on 04/30/21    XR SPINE LUMB 2 OR 3 V    Impression  L2 compression deformity. Osteopenia. Lumbar spondylosis. Abdominal aorta atherosclerosis. VAS/US Results (maximum last 3):   No results found for this or any previous visit. TTE  02/13/21    ECHO ADULT COMPLETE 02/16/2021 2/16/2021    Interpretation Summary  · LV: Calculated LVEF is 65%. Normal cavity size and systolic function (ejection fraction normal). Moderate concentric hypertrophy. Wall motion: normal.  · Saline contrast was given to evaluate for intracardiac shunt. No shunting is demonstrated. · AV: Mild aortic valve regurgitation is present. · MV: Moderate mitral annular calcification. · TV: Mild tricuspid valve regurgitation is present. Signed by: Raegan Gupta MD on 2/16/2021  2:19 PM        EKG  Results for orders placed or performed during the hospital encounter of 10/15/21   EKG, 12 LEAD, INITIAL   Result Value Ref Range    Ventricular Rate 70 BPM    Atrial Rate 69 BPM    P-R Interval 198 ms    QRS Duration 97 ms    Q-T Interval 427 ms    QTC Calculation (Bezet) 461 ms    Calculated P Axis 13 degrees    Calculated R Axis 14 degrees    Calculated T Axis 99 degrees    Diagnosis       Sinus rhythm  Atrial premature complex  Abnormal R-wave progression, early transition  Nonspecific T abnormalities, lateral leads    Confirmed by Rudy Kumar ([id number]) on 10/15/2021 4:52:18 PM         Hospital Problems  Date Reviewed: 10/1/2021        Codes Class Noted POA    CVA (cerebral vascular accident) [facility]) ICD-10-CM: I63.9  ICD-9-CM: 434.91  2/13/2021 Unknown              Assessment/Plan:     Clemente Moritz is a 78 y.o. female with a left pontine infarct with intracranial vascular atherosclerotic diease. Continue aspirin 81 mg daily and Brilinta 60mg BID. Continue Lipitor 40 mg daily;  LDL 43.2 which is at goal.  Echo still pending if unreveali  ng okay to discharge. Continue PT/OT. Stroke educations. Thank you very much for this consultation. No further neurologic recommendations at this time. Thank you for this consult.     Signed By: Haroon Garcia NP     October 19, 2021 12:45 PM

## 2021-10-19 NOTE — PROGRESS NOTES
Transition of Care Plan: Likely IPR-Referral sent to [facility]-Status pending     RUR: 23%     PCP F/U:Teresa Larson MD     Disposition: Likely IPR     Transportation: BLS     Main Contact: Oral Quique     4622: Left message for Blair Vides to return call to see who was covering this patient and to check the status of his referral.     [phone]: Spoke with daughter Sabina Frazier. States she is agreeable to referrals being sent to [facility] as her mother has been there before. Waiting on response from IPRs and will touch base with daughter this afternoon when she comes back to the hospital.     1357: [facility] accepted and can likely accept today. [facility] has a waiting list for beds and they are still reviewing patient's insurance. Need to talk to daughter to secure placement. Will continue to follow.      Klaudia Anne RN, CRM

## 2021-10-19 NOTE — PROGRESS NOTES
Hospitalist Progress Note  Bhumika Melvin MD  Answering service: [phone] from in house phone      Date of Service:  10/19/2021  NAME:  Marya Britton  :  1945  MRN:  [id number]    Admission Summary:   76F p/w new L pontine CVA    Interval history / Subjective:   Patient seen and examined at bedside, feels well, no acute events overnight. In good mood. Her BP elevated, re-introduced amlodipine, avoid hypotension. Assessment & Plan:     Acute CVA - Left Jeni  - Continue aspirin, switched plavix 'not effective on testing' to brilinta  - A1c 4.8, TSH 1.6, LDL 43. TTE pending.  - Neurology following- Permissive hypertension. PT/OT- IP-rehab  - SLP following, MBS wnl.     HTN- Home BP meds restarted. Monitor, avoid hypotension. 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 Likely 2nd to abve. Code status: Full  DVT prophylaxis: SCDs  Care Plan discussed with: Patient/Family and Nurse  Disposition: [facility] [place] Problems  Date Reviewed: 10/1/2021        Codes Class Noted POA    CVA (cerebral vascular accident) [facility]) ICD-10-CM: I63.9  ICD-9-CM: 434.91  2021 Unknown            Review of Systems:   Pertinent items are mentioned in interval history. Vital Signs:    Last 24hrs VS reviewed since prior progress note. Most recent are:  Visit Vitals  BP (!) 191/80 (BP 1 Location: Right upper arm, BP Patient Position: At rest)   Pulse 69   Temp 97.8 °F (36.6 °C)   Resp 16   Ht 5' 4\" (1.626 m)   Wt 83.1 kg (183 lb 4.8 oz)   SpO2 99%   BMI 31.46 kg/m²       No intake or output data in the 24 hours ending 10/19/21 0936     Physical Examination:   Evaluated face to face and examined 10/19/21    General:  Alert, oriented, No acute distress. Elderly, frail BW  Resp:  No accessory muscle use, Good AE, no wheezes.  no crepitations  Abd: Soft, non-tender, non-distended, BS+  Extremities:  No cyanosis or clubbing, no significant edema  Neuro:  Grossly normal, no focal neuro deficits, follows commands, speech wnl  Psych:  fair insight, not agitated. Data Review:    Review and/or order of clinical lab test  Review and/or order of tests in the radiology section of CPT  Review and/or order of tests in the medicine section of CPT  Labs:     No results for input(s): WBC, HGB, HCT, PLT, HGBEXT, HCTEXT, PLTEXT, HGBEXT, HCTEXT, PLTEXT in the last 72 hours. No results for input(s): NA, K, CL, CO2, BUN, CREA, GLU, CA, MG, PHOS, URICA in the last 72 hours. No results for input(s): ALT, AP, TBIL, TBILI, TP, ALB, GLOB, GGT, AML, LPSE in the last 72 hours. No lab exists for component: SGOT, GPT, AMYP, HLPSE  No results for input(s): INR, PTP, APTT, INREXT, INREXT in the last 72 hours. No results for input(s): FE, TIBC, PSAT, FERR in the last 72 hours. Lab Results   Component Value Date/Time    Folate 16.2 05/01/2021 06:41 AM      No results for input(s): PH, PCO2, PO2 in the last 72 hours. No results for input(s): CPK, CKNDX, TROIQ in the last 72 hours.     No lab exists for component: CPKMB  Lab Results   Component Value Date/Time    Cholesterol, total 92 10/17/2021 02:46 AM    HDL Cholesterol 33 10/17/2021 02:46 AM    LDL, calculated 43.2 10/17/2021 02:46 AM    Triglyceride 79 10/17/2021 02:46 AM    CHOL/HDL Ratio 2.8 10/17/2021 02:46 AM     Lab Results   Component Value Date/Time    Glucose (POC) 115 10/18/2021 04:18 PM    Glucose (POC) 167 (H) 10/18/2021 11:34 AM    Glucose (POC) 95 10/18/2021 06:17 AM    Glucose (POC) 149 (H) 10/17/2021 09:47 PM    Glucose (POC) 145 (H) 10/17/2021 04:14 PM     Lab Results   Component Value Date/Time    Color Yellow/Straw 10/15/2021 01:56 PM    Appearance Clear 10/15/2021 01:56 PM    Specific gravity 1.020 10/15/2021 01:56 PM    pH (UA) 7.0 10/15/2021 01:56 PM    Protein >300 (A) 10/15/2021 01:56 PM    Glucose Negative 10/15/2021 01:56 PM    Ketone Negative 10/15/2021 01:56 PM    Bilirubin Negative 10/15/2021 01:56 PM    Urobilinogen 0.2 10/15/2021 01:56 PM    Nitrites Negative 10/15/2021 01:56 PM    Leukocyte Esterase Negative 10/15/2021 01:56 PM    Bacteria 1+ (A) 10/15/2021 01:56 PM    WBC 0-4 10/15/2021 01:56 PM    RBC 0-5 10/15/2021 01:56 PM     Medications Reviewed:     Current Facility-Administered Medications   Medication Dose Route Frequency    amLODIPine (NORVASC) tablet 10 mg  10 mg Oral DAILY    ticagrelor (BRILINTA) tablet 60 mg  60 mg Oral Q12H    cefTRIAXone (ROCEPHIN) 1 g in sterile water (preservative free) 10 mL IV syringe  1 g IntraVENous Q24H    aspirin chewable tablet 81 mg  81 mg Oral DAILY    acetaminophen (TYLENOL) tablet 650 mg  650 mg Oral Q4H PRN    Or    acetaminophen (TYLENOL) solution 650 mg  650 mg Per NG tube Q4H PRN    Or    acetaminophen (TYLENOL) suppository 650 mg  650 mg Rectal Q4H PRN    ondansetron (ZOFRAN) injection 4 mg  4 mg IntraVENous Q6H PRN    albuterol-ipratropium (DUO-NEB) 2.5 MG-0.5 MG/3 ML  3 mL Nebulization Q6H PRN    donepeziL (ARICEPT) tablet 5 mg  5 mg Oral DAILY    ferrous sulfate tablet 325 mg  325 mg Oral DAILY    traZODone (DESYREL) tablet 100 mg  100 mg Oral QHS    hydrALAZINE (APRESOLINE) tablet 100 mg  100 mg Oral TID    lisinopriL (PRINIVIL, ZESTRIL) tablet 40 mg  40 mg Oral DAILY    atorvastatin (LIPITOR) tablet 40 mg  40 mg Oral QHS   ______________________________________________________________________  EXPECTED LENGTH OF STAY: 2d 21h  ACTUAL LENGTH OF STAY:          3               Miki Lesches, MD

## 2021-10-19 NOTE — PROGRESS NOTES
Problem: Mobility Impaired (Adult and Pediatric)  Goal: *Acute Goals and Plan of Care (Insert Text)  Outcome: Progressing Towards Goal   PHYSICAL THERAPY TREATMENT  Patient: Sourav Mcgee (89 y.o. female)  Date: 10/19/2021  Diagnosis: [facility] [I63.9] <principal problem not specified>       Precautions: WBAT, Fall  Chart, physical therapy assessment, plan of care and goals were reviewed. ASSESSMENT  Patient continues with skilled PT services and is progressing towards goals. She is received supine in bed. Patient demonstrates improved BP today without noted orthostatic drop. She demonstrates fair sitting balance EOB with R lean that she is able to intermittently correct. Patient performs stand pivot transfer with use of RW and Min A for safety. VC are provided for keeping RW with her throughout transfer. Current Level of Function Impacting Discharge (mobility/balance): Min A     Other factors to consider for discharge: medical stability, increased need for assistance, decreased strength/endurance          PLAN :  Patient continues to benefit from skilled intervention to address the above impairments. Continue treatment per established plan of care. to address goals. Recommendation for discharge: (in order for the patient to meet his/her long term goals)  Therapy 3 hours per day 5-7 days per week    This discharge recommendation:  Has not yet been discussed the attending provider and/or case management    IF patient discharges home will need the following DME: to be determined (TBD)       SUBJECTIVE:   Patient stated what do you want me to do.     OBJECTIVE DATA SUMMARY:   Critical Behavior:  Neurologic State: Alert, Eyes open spontaneously  Orientation Level: Oriented X4  Cognition: Decreased attention/concentration, Follows commands  Safety/Judgement: Decreased awareness of environment, Decreased awareness of need for assistance, Decreased awareness of need for safety, Decreased insight into deficits  Functional Mobility Training:  Bed Mobility:     Supine to Sit: Stand-by assistance     Scooting: Minimum assistance        Transfers:  Sit to Stand: Minimum assistance  Stand to Sit: Minimum assistance                             Balance:  Sitting: Impaired; With support  Sitting - Static: Fair (occasional)  Sitting - Dynamic: Fair (occasional)  Standing: Impaired; With support  Standing - Static: Constant support; Fair  Standing - Dynamic : Constant support; Fair  Ambulation/Gait Training:                                                        Stairs: Therapeutic Exercises:     Pain Rating:      Activity Tolerance:   Fair    After treatment patient left in no apparent distress:   Sitting in chair, Call bell within reach, and Bed / chair alarm activated    COMMUNICATION/COLLABORATION:   The patients plan of care was discussed with: Occupational therapist and Registered nurse.      Rober Noe PT   Time Calculation: 14 mins

## 2021-10-19 NOTE — PROGRESS NOTES
Problem: Falls - Risk of  Goal: *Absence of Falls  Description: Document Cecilia Blood Fall Risk and appropriate interventions in the flowsheet.   Outcome: Progressing Towards Goal  Note: Fall Risk Interventions:  Mobility Interventions: PT Consult for mobility concerns, Communicate number of staff needed for ambulation/transfer    Mentation Interventions: Adequate sleep, hydration, pain control, Bed/chair exit alarm, Door open when patient unattended    Medication Interventions: Patient to call before getting OOB    Elimination Interventions: Call light in reach, Patient to call for help with toileting needs              Problem: Patient Education: Go to Patient Education Activity  Goal: Patient/Family Education  Outcome: Progressing Towards Goal     Problem: Discharge Planning  Goal: *Discharge to safe environment  Outcome: Progressing Towards Goal

## 2021-10-20 VITALS
HEART RATE: 80 BPM | HEIGHT: 64 IN | DIASTOLIC BLOOD PRESSURE: 66 MMHG | BODY MASS INDEX: 30.99 KG/M2 | RESPIRATION RATE: 18 BRPM | WEIGHT: 181.5 LBS | OXYGEN SATURATION: 98 % | SYSTOLIC BLOOD PRESSURE: 165 MMHG | TEMPERATURE: 98.4 F

## 2021-10-20 LAB
ANION GAP SERPL CALC-SCNC: 5 MMOL/L (ref 5–15)
BUN SERPL-MCNC: 60 MG/DL (ref 6–20)
BUN/CREAT SERPL: 13 (ref 12–20)
CALCIUM SERPL-MCNC: 8.6 MG/DL (ref 8.5–10.1)
CHLORIDE SERPL-SCNC: 112 MMOL/L (ref 97–108)
CO2 SERPL-SCNC: 24 MMOL/L (ref 21–32)
CREAT SERPL-MCNC: 4.47 MG/DL (ref 0.55–1.02)
GLUCOSE BLD STRIP.AUTO-MCNC: 140 MG/DL (ref 65–117)
GLUCOSE BLD STRIP.AUTO-MCNC: 146 MG/DL (ref 65–117)
GLUCOSE BLD STRIP.AUTO-MCNC: 85 MG/DL (ref 65–117)
GLUCOSE SERPL-MCNC: 117 MG/DL (ref 65–100)
POTASSIUM SERPL-SCNC: 4 MMOL/L (ref 3.5–5.1)
SERVICE CMNT-IMP: ABNORMAL
SERVICE CMNT-IMP: ABNORMAL
SERVICE CMNT-IMP: NORMAL
SODIUM SERPL-SCNC: 141 MMOL/L (ref 136–145)

## 2021-10-20 PROCEDURE — 99232 SBSQ HOSP IP/OBS MODERATE 35: CPT | Performed by: NURSE PRACTITIONER

## 2021-10-20 PROCEDURE — 97535 SELF CARE MNGMENT TRAINING: CPT | Performed by: OCCUPATIONAL THERAPIST

## 2021-10-20 PROCEDURE — 74011250637 HC RX REV CODE- 250/637: Performed by: STUDENT IN AN ORGANIZED HEALTH CARE EDUCATION/TRAINING PROGRAM

## 2021-10-20 PROCEDURE — 80048 BASIC METABOLIC PNL TOTAL CA: CPT

## 2021-10-20 PROCEDURE — 74011250637 HC RX REV CODE- 250/637: Performed by: INTERNAL MEDICINE

## 2021-10-20 PROCEDURE — 82962 GLUCOSE BLOOD TEST: CPT

## 2021-10-20 PROCEDURE — 97530 THERAPEUTIC ACTIVITIES: CPT

## 2021-10-20 PROCEDURE — 97116 GAIT TRAINING THERAPY: CPT

## 2021-10-20 PROCEDURE — 36415 COLL VENOUS BLD VENIPUNCTURE: CPT

## 2021-10-20 PROCEDURE — 74011250637 HC RX REV CODE- 250/637: Performed by: PSYCHIATRY & NEUROLOGY

## 2021-10-20 RX ORDER — HYDRALAZINE HYDROCHLORIDE 50 MG/1
50 TABLET, FILM COATED ORAL ONCE
Status: COMPLETED | OUTPATIENT
Start: 2021-10-20 | End: 2021-10-20

## 2021-10-20 RX ORDER — FUROSEMIDE 40 MG/1
40 TABLET ORAL 2 TIMES DAILY
Status: DISCONTINUED | OUTPATIENT
Start: 2021-10-20 | End: 2021-10-20 | Stop reason: HOSPADM

## 2021-10-20 RX ORDER — HYDRALAZINE HYDROCHLORIDE 50 MG/1
100 TABLET, FILM COATED ORAL 3 TIMES DAILY
Status: DISCONTINUED | OUTPATIENT
Start: 2021-10-20 | End: 2021-10-20 | Stop reason: HOSPADM

## 2021-10-20 RX ORDER — LANOLIN ALCOHOL/MO/W.PET/CERES
325 CREAM (GRAM) TOPICAL 2 TIMES DAILY WITH MEALS
Status: DISCONTINUED | OUTPATIENT
Start: 2021-10-20 | End: 2021-10-20 | Stop reason: HOSPADM

## 2021-10-20 RX ORDER — FERROUS SULFATE TAB 325 MG (65 MG ELEMENTAL FE) 325 (65 FE) MG
325 TAB ORAL 2 TIMES DAILY
Qty: 60 TABLET | Refills: 0 | Status: SHIPPED | OUTPATIENT
Start: 2021-10-20 | End: 2021-11-19

## 2021-10-20 RX ORDER — ALLOPURINOL 100 MG/1
100 TABLET ORAL DAILY
Status: DISCONTINUED | OUTPATIENT
Start: 2021-10-21 | End: 2021-10-20 | Stop reason: HOSPADM

## 2021-10-20 RX ADMIN — ASPIRIN 81 MG CHEWABLE TABLET 81 MG: 81 TABLET CHEWABLE at 08:55

## 2021-10-20 RX ADMIN — LISINOPRIL 40 MG: 20 TABLET ORAL at 08:55

## 2021-10-20 RX ADMIN — TICAGRELOR 60 MG: 60 TABLET ORAL at 08:57

## 2021-10-20 RX ADMIN — HYDRALAZINE HYDROCHLORIDE 50 MG: 50 TABLET, FILM COATED ORAL at 08:57

## 2021-10-20 RX ADMIN — HYDRALAZINE HYDROCHLORIDE 100 MG: 50 TABLET, FILM COATED ORAL at 16:06

## 2021-10-20 RX ADMIN — AMLODIPINE BESYLATE 10 MG: 5 TABLET ORAL at 08:57

## 2021-10-20 RX ADMIN — FERROUS SULFATE TAB 325 MG (65 MG ELEMENTAL FE) 325 MG: 325 (65 FE) TAB at 16:06

## 2021-10-20 RX ADMIN — HYDRALAZINE HYDROCHLORIDE 50 MG: 50 TABLET, FILM COATED ORAL at 11:05

## 2021-10-20 RX ADMIN — DONEPEZIL HYDROCHLORIDE 5 MG: 5 TABLET, FILM COATED ORAL at 08:57

## 2021-10-20 RX ADMIN — FERROUS SULFATE TAB 325 MG (65 MG ELEMENTAL FE) 325 MG: 325 (65 FE) TAB at 08:57

## 2021-10-20 NOTE — PROGRESS NOTES
Problem: Falls - Risk of  Goal: *Absence of Falls  Description: Document Stephen Leoen Fall Risk and appropriate interventions in the flowsheet.   Outcome: Resolved/Met     Problem: Patient Education: Go to Patient Education Activity  Goal: Patient/Family Education  Outcome: Resolved/Met     Problem: TIA/CVA Stroke: 0-24 hours  Goal: Off Pathway (Use only if patient is Off Pathway)  Outcome: Resolved/Met  Goal: Activity/Safety  Outcome: Resolved/Met  Goal: Consults, if ordered  Outcome: Resolved/Met  Goal: Diagnostic Test/Procedures  Outcome: Resolved/Met  Goal: Nutrition/Diet  Outcome: Resolved/Met  Goal: Discharge Planning  Outcome: Resolved/Met  Goal: Medications  Outcome: Resolved/Met  Goal: Respiratory  Outcome: Resolved/Met  Goal: Treatments/Interventions/Procedures  Outcome: Resolved/Met  Goal: Minimize risk of bleeding post-thrombolytic infusion  Outcome: Resolved/Met  Goal: Monitor for complications post-thrombolytic infusion  Outcome: Resolved/Met  Goal: Psychosocial  Outcome: Resolved/Met  Goal: *Hemodynamically stable  Outcome: Resolved/Met  Goal: *Neurologically stable  Description: Absence of additional neurological deficits    Outcome: Resolved/Met  Goal: *Verbalizes anxiety and depression are reduced or absent  Outcome: Resolved/Met  Goal: *Absence of Signs of Aspiration on Current Diet  Outcome: Resolved/Met  Goal: *Absence of deep venous thrombosis signs and symptoms(Stroke Metric)  Outcome: Resolved/Met  Goal: *Ability to perform ADLs and demonstrates progressive mobility and function  Outcome: Resolved/Met  Goal: *Stroke education started(Stroke Metric)  Outcome: Resolved/Met  Goal: *Dysphagia screen performed(Stroke Metric)  Outcome: Resolved/Met  Goal: *Rehab consulted(Stroke Metric)  Outcome: Resolved/Met     Problem: Patient Education: Go to Patient Education Activity  Goal: Patient/Family Education  Outcome: Resolved/Met     Problem: Discharge Planning  Goal: *Discharge to safe environment  Outcome: Resolved/Met     Problem: Patient Education: Go to Patient Education Activity  Goal: Patient/Family Education  Outcome: Resolved/Met     Problem: TIA/CVA Stroke: Day 2 Until Discharge  Goal: Off Pathway (Use only if patient is Off Pathway)  Outcome: Resolved/Met  Goal: Activity/Safety  Outcome: Resolved/Met  Goal: Diagnostic Test/Procedures  Outcome: Resolved/Met  Goal: Nutrition/Diet  Outcome: Resolved/Met  Goal: Discharge Planning  Outcome: Resolved/Met  Goal: Medications  Outcome: Resolved/Met  Goal: Respiratory  Outcome: Resolved/Met  Goal: Treatments/Interventions/Procedures  Outcome: Resolved/Met  Goal: Psychosocial  Outcome: Resolved/Met  Goal: *Verbalizes anxiety and depression are reduced or absent  Outcome: Resolved/Met  Goal: *Absence of aspiration  Outcome: Resolved/Met  Goal: *Absence of deep venous thrombosis signs and symptoms(Stroke Metric)  Outcome: Resolved/Met  Goal: *Optimal pain control at patient's stated goal  Outcome: Resolved/Met  Goal: *Tolerating diet  Outcome: Resolved/Met  Goal: *Ability to perform ADLs and demonstrates progressive mobility and function  Outcome: Resolved/Met  Goal: *Stroke education continued(Stroke Metric)  Outcome: Resolved/Met     Problem: Patient Education: Go to Patient Education Activity  Goal: Patient/Family Education  Outcome: Resolved/Met     Problem: Patient Education: Go to Patient Education Activity  Goal: Patient/Family Education  Outcome: Resolved/Met

## 2021-10-20 NOTE — PROGRESS NOTES
Stroke Education documented in Patient Education: YES  Core Measures Documented in [facility]:  Risk Factors: YES  Warning signs of stroke: YES  When to Activate 911: YES  Medication Education for Risk Factors: YES  Smoking cessation if applicable: YES  Written Education Given:  YES    Discharge NIH Completed: YES  Score: 5    BRAINS: YES    Follow Up Appointment Made: NO  Date/Time if applicable: n/a

## 2021-10-20 NOTE — PROGRESS NOTES
Neurology Progress Note  Cami Estrada NP      Date of admission: 10/15/2021    Patient: Magda Solis MRN: [id number]  SSN: [id number]    YOB: 1945  Age: 78 y.o. Sex: female        Subjective:     HPI: Magda Solis is a 78 y.o. female  with a history of diabetes, hypertension, stroke who is here from an outside hospital because of presenting with slurred speech and weakness. She had difficulty using her walker which is her baseline. Her speech is very slurred making it difficult to understand. Imaging showing a left pontine infarct. She also has right MCA stenosis. 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)      Past Surgical History:   Procedure Laterality Date    HX OOPHORECTOMY      STENT INSERTION  2001,2002    Darby Car      Family History   Problem Relation Age of Onset    Breast Cancer Maternal Aunt     Breast Cancer Cousin      Social History     Tobacco Use    Smoking status: Former Smoker    Smokeless tobacco: Never Used    Tobacco comment: quit 1972   Substance Use Topics    Alcohol use: No      Prior to Admission medications    Medication Sig Start Date End Date Taking? Authorizing Provider   ipratropium (ATROVENT) 21 mcg (0.03 %) nasal spray 1 [place] by Both Nostrils route two (2) times a day. Indications: runny nose 10/1/21  Yes Mac Villegas MD   furosemide (LASIX) 40 mg tablet Take 40 mg by mouth two (2) times a day. Yes Provider, Historical   loratadine 10 mg cap Take  by mouth.    Yes Provider, Historical   traZODone (DESYREL) 100 mg tablet Take 100 mg by mouth nightly. Yes Provider, Historical   augmented betamethasone dipropionate (DIPROLENE-AF) 0.05 % topical cream Apply  to affected area two (2) times a day. Yes Provider, Historical   clobetasoL (CLOBEX) 0.05 % lotion Apply  to affected area two (2) times a day. Yes Provider, Historical   ketoconazole (NIZORAL) 2 % shampoo Apply  to affected area daily as needed for Itching. Yes Provider, Historical   hydrALAZINE (APRESOLINE) 100 mg tablet Take 1 Tab by mouth three (3) times daily. 5/6/21  Yes Sandrine Taylor MD   carvediloL (COREG) 25 mg tablet Take 1 Tab by mouth two (2) times daily (with meals). 5/6/21  Yes Sandrine Taylor MD   nitroglycerin (NITRODUR) 0.2 mg/hr 1 Patch by TransDERmal route daily. 5/6/21  Yes Sandrine Taylor MD   insulin lispro (HUMALOG) 100 unit/mL injection INITIATE CORRECTIVE INSULIN PROTOCOL (EDWARD):  RX EDWARD Normal Sensitivity (Average weight)    AC (before meals), Q6H, and Q4H CORRECTIONAL SCALE only For Blood Sugar (mg/dl) of :             140-199=2 units            200-249=3 units  250-299=5 units  300-349=7 units  350 or greater = Call MD  Give in addition to basal medications. Do Not Hold for NPO    BEDTIME CORRECTIONAL sliding scale when scheduled:  200-249=2 units  250-299=3 units   300-349=4 units  350 or greater = Call MD  Give in addition to basal medications. Do Not Hold for NPO Fast Acting - Administer Immediately - or within 15 minutes of start of meal, if mealtime coverage. 5/6/21  Yes Sandrine Taylor MD   lidocaine (LIDODERM) 5 % 1 Patch by TransDERmal route daily. 4/20/21  Yes Provider, Historical   albuterol (PROVENTIL HFA, VENTOLIN HFA, PROAIR HFA) 90 mcg/actuation inhaler Take 2 Puffs by inhalation every four (4) hours as needed for Shortness of Breath. 4/28/21  Yes Provider, Historical   amLODIPine (NORVASC) 10 mg tablet Take 10 mg by mouth daily.  2/5/21  Yes Provider, Historical   cholecalciferol, vitamin D3, 50 mcg (2,000 unit) tab Take 2,000 Units by mouth daily. 3/8/21  Yes Provider, Historical   FeroSuL 325 mg (65 mg iron) tablet Take 325 mg by mouth daily. 3/9/21  Yes Provider, Historical   donepeziL (ARICEPT) 5 mg tablet Take 5 mg by mouth daily. Yes Provider, Historical   lisinopriL (PRINIVIL, ZESTRIL) 40 mg tablet Take 40 mg by mouth daily. Yes Provider, Historical   aspirin 81 mg chewable tablet Take 81 mg by mouth daily. Yes Provider, Historical   cyanocobalamin, vitamin B-12, 3,000 mcg cap cyanocobalamin (vitamin B-12) 3,000 mcg capsule   Yes Other, MD Riana   allopurinoL (ZYLOPRIM) 100 mg tablet allopurinol 100 mg tablet   Yes Other, MD Riana   fluticasone propion-salmeterol (ADVAIR HFA) 115-21 mcg/actuation inhaler Take 2 Puffs by inhalation two (2) times a day. Yes Provider, Historical   sodium bicarbonate 650 mg tablet Take 650 mg by mouth two (2) times a day. Yes Provider, Historical   acetaminophen (TYLENOL) 325 mg tablet Take 2 Tabs by mouth every six (6) hours as needed for Pain or Fever. Patient not taking: Reported on 10/19/2021 5/6/21   Vladimir Curiel MD   doxazosin (CARDURA) 2 mg tablet Take 2 mg by mouth nightly.   Patient not taking: Reported on 10/19/2021    Provider, Historical   atorvastatin (LIPITOR) 40 mg tablet atorvastatin 40 mg tablet  Patient not taking: Reported on 10/19/2021    Other, MD Riana     Current Facility-Administered Medications   Medication Dose Route Frequency Provider Last Rate Last Admin    amLODIPine (NORVASC) tablet 10 mg  10 mg Oral DAILY Wes Garcia MD   10 mg at 10/19/21 1013    hydrALAZINE (APRESOLINE) tablet 50 mg  50 mg Oral TID Wes Garcia MD   50 mg at 10/19/21 2239    ticagrelor (BRILINTA) tablet 60 mg  60 mg Oral Q12H Karen Spann DO   60 mg at 10/19/21 2130    cefTRIAXone (ROCEPHIN) 1 g in sterile water (preservative free) 10 mL IV syringe  1 g IntraVENous Q24H [place], MD Wes   1 g at 10/19/21 1829    aspirin chewable tablet 81 mg  81 mg Oral DAILY Freida Parish MD   81 mg at 10/19/21 0949    acetaminophen (TYLENOL) tablet 650 mg  650 mg Oral Q4H PRN Freida Parish MD        Or    acetaminophen (TYLENOL) solution 650 mg  650 mg Per NG tube Q4H PRN Freida Parish MD        Or    acetaminophen (TYLENOL) suppository 650 mg  650 mg Rectal Q4H PRN Freida Parish MD        ondansetron (ZOFRAN) injection 4 mg  4 mg IntraVENous Q6H PRN Freida Parish MD        albuterol-ipratropium (DUO-NEB) 2.5 MG-0.5 MG/3 ML  3 mL Nebulization Q6H PRN Freida Parish MD        donepeziL (ARICEPT) tablet 5 mg  5 mg Oral DAILY Freida Parish MD   5 mg at 10/19/21 0950    ferrous sulfate tablet 325 mg  325 mg Oral DAILY Freida Parish MD   325 mg at 10/19/21 0950    traZODone (DESYREL) tablet 100 mg  100 mg Oral QHS Freida Parish MD   100 mg at 10/19/21 2239    lisinopriL (PRINIVIL, ZESTRIL) tablet 40 mg  40 mg Oral DAILY Freida Parish MD   40 mg at 10/19/21 0950    atorvastatin (LIPITOR) tablet 40 mg  40 mg Oral QHS Karen Spann DO   40 mg at 10/19/21 2239        No Known Allergies    Objective:     Vitals:    10/20/21 0204 10/20/21 0417 10/20/21 0609 10/20/21 0611   BP:   (!) 163/72    Pulse: 67 69 68 69   Resp:   19    Temp:   98.6 °F (37 °C)    SpO2:   98%         Temp (24hrs), Av.4 °F (36.9 °C), Min:98.2 °F (36.8 °C), Max:98.6 °F (37 °C)        O2 Device: None (Room air)         Intake/Output Summary (Last 24 hours) at 10/20/2021 0746  Last data filed at 10/19/2021 1558  Gross per 24 hour   Intake    Output 1550 ml   Net -1550 ml       General: In NAD. Cardiac: RRR  Lungs: Unlabored breathing. Abdomen: Soft/NT/non-distended. Extremities. No edema. Neurologic Exam:  Mental Status:  Alert and oriented x 4. Language:    Grossly intact fluency and comprehension. No dysarthria.     Cranial Nerves:   Pupils equal, round and reactive to light. Visual fields intact. Extraocular movements intact w/o nystagmus      Facial sensation intact to LT     Facial activation symmetric. Hearing grossly intact. Motor:    Bulk and tone normal.      5/5 strength in all extremities. No pronator drift. No involuntary movements. Sensation:    Sensation intact throughout to light touch. Coordination & Gait: No ataxia with finger to nose. Gait deferred    LABS:  No results for input(s): WBC, HGB, HCT, PLT, HGBEXT, HCTEXT, PLTEXT, HGBEXT, HCTEXT, PLTEXT in the last 72 hours. No results for input(s): NA, K, CL, CO2, BUN, CREA, GLU, CA, MG, PHOS, URICA in the last 72 hours. No results for input(s): ALT, AP, TBIL, TBILI, TP, ALB, GLOB, GGT, AML, LPSE in the last 72 hours. No lab exists for component: SGOT, GPT, AMYP, HLPSE  No results for input(s): INR, PTP, APTT, INREXT, INREXT in the last 72 hours. No results for input(s): PHI, PCO2I, PO2I, HCO3I in the last 72 hours. No results for input(s): CPK, CKNDX, TROIQ in the last 72 hours.     No lab exists for component: CPKMB  Lab Results   Component Value Date/Time    Cholesterol, total 92 10/17/2021 02:46 AM    HDL Cholesterol 33 10/17/2021 02:46 AM    LDL, calculated 43.2 10/17/2021 02:46 AM    Triglyceride 79 10/17/2021 02:46 AM    CHOL/HDL Ratio 2.8 10/17/2021 02:46 AM     Lab Results   Component Value Date/Time    Glucose (POC) 187 (H) 10/19/2021 10:20 PM    Glucose (POC) 94 10/19/2021 04:45 PM    Glucose (POC) 112 10/19/2021 11:22 AM    Glucose (POC) 115 10/18/2021 04:18 PM    Glucose (POC) 167 (H) 10/18/2021 11:34 AM     Lab Results   Component Value Date/Time    Color Yellow/Straw 10/15/2021 01:56 PM    Appearance Clear 10/15/2021 01:56 PM    Specific gravity 1.020 10/15/2021 01:56 PM    pH (UA) 7.0 10/15/2021 01:56 PM    Protein >300 (A) 10/15/2021 01:56 PM    Glucose Negative 10/15/2021 01:56 PM    Ketone Negative 10/15/2021 01:56 PM    Bilirubin Negative 10/15/2021 01:56 PM    Urobilinogen 0.2 10/15/2021 01:56 PM    Nitrites Negative 10/15/2021 01:56 PM    Leukocyte Esterase Negative 10/15/2021 01:56 PM    Bacteria 1+ (A) 10/15/2021 01:56 PM    WBC 0-4 10/15/2021 01:56 PM    RBC 0-5 10/15/2021 01:56 PM       No results for input(s): FE, TIBC, PSAT, FERR in the last 72 hours. Lab Results   Component Value Date/Time    Folate 16.2 05/01/2021 06:41 AM      No results for input(s): PH, PCO2, PO2 in the last 72 hours. No results for input(s): CPK, CKNDX, TROIQ in the last 72 hours. No lab exists for component: CPKMB    Imaging:  No results found. CT Results:  Results from [place] encounter on 10/15/21    CT HEAD WO CONT    Narrative  CT dose reduction was achieved through use of a standardized protocol tailored  for this examination and automatic exposure control for dose modulation. CT Head    History:    The sulci are prominent but symmetric. Periventricular and deep white matter  hypodensity is not unexpected for age. Multiple chronic small vessel infarcts,  as seen February 16. No acute abnormality seen gray or white matter. Ventricles  are symmetric and appropriate for atrophy. There is no midline shift or mass  effect, or evidence of hemorrhage. Bone windows show no fracture. Impression  Age-appropriate atrophy. No acute findings. Results from [place] encounter on 02/13/21    CT HEAD WO CONT    Narrative  PROCEDURE: CT HEAD WO CONT    HISTORY:Constricted pupils    COMPARISON:Head CT from 2 days ago    Department policy stipulates all CT scans at this facility are performed using  dose reduction optimization techniques as appropriate to the performed exam,  including the following: Automated exposure control, adjustments of the mA  and/or KVP according to the patient size, and the use of iterative  reconstruction technique.     TECHNIQUE: Without IV contrast    Bones/extracranial soft tissues:  Within normal limits  Extra-axial spaces:  No hemorrhage, mass or focal fluid collection. Ventricles/sulci:  There is mild dilatation of the ventricles. Sulci are  prominent. Brain parenchyma:   No mass effect. There is good gray-white differentiation. There are inhomogeneous areas of mildly decreased density in periventricular  white matter. Again noted is poorly defined hypodensity in the silvina, unchanged. Old small  lacunar infarcts in the basal ganglia bilaterally, unchanged    Impression  Diffuse chronic changes which appear stable. No acute or active  intracranial process. Results from [place] encounter on 02/13/21    CT HEAD WO CONT    Narrative  Dose Reduction:  All CT scans at this facility are performed using dose reduction optimization  techniques as appropriate to a performed exam including the following: Automated  exposure control, adjustments of the mA and/or kV according to patient size, or  use of iterative reconstruction technique. Unenhanced images were obtained and compared with 10/15/2020    Prominent calcification of bilateral distal vertebral and cavernous internal  carotid arteries. Internal calvarial hyperostosis, similar to 10/15/2020. Normal  gray-white differentiation. Small vessel disease involving brainstem,  periventricular deep white matter and bilateral thalami. There is diffuse  atrophy. No evidence of brain mass, hemorrhage, or acute large vessel  distribution infarct. No abnormal extra-axial fluid collection. Thinning of  corpus callosum. Normal appearance of craniovertebral junction and pituitary  gland. Impression  Atrophy. Microangiopathy. No evidence of intracranial hemorrhage or  acute large vessel distribution infarct. If there is clinical suspicion of acute lacunar infarction, consider additional  imaging evaluation with MRI.       MRI Results:  Results from [place] encounter on 10/15/21    MRA NECK WO CONT    Narrative  *PRELIMINARY REPORT*    No evidence of large vessel occlusion. Preliminary report was provided by Dr. Cornel Cervantes, the on-call radiologist, at 9:45 PM  on 10/15/2021. Final report to follow. *END PRELIMINARY REPORT*    CLINICAL HISTORY: Slurred speech    INDICATION: Slurred speech. COMPARISON: None  TECHNIQUE: MR examination of the brain includes axial and sagittal T1 , axial  T2, axial FLAIR, axial gradient echo, axial DWI, coronal T2. Contrast: None  Next, 3-D time-of-flight MRA of the brain was performed. Multiplanar  reconstructions were obtained. Next, 2-D time-of-flight MRA of the neck was performed. Multiplanar  reconstructions were obtained. FINDINGS:  There is a small focus of infarction in the posterior central silvina extending  slightly to the left. Less than 10 mm in size. There is sulcal and ventricular prominence. There are scattered foci of chronic  hemosiderin deposition, the majority of these and cerebral hemispheres with some  additional foci of chronic hemosiderin deposition in the central silvina. Extensive  periventricular and scattered foci of increased T2 signal intensity corona  radiata, central silvina, centrum semiovale. There are small scattered remote  infarctions demonstrated right and left cerebellum and left silvina right thalamus  periventricular white matter in the frontal lobes as well. IACs are symmetric in  size. There is no Chiari or syrinx. The pituitary and infundibulum are grossly  unremarkable. There is no skull base mass. Cerebellopontine angles are grossly  unremarkable. The major intracranial vascular flow-voids are unremarkable. The cavernous sinuses are symmetric. Optic chiasm and infundibulum grossly  unremarkable. Orbits are grossly symmetric. Dural venous sinuses are grossly  patent. The brain architecture is normal. There is no evidence of midline shift  or mass-effect. There are no extra-axial fluid collections. The mastoid air  cells and paranasal sinuses are well pneumatized and clear.     Vertebral arteries are codominant. There is no basilar artery stenosis. There is  atherosclerotic change of the cavernous ICAs with mild right cavernous ICA  stenosis and moderate to severe supraclinoid ICA stenosis on the left. Mild  multifocal M1 segment stenoses. Arborization of M2 vessels on the left is  normal. Right M3 anterior division stenosis at 1-35 may be nearly occlusive. No  infarction in this region. . Tiny posterior communicating artery on the left. P1  segments are patent. Proximal P2 segments are patent. . The internal carotid,  anterior cerebral, and middle cerebral arteries are patent. Common origin of the innominate and left common carotid arteries. . The bilateral  subclavian, common carotid, and internal carotid arteries are patent with no  flow-limiting stenosis. Left vertebral artery slightly larger than the right  vertebral artery. .    There is 0. % stenosis in the right internal carotid artery utilizing NASCET  criteria. There is 0. % stenosis in the left internal carotid artery utilizing NASCET  criteria. Impression  Very small focus of acute infarction in the posterior silvina slightly to the left  of midline. No superimposed hemorrhage. Moderate to severe chronic microvascular ischemic change with additional small  scattered lacunar infarctions seen throughout the cerebral and cerebellar  hemispheres. Moderate to severe cerebral atrophy as well. There is no major vessel occlusion, aneurysm, dissection or large vessel  hemodynamically significant stenosis. There is atherosclerotic cerebral vasculopathy with multifocal mild to moderate  an even severe stenoses demonstrated an smaller M3/A3 segments suggested. There is no intracranial mass, hemorrhage. MRA BRAIN WO CONT    Narrative  *PRELIMINARY REPORT*    No evidence of large vessel occlusion. Preliminary report was provided by Dr. Thomas Patel, the on-call radiologist, at 9:45 PM  on 10/15/2021. Final report to follow.     *END PRELIMINARY REPORT*    CLINICAL HISTORY: Slurred speech    INDICATION: Slurred speech. COMPARISON: None  TECHNIQUE: MR examination of the brain includes axial and sagittal T1 , axial  T2, axial FLAIR, axial gradient echo, axial DWI, coronal T2. Contrast: None  Next, 3-D time-of-flight MRA of the brain was performed. Multiplanar  reconstructions were obtained. Next, 2-D time-of-flight MRA of the neck was performed. Multiplanar  reconstructions were obtained. FINDINGS:  There is a small focus of infarction in the posterior central silvina extending  slightly to the left. Less than 10 mm in size. There is sulcal and ventricular prominence. There are scattered foci of chronic  hemosiderin deposition, the majority of these and cerebral hemispheres with some  additional foci of chronic hemosiderin deposition in the central silvina. Extensive  periventricular and scattered foci of increased T2 signal intensity corona  radiata, central silvina, centrum semiovale. There are small scattered remote  infarctions demonstrated right and left cerebellum and left silvina right thalamus  periventricular white matter in the frontal lobes as well. IACs are symmetric in  size. There is no Chiari or syrinx. The pituitary and infundibulum are grossly  unremarkable. There is no skull base mass. Cerebellopontine angles are grossly  unremarkable. The major intracranial vascular flow-voids are unremarkable. The cavernous sinuses are symmetric. Optic chiasm and infundibulum grossly  unremarkable. Orbits are grossly symmetric. Dural venous sinuses are grossly  patent. The brain architecture is normal. There is no evidence of midline shift  or mass-effect. There are no extra-axial fluid collections. The mastoid air  cells and paranasal sinuses are well pneumatized and clear. Vertebral arteries are codominant. There is no basilar artery stenosis.  There is  atherosclerotic change of the cavernous ICAs with mild right cavernous ICA  stenosis and moderate to severe supraclinoid ICA stenosis on the left. Mild  multifocal M1 segment stenoses. Arborization of M2 vessels on the left is  normal. Right M3 anterior division stenosis at 1-35 may be nearly occlusive. No  infarction in this region. . Tiny posterior communicating artery on the left. P1  segments are patent. Proximal P2 segments are patent. . The internal carotid,  anterior cerebral, and middle cerebral arteries are patent. Common origin of the innominate and left common carotid arteries. . The bilateral  subclavian, common carotid, and internal carotid arteries are patent with no  flow-limiting stenosis. Left vertebral artery slightly larger than the right  vertebral artery. .    There is 0. % stenosis in the right internal carotid artery utilizing NASCET  criteria. There is 0. % stenosis in the left internal carotid artery utilizing NASCET  criteria. Impression  Very small focus of acute infarction in the posterior silvina slightly to the left  of midline. No superimposed hemorrhage. Moderate to severe chronic microvascular ischemic change with additional small  scattered lacunar infarctions seen throughout the cerebral and cerebellar  hemispheres. Moderate to severe cerebral atrophy as well. There is no major vessel occlusion, aneurysm, dissection or large vessel  hemodynamically significant stenosis. There is atherosclerotic cerebral vasculopathy with multifocal mild to moderate  an even severe stenoses demonstrated an smaller M3/A3 segments suggested. There is no intracranial mass, hemorrhage. MRI BRAIN WO CONT    Narrative*PRELIMINARY REPOR*    There is an acute lacunar infarct in the silvina. Preliminary report was provided by Dr. Luis Antonio, the on-call radiologist, at 9:45 PM  10/15/2021    Final report to follow. *END PRELIMINARY REPORT*      CLINICAL HISTORY: Slurred speech    INDICATION: Slurred speech.     COMPARISON: None  TECHNIQUE: MR examination of the brain includes axial and sagittal T1 , axial  T2, axial FLAIR, axial gradient echo, axial DWI, coronal T2. Contrast: None  Next, 3-D time-of-flight MRA of the brain was performed. Multiplanar  reconstructions were obtained. Next, 2-D time-of-flight MRA of the neck was performed. Multiplanar  reconstructions were obtained. FINDINGS:  There is a small focus of infarction in the posterior central silvina extending  slightly to the left. Less than 10 mm in size. There is sulcal and ventricular prominence. There are scattered foci of chronic  hemosiderin deposition, the majority of these and cerebral hemispheres with some  additional foci of chronic hemosiderin deposition in the central silvina. Extensive  periventricular and scattered foci of increased T2 signal intensity corona  radiata, central silvina, centrum semiovale. There are small scattered remote  infarctions demonstrated right and left cerebellum and left silvina right thalamus  periventricular white matter in the frontal lobes as well. IACs are symmetric in  size. There is no Chiari or syrinx. The pituitary and infundibulum are grossly  unremarkable. There is no skull base mass. Cerebellopontine angles are grossly  unremarkable. The major intracranial vascular flow-voids are unremarkable. The cavernous sinuses are symmetric. Optic chiasm and infundibulum grossly  unremarkable. Orbits are grossly symmetric. Dural venous sinuses are grossly  patent. The brain architecture is normal. There is no evidence of midline shift  or mass-effect. There are no extra-axial fluid collections. The mastoid air  cells and paranasal sinuses are well pneumatized and clear. Vertebral arteries are codominant. There is no basilar artery stenosis. There is  atherosclerotic change of the cavernous ICAs with mild right cavernous ICA  stenosis and moderate to severe supraclinoid ICA stenosis on the left. Mild  multifocal M1 segment stenoses.  Arborization of M2 vessels on the left is  normal. Right M3 anterior division stenosis at 1-35 may be nearly occlusive. No  infarction in this region. . Tiny posterior communicating artery on the left. P1  segments are patent. Proximal P2 segments are patent. . The internal carotid,  anterior cerebral, and middle cerebral arteries are patent. Common origin of the innominate and left common carotid arteries. . The bilateral  subclavian, common carotid, and internal carotid arteries are patent with no  flow-limiting stenosis. Left vertebral artery slightly larger than the right  vertebral artery. .    There is 0. % stenosis in the right internal carotid artery utilizing NASCET  criteria. There is 0. % stenosis in the left internal carotid artery utilizing NASCET  criteria. Impression  Very small focus of acute infarction in the posterior silvina slightly to the left  of midline. No superimposed hemorrhage. Moderate to severe chronic microvascular ischemic change with additional small  scattered lacunar infarctions seen throughout the cerebral and cerebellar  hemispheres. Moderate to severe cerebral atrophy as well. There is no major vessel occlusion, aneurysm, dissection or large vessel  hemodynamically significant stenosis. There is atherosclerotic cerebral vasculopathy with multifocal mild to moderate  an even severe stenoses demonstrated an smaller M3/A3 segments suggested. There is no intracranial mass, hemorrhage. XR Results   Results from [place] encounter on 10/15/21    XR SWALLOW FUNC VIDEO    Impression  No significant pooling, penetration or aspiration. Results from Hospital Encounter encounter on 10/15/21    XR CHEST PORT    Impression  The cardiomediastinal silhouette is appropriate for age, technique,  and lung expansion. Pulmonary vasculature is not congested. The lungs are  essentially clear. No effusion or pneumothorax is seen.       Results from [place] encounter on 04/30/21    XR SPINE LUMB 2 OR 3 V    Impression  L2 compression deformity. Osteopenia. Lumbar spondylosis. Abdominal aorta atherosclerosis. VAS/US Results (maximum last 3): No results found for this or any previous visit. TTE  02/13/21    ECHO ADULT COMPLETE 02/16/2021 2/16/2021    Interpretation Summary  · LV: Calculated LVEF is 65%. Normal cavity size and systolic function (ejection fraction normal). Moderate concentric hypertrophy. Wall motion: normal.  · Saline contrast was given to evaluate for intracardiac shunt. No shunting is demonstrated. · AV: Mild aortic valve regurgitation is present. · MV: Moderate mitral annular calcification. · TV: Mild tricuspid valve regurgitation is present. Signed by: Kennedy Barrientos MD on 2/16/2021  2:19 PM        EKG  Results for orders placed or performed during the hospital encounter of 10/15/21   EKG, 12 LEAD, INITIAL   Result Value Ref Range    Ventricular Rate 70 BPM    Atrial Rate 69 BPM    P-R Interval 198 ms    QRS Duration 97 ms    Q-T Interval 427 ms    QTC Calculation (Bezet) 461 ms    Calculated P Axis 13 degrees    Calculated R Axis 14 degrees    Calculated T Axis 99 degrees    Diagnosis       Sinus rhythm  Atrial premature complex  Abnormal R-wave progression, early transition  Nonspecific T abnormalities, lateral leads    Confirmed by Liliana Queen ([id number]) on 10/15/2021 4:52:18 PM         Hospital Problems  Date Reviewed: 10/1/2021        Codes Class Noted POA    CVA (cerebral vascular accident) [facility]) ICD-10-CM: I63.9  ICD-9-CM: 434.91  2/13/2021 Unknown              Assessment/Plan:     [facility] is a 78 y.o. female with a left pontine infarct with intracranial vascular atherosclerotic diease. Continue aspirin 81 mg daily and Brilinta 60mg BID. Continue Lipitor 40 mg daily;  LDL 43.2 which is at goal.  Echo 55-60% with right to left shunt, recommend Ms. Debbie Saucedo follow up with cardiology outpatient. Continue PT/OT. Stroke educations. Thank you very much for this consultation.  No further neurologic recommendations at this time. Thank you for this consult.     Signed By: Jose Francisco Price NP     October 20, 2021 12:45 PM

## 2021-10-20 NOTE — PROGRESS NOTES
Problem: Mobility Impaired (Adult and Pediatric)  Goal: *Acute Goals and Plan of Care (Insert Text)  Description: Note: FUNCTIONAL STATUS PRIOR TO ADMISSION:  The required caregivers for assitance with ADLs and was only mobilizing at a household distance level. HOME SUPPORT PRIOR TO ADMISSION: The patient lived alone with caregivers  to provide assistance. Physical Therapy Goals  Initiated 10/17/2021  1. Patient will move from supine to sit and sit to supine  in bed with modified independence within 7 day(s). 2.  Patient will transfer from bed to chair and chair to bed with supervision/set-up using the least restrictive device within 7 day(s). 3.  Patient will perform sit to stand with modified independence within 7 day(s). 4.  Patient will ambulate with minimal assistance/contact guard assist for 125 feet with the least restrictive device within 7 day(s). Outcome: Progressing Towards Goal   PHYSICAL THERAPY TREATMENT  Patient: Annie Somers (58 y.o. female)  Date: 10/20/2021  Diagnosis: CVA (cerebral vascular accident) ([facility] [I63.9] <principal problem not specified>       Precautions: WBAT, Fall  Chart, physical therapy assessment, plan of care and goals were reviewed. ASSESSMENT  Patient continues with skilled PT services and is progressing towards goals. Pt received supine in bed and agreeable to therapy with encouragement. Pt tolerated session well, but continues to be limited by slight R sided weakness, decreased functional mobility, impaired balance and gait, impaired speech, confusion. Pt tolerated progressive gait training x 22 ft with RW with min A with seated break on the toilet. Pt demonstrated forward flexed trunk and narrow GUSTAVO. Pt attempted to sit too soon when turning to sit in the chair. Pt will continue to benefit from inpatient rehab upon discharge.      Current Level of Function Impacting Discharge (mobility/balance): min A sit<>stand with RW, gait training x 22 ft with RW with min A     Other factors to consider for discharge:          PLAN :  Patient continues to benefit from skilled intervention to address the above impairments. Continue treatment per established plan of care. to address goals. Recommendation for discharge: (in order for the patient to meet his/her long term goals)  Therapy 3 hours per day 5-7 days per week    This discharge recommendation:  Has been made in collaboration with the attending provider and/or case management    IF patient discharges home will need the following DME: to be determined (TBD)       SUBJECTIVE:   Patient stated You all don't understand. You have no empathy.     OBJECTIVE DATA SUMMARY:   Critical Behavior:  Neurologic State: Alert, Confused  Orientation Level: Disoriented to time, Oriented to person, Oriented to place, Oriented to situation  Cognition: Follows commands  Safety/Judgement: Awareness of environment, Decreased awareness of need for assistance, Decreased awareness of need for safety, Decreased insight into deficits, Fall prevention  Functional Mobility Training:  Bed Mobility:     Supine to Sit: Contact guard assistance; Additional time     Scooting: Contact guard assistance;Minimum assistance        Transfers:  Sit to Stand: Minimum assistance  Stand to Sit: Minimum assistance                             Balance:  Sitting: Impaired  Sitting - Static: Good (unsupported)  Sitting - Dynamic: Fair (occasional)  Standing: Impaired; With support  Standing - Static: Constant support; Fair  Standing - Dynamic : Constant support; Fair  Ambulation/Gait Training:  Distance (ft): 22 Feet (ft)  Assistive Device: Gait belt;Walker, rolling  Ambulation - Level of Assistance: Minimal assistance        Gait Abnormalities: Decreased step clearance;Trunk sway increased; Shuffling gait (forward flexed trunk)        Base of Support: Narrowed     Speed/Jennie: Pace decreased (<100 feet/min); Shuffled  Step Length: Right shortened;Left shortened Therapeutic Exercises:     Pain Rating:  Pt denied pain    Activity Tolerance:   Fair    After treatment patient left in no apparent distress:   Sitting in chair, Call bell within reach, Bed / chair alarm activated, and Side rails x 3    COMMUNICATION/COLLABORATION:   The patients plan of care was discussed with: Occupational therapist and Registered nurse.      Lenora Santacruz, PT, DPT   Time Calculation: 25 mins

## 2021-10-20 NOTE — PROGRESS NOTES
Hospitalist Progress Note  Leti Johnson MD  Answering service: [phone] from in house phone      Date of Service:  10/20/2021  NAME:  Suma Mojica  :  1945  MRN:  [id number]    Admission Summary:   76F p/w new L pontine CVA    Interval history / Subjective:   Patient seen and examined at bedside, feels well. No acute complaints. In positive mood. Assessment & Plan:     Acute CVA - Left Jeni  - Continue aspirin, switched plavix 'not effective on testing' to brilinta  - A1c 4.8, TSH 1.6, LDL 43. TTE preserved ef. Small to mod pericardial effusion. F/u op  - Neurology following- Permissive hypertension. PT/OT- IP-rehab  - SLP following, MBS wnl.     HTN- Home BP meds restarted. Monitor, avoid hypotension. 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 Likely 2nd to abve. Code status: Full  DVT prophylaxis: SCDs  Care Plan discussed with: Patient/Family and Nurse  Disposition: IPR Medically ready. Hospital Problems  Date Reviewed: 10/1/2021        Codes Class Noted POA    CVA (cerebral vascular accident) [facility]) ICD-10-CM: I63.9  ICD-9-CM: 434.91  2021 Unknown            Review of Systems:   Pertinent items are mentioned in interval history. Vital Signs:    Last 24hrs VS reviewed since prior progress note. Most recent are:  Visit Vitals  BP (!) 175/76   Pulse 73   Temp 98.6 °F (37 °C)   Resp 19   Ht 5' 4\" (1.626 m)   Wt 82.3 kg (181 lb 8 oz)   SpO2 98%   BMI 31.15 kg/m²         Intake/Output Summary (Last 24 hours) at 10/20/2021 0954  Last data filed at 10/19/2021 1558  Gross per 24 hour   Intake    Output 1550 ml   Net -1550 ml        Physical Examination:   Evaluated face to face and examined 10/20/21    General:  Alert, oriented, No acute distress. Elderly, frail BW  Resp:  No accessory muscle use, Good AE, no wheezes. no crepitations  Abd:  Soft, non-tender, non-distended, BS+  Extremities:  No cyanosis or clubbing, no significant edema  Neuro:  Grossly normal, no focal neuro deficits, follows commands, speech wnl  Psych:  fair insight, not agitated. Data Review:    Review and/or order of clinical lab test  Review and/or order of tests in the radiology section of CPT  Review and/or order of tests in the medicine section of CPT  Labs:     No results for input(s): WBC, HGB, HCT, PLT, HGBEXT, HCTEXT, PLTEXT, HGBEXT, HCTEXT, PLTEXT in the last 72 hours. No results for input(s): NA, K, CL, CO2, BUN, CREA, GLU, CA, MG, PHOS, URICA in the last 72 hours. No results for input(s): ALT, AP, TBIL, TBILI, TP, ALB, GLOB, GGT, AML, LPSE in the last 72 hours. No lab exists for component: SGOT, GPT, AMYP, HLPSE  No results for input(s): INR, PTP, APTT, INREXT, INREXT in the last 72 hours. No results for input(s): FE, TIBC, PSAT, FERR in the last 72 hours. Lab Results   Component Value Date/Time    Folate 16.2 05/01/2021 06:41 AM      No results for input(s): PH, PCO2, PO2 in the last 72 hours. No results for input(s): CPK, CKNDX, TROIQ in the last 72 hours.     No lab exists for component: CPKMB  Lab Results   Component Value Date/Time    Cholesterol, total 92 10/17/2021 02:46 AM    HDL Cholesterol 33 10/17/2021 02:46 AM    LDL, calculated 43.2 10/17/2021 02:46 AM    Triglyceride 79 10/17/2021 02:46 AM    CHOL/HDL Ratio 2.8 10/17/2021 02:46 AM     Lab Results   Component Value Date/Time    Glucose (POC) 85 10/20/2021 07:45 AM    Glucose (POC) 187 (H) 10/19/2021 10:20 PM    Glucose (POC) 94 10/19/2021 04:45 PM    Glucose (POC) 112 10/19/2021 11:22 AM    Glucose (POC) 115 10/18/2021 04:18 PM     Lab Results   Component Value Date/Time    Color Yellow/Straw 10/15/2021 01:56 PM    Appearance Clear 10/15/2021 01:56 PM    Specific gravity 1.020 10/15/2021 01:56 PM    pH (UA) 7.0 10/15/2021 01:56 PM    Protein >300 (A) 10/15/2021 01:56 PM Glucose Negative 10/15/2021 01:56 PM    Ketone Negative 10/15/2021 01:56 PM    Bilirubin Negative 10/15/2021 01:56 PM    Urobilinogen 0.2 10/15/2021 01:56 PM    Nitrites Negative 10/15/2021 01:56 PM    Leukocyte Esterase Negative 10/15/2021 01:56 PM    Bacteria 1+ (A) 10/15/2021 01:56 PM    WBC 0-4 10/15/2021 01:56 PM    RBC 0-5 10/15/2021 01:56 PM     Medications Reviewed:     Current Facility-Administered Medications   Medication Dose Route Frequency    amLODIPine (NORVASC) tablet 10 mg  10 mg Oral DAILY    hydrALAZINE (APRESOLINE) tablet 50 mg  50 mg Oral TID    ticagrelor (BRILINTA) tablet 60 mg  60 mg Oral Q12H    cefTRIAXone (ROCEPHIN) 1 g in sterile water (preservative free) 10 mL IV syringe  1 g IntraVENous Q24H    aspirin chewable tablet 81 mg  81 mg Oral DAILY    acetaminophen (TYLENOL) tablet 650 mg  650 mg Oral Q4H PRN    Or    acetaminophen (TYLENOL) solution 650 mg  650 mg Per NG tube Q4H PRN    Or    acetaminophen (TYLENOL) suppository 650 mg  650 mg Rectal Q4H PRN    ondansetron (ZOFRAN) injection 4 mg  4 mg IntraVENous Q6H PRN    albuterol-ipratropium (DUO-NEB) 2.5 MG-0.5 MG/3 ML  3 mL Nebulization Q6H PRN    donepeziL (ARICEPT) tablet 5 mg  5 mg Oral DAILY    ferrous sulfate tablet 325 mg  325 mg Oral DAILY    traZODone (DESYREL) tablet 100 mg  100 mg Oral QHS    lisinopriL (PRINIVIL, ZESTRIL) tablet 40 mg  40 mg Oral DAILY    atorvastatin (LIPITOR) tablet 40 mg  40 mg Oral QHS   ______________________________________________________________________  EXPECTED LENGTH OF STAY: 2d 21h  ACTUAL LENGTH OF STAY:          4               Wes Garcia MD

## 2021-10-20 NOTE — PROGRESS NOTES
Physician Progress Note      PATIENT:               Christopher Paget  CSN #:                  [id number]  :                       1945  ADMIT DATE:       10/15/2021 6:20 PM  DISCH DATE:  RESPONDING  PROVIDER #:        PHOENIX CAMPOS MD          QUERY TEXT:    Pt admitted with left pontine infarct and has CHF documented. If possible, please document in progress notes and discharge summary further specificity regarding the type and acuity of CHF:    The medical record reflects the following:  Risk Factors: HTN, DM, CKD    Clinical Indicators:    CXR- Pulmonary vasculature is not congested. The lungs are essentially clear. No effusion or pneumothorax is seen    Echo 10/19-  Left Ventricle Normal cavity size, systolic function (ejection fraction normal) and diastolic function. Mild concentric hypertrophy. The estimated EF is 55 - 60%. Left Atrium Normal cavity size. Right Ventricle Normal cavity size and global systolic function. Right Atrium Normal cavity size. Interatrial Septum Agitated saline contrast study was performed. No atrial septal defect present. No patent foramen ovale visualized. Aortic Valve Aortic valve sclerosis. Aortic valve mean gradient is 10 mmHg. There is mild aortic stenosis. Trace aortic valve regurgitation. Mitral Valve No stenosis. Mitral valve non-specific thickening. Leaflet calcification. There is moderate posterior leaflet calcification at the base. Moderate mitral annular calcification. Mild regurgitation. Tricuspid Valve Normal valve structure and no stenosis. Trace regurgitation. Pulmonic Valve Pulmonic valve not well visualized. Aorta Normal aortic root. Pulmonary Artery Pulmonary hypertension not suggested by Doppler findings. Pericardium Small-to-moderate pericardial effusion noted. Effusion is located adjacent to the right ventricle. The effusion measures 15 mm. No indications of tamponade present. Treatment: Coreg 25mg PO BID PTA;  Lasix 40mg PO BID PTA; Lisinopril 40mg PO daily    Thank you,  Servando Alexander RN, BSN, CCDS, [place], [place]  Clinical Documentation  [phone] or [phone]  Options provided:  -- Acute on Chronic Diastolic CHF/HFpEF  -- Acute Diastolic CHF/HFpEF  -- Chronic Diastolic CHF/HFpEF  -- Other - I will add my own diagnosis  -- Disagree - Not applicable / Not valid  -- Disagree - Clinically unable to determine / Unknown  -- Refer to Clinical Documentation Reviewer    PROVIDER RESPONSE TEXT:    This patient has chronic diastolic CHF/HFpEF.     Query created by: Heike Rivera on 10/20/2021 11:48 AM      Electronically signed by:  PHOENIX Pastor MD 10/20/2021 1:21 PM

## 2021-10-20 NOTE — PROGRESS NOTES
Problem: Self Care Deficits Care Plan (Adult)  Goal: *Acute Goals and Plan of Care (Insert Text)  Description: FUNCTIONAL STATUS PRIOR TO ADMISSION: Patient was modified independent using a walker for functional mobility. HOME SUPPORT: Patient has care aid 8 hours a day/7 days a week to assist with ADLs as needed and with iADLs. Patient spends most of her time in bed. Occupational Therapy Goals  Initiated 10/17/2021   1. Patient will perform self-feeding with moderate assistance  within 7 day(s). 2.  Patient will perform upper body dressing with minimal assistance within 7 day(s). 3.  Patient will perform lower body dressing with moderate assistance within 7 day(s). 4.  Patient will perform toilet transfers with minimal assistance within 7 day(s). 5.  Patient will perform all aspects of toileting with moderate assistance  within 7 day(s). 6.  Patient will participate in upper extremity therapeutic exercise/activities with moderate assistance  within 7 day(s). 7.  Patient will utilize energy conservation techniques during functional activities with moderate verbal cues within 7 day(s). 8.  Patient will improve their Fugl Sheppard score by 5 points in prep for ADLs within 7 days. Outcome: Progressing Towards Goal     OCCUPATIONAL THERAPY TREATMENT  Patient: Charlie Saab (48 y.o. female)  Date: 10/20/2021  Diagnosis: CV[facility]cere[facility] accident[facility] [I63.9] <principal problem not specified>       Precautions: WBAT, Fall  Chart, occupational therapy assessment, plan of care, and goals were reviewed. ASSESSMENT  Patient continues with skilled OT services and is progressing towards goals. She remains limited by impaired cognition, strength, endurance, coordination, balance and safety following L pontine CVA. She requires increased time and encouragement for active participation and repeatedly stated \"nurses don't have any empathy\" after re-orienting her to situation.   Continue to recommend rehab at discharge. Current Level of Function Impacting Discharge (ADLs): min A LE ADLs, max A toileting and min A functional mobility amb with RW    Other factors to consider for discharge: see above         PLAN :  Patient continues to benefit from skilled intervention to address the above impairments. Continue treatment per established plan of care to address goals. Recommend with staff: up to chair for all meals and toilet for toileting    Recommend next OT session: LE ADLs, grooming standing at sink    Recommendation for discharge: (in order for the patient to meet his/her long term goals)  Therapy 3 hours per day 5-7 days per week    This discharge recommendation:  Has been made in collaboration with the attending provider and/or case management    IF patient discharges home will need the following DME: TBD       SUBJECTIVE:   Patient stated nurses don't have any empathy.     OBJECTIVE DATA SUMMARY:   Cognitive/Behavioral Status:  Neurologic State: Alert;Confused  Orientation Level: Oriented to person;Oriented to place  Cognition: Decreased attention/concentration; Follows commands  Perception: Appears intact  Perseveration: Perseverates during ADLS;Perseverates during conversation  Safety/Judgement: Awareness of environment;Decreased awareness of need for assistance;Decreased awareness of need for safety;Decreased insight into deficits; Fall prevention    Functional Mobility and Transfers for ADLs:  Bed Mobility:  Supine to Sit: Contact guard assistance; Additional time  Scooting: Contact guard assistance;Minimum assistance    Transfers:  Sit to Stand: Minimum assistance  Functional Transfers  Toilet Transfer : Minimum assistance; Additional time;Assist x1  Cues: Physical assistance; Tactile cues provided;Verbal cues provided;Visual cues provided  Adaptive Equipment: Walker (comment)       Balance:  Sitting: Impaired  Sitting - Static: Good (unsupported)  Sitting - Dynamic: Fair (occasional)  Standing: Impaired; With support  Standing - Static: Constant support; Fair  Standing - Dynamic : Constant support; Fair    ADL Intervention:  Lower Body Dressing Assistance  Socks: Minimum assistance  Leg Crossed Method Used: Yes  Position Performed: Seated edge of bed  Cues: Don;Physical assistance;Verbal cues provided    Toileting  Toileting Assistance: Maximum assistance (in standing)  Bladder Hygiene: Minimum assistance  Bowel Hygiene: Moderate assistance (for cleanliness)  Clothing Management: Maximum assistance  Cues: Tactile cues provided;Verbal cues provided;Visual cues provided  Adaptive Equipment: Walker    Cognitive Retraining  Safety/Judgement: Awareness of environment;Decreased awareness of need for assistance;Decreased awareness of need for safety;Decreased insight into deficits; Fall prevention    Pain:  No complaints    Activity Tolerance:   Fair and requires frequent rest breaks    After treatment patient left in no apparent distress:   Sitting in chair, Call bell within reach, and Bed / chair alarm activated    COMMUNICATION/COLLABORATION:   The patients plan of care was discussed with: Physical therapist and Registered nurse. Patient was educated regarding Her deficit(s) of impaired cognition, R side weakness, imp balance as this relates to Her diagnosis of L pontine CVA. She demonstrated Guarded understanding as evidenced by some awareness of situation. Patient and/or family was verbally educated on the BE FAST acronym for signs/symptoms of CVA and TIA. BE FAST was written on patient's communication board  for visual education and reinforcement. All questions answered with patient indicating fair understanding.      Sangeetha Julian OT  Time Calculation: 25 mins

## 2021-10-20 NOTE — DISCHARGE SUMMARY
Discharge Summary       PATIENT ID: Chris Mims  MRN: [id number]   YOB: 1945    DATE OF ADMISSION: 10/15/2021  6:20 PM    DATE OF DISCHARGE: 10/20/2021   PRIMARY CARE PROVIDER: Sadie Brown MD     ATTENDING PHYSICIAN: Basim Chandra MD  DISCHARGING PROVIDER: Basim Chandra MD    To contact this individual call [phone] and ask the  to page. If unavailable ask to be transferred the Adult Hospitalist Department. CONSULTATIONS: IP CONSULT TO NEUROLOGY    PROCEDURES/SURGERIES: * No surgery found *    ADMITTING DIAGNOSES & HOSPITAL COURSE:   Evaluated and treated for acute CVA. Started Shania Patch, keep aspirin    Risk of Re-Admission: high  DISCHARGE DIAGNOSES / PLAN:      Acute CVA - Left Jeni  - Continue aspirin, switched plavix 'not effective on testing' to brilinta  - A1c 4.8, TSH 1.6, LDL 43. TTE preserved ef. Small to mod pericardial effusion. F/u op  - Neurology following- Permissive hypertension. PT/OT- IP-rehab  - SLP following, MBS wnl.     HTN- Home BP meds restarted. Monitor, avoid hypotension. DM II - SSI plus hypoglycemic protocols, A1c 4.8  LORY on CKD IV/V-- Recent graft in preparation for HD. Monitor renal function op. F/u   Anemia- on iron supplementation. Likely 2nd to abve. FOLLOW UP APPOINTMENTS:    Follow-up Information     Follow up With Specialties Details Why Marilu Calderon MD Internal Medicine In 1 week  [address] [facility]  [phone]          Nephrology         ADDITIONAL CARE RECOMMENDATIONS:  Follow up with PCP and Nephrology    DIET: Resume previous diet    ACTIVITY: Activity as tolerated    DISCHARGE MEDICATIONS:  Current Discharge Medication List      START taking these medications    Details   ticagrelor (BRILINTA) 60 mg tab tablet Take 1 Tablet by mouth every twelve (12) hours every twelve (12) hours for 30 days.   Qty: 60 Tablet, Refills: 0  Start date: 10/20/2021, End date: 11/19/2021 CONTINUE these medications which have CHANGED    Details   FeroSuL 325 mg (65 mg iron) tablet Take 1 Tablet by mouth two (2) times a day for 30 days. Qty: 60 Tablet, Refills: 0  Start date: 10/20/2021, End date: 11/19/2021         CONTINUE these medications which have NOT CHANGED    Details   ipratropium (ATROVENT) 21 mcg (0.03 %) nasal spray 1 [place] by Both Nostrils route two (2) times a day. Indications: runny nose  Qty: 30 mL, Refills: 1      furosemide (LASIX) 40 mg tablet Take 40 mg by mouth two (2) times a day. loratadine 10 mg cap Take  by mouth. traZODone (DESYREL) 100 mg tablet Take 100 mg by mouth nightly. augmented betamethasone dipropionate (DIPROLENE-AF) 0.05 % topical cream Apply  to affected area two (2) times a day. clobetasoL (CLOBEX) 0.05 % lotion Apply  to affected area two (2) times a day.      ketoconazole (NIZORAL) 2 % shampoo Apply  to affected area daily as needed for Itching. hydrALAZINE (APRESOLINE) 100 mg tablet Take 1 Tab by mouth three (3) times daily. Qty: 90 Tab, Refills: 0      carvediloL (COREG) 25 mg tablet Take 1 Tab by mouth two (2) times daily (with meals). Qty: 60 Tab, Refills: 0      nitroglycerin (NITRODUR) 0.2 mg/hr 1 Patch by TransDERmal route daily. Qty: 3 Patch, Refills: 0      insulin lispro (HUMALOG) 100 unit/mL injection INITIATE CORRECTIVE INSULIN PROTOCOL (EDWARD):  RX EDWARD Normal Sensitivity (Average weight)    AC (before meals), Q6H, and Q4H CORRECTIONAL SCALE only For Blood Sugar (mg/dl) of :             140-199=2 units            200-249=3 units  250-299=5 units  300-349=7 units  350 or greater = Call MD  Give in addition to basal medications. Do Not Hold for NPO    BEDTIME CORRECTIONAL sliding scale when scheduled:  200-249=2 units  250-299=3 units   300-349=4 units  350 or greater = Call MD  Give in addition to basal medications.   Do Not Hold for NPO Fast Acting - Administer Immediately - or within 15 minutes of start of meal, if mealtime coverage. Qty: 1 Vial, Refills: 0      lidocaine (LIDODERM) 5 % 1 Patch by TransDERmal route daily. albuterol (PROVENTIL HFA, VENTOLIN HFA, PROAIR HFA) 90 mcg/actuation inhaler Take 2 Puffs by inhalation every four (4) hours as needed for Shortness of Breath. amLODIPine (NORVASC) 10 mg tablet Take 10 mg by mouth daily. cholecalciferol, vitamin D3, 50 mcg (2,000 unit) tab Take 2,000 Units by mouth daily. donepeziL (ARICEPT) 5 mg tablet Take 5 mg by mouth daily. lisinopriL (PRINIVIL, ZESTRIL) 40 mg tablet Take 40 mg by mouth daily. aspirin 81 mg chewable tablet Take 81 mg by mouth daily. cyanocobalamin, vitamin B-12, 3,000 mcg cap cyanocobalamin (vitamin B-12) 3,000 mcg capsule      allopurinoL (ZYLOPRIM) 100 mg tablet allopurinol 100 mg tablet      fluticasone propion-salmeterol (ADVAIR HFA) 115-21 mcg/actuation inhaler Take 2 Puffs by inhalation two (2) times a day. sodium bicarbonate 650 mg tablet Take 650 mg by mouth two (2) times a day. acetaminophen (TYLENOL) 325 mg tablet Take 2 Tabs by mouth every six (6) hours as needed for Pain or Fever. Qty: 60 Tab, Refills: 0      doxazosin (CARDURA) 2 mg tablet Take 2 mg by mouth nightly. atorvastatin (LIPITOR) 40 mg tablet atorvastatin 40 mg tablet           NOTIFY YOUR PHYSICIAN FOR ANY OF THE FOLLOWING:   Fever over 101 degrees for 24 hours. Chest pain, shortness of breath, fever, chills, nausea, vomiting, diarrhea, change in mentation, falling, weakness, bleeding. Severe pain or pain not relieved by medications. Or, any other signs or symptoms that you may have questions about.     DISPOSITION:    Home With:   OT  PT  HH  RN       Long term SNF/Inpatient Rehab   x Independent/assisted living    Hospice    Other:     PATIENT CONDITION AT DISCHARGE:     Functional status    Poor     Deconditioned     Independent      Cognition     Lucid     Forgetful     Dementia      Catheters/lines (plus indication) Rahel     PICC     PEG     None      Code status     Full code     DNR      PHYSICAL EXAMINATION AT DISCHARGE:  Patient seen and examined at bedside, Condition stable, explained discharge and follow up plans. BP (!) 163/67   Pulse 70   Temp 98.1 °F (36.7 °C)   Resp 17   Ht 5' 4\" (1.626 m)   Wt 82.3 kg (181 lb 8 oz)   LMP  (LMP Unknown)   SpO2 99%   BMI 31.15 kg/m²   General:  Alert, oriented, No acute distress. Elderly, BW. Occasional confusion. Resp:  No accessory muscle use, Good AE. Neuro:  Grossly normal, no focal neuro deficits, follows commands     CHRONIC MEDICAL DIAGNOSES:  Problem List as of 10/20/2021 Date Reviewed: 10/1/2021        Codes Class Noted - Resolved    CKD (chronic kidney disease) ICD-10-CM: N18.9  ICD-9-CM: 585.9  4/30/2021 - Present        Accelerated hypertension ICD-10-CM: I10  ICD-9-CM: 401.0  4/30/2021 - Present        CVA (cerebral vascular accident) [facility]) ICD-10-CM: I63.9  ICD-9-CM: 434.91  2/13/2021 - Present              37 minutes were spent with the patient on counseling and coordination of care.     Signed:   Nick Freitas MD  10/20/2021  3:28 PM

## 2021-10-20 NOTE — PROGRESS NOTES
I have reviewed discharge instructions with the guardian. The guardian verbalized understanding. Report called to maria esther MCGREGOR to transport at 1715.

## 2021-10-20 NOTE — DISCHARGE INSTRUCTIONS
Discharge Instructions       PATIENT ID: Daniele Bains  MRN: [id number]   YOB: 1945    DATE OF ADMISSION: 10/15/2021  6:20 PM    DATE OF DISCHARGE: 10/20/2021    PRIMARY CARE PROVIDER: Rey Schroeder MD     ATTENDING PHYSICIAN: Nella Nicholson MD  DISCHARGING PROVIDER: Rayo Foster MD    To contact this individual call [phone] and ask the  to page. If unavailable ask to be transferred the Adult Hospitalist Department. DISCHARGE DIAGNOSES acute CVA    CONSULTATIONS: IP CONSULT TO NEUROLOGY    PROCEDURES/SURGERIES: * No surgery found *    FOLLOW UP APPOINTMENTS:   Follow-up Information     Follow up With Specialties Details Why Contact Emily Olmedo MD Internal Medicine In 1 week  [address] [facility] [phone]          Nephrology           ADDITIONAL CARE RECOMMENDATIONS: follow up with PCP and Nephrology    DIET: Resume previous diet    DISCHARGE MEDICATIONS:  brilinta    · It is important that you take the medication exactly as they are prescribed. · Keep your medication in the bottles provided by the pharmacist and keep a list of the medication names, dosages, and times to be taken in your wallet. · Do not take other medications without consulting your doctor. NOTIFY YOUR PHYSICIAN FOR ANY OF THE FOLLOWING:   Fever over 101 degrees for 24 hours. Chest pain, shortness of breath, fever, chills, nausea, vomiting, diarrhea, change in mentation, falling, weakness, bleeding. Severe pain or pain not relieved by medications. Or, any other signs or symptoms that you may have questions about. It was our pleasure to help take care of you and we hope you get well very soon.     DISPOSITION:    Home With:   OT  PT  HH  RN       SNF/Inpatient Rehab/LTAC   x Independent/assisted living    Hospice    Other:     CDMP Checked:   Yes x     PROBLEM LIST Updated:  Yes x     Signed:   Rayo Foster MD  10/20/2021  3:28 PM

## 2021-10-20 NOTE — PROGRESS NOTES
Bedside and Verbal shift change report given to Pipette (oncoming nurse) by Rosa Isela High (offgoing nurse). Report included the following information SBAR, Kardex, Procedure Summary, Intake/Output, MAR, Accordion and Recent Results.

## 2021-10-20 NOTE — PROGRESS NOTES
Transition of Francia ALMANZAR to call report to [phone], LUCIANA transfer     RUR: 23%     PCP F/U:Teresa Lal MD     Disposition: Likely IPR: Cuate Cook     Transportation: [id number]     Main Contact: Teo Betts    2400: Telephone call to keanu Mora Like to discuss disposition. Left message for her to return call. [facility] and [facility] have accepted, but [facility] says may not have a bed until Friday. 46: Spoke with keanu Mora Like who states she has some medical concerns that she wants to discuss with the doctor regarding her mother's medications and blood pressure. Also states that [facility] is her first choice, but would like more information regarding [facility] and their COVID policies. Left their visitation policy in the room. Have requested Lyles Candis from [facility] to reach out to her. 1431: Keanu Mora Like agreeable for mother to discharge to [facility]. Bed available. [facility] set up for 1715. MD and RN notified.        Mellisa Swann RN, CRM

## 2021-10-26 RX ORDER — IPRATROPIUM BROMIDE 21 UG/1
SPRAY, METERED NASAL
Qty: 30 ML | Refills: 1 | Status: SHIPPED | OUTPATIENT
Start: 2021-10-26 | End: 2022-03-30

## 2021-11-11 ENCOUNTER — APPOINTMENT (OUTPATIENT)
Dept: CT IMAGING | Age: 76
End: 2021-11-11
Attending: EMERGENCY MEDICINE
Payer: MEDICARE

## 2021-11-11 ENCOUNTER — HOSPITAL ENCOUNTER (EMERGENCY)
Age: 76
Discharge: HOME | End: 2021-11-11
Attending: EMERGENCY MEDICINE
Payer: MEDICARE

## 2021-11-11 VITALS
RESPIRATION RATE: 18 BRPM | BODY MASS INDEX: 29.19 KG/M2 | OXYGEN SATURATION: 98 % | WEIGHT: 171 LBS | HEIGHT: 64 IN | TEMPERATURE: 97.8 F | SYSTOLIC BLOOD PRESSURE: 130 MMHG | HEART RATE: 72 BPM | DIASTOLIC BLOOD PRESSURE: 69 MMHG

## 2021-11-11 DIAGNOSIS — S70.01XA HEMATOMA OF RIGHT HIP, INITIAL ENCOUNTER: Primary | ICD-10-CM

## 2021-11-11 PROCEDURE — 99284 EMERGENCY DEPT VISIT MOD MDM: CPT

## 2021-11-11 PROCEDURE — 73700 CT LOWER EXTREMITY W/O DYE: CPT

## 2021-11-11 NOTE — ED NOTES
[facility] present for pt transport home. Pts d/c paperwork placed in home health folder and given to aide. PT denies any concerns at this time. Pt verbalizes understanding of d/c paperwork.

## 2021-11-11 NOTE — ED PROVIDER NOTES
HPI   Patient reports that she was hospitalized several weeks ago for CVA which is left her with some slurred speech and decreased mobility. She reports that during her hospital stay she fell off the hospital commode landing on her right hip causing pain and bruising. She reports that it x-ray was negative. Today, apparently her home health nurse checked in on patient. Patient reported continued right hip pain and bruising and the home health nurse summoned EMS. Patient reports that she does have continued pain and bruising. She denies any loss of strength or mobility, focal weakness, paresthesias, new neuro deficit.   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     Chronic kidney disease     Chronic pain     SPINAL STENOSIS    DM [facility]diabetes mellitus) [facility] [address]     HTN (hypertension)     Stroke [facility])        Past Surgical History:   Procedure Laterality Date    HX OOPHORECTOMY      STENT INSERTION  2001,2002    Josue Cage         Family History:   Problem Relation Age of Onset    Breast Cancer Maternal Aunt     Breast Cancer Cousin        Social History     Socioeconomic History    Marital status:      Spouse name: Not on file    Number of children: Not on file    Years of education: Not on file    Highest education level: Not on file   Occupational History    Not on file   Tobacco Use    Smoking status: Former Smoker    Smokeless tobacco: Never Used    Tobacco comment: quit 1972   Substance and Sexual Activity    Alcohol use: No    Drug use: No    Sexual activity: Not Currently   Other Topics Concern     Service Not Asked    Blood Transfusions Not Asked    Caffeine Concern Not Asked    Occupational Exposure Not Asked    Hobby Hazards Not Asked    Sleep Concern Not Asked    Stress Concern Not Asked    Weight Concern Not Asked    Special Diet Not Asked    Back Care Not Asked    Exercise Not Asked    Bike Helmet Not Asked   [address] Not Asked    Self-Exams Not Asked   Social History Narrative    Not on file     Social Determinants of Health     Financial Resource Strain:     Difficulty of Paying Living Expenses: Not on file   Food Insecurity:     Worried About Running Out of Food in the Last Year: Not on file    Chip of Food in the Last Year: Not on file   Transportation Needs:     Lack of Transportation (Medical): Not on file    Lack of Transportation (Non-Medical): Not on file   Physical Activity:     Days of Exercise per Week: Not on file    Minutes of Exercise per Session: Not on file   Stress:     Feeling of Stress : Not on file   Social Connections:     Frequency of Communication with Friends and Family: Not on file    Frequency of Social Gatherings with Friends and Family: Not on file    Attends Worship Services: Not on file    Active Member of [address] [place] [address] [place] or Organizations: Not on file    Attends Club or Organization Meetings: Not on file    Marital Status: Not on file   Intimate Partner Violence:     Fear of Current or Ex-Partner: Not on file    Emotionally Abused: Not on file    Physically Abused: Not on file    Sexually Abused: Not on file   Housing Stability:     Unable to Pay for Housing in the Last Year: Not on file    Number of [place] in the Last Year: Not on file    Unstable Housing in the Last Year: Not on file         ALLERGIES: Patient has no known allergies. Review of Systems   Constitutional: Negative. HENT: Negative. Eyes: Negative. Respiratory: Negative. Cardiovascular: Negative. Gastrointestinal: Negative. Endocrine: Negative. Genitourinary: Negative. Musculoskeletal: Positive for arthralgias, gait problem and myalgias. Allergic/Immunologic: Negative. Neurological: Positive for speech difficulty. Hematological: Negative. Psychiatric/Behavioral: Negative.     All other systems reviewed and are negative. Vitals:    11/11/21 1323   BP: (!) 142/58   Pulse: 64   Resp: 20   Temp: 97.8 °F (36.6 °C)   SpO2: 98%   Weight: 77.6 kg (171 lb)   Height: 5' 4\" (1.626 m)            Physical Exam  Vitals and nursing note reviewed. Constitutional:       General: She is not in acute distress. Appearance: Normal appearance. She is obese. She is not ill-appearing, toxic-appearing or diaphoretic. HENT:      Head: Normocephalic and atraumatic. Nose: Nose normal.      Mouth/Throat:      Mouth: Mucous membranes are moist.   Eyes:      Extraocular Movements: Extraocular movements intact. Pupils: Pupils are equal, round, and reactive to light. Cardiovascular:      Rate and Rhythm: Normal rate and regular rhythm. Pulses: Normal pulses. Heart sounds: Normal heart sounds. No murmur heard. No friction rub. No gallop. Pulmonary:      Effort: Pulmonary effort is normal. No respiratory distress. Abdominal:      General: Abdomen is flat. There is no distension. Palpations: Abdomen is soft. There is no mass. Tenderness: There is no abdominal tenderness. There is no right CVA tenderness, left CVA tenderness, guarding or rebound. Hernia: No hernia is present. Musculoskeletal:         General: Tenderness present. No swelling, deformity or signs of injury. Normal range of motion. Cervical back: Normal range of motion. No rigidity. Right lower leg: No edema. Left lower leg: No edema. Comments: Mild tenderness of right hip at the superior acetabulum. Patient has full range of motion both active and passive. Skin:     General: Skin is warm and dry. Capillary Refill: Capillary refill takes less than 2 seconds. Coloration: Skin is not jaundiced or pale. Findings: Bruising present. No erythema, lesion or rash. Comments: Mild small superficial ecchymosis to the right lower quadrant of the abdomen and at the right hip at the superior acetabulum. Neurological:      Mental Status: She is alert and oriented to person, place, and time. Mental status is at baseline. Sensory: No sensory deficit. Motor: No weakness. Coordination: Coordination normal.      Comments: Some mild slurred speech and expressive aphasia-chronic and unchanged per patient. Psychiatric:         Mood and Affect: Mood normal.         Behavior: Behavior normal.          MDM     Patient has full range of motion with the right hip both active and passive and I do not suspect a fracture. However, given continued pain will check CT. Likely discharge. The remainder of her issues are chronic and unchanged.   Procedures

## 2021-11-11 NOTE — ED NOTES
Per Pt Request, Daughter updated on results, Per Daughter update Mei Jacques the [facility] health nurse. Per daughter pt will need ride home, Unit clerk to contact [facility] for stretcher transport.

## 2021-12-01 ENCOUNTER — HOSPITAL ENCOUNTER (OUTPATIENT)
Dept: LAB | Age: 76
Discharge: HOME | End: 2021-12-01
Attending: INTERNAL MEDICINE
Payer: MEDICARE

## 2021-12-01 ENCOUNTER — HOSPITAL ENCOUNTER (OUTPATIENT)
Dept: MAMMOGRAPHY | Age: 76
Discharge: HOME | End: 2021-12-01
Attending: INTERNAL MEDICINE
Payer: MEDICARE

## 2021-12-01 ENCOUNTER — TRANSCRIBE ORDER (OUTPATIENT)
Dept: REGISTRATION | Age: 76
End: 2021-12-01

## 2021-12-01 DIAGNOSIS — Z12.31 VISIT FOR SCREENING MAMMOGRAM: ICD-10-CM

## 2021-12-01 DIAGNOSIS — D64.9 ANEMIA, UNSPECIFIED: Primary | ICD-10-CM

## 2021-12-01 DIAGNOSIS — D64.9 ANEMIA, UNSPECIFIED: ICD-10-CM

## 2021-12-01 LAB
BASOPHILS # BLD: 0.1 K/UL (ref 0–0.2)
BASOPHILS NFR BLD: 2 % (ref 0–2.5)
EOSINOPHIL # BLD: 0.2 K/UL (ref 0–0.7)
EOSINOPHIL NFR BLD: 3 % (ref 0.9–2.9)
ERYTHROCYTE [DISTWIDTH] IN BLOOD BY AUTOMATED COUNT: 16.7 % (ref 11.5–14.5)
HCT VFR BLD AUTO: 26.1 % (ref 36–46)
HGB BLD-MCNC: 8.4 G/DL (ref 13.5–17.5)
LYMPHOCYTES # BLD: 1.2 K/UL (ref 1–4.8)
LYMPHOCYTES NFR BLD: 16 % (ref 20.5–51.1)
MCH RBC QN AUTO: 29.1 PG (ref 31–34)
MCHC RBC AUTO-ENTMCNC: 32 G/DL (ref 31–36)
MCV RBC AUTO: 91 FL (ref 80–100)
MONOCYTES # BLD: 0.5 K/UL (ref 0.2–2.4)
MONOCYTES NFR BLD: 7 % (ref 1.7–9.3)
NEUTS SEG # BLD: 5.1 K/UL (ref 1.8–7.7)
NEUTS SEG NFR BLD: 72 % (ref 42–75)
NRBC # BLD: 0.01 K/UL
NRBC BLD-RTO: 0.2 PER 100 WBC
PLATELET # BLD AUTO: 232 K/UL (ref 150–400)
PMV BLD AUTO: 9.2 FL (ref 6.5–11.5)
RBC # BLD AUTO: 2.87 M/UL (ref 4.5–5.9)
WBC # BLD AUTO: 7.1 K/UL (ref 4.4–11.3)

## 2021-12-01 PROCEDURE — 36415 COLL VENOUS BLD VENIPUNCTURE: CPT

## 2021-12-01 PROCEDURE — 85025 COMPLETE CBC W/AUTO DIFF WBC: CPT

## 2021-12-01 PROCEDURE — 77063 BREAST TOMOSYNTHESIS BI: CPT

## 2021-12-08 ENCOUNTER — TRANSCRIBE ORDER (OUTPATIENT)
Dept: SCHEDULING | Age: 76
End: 2021-12-08

## 2021-12-08 DIAGNOSIS — R92.8 ABNORMAL MAMMOGRAM: Primary | ICD-10-CM

## 2022-01-06 ENCOUNTER — TRANSCRIBE ORDER (OUTPATIENT)
Dept: SCHEDULING | Age: 77
End: 2022-01-06

## 2022-01-06 DIAGNOSIS — M79.609 PAIN IN UNSPECIFIED LIMB: Primary | ICD-10-CM

## 2022-01-14 ENCOUNTER — HOSPITAL ENCOUNTER (OUTPATIENT)
Dept: VASCULAR SURGERY | Age: 77
Discharge: HOME | End: 2022-01-14
Attending: INTERNAL MEDICINE
Payer: MEDICARE

## 2022-01-14 DIAGNOSIS — M79.609 PAIN IN UNSPECIFIED LIMB: ICD-10-CM

## 2022-01-14 PROCEDURE — 93971 EXTREMITY STUDY: CPT

## 2022-01-31 ENCOUNTER — HOSPITAL ENCOUNTER (OUTPATIENT)
Dept: MAMMOGRAPHY | Age: 77
Discharge: HOME | End: 2022-01-31
Attending: INTERNAL MEDICINE
Payer: MEDICARE

## 2022-01-31 DIAGNOSIS — R92.8 ABNORMAL MAMMOGRAM: ICD-10-CM

## 2022-01-31 PROCEDURE — 77065 DX MAMMO INCL CAD UNI: CPT

## 2022-03-08 ENCOUNTER — APPOINTMENT (OUTPATIENT)
Dept: CT IMAGING | Age: 77
End: 2022-03-08
Attending: EMERGENCY MEDICINE
Payer: MEDICARE

## 2022-03-08 ENCOUNTER — HOSPITAL ENCOUNTER (EMERGENCY)
Age: 77
Discharge: HOME | End: 2022-03-08
Attending: EMERGENCY MEDICINE
Payer: MEDICARE

## 2022-03-08 ENCOUNTER — APPOINTMENT (OUTPATIENT)
Dept: GENERAL RADIOLOGY | Age: 77
End: 2022-03-08
Attending: EMERGENCY MEDICINE
Payer: MEDICARE

## 2022-03-08 VITALS
WEIGHT: 171 LBS | RESPIRATION RATE: 17 BRPM | SYSTOLIC BLOOD PRESSURE: 117 MMHG | BODY MASS INDEX: 29.35 KG/M2 | HEART RATE: 81 BPM | OXYGEN SATURATION: 97 % | TEMPERATURE: 98.2 F | DIASTOLIC BLOOD PRESSURE: 56 MMHG

## 2022-03-08 DIAGNOSIS — N18.6 END STAGE RENAL DISEASE (HCC): ICD-10-CM

## 2022-03-08 DIAGNOSIS — N39.0 URINARY TRACT INFECTION WITHOUT HEMATURIA, SITE UNSPECIFIED: Primary | ICD-10-CM

## 2022-03-08 DIAGNOSIS — D64.9 ANEMIA, UNSPECIFIED TYPE: ICD-10-CM

## 2022-03-08 LAB
ALBUMIN SERPL-MCNC: 3 G/DL (ref 3.5–5)
ALBUMIN/GLOB SERPL: 0.9 {RATIO} (ref 1.1–2.2)
ALP SERPL-CCNC: 42 U/L (ref 45–117)
ALT SERPL-CCNC: 10 U/L (ref 12–78)
ANION GAP SERPL CALC-SCNC: 13 MMOL/L (ref 5–15)
APPEARANCE UR: CLEAR
AST SERPL W P-5'-P-CCNC: 10 U/L (ref 15–37)
BACTERIA URNS QL MICRO: ABNORMAL /HPF
BASOPHILS # BLD: 0.1 K/UL (ref 0–0.2)
BASOPHILS NFR BLD: 1 % (ref 0–2.5)
BILIRUB SERPL-MCNC: 0.5 MG/DL (ref 0.2–1)
BILIRUB UR QL: NEGATIVE
BNP SERPL-MCNC: 2383 PG/ML
BUN SERPL-MCNC: 57 MG/DL (ref 6–20)
BUN/CREAT SERPL: 10 (ref 12–20)
CA-I BLD-MCNC: 8.6 MG/DL (ref 8.5–10.1)
CHLORIDE SERPL-SCNC: 105 MMOL/L (ref 97–108)
CO2 SERPL-SCNC: 24 MMOL/L (ref 21–32)
COLOR UR: ABNORMAL
CREAT SERPL-MCNC: 5.74 MG/DL (ref 0.55–1.02)
EOSINOPHIL # BLD: 0.2 K/UL (ref 0–0.7)
EOSINOPHIL NFR BLD: 2 % (ref 0.9–2.9)
ERYTHROCYTE [DISTWIDTH] IN BLOOD BY AUTOMATED COUNT: 17.6 % (ref 11.5–14.5)
GLOBULIN SER CALC-MCNC: 3.4 G/DL (ref 2–4)
GLUCOSE SERPL-MCNC: 134 MG/DL (ref 65–100)
GLUCOSE UR STRIP.AUTO-MCNC: NEGATIVE MG/DL
HCT VFR BLD AUTO: 24.2 % (ref 36–46)
HGB BLD-MCNC: 7.6 G/DL (ref 13.5–17.5)
HGB UR QL STRIP: NEGATIVE
KETONES UR QL STRIP.AUTO: NEGATIVE MG/DL
LEUKOCYTE ESTERASE UR QL STRIP.AUTO: ABNORMAL
LYMPHOCYTES # BLD: 0.7 K/UL (ref 1–4.8)
LYMPHOCYTES NFR BLD: 7 % (ref 20.5–51.1)
MCH RBC QN AUTO: 27.9 PG (ref 31–34)
MCHC RBC AUTO-ENTMCNC: 31.3 G/DL (ref 31–36)
MCV RBC AUTO: 89.1 FL (ref 80–100)
MONOCYTES # BLD: 0.6 K/UL (ref 0.2–2.4)
MONOCYTES NFR BLD: 6 % (ref 1.7–9.3)
NEUTS SEG # BLD: 8.6 K/UL (ref 1.8–7.7)
NEUTS SEG NFR BLD: 84 % (ref 42–75)
NITRITE UR QL STRIP.AUTO: NEGATIVE
NRBC # BLD: 0.01 K/UL
NRBC BLD-RTO: 0.1 PER 100 WBC
PH UR STRIP: 6.5 [PH] (ref 5–8)
PLATELET # BLD AUTO: 227 K/UL (ref 150–400)
PMV BLD AUTO: 9.3 FL (ref 6.5–11.5)
POTASSIUM SERPL-SCNC: 3.9 MMOL/L (ref 3.5–5.1)
PROT SERPL-MCNC: 6.4 G/DL (ref 6.4–8.2)
PROT UR STRIP-MCNC: 100 MG/DL
RBC # BLD AUTO: 2.72 M/UL (ref 4.5–5.9)
RBC #/AREA URNS HPF: ABNORMAL /HPF (ref 0–3)
SODIUM SERPL-SCNC: 142 MMOL/L (ref 136–145)
SP GR UR REFRACTOMETRY: 1.01 (ref 1–1.03)
TROPONIN-HIGH SENSITIVITY: 21 NG/L (ref 0–51)
UROBILINOGEN UR QL STRIP.AUTO: 0.2 EU/DL (ref 0.2–1)
WBC # BLD AUTO: 10.2 K/UL (ref 4.4–11.3)
WBC URNS QL MICRO: ABNORMAL /HPF (ref 0–5)

## 2022-03-08 PROCEDURE — 36415 COLL VENOUS BLD VENIPUNCTURE: CPT

## 2022-03-08 PROCEDURE — 83880 ASSAY OF NATRIURETIC PEPTIDE: CPT

## 2022-03-08 PROCEDURE — 85025 COMPLETE CBC W/AUTO DIFF WBC: CPT

## 2022-03-08 PROCEDURE — 93005 ELECTROCARDIOGRAM TRACING: CPT

## 2022-03-08 PROCEDURE — 80053 COMPREHEN METABOLIC PANEL: CPT

## 2022-03-08 PROCEDURE — 71045 X-RAY EXAM CHEST 1 VIEW: CPT

## 2022-03-08 PROCEDURE — 84484 ASSAY OF TROPONIN QUANT: CPT

## 2022-03-08 PROCEDURE — 70450 CT HEAD/BRAIN W/O DYE: CPT

## 2022-03-08 PROCEDURE — 81001 URINALYSIS AUTO W/SCOPE: CPT

## 2022-03-08 PROCEDURE — 74011250637 HC RX REV CODE- 250/637: Performed by: EMERGENCY MEDICINE

## 2022-03-08 PROCEDURE — 99285 EMERGENCY DEPT VISIT HI MDM: CPT

## 2022-03-08 RX ORDER — LEVOFLOXACIN 250 MG/1
250 TABLET ORAL DAILY
Qty: 3 TABLET | Refills: 0 | Status: ON HOLD | OUTPATIENT
Start: 2022-03-08 | End: 2022-07-25

## 2022-03-08 RX ORDER — ONDANSETRON 2 MG/ML
4 INJECTION INTRAMUSCULAR; INTRAVENOUS ONCE
Status: DISCONTINUED | OUTPATIENT
Start: 2022-03-08 | End: 2022-03-08

## 2022-03-08 RX ORDER — ONDANSETRON 4 MG/1
4 TABLET, ORALLY DISINTEGRATING ORAL
Status: COMPLETED | OUTPATIENT
Start: 2022-03-08 | End: 2022-03-08

## 2022-03-08 RX ORDER — LEVOFLOXACIN 250 MG/1
250 TABLET ORAL
Status: COMPLETED | OUTPATIENT
Start: 2022-03-08 | End: 2022-03-08

## 2022-03-08 RX ADMIN — ONDANSETRON 4 MG: 4 TABLET, ORALLY DISINTEGRATING ORAL at 17:21

## 2022-03-08 RX ADMIN — LEVOFLOXACIN 250 MG: 250 TABLET, FILM COATED ORAL at 18:56

## 2022-03-08 NOTE — ED TRIAGE NOTES
Pt is brought in via [facility]-- Pt has dementia  Is a poor historian--Pt does reports nausea, decreased appetite today. Denies SOB/CP on arrival.     HX of dementia, ESRD.

## 2022-03-08 NOTE — ED NOTES
Spoke with Mark Gunter (daughter) on phone prior to pts arrival. Per Alvaro Martinez, Pt has hx of DM, dementia, CVA, ESRD. Alvaro Martinez states caregiver Niharika Greer will arrive around 1600 approximately. Pts PCP Dr. July Pearson. Mark Gunter: [phone]. Mark Gunter is [facility]; currently in [place].

## 2022-03-08 NOTE — ED PROVIDER NOTES
HPI 68-year-old female with a history of end-stage renal disease on hemodialysis blindness dementia CVA October 21 diabetes hypertension congestive heart failure brought to ED by EMS stating the patient had nausea today. She also told the nurse that she was unable to eat. She is oriented to person only she does not remember her last dialysis. She denies pain. Caretaker arrived later states she is unsure why patient is in the ED she apparently became upset when the early morning caretaker left was talking with her daughter and the daughter who lives in [place] arranged transport.     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    [facility])     Chronic kidney disease     Chronic pain     SPINAL STENOSIS    DM [facility]diabetes mellitus) ([facility] [address]     HTN (hypertension)     Menopause     Stroke [facility])        Past Surgical History:   Procedure Laterality Date    HX OOPHORECTOMY      STENT INSERTION  2001,2002    [facility]         Family History:   Problem Relation Age of Onset    Breast Cancer Maternal Aunt     Breast Cancer Cousin        Social History     Socioeconomic History    Marital status:      Spouse name: Not on file    Number of children: Not on file    Years of education: Not on file    Highest education level: Not on file   Occupational History    Not on file   Tobacco Use    Smoking status: Former Smoker    Smokeless tobacco: Never Used    Tobacco comment: quit 1972   Substance and Sexual Activity    Alcohol use: No    Drug use: No    Sexual activity: Not Currently   Other Topics Concern     Service Not Asked    Blood Transfusions Not Asked    Caffeine Concern Not Asked    Occupational Exposure Not Asked    Hobby Hazards Not Asked    Sleep Concern Not Asked    Stress Concern Not Asked    Weight Concern Not Asked    Special Diet Not Asked    Back Care Not Asked    Exercise Not Asked    Bike Helmet Not Asked   [address] [facility] Not Asked    Self-Exams Not Asked   Social History Narrative    Not on file     Social Determinants of Health     Financial Resource Strain:     Difficulty of Paying Living Expenses: Not on file   Food Insecurity:     Worried About Running Out of Food in the Last Year: Not on file    Chip of Food in the Last Year: Not on file   Transportation Needs:     Lack of Transportation (Medical): Not on file    Lack of Transportation (Non-Medical): Not on file   Physical Activity:     Days of Exercise per Week: Not on file    Minutes of Exercise per Session: Not on file   Stress:     Feeling of Stress : Not on file   Social Connections:     Frequency of Communication with Friends and Family: Not on file    Frequency of Social Gatherings with Friends and Family: Not on file    Attends Jainism Services: Not on file    Active Member of [address] [place] [address] [employer] or Organizations: Not on file    Attends Club or Organization Meetings: Not on file    Marital Status: Not on file   Intimate Partner Violence:     Fear of Current or Ex-Partner: Not on file    Emotionally Abused: Not on file    Physically Abused: Not on file    Sexually Abused: Not on file   Housing Stability:     Unable to Pay for Housing in the Last Year: Not on file    Number of [place] in the Last Year: Not on file    Unstable Housing in the Last Year: Not on file         ALLERGIES: Patient has no known allergies. Review of Systems   Unable to perform ROS: Dementia       Vitals:    03/08/22 1531 03/08/22 1547   BP: 133/64    Pulse: 81    Resp: 18    Temp: 98.2 °F (36.8 °C)    SpO2: 98% 98%   Weight: 77.6 kg (171 lb)           Elderly chronically ill female no immediate distress afebrile oxygen saturation 98%  Physical Exam  Vitals and nursing note reviewed. Constitutional:       General: She is not in acute distress. Appearance: Normal appearance. She is not ill-appearing, toxic-appearing or diaphoretic.    HENT: Head: Normocephalic and atraumatic. Nose: Nose normal.      Mouth/Throat:      Mouth: Mucous membranes are moist.   Eyes:      Extraocular Movements: Extraocular movements intact. Conjunctiva/sclera: Conjunctivae normal.   Cardiovascular:      Rate and Rhythm: Normal rate and regular rhythm. Pulses: Normal pulses. Heart sounds: Normal heart sounds. No murmur heard. Pulmonary:      Effort: Pulmonary effort is normal. No respiratory distress. Breath sounds: Normal breath sounds. No wheezing, rhonchi or rales. Abdominal:      General: Bowel sounds are normal. There is no distension. Palpations: Abdomen is soft. There is no mass. Tenderness: There is no abdominal tenderness. There is no right CVA tenderness, left CVA tenderness, guarding or rebound. Hernia: No hernia is present. Musculoskeletal:         General: Swelling present. No tenderness, deformity or signs of injury. Normal range of motion. Cervical back: Normal range of motion and neck supple. No rigidity or tenderness. Right lower leg: No edema. Left lower leg: No edema. Comments: The right dorsal foot has a old hematoma there is no significant tenderness or deformity the area-both caregiver and daughter state this finding is old and has been evaluated by orthopedics    Left upper extremity old AV fistula left forearm feels hard; left upper arm with new AV fistula with active thrill   Lymphadenopathy:      Cervical: No cervical adenopathy. Skin:     General: Skin is warm and dry. Capillary Refill: Capillary refill takes less than 2 seconds. Findings: No erythema, lesion or rash. Neurological:      General: No focal deficit present. Mental Status: She is alert. Mental status is at baseline. Cranial Nerves: No cranial nerve deficit. Sensory: No sensory deficit. Motor: No weakness.       Coordination: Coordination normal.      Comments: Patient is oriented to person and place not to the day of the week; she knows the president and her birthdate   Psychiatric:         Mood and Affect: Mood normal.         Behavior: Behavior normal.      Comments: Apparently at baseline per EMS oriented to person only poor memory lacks cognitive insight          MDM EKG performed at 1557 interpreted at 1600 shows a sinus rhythm at 67 with considerable baseline wander FL interval appears prolonged by my interpretation QRS duration mildly prolonged QRS axis is normal nonspecific ST/T wave abnormalities no acute injury pattern       Procedures    Work-up here in the ED is essentially negative chronic renal failure stable chronic anemia appears also stable. Mild pyuria without evidence of systemic infection there is no fever flank pain will cover with oral antibiotic. Dialysis set to beginning approximately 3 weeks per caregiver prior access in left forearm has clotted off; new AV fistula left upper extremity with thrill    1835: Teleconference with his daughter in [place] who knows patient well discussed entire work-up now back back to baseline mental status oriented to person and place there is no focal neurologic findings by exam and CT is negative will return to home on oral antibiotic. Will use Levaquin 250 daily x3 days.   Daughter will fax patient's medication list for full modification with reconciliation

## 2022-03-09 LAB
ATRIAL RATE: 70 BPM
CALCULATED P AXIS, ECG09: 0 DEGREES
CALCULATED R AXIS, ECG10: 35 DEGREES
CALCULATED T AXIS, ECG11: 172 DEGREES
DIAGNOSIS, 93000: NORMAL
P-R INTERVAL, ECG05: 200 MS
Q-T INTERVAL, ECG07: 430 MS
QRS DURATION, ECG06: 99 MS
QTC CALCULATION (BEZET), ECG08: 454 MS
VENTRICULAR RATE, ECG03: 67 BPM

## 2022-03-18 PROBLEM — I10 ACCELERATED HYPERTENSION: Status: ACTIVE | Noted: 2021-04-30

## 2022-03-20 PROBLEM — I63.9 CVA (CEREBRAL VASCULAR ACCIDENT) (HCC): Status: ACTIVE | Noted: 2021-02-13

## 2022-03-20 PROBLEM — N18.9 CKD (CHRONIC KIDNEY DISEASE): Status: ACTIVE | Noted: 2021-04-30

## 2022-03-30 RX ORDER — IPRATROPIUM BROMIDE 21 UG/1
SPRAY, METERED NASAL
Qty: 30 ML | Refills: 1 | Status: SHIPPED | OUTPATIENT
Start: 2022-03-30

## 2022-04-07 ENCOUNTER — HOSPITAL ENCOUNTER (OUTPATIENT)
Dept: PHYSICAL THERAPY | Age: 77
Discharge: HOME | End: 2022-04-07
Payer: MEDICARE

## 2022-04-07 PROCEDURE — 97161 PT EVAL LOW COMPLEX 20 MIN: CPT

## 2022-04-07 PROCEDURE — 96105 ASSESSMENT OF APHASIA: CPT

## 2022-04-07 NOTE — PROGRESS NOTES
PT INITIAL EVALUATION NOTE - [facility] 2-15    Patient Name: Jessica Jenkins  Date:2022  : 1945  [x]  Patient  Verified  Payor: VA MEDICARE / Plan: VA MEDICARE PART A & B / Product Type: Medicare /    In time:11:20 AM  Out time: 12:00  Visit #: 1    Treatment Area: Weakness [R53.1]    SUBJECTIVE  Pain Level (0-10 scale): 8/10  Any medication changes, allergies to medications, adverse drug reactions, diagnosis change, or new procedure performed?: [] No    [x] Yes (see summary sheet for update)    Subjective:    78year old black female who has a history of mutiple medical problems. Patient was receiving Home Health Physical Therapy, Speech therapy and Occupational Therapy. Patient will be receiving Dialysis in April and will be hospitalized for 2 weeks. Referred to Physical Therapy for muscle weakness     PLOF: Independent with all ADL's; primary use of rolling walker for mobility  Mechanism of Injury: none  Previous Treatment/Compliance: Home Health  Radiographs: none  What increases symptoms: none  What decreases symptoms: none  Work Hx: retired  Living Situation: living at home with caretaker  Pt Goals:  To walk  Barriers: depression,  Motivation: Fair due to fatigue  Substance use: None reported  Cognition: Oriented to person, Oriented to place and Oriented to situation  Fall Assessment: TUG Test: 40 seconds  Past Medical History:  Past Medical History:   Diagnosis Date    Blindness/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    [facility])     Chronic kidney disease     Chronic pain     SPINAL STENOSIS    DM [facility]di[facility] mellitus) ([facility] [address]     HTN (hypertension)     Menopause     Stroke [facility])      Past Surgical History:  Past Surgical History:   Procedure Laterality Date    HX OOPHORECTOMY      STENT INSERTION  ,    [facility]     Current Medications:  Current Outpatient Medications   Medication Instructions    acetaminophen (TYLENOL) 650 mg, Oral, EVERY 6 HOURS AS NEEDED    albuterol (PROVENTIL HFA, VENTOLIN HFA, PROAIR HFA) 90 mcg/actuation inhaler 2 Puffs, Inhalation, EVERY 4 HOURS AS NEEDED    allopurinoL (ZYLOPRIM) 100 mg tablet allopurinol 100 mg tablet    amLODIPine (NORVASC) 10 mg, Oral, DAILY    aspirin 81 mg, Oral, DAILY    atorvastatin (LIPITOR) 40 mg tablet atorvastatin 40 mg tablet    augmented betamethasone dipropionate (DIPROLENE-AF) 0.05 % topical cream Topical, 2 TIMES DAILY    carvediloL (COREG) 25 mg, Oral, 2 TIMES DAILY WITH MEALS    cholecalciferol (vitamin D3) 2,000 Units, Oral, DAILY    clobetasoL (CLOBEX) 0.05 % lotion Topical, 2 TIMES DAILY    cyanocobalamin, vitamin B-12, 3,000 mcg cap cyanocobalamin (vitamin B-12) 3,000 mcg capsule    donepeziL (ARICEPT) 5 mg, Oral, DAILY    doxazosin (CARDURA) 2 mg, EVERY BEDTIME    fluticasone propion-salmeterol (ADVAIR HFA) 115-21 mcg/actuation inhaler 2 Puffs, Inhalation, 2 TIMES DAILY    furosemide (LASIX) 40 mg, Oral, 2 TIMES DAILY    hydrALAZINE (APRESOLINE) 100 mg, Oral, 3 TIMES DAILY    insulin lispro (HUMALOG) 100 unit/mL injection INITIATE CORRECTIVE INSULIN PROTOCOL (EDWARD):<BR>RX EDWARD Normal Sensitivity (Average weight)<BR><BR>AC (before meals), Q6H, and Q4H CORRECTIONAL SCALE only For Blood Sugar (mg/dl) of :           <BR>140-199=2 units          <BR>200-249=3 units<BR>250-299=5 units<BR>300-349=7 units<BR>350 or greater = Call MD<BR>Give in addition to basal medications. <BR>Do Not Hold for NPO<BR><BR>BEDTIME CORRECTIONAL sliding scale when scheduled:<BR>200-249=2 units<BR>250-299=3 units <BR>300-349=4 units<BR>350 or greater = Call MD<BR>Give in addition to basal medications. <BR>Do Not Hold for NPO Fast Acting - Administer Immediately - or within 15 minutes of start of meal, if mealtime coverage.     ipratropium (ATROVENT) 21 mcg (0.03 %) nasal spray 1 SPRAY BY BOTH NOSTRILS ROUTE EVERY TWELVE (12) HOURS AS NEEDED FOR RUNNY NOSE    ketoconazole (NIZORAL) 2 % shampoo Topical, DAILY PRN    levoFLOXacin (LEVAQUIN) 250 mg, Oral, DAILY    lidocaine (LIDODERM) 5 % 1 Patch, TransDERmal, DAILY    lisinopriL (PRINIVIL, ZESTRIL) 40 mg, Oral, DAILY    loratadine 10 mg cap Oral    nitroglycerin (NITRODUR) 0.2 mg/hr 1 Patch, TransDERmal, DAILY    sodium bicarbonate 650 mg, Oral, 2 TIMES DAILY    traZODone (DESYREL) 100 mg, Oral, EVERY BEDTIME          OBJECTIVE/EXAMINATION  Posture: poor  Other Observations:    Gait and Functional Mobility: Transported in , Gait with rolling walker with moderate Assistance. Hip:  Strength AROM     Right Left Right Left    Flexion 3/5 3/5 wfl wfl    Extension 3/5 3/5 wfl wfl    Abduction 3/5 3/5 wfl wfl    Adduction 4/5 4/5 wfl wfl                   Knee:  Strength AROM     Right Left Right Left    Flexion 3/5 3/5 wfl wfl    Extension 3/5 3/5 wfl wfl   Ankle  Strength AROM     Right Left Right Left    Dorsiflexion *4/5 4/5 wfl wfl    Plantarflexion 4/5 4/5 wfl wf                   *All strength measures are on a scale with 5 as a maximum, if a space is left blank it was not tested. All ROM measurements are measured in degrees.           Pain Level (0-10 scale) post treatment: 8/10    ASSESSMENT/Changes in Function:   [x]  See Plan of [facility], PT,  4/7/2022

## 2022-04-07 NOTE — PROGRESS NOTES
[address]  [place], [address] [place]  Ph: [phone]    Fax: [phone]    Plan of Care/Statement of Necessity for Physical Therapy Services  2-15    Patient name: Erika Bob  : 1945  Provider#: [id number]  Referral source: Viet Piña MD      Medical/Treatment Diagnosis: Weakness [R53.1]     Prior Hospitalization: see medical history     Comorbidities: see medical history  Prior Level of Function: Independent with all ADL's; primary use of rolling walker for mobility  Medications: Verified on Patient Summary List  Start of Care: 2022      Onset Date:     The Plan of Care and following information is based on the information from the initial evaluation. Assessment/ key information: 78year old black female who had a stroke in , but have mutiple medical problems as well. Patient lives at home with  Jocelyn Wallace and has a daughter in [place]. Patient requires assistance with gait and transfers due to a high fall risk. However,patient will be getting dialysis for soon and will be hospitalized for 2 weeks. Patient has active movement of all extremities. However, she get weak quickly and failure to strive.       Evaluation Complexity History LOW Complexity : Zero comorbidities / personal factors that will impact the outcome / POC; Examination LOW Complexity : 1-2 Standardized tests and measures addressing body structure, function, activity limitation and / or participation in recreation  ;Presentation LOW Complexity : Stable, uncomplicated  ;Clinical Decision Making TUG Score: high fall risk - 40 sec  Overall Complexity Rating: LOW     Problem List: decrease strength, impaired gait/ balance and decrease ADL/ functional abilitiies   Treatment Plan may include any combination of the following: Therapeutic exercise, Therapeutic activities, Gait/balance training, Self Care training, Functional mobility training, Home safety training and Stair training  Patient / Family readiness to learn indicated by: asking questions  Persons(s) to be included in education: patient (P) and family support person (FSP);list caretaker  Barriers to Learning/Limitations: None  Patient Goal (s): To be able to walk  Patient Self Reported Health Status: fair  Rehabilitation Potential: fair    Short Term Goals: To be accomplished in 6 treatments. Caretaker will be able to assist with performing HEP for strengthening and maintaining mobility. Patient will be able to transfer from bed to [facility] with min. Assistant x 1 with rolling walker. Long Term Goals: To be accomplished in 12 treatments. Patient will be able to ambulate with RW  to kitchen and sit for meals with caretaker assistance. Patient will be able to assist with bed mobilities for easy changing of briefs. Frequency / Duration: Patient to be seen 2 times per week for 12 treatments. Patient/ Caregiver education and instruction: exercises    [x]  Plan of care has been reviewed with PTA        Certification Period: 2022 to 22    Rashmi Ley PT,  2022     ________________________________________________________________________    I certify that the above Therapy Services are being furnished while the patient is under my care. I agree with the treatment plan and certify that this therapy is necessary.     Physician's Signature:____________________  Date:____________Time: _________    Patient name: Sharon Velazquez  : 1945  Provider#: [id number]

## 2022-04-07 NOTE — PROGRESS NOTES
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]   Onset Date:2021    Prior Hospitalization: see medical history    Medications: Verified on Patient summary List      Prior Level of Function: Prior to her 2019 stroke, the patient was independent with all I/ADLs; prior to her 2021 stroke, her speech-language skills were largely WNL (per daughter      Payor: Emmanuel Fish / Plan: Carmella Lemos / Product Type: Medicare /    In time:1000  Out time:1105  Total Treatment Time (min): 72  Visit #: 1     Certification Period: 2022 through 2022    The Plan of Care and following information is based on the information from the:  [x] Initial evaluation  [] Re-evaluation     ASSESSMENT  Based on assessment today, Ms. Ting Angela presents with a moderate mixed expressive-receptive aphasia c/b word-finding deficits at the word, sentence and conversation levels, difficulty following multi-step commands, understanding complex ideational material, and reading comprehension deficits at the sentence and paragraph levels. She demonstrates frustration with word-finding deficits and currently does not utilize any word-finding strategies during anomic events. In addition, Ms. Ting Angela presents with apraxic components within spoken language, more evident with repetition tasks compared to connected speech. It is recommended that Ms. Ting Angela receive skilled speech therapy services as it is medically necessary to improve communication skills for ADL tasks related to home,  community, and for their QOL.      The [facility] functional outcome measure was used to quantify this patient's level of expressive language impairment. Based on the NOMS, the patient was determined to be at level 5 for spoken language expression.      The NOMS functional outcome measure was used to quantify this patient's level of receptive language impairment. Based on the NOMS, the patient was determined to be at level 4 for spoken language comprehension.      NOMS Spoken Language Comprehension:  Level 1 (CN): Unable to follow directions or respond to simple yes/no  Level 2 (CM): Can follow directions & respond to simple y/n in context with max cues. Level 3 (CL): Responds to simple y/n; follows simple commands w/mod cues out of context  Level 4 (CK): Responds to y/n; occasionally follows simple directions without cues. Mod cues for complex info. Understands limited conversation about routine activities with familiar communication partner  Level 5 (Lili Rayray): Understands communication in structured conversation with familiar and unfamiliar partners. Min cues for complex information. Occasionally initiates use of compensatory strategies  Level 6 (CI): Limitations still apparent in voc/social situations. Rarely cued for complex info. Level 7 (CH): Independently participates in vocational, avocational, and social situations. ERIC. (2003). National Outcomes Measurement System (NOMS): Adult Speech-Language Pathology User's Guide.      NOMS Spoken Language Expression:  Level 1 (CN): Verbalizations not meaningful to anyone. Level 2 (CM): Few attempts accurate/appropriate. Max cues to elicit automatic/imitative words/phrases. Level 3 (CL): Communication partner responsible for communication; Mod cues for words/phrases meaningful in context  Level 4 (CK): Initiate during simple, routine activities w/familiar partner. Mod cues to produce simple sentences  Level 5 (Lili Rayray): Initiates communication with familiar and unfamiliar partners. Min cues for complex sentences. Level 6 (CI): Communicates for most activities.  Some limitations still present for vocational/social activities. Rarely cued for complex info  Level 7 [facility]): Independent communication. GORGE (2003). National Outcomes Measurement System (NOMS): Adult Speech-Language Pathology User's Guide.      Problem List:      [x]? Aphasia                   []? Dysarthria  []? Feeding/swallowing  []? Receptive language  []? Expressive language  []? Mixed receptive/expressive          []? Dysphonia/voice disorder  []? Pragmatic language  []? Dysfluency  []? Artic/Phonology  []? Cognitive-Linguistic Disorder    [x]? Other: apraxia      PLAN:  Treatment Plan may include any combination of the following: Aphasia treatment, Cognitive/language treatment and Dysarthria treatment                 Patient / Family readiness to learn indicated by: asking questions, trying to perform skills and interest     Persons(s) to be included in education:   patient (P) and family support person (FSP);list daughter [place] Kenny) and paid caregivers     Barriers to Learning/Limitations: yes;  reading/writing and sensory deficits-vision/hearing/speech     Patient Goal (s): To walk and talk better     Patient Self Reported Health Status: good     Rehabilitation Potential: fair     Short Term Goals: Within 8 treatment sessions, patient will verbally return at least 3 separate word-finding strategies. Within 8 treatment sessions, patient will utilize a variety of word-finding strategies during structured confrontational naming tasks with min assist.  Within 8 treatment sessions, patient will improve reading comprehension of short (<5 sentence) passages with 80% accuracy independently. Within 10 treatment sessions, patient will demonstrate absence of apraxic components during word repetition tasks with at least 90% accuracy. Within 12 treatment sessions, patient will utilize word-finding strategies within a story retell with min cues.    Within 12 treatment session, patient caregiver will return demonstration of word-finding cues in order to assist during communication breakdowns within the home environment.     Long Term Goals:   Within 16 treatment sessions, patient will utilize word finding strategies within conversation with min cues in order to communicate wants and needs effectively and participate socially in functional living environment           Frequency / Duration: Patient to be seen 2 times per week for 8 weeks     Discharge Plan:  Patient will be discharged when all short term and long term goals are met, or when patient reaches a plateau for 90 days.     Patient/ Caregiver education and instruction: Diagnosis, prognosis, carry-over exercises/activities     Claire Garber, SLP 4/7/2022   ________________________________________________________________________     I certify that the above Therapy Services are being furnished while the patient is under my care.  I agree with the treatment plan and certify that this therapy is necessary.     Physician's Signature:____________________  Date:___________Time:_________     Please sign and return to:     [address]  [place], [place]  Ph: [phone]    Fax: [phone]   Thank you

## 2022-04-07 NOTE — PROGRESS NOTES
[place] [address], [address]  Ph: [phone]     Fax: [phone]    Speech-language pathology  Initial Evaluation       Patient Name: Jessica Jenkins  Date:2022  : 1945  [x]  Patient  Verified  Payor: [place] MEDICARE / Plan: VA MEDICARE PART A & B / Product Type: Medicare /   In time:1000  Out time:1105  Total Treatment Time (min): 65  Visit #: 1    SUBJECTIVE  Pain Level (0-10 scale): 0  Any medication changes, allergies to medications, adverse drug reactions, diagnosis change, or new procedure performed?: [x] No    [] Yes (see summary sheet for update)    Subjective functional status/changes:   [] No changes reported  Patient entered the room freely and without hesitation. Alert, oriented to person and place. Agreeable to therapy evaluation, but stated several times throughout \"this is too much\" and \"I want to go\". Date of Onset: Most recent stroke in 2021  History: Ms. Claribel Mccollum is a 79 y/o retired -American female who is originally from [place] [address], but moved to the [place] area. She moved back to [place] [address] in , to take care of her mother who is now . Ms. Danis Beard medical diagnoses include: dementia, end stage renal failure, hypertension, high blood pressure, hx of strokes, diabetic, congestive heart failure, COPD, spinal stenosis, and neurological disorder. It is noted her first (minor) stroke was at the age of 48, with subsequent strokes in 2019, 2020, 2021, 2021. Her speech/language was most affected by her most recent stroke in 2021. She has received a variety of services since 2019, including PT/OT/ST across multiple settings (inpatient rehab, home health), but has not received outpatient therapy until now. There is a hx of non-compliance with home health services; however, her daughter reports that Ms. Rae Jaeger appears to be motivated to try outside the home and feels she will make more progress if she is able to get out of the house and make social connections with others. Of note, she will begin dialysis next week, so things may change to how much PT/Speech she can participate. Prior Functional level: Prior to her October 2019 stroke, the patient was independent with all I/ADLs; prior to her October 2021 stroke, her speech-language skills were largely WNL (per daughter). OBJECTIVE  [place] Diagnostic Aphasia Examination (BDAE)  The BDAE assesses communication in the areas of: conversational and expository speech, auditory comprehension, oral expression, reading and writing. The short form for this examination was used.   Results are as follows:     I. Conversational and expository speech  Patient was able to provide simple social responses on 7 out of 7 opportunities.  Within free speech, patient exhibited adequate phrase length, normal range of syntax, with mild word finding deficits in connected speech.  Patient demonstrated occasional abandoned utterances and off-topic responses.       II. Auditory Comprehension  Word comprehension 13.5/16  Commands 1/3  Complex ideational material 5/6     III. Oral Expression  Automatized Sequences 4/4  Repetition of single words 4/5  Repetition of sentences 2/2  Responsive naming 10/10   Screening of special categories 9/12     IV. Reading  Matching across cases and scripts 4/4  Number matching 4/4  Word identification 2/4  Oral Reading 15/15  Oral Reading of sentences with comprehension - 2/5  Reading Comprehension - 1/3  Sentences and Paragraphs - 3/4     V.  Writing  Mechanics of writing      Well-formedness of letters 7/14      Correctness of letter choice 21/21       Motor facility 7/14  Basic Encoding Skills       Primer word vocabulary 4/4       Regular phonics 2/2       Common irregular forms 3/3     [place] Naming Test, 2nd Edition (BNT-2)  The BNT-2 is a confrontational naming test that asks the client to provide the best name for a given picture.  This test was designed to detect word-finding impairments. In addition, stimulus cues that give semantic, phonemic (word-initial sound/s) and written information are provided as necessary. The results are reported in the following table:       Initial evaluation Re-evaluation  Date: Notes   Number spontaneous correct responses    12       Number stimulus cues given    3       Number correct responses following stimulus cue    0       Total Score    12       Additional cueing   Number phonemic cues given    3       Number correct responses following phonemic cue    3       Number written multiple choices given    0       Number correct responses following written multiple choice 0          Speech:  Oral Verbal Apraxia:   []? ? None    [x]? ?[address]    []? ? Moderate    []? ? Severe   Dys[place]:                   []? ? None    [x]? ?[address]    []? ? Moderate    []? ? Severe   Intelligibility:                 [x]? ? WNL    []? ? Words   []? ? Phrases   []? ? Sentences   [x]? ? Conversation                                                  % Intelligible: 80       Patient/Caregiver instruction/education: [x] Review HEP    [] Progressed/Changed    HEP/Handouts given: Results of eval reviewed with patient    Pain Level (0-10 scale) post treatment: 0    ASSESSMENT  [x]  See Plan of [address] [place] [address],[address], CCC-SLP  4/7/2022  2:00 PM

## 2022-04-29 ENCOUNTER — HOSPITAL ENCOUNTER (OUTPATIENT)
Dept: PHYSICAL THERAPY | Age: 77
Discharge: HOME | End: 2022-04-29
Payer: MEDICARE

## 2022-04-29 PROCEDURE — 97530 THERAPEUTIC ACTIVITIES: CPT

## 2022-04-29 PROCEDURE — 97110 THERAPEUTIC EXERCISES: CPT

## 2022-04-29 NOTE — PROGRESS NOTES
PT DAILY TREATMENT NOTE - [facility] 2-15    Patient Name: Erika Bob  Date:2022  : 1945  [x]  Patient  Verified  Payor: Colten Case / Plan: VA MEDICARE PART A & B / Product Type: Medicare /    In time:9:15  Out time:9:55  Total Treatment Time (min): 40  Total Timed Codes (min): 40  1:1 Treatment Time ( [address] only): 40   Visit #:  2    Treatment Area: Weakness [R53.1]    SUBJECTIVE  Pain Level (0-10 scale): 0/10  Any medication changes, allergies to medications, adverse drug reactions, diagnosis change, or new procedure performed?: [x] No    [] Yes (see summary sheet for update)  Subjective functional status/changes:     \"She states she has started dialysis. Per daughter report that she will have OP dialysis Tue, Thurs, and Sat. \"    OBJECTIVE    25 min Therapeutic Exercise:  [x] See flow sheet :   Rationale: increase ROM, increase strength, improve coordination, improve balance and increase proprioception to improve the patients ability to ability to assist with her ADLs and transfers. 20 min Therapeutic Activity:  [x]  See flow sheet :   Rationale: increase ROM, increase strength, improve coordination, improve balance and increase proprioception  to improve the patients ability to transfer safely with assistance of caregiver. With   [x] TE   [] TA   [] neuro   [] other: Patient Education: [x] Review HEP    [] Progressed/Changed HEP based on:   [] positioning   [] body mechanics   [] transfers   [] heat/ice application    [] other:      Other Objective/Functional Measures: Added in heel toe raises seated and caregiver transfer training. Pain Level (0-10 scale) post treatment: 0/10    ASSESSMENT/Changes in Function:   The pt tolerated treatment well today with some fatigue by end of the session. We did heavy education on transfer training with patient and pt's daughter. She was able to transfer with Donna at start of session with use of RW for support.  By the end of the session she required [address] [facility] [place] secondary to fatigue. HEP was printed again for new copy for daughter and caregivers. We will transition to more standing as she tolerates. Patient will continue to benefit from skilled PT services to modify and progress therapeutic interventions, address functional mobility deficits, address ROM deficits, address strength deficits, analyze and cue movement patterns, analyze and modify body mechanics/ergonomics, assess and modify postural abnormalities and instruct in home and community integration to attain remaining goals. [x]  See Plan of Care  []  See progress note/recertification  []  See Discharge Summary         Progress towards goals / Updated goals:  Short Term Goals: To be accomplished in 6 treatments. Caretaker will be able to assist with performing HEP for strengthening and maintaining mobility. Patient will be able to transfer from bed to [facility] with min. Assistant x 1 with rolling walker.     Long Term Goals: To be accomplished in 12 treatments. Patient will be able to ambulate with RW  to kitchen and sit for meals with caretaker assistance. Patient will be able to assist with bed mobilities for easy changing of briefs.     PLAN  [x]  Upgrade activities as tolerated     [x]  Continue plan of care  []  Update interventions per flow sheet       []  Discharge due to:_  []  Other:_      Calin Ritchie, PT, DPT 4/29/2022

## 2022-05-02 ENCOUNTER — HOSPITAL ENCOUNTER (OUTPATIENT)
Dept: PHYSICAL THERAPY | Age: 77
Discharge: HOME | End: 2022-05-02
Payer: MEDICARE

## 2022-05-02 PROCEDURE — 97110 THERAPEUTIC EXERCISES: CPT

## 2022-05-02 PROCEDURE — 97530 THERAPEUTIC ACTIVITIES: CPT

## 2022-05-02 NOTE — PROGRESS NOTES
PT DAILY TREATMENT NOTE - [facility] 2-15    Patient Name: Sushma Vear  Date:2022  : 1945  [x]  Patient  Verified  Payor: VA MEDICARE / Plan: VA MEDICARE PART A & B / Product Type: Medicare /    In time: 10:59  Out time: 11:55  Total Treatment Time (min): 56  Total Timed Codes (min): 56  1:1 Treatment Time ( [address] only): 64   Visit #:  3    Treatment Area: Weakness [R53.1]    SUBJECTIVE  Pain Level (0-10 scale): 0/10  Any medication changes, allergies to medications, adverse drug reactions, diagnosis change, or new procedure performed?: [x] No    [] Yes (see summary sheet for update)  Subjective functional status/changes:     Pt comes in today using wheelchair. OBJECTIVE    41 min Therapeutic Exercise:  [x] See flow sheet :   Rationale: increase ROM and increase strength to improve the patients ability to improve functional mobility    15 min Therapeutic Activity:  [x]  See flow sheet :   Rationale: increase strength  to improve the patients ability to perform transfers independently     With   [x] TE   [x] TA   [] neuro   [] other: Patient Education: [x] Review HEP    [] Progressed/Changed HEP based on:   [] positioning   [] body mechanics   [] transfers   [] heat/ice application    [] other:      Pain Level (0-10 scale) post treatment: 0/10    ASSESSMENT/Changes in Function: The pt tolerated treatment fairly well. Increased encouragement to perform standing. Caregiver present for therapy today. Advised pt and caregiver to practice static standing at her kitchen counter for increased LE strengthening and in hopes for more dynamic standing activities in the future. Min A of 1 for sit to stands today from wheelchair to RW. Min A of 1 wheelchair <> bed t/f with cues for hand placement. Pt's caregiver states she usually does all of pt's bed mobility for her, but is going to stop and let pt do as much as she can on her own.  Pt was mod A for sit > supine, mainly for R leg; supine > sit mod A, but with increased encouragement to use B UE on her own. Patient will continue to benefit from skilled PT services to address functional mobility deficits, address ROM deficits and address strength deficits to attain remaining goals     [x]  See Plan of Care  []  See progress note/recertification  []  See Discharge Summary         Progress towards goals / Updated goals:  Short Term Goals: To be accomplished in 6 treatments. Caretaker will be able to assist with performing HEP for strengthening and maintaining mobility. Patient will be able to transfer from bed to [facility] with min. Assistant x 1 with rolling walker.     Long Term Goals: To be accomplished in 12 treatments. Patient will be able to ambulate with RW  to kitchen and sit for meals with caretaker assistance. Patient will be able to assist with bed mobilities for easy changing of briefs.     PLAN  [x]  Upgrade activities as tolerated     [x]  Continue plan of care  []  Update interventions per flow sheet       []  Discharge due to:_  []  Other:_      Milagros Dhillon PTA, LPTA 5/2/2022

## 2022-05-06 ENCOUNTER — HOSPITAL ENCOUNTER (OUTPATIENT)
Dept: PHYSICAL THERAPY | Age: 77
Discharge: HOME | End: 2022-05-06
Payer: MEDICARE

## 2022-05-06 PROCEDURE — 97530 THERAPEUTIC ACTIVITIES: CPT

## 2022-05-06 PROCEDURE — 97150 GROUP THERAPEUTIC PROCEDURES: CPT

## 2022-05-06 PROCEDURE — 97110 THERAPEUTIC EXERCISES: CPT

## 2022-05-06 NOTE — PROGRESS NOTES
PT DAILY TREATMENT NOTE - [facility] 2-15    Patient Name: Jorge Hardy  Date:2022  : 1945  [x]  Patient  Verified  Payor: VA MEDICARE / Plan: VA MEDICARE PART A & B / Product Type: Medicare /    In time:11:04  Out time:11:56  Total Treatment Time (min): 52  Total Timed Codes (min): 46  1:1 Treatment Time ( [address] only): 55   Visit #: 4    Treatment Area: Weakness [R53.1]    SUBJECTIVE  Pain Level (0-10 scale): 2/10  Any medication changes, allergies to medications, adverse drug reactions, diagnosis change, or new procedure performed?: [x] No    [] Yes (see summary sheet for update)  Subjective functional status/changes:     \"I am a little sore from dialysis and other shots I had to get. I have not been getting up at home. \"    OBJECTIVE    36 min Therapeutic Exercise:  [x] See flow sheet :   Rationale: increase ROM and increase strength to improve the patients ability to for daily tasks and transfers. 10 min Therapeutic Activity:  [x]  See flow sheet :   Rationale: increase ROM, increase strength and improve balance  to improve the patients ability to assist with transfers and daily tasks. 6 min Group Therapy:  [x] See flow sheet :   Billed while completing seated TE at the beginning of the session. With   [x] TE   [] TA   [] neuro   [] other: Patient Education: [x] Review HEP    [] Progressed/Changed HEP based on:   [] positioning   [] body mechanics   [] transfers   [] heat/ice application    [] other:      Other Objective/Functional Measures: added hip abduction with band     Pain Level (0-10 scale) post treatment: 2/10    ASSESSMENT/Changes in Function:   The pt tolerated treatment fairly well today. She decently tolerated exercises today and stood for a total of 2 min each time. She was not eager to perform the remaining exercises, but complete the Nustep for 8 min. She requires continuous reiforcement to perform exercises and some help with LE movement.     Patient will continue to benefit from skilled PT services to modify and progress therapeutic interventions, address functional mobility deficits, address ROM deficits, address strength deficits, analyze and modify body mechanics/ergonomics, assess and modify postural abnormalities and instruct in home and community integration to attain remaining goals. [x]  See Plan of Care  []  See progress note/recertification  []  See Discharge Summary         Progress towards goals / Updated goals:  Short Term Goals: To be accomplished in 6 treatments. Caretaker will be able to assist with performing HEP for strengthening and maintaining mobility. Patient will be able to transfer from bed to [facility] with min. Assistant x 1 with rolling walker.     Long Term Goals: To be accomplished in 12 treatments. Patient will be able to ambulate with RW  to kitchen and sit for meals with caretaker assistance. Patient will be able to assist with bed mobilities for easy changing of briefs.   PLAN  [x]  Upgrade activities as tolerated     [x]  Continue plan of care  []  Update interventions per flow sheet       []  Discharge due to:_  []  Other:_      Kia Welch PT, DPT 5/6/2022

## 2022-05-09 ENCOUNTER — HOSPITAL ENCOUNTER (OUTPATIENT)
Dept: PHYSICAL THERAPY | Age: 77
Discharge: HOME | End: 2022-05-09
Payer: MEDICARE

## 2022-05-09 PROCEDURE — 97110 THERAPEUTIC EXERCISES: CPT

## 2022-05-09 NOTE — PROGRESS NOTES
PT DAILY TREATMENT NOTE - [facility] 2-15    Patient Name: Shant Dupree  Date:2022  : 1945  [x]  Patient  Verified  Payor: VA MEDICARE / Plan: VA MEDICARE PART A & B / Product Type: Medicare /    In time:11:00 AM  Out time:11:53 AM  Total Treatment Time (min): 53  Total Timed Codes (min): 53  1:1 Treatment Time ([address] only): 48   Visit #:  4    Treatment Area: Weakness [R53.1]    SUBJECTIVE  Pain Level (0-10 scale): 0/10  Any medication changes, allergies to medications, adverse drug reactions, diagnosis change, or new procedure performed?: [x] No    [] Yes (see summary sheet for update)  Subjective functional status/changes:      I did not do anything over the weekend. I had to get an injection in my right shoulder    OBJECTIVE    53 min Therapeutic Exercise:  [x] See flow sheet :   Rationale: increase ROM, increase strength, improve coordination, improve balance and increase proprioception    to improve the patients ability to assist with transfers and walk increasing distances in the house. With   [] TE   [] TA   [] neuro   [] other: Patient Education: [x] Review HEP    [] Progressed/Changed HEP based on:   [] positioning   [] body mechanics   [] transfers   [] heat/ice application    [] other:      Other Objective/Functional Measures: Progressive gait training with rolling walker, exercises in sitting position,   sit to stand with assistance    Pain Level (0-10 scale) post treatment: 0/10    ASSESSMENT/Changes in Function:   The pt tolerated treatment. Patient has active mobility of both LE. The right LE is weaker than left LE from old CVA. Lumbar stiffness and forward flexion in standing position is from staying in the [facility] for a long time during the day. Patient appears more alert with a little more energy since her dialysis treatment initiation 3 weeks ago. Patient requires encouragement and motivationto complete task and build endurance level.  Ambulated with the rolling walker for 126 feet with SBA. A good accomplishment. Patient will continue to benefit from skilled PT services to address functional mobility deficits, address ROM deficits, address strength deficits and analyze and address soft tissue restrictions to attain remaining goals     [x]  See Plan of Care  []  See progress note/recertification  []  See Discharge Summary           Progress towards goals / Updated goals:  Short Term Goals: To be accomplished in 6 treatments. Caretaker will be able to assist with performing HEP for strengthening and maintaining mobility. Patient will be able to transfer from bed to [facility] with min. Assistant x 1 with rolling walker.     Long Term Goals: To be accomplished in 12 treatments. Patient will be able to ambulate with RW  to kitchen and sit for meals with caretaker assistance. Patient will be able to assist with bed mobilities for easy changing of briefs.     PLAN  [x]  Upgrade activities as tolerated     [x]  Continue plan of care  []  Update interventions per flow sheet       []  Discharge due to:_  []  Other:_      Haylee Tony, PT,  5/9/2022

## 2022-05-13 ENCOUNTER — HOSPITAL ENCOUNTER (OUTPATIENT)
Dept: PHYSICAL THERAPY | Age: 77
Discharge: HOME | End: 2022-05-13
Payer: MEDICARE

## 2022-05-13 PROCEDURE — 97110 THERAPEUTIC EXERCISES: CPT

## 2022-05-13 NOTE — PROGRESS NOTES
PT DAILY TREATMENT NOTE - [facility] 2-15    Patient Name: Royal Tsai  Date:2022  : 1945  [x]  Patient  Verified  Payor: Dione Torresman / Plan: VA MEDICARE PART A & B / Product Type: Medicare /    In time: 10:50 AM  Out time:11:53  Total Treatment Time (min): 63  Total Timed Codes (min): 63  1:1 Treatment Time ( [address] only): 63   Visit #:  5    Treatment Area: Weakness [R53.1]    SUBJECTIVE  Pain Level (0-10 scale): 8/10  Any medication changes, allergies to medications, adverse drug reactions, diagnosis change, or new procedure performed?: [x] No    [] Yes (see summary sheet for update)    Subjective functional status/changes:     My arms bother me today. I did not eat this morning. I feel tired. Caretaker stated that she would not eat, but her blood pressure and sugar level was normal.    OBJECTIVE       53 min Therapeutic Exercise:  [x] See flow sheet :   Rationale: increase ROM, increase strength, improve coordination, improve balance and increase proprioception    to improve the patients ability to transfer from bed to [facility] with min. assistant x 1 with rolling walker. With   [] TE   [] TA   [] neuro   [] other: Patient Education: [x] Review HEP    [] Progressed/Changed HEP based on:   [] positioning   [] body mechanics   [] transfers   [] heat/ice application    [] other:      Other Objective/Functional Measures: Gait with rolling walker with min assistance followed by WC  Sit to stand and seated exercises for strengthening LE as tolerated, Self pedal wheelchair       Pain Level (0-10 scale) post treatment: 0/10    ASSESSMENT/Changes in Function:   The pt tolerated treatment . Patient requires encouragement and motivation to perform exercises. She has weakness of the right LE, but active mobility. Questionable of how much carryover, once leaving outpatient therapy.   Patient will continue to benefit from skilled PT services to address ROM deficits, address strength deficits, analyze and address soft tissue restrictions and analyze and cue movement patterns to attain remaining goals     [x]  See Plan of Care  []  See progress note/recertification  []  See Discharge Summary              Progress towards goals / Updated goals:  Short Term Goals: To be accomplished in 6 treatments. Caretaker will be able to assist with performing HEP for strengthening and maintaining mobility. Patient will be able to transfer from bed to [facility] 23 with min. Assistant x 1 with rolling walker.     Long Term Goals: To be accomplished in 12 treatments. Patient will be able to ambulate with RW  to kitchen and sit for meals with caretaker assistance. Patient will be able to assist with bed mobilities for easy changing of briefs.     PLAN  [x]  Upgrade activities as tolerated     [x]  Continue plan of care  []  Update interventions per flow sheet       []  Discharge due to:_  []  Other:_      Clarice Dang, PT,  5/13/2022

## 2022-05-16 ENCOUNTER — HOSPITAL ENCOUNTER (OUTPATIENT)
Dept: PHYSICAL THERAPY | Age: 77
Discharge: HOME | End: 2022-05-16
Payer: MEDICARE

## 2022-05-16 PROCEDURE — 97110 THERAPEUTIC EXERCISES: CPT

## 2022-05-16 PROCEDURE — 97116 GAIT TRAINING THERAPY: CPT

## 2022-05-16 NOTE — PROGRESS NOTES
PT DAILY TREATMENT NOTE - [facility] 2-15    Patient Name: Edgard Briggs  Date:2022  : 1945  [x]  Patient  Verified  Payor: Caroline Calender / Plan: VA MEDICARE PART A & B / Product Type: Medicare /    In time:1:04  Out time:2:04  Total Treatment Time (min): 60  Total Timed Codes (min): 60  1:1 Treatment Time ( [address] only): 60   Visit #:  7    Treatment Area: Weakness [R53.1]    SUBJECTIVE  Pain Level (0-10 scale): 0/10  Any medication changes, allergies to medications, adverse drug reactions, diagnosis change, or new procedure performed?: [x] No    [] Yes (see summary sheet for update)  Subjective functional status/changes:     \"I am doing good today, but tired. \"    OBJECTIVE    45 min Therapeutic Exercise:  [x] See flow sheet :   Rationale: increase ROM, increase strength, improve coordination and improve balance to improve the patients ability to perform daily tasks and transfers. 15 min Gait Training:  60 x2 feet with RW device on level surfaces with SBA level of assist   Rationale: increase ROM, increase strength, improve coordination and improve balance  to improve the patients ability to ambulate in home and to appointments. With   [x] TE   [] TA   [] neuro   [] other: Patient Education: [x] Review HEP    [] Progressed/Changed HEP based on:   [] positioning   [] body mechanics   [] transfers   [] heat/ice application    [] other:      Pain Level (0-10 scale) post treatment: 0/10    ASSESSMENT/Changes in Function:   The pt tolerated treatment well today. She is requiring less assistance to perform standing and transfers. She needs continuous encouragement to participate in exercises. Her caregiver was in a car accident, so her daughter is here to take care of her for a whole.  Patient will continue to benefit from skilled PT services to modify and progress therapeutic interventions, address functional mobility deficits, address ROM deficits, address strength deficits, analyze and cue movement patterns, analyze and modify body mechanics/ergonomics, assess and modify postural abnormalities, address imbalance/dizziness and instruct in home and community integration to attain remaining goals     [x]  See Plan of Care  []  See progress note/recertification  []  See Discharge Summary         Progress towards goals / Updated goals:  Short Term Goals: To be accomplished in 6 treatments. Caretaker will be able to assist with performing HEP for strengthening and maintaining mobility. Patient will be able to transfer from bed to [facility] with min. Assistant x 1 with rolling walker.     Long Term Goals: To be accomplished in 12 treatments. Patient will be able to ambulate with RW  to kitchen and sit for meals with caretaker assistance. Patient will be able to assist with bed mobilities for easy changing of briefs.   PLAN  [x]  Upgrade activities as tolerated     [x]  Continue plan of care  []  Update interventions per flow sheet       []  Discharge due to:_  []  Other:_      Courtney Thomas, PT, DPT 5/16/2022

## 2022-05-20 ENCOUNTER — HOSPITAL ENCOUNTER (OUTPATIENT)
Dept: PHYSICAL THERAPY | Age: 77
Discharge: HOME | End: 2022-05-20
Payer: MEDICARE

## 2022-05-20 PROCEDURE — 97116 GAIT TRAINING THERAPY: CPT

## 2022-05-20 PROCEDURE — 97110 THERAPEUTIC EXERCISES: CPT

## 2022-05-20 NOTE — PROGRESS NOTES
PT DAILY TREATMENT NOTE - [facility] 2-15    Patient Name: Khoi Casas  Date:2022  : 1945  [x]  Patient  Verified  Payor: [place] MEDICARE / Plan: VA MEDICARE PART A & B / Product Type: Medicare /    In time:1:05 PM  Out time:1:55 PM  Total Treatment Time (min): 50  Total Timed Codes (min): 50  1:1 Treatment Time ( [address] only): 50   Visit #:  8    Treatment Area: Weakness [R53.1]    SUBJECTIVE  Pain Level (0-10 scale): 0/10  Any medication changes, allergies to medications, adverse drug reactions, diagnosis change, or new procedure performed?: [x] No    [] Yes (see summary sheet for update)  Subjective functional status/changes:     \"I am doing good today and slept a lot this morning. \"    OBJECTIVE    38 min Therapeutic Exercise:  [x] See flow sheet :   Rationale: increase ROM, increase strength and improve coordination to improve the patients ability to assist with her ADLs and transfers      12 min Gait Trainin ft x 1 and 60 ft x 1  feet with RW device on level surfaces with SBA level of assist with a WC follow   Rationale: increase ROM, increase strength, improve coordination and improve balance  to improve the patients ability to ambulate around home and to MD appointments. With   [x] TE   [] TA   [] neuro   [] other: Patient Education: [x] Review HEP    [] Progressed/Changed HEP based on:   [] positioning   [] body mechanics   [] transfers   [] heat/ice application    [] other:      Pain Level (0-10 scale) post treatment: 0/10    ASSESSMENT/Changes in Function:   The pt tolerated treatment well today and was eager to start next exercise. She was able to walk a full lap in the gym today without a rest break and then a half a lap to the [facility] after a rest break. She performed marches with less cuing today.  Patient will continue to benefit from skilled PT services to modify and progress therapeutic interventions, address functional mobility deficits, address ROM deficits, address strength deficits, analyze and address soft tissue restrictions, analyze and cue movement patterns, analyze and modify body mechanics/ergonomics and address imbalance/dizziness to attain remaining goals. [x]  See Plan of Care  []  See progress note/recertification  []  See Discharge Summary         Progress towards goals / Updated goals:  Short Term Goals: To be accomplished in 6 treatments. Caretaker will be able to assist with performing HEP for strengthening and maintaining mobility. Patient will be able to transfer from bed to [facility] with min. Assistant x 1 with rolling walker.     Long Term Goals: To be accomplished in 12 treatments. Patient will be able to ambulate with RW  to kitchen and sit for meals with caretaker assistance. Patient will be able to assist with bed mobilities for easy changing of briefs.     PLAN  [x]  Upgrade activities as tolerated     [x]  Continue plan of care  []  Update interventions per flow sheet       []  Discharge due to:_  []  Other:_      Portia Jackson, PT, DPT 5/20/2022

## 2022-05-23 ENCOUNTER — APPOINTMENT (OUTPATIENT)
Dept: PHYSICAL THERAPY | Age: 77
End: 2022-05-23
Payer: MEDICARE

## 2022-05-25 ENCOUNTER — APPOINTMENT (OUTPATIENT)
Dept: PHYSICAL THERAPY | Age: 77
End: 2022-05-25
Payer: MEDICARE

## 2022-05-27 ENCOUNTER — APPOINTMENT (OUTPATIENT)
Dept: PHYSICAL THERAPY | Age: 77
End: 2022-05-27
Payer: MEDICARE

## 2022-06-01 ENCOUNTER — APPOINTMENT (OUTPATIENT)
Dept: PHYSICAL THERAPY | Age: 77
End: 2022-06-01
Payer: MEDICARE

## 2022-06-03 ENCOUNTER — HOSPITAL ENCOUNTER (OUTPATIENT)
Dept: PHYSICAL THERAPY | Age: 77
Discharge: HOME | End: 2022-06-03
Payer: MEDICARE

## 2022-06-03 PROCEDURE — 97110 THERAPEUTIC EXERCISES: CPT

## 2022-06-03 PROCEDURE — 97116 GAIT TRAINING THERAPY: CPT

## 2022-06-03 NOTE — PROGRESS NOTES
PT DAILY TREATMENT NOTE - [facility] 2-15    Patient Name: Sushma Vera  Date:6/3/2022  : 1945  [x]  Patient  Verified  Payor: Kari Buitrago / Plan: VA MEDICARE PART A & B / Product Type: Medicare /    In time:9:10  Out time:9:58  Total Treatment Time (min): 48  Total Timed Codes (min): 48  1:1 Treatment Time ( [address] only): 50   Visit #:  9    Treatment Area: Weakness [R53.1]    SUBJECTIVE  Pain Level (0-10 scale): 3/10  Any medication changes, allergies to medications, adverse drug reactions, diagnosis change, or new procedure performed?: [x] No    [] Yes (see summary sheet for update)  Subjective functional status/changes:     \"I am a little sore today. Per daughter report she has been seeing most of her MDs with good reports. She also has been walking more at home. She has a new aid starting next week \"Delmy\". \"    OBJECTIVE    38 min Therapeutic Exercise:  [x] See flow sheet :   Rationale: increase ROM, increase strength and improve coordination to improve the patients ability to perform transfers and gait in home. 10 min Gait Training:  126 x1 and 60 x1 feet with RW device on level surfaces with SBA level of assist   Rationale: increase ROM, increase strength, improve coordination, improve balance and increase proprioception  to improve the patients ability to navigate home. She did have one instance of loosing balance where therapist had to assist to return to being balance. With   [x] TE   [] TA   [] neuro   [] other: Patient Education: [x] Review HEP    [] Progressed/Changed HEP based on:   [] positioning   [] body mechanics   [] transfers   [] heat/ice application    [] other:      Other Objective/Functional Measures: 3 way hip exercises    Pain Level (0-10 scale) post treatment: 3/10    ASSESSMENT/Changes in Function:   The pt tolerated treatment well today. She was able to tolerate addition of 3 way hip exercises today.  She did have one instance of loosing her balance while ambulating, but with therapist assistance was able to correct and continue walking. We will continue to push the patient and ensure carryover at home as she is getting a new aid. Patient will continue to benefit from skilled PT services to modify and progress therapeutic interventions, address functional mobility deficits, address ROM deficits, address strength deficits, analyze and address soft tissue restrictions, analyze and cue movement patterns, analyze and modify body mechanics/ergonomics and assess and modify postural abnormalities to attain remaining goals. [x]  See Plan of Care  []  See progress note/recertification  []  See Discharge Summary         Progress towards goals / Updated goals:  Short Term Goals: To be accomplished in 6 treatments. Caretaker will be able to assist with performing HEP for strengthening and maintaining mobility. Patient will be able to transfer from bed to [facility] with min. Assistant x 1 with rolling walker.     Long Term Goals: To be accomplished in 12 treatments. Patient will be able to ambulate with RW  to kitchen and sit for meals with caretaker assistance. Patient will be able to assist with bed mobilities for easy changing of briefs.     PLAN  [x]  Upgrade activities as tolerated     [x]  Continue plan of care  []  Update interventions per flow sheet       []  Discharge due to:_  []  Other:_      Erika Hurley, PT, DPT 6/3/2022

## 2022-06-06 ENCOUNTER — HOSPITAL ENCOUNTER (OUTPATIENT)
Dept: PHYSICAL THERAPY | Age: 77
Discharge: HOME | End: 2022-06-06
Payer: MEDICARE

## 2022-06-06 PROCEDURE — 97110 THERAPEUTIC EXERCISES: CPT

## 2022-06-06 NOTE — PROGRESS NOTES
PT DAILY TREATMENT NOTE - [facility] 2-15    Patient Name: Yarely Short  Date:2022  : 1945  [x]  Patient  Verified  Payor: VA MEDICARE / Plan: VA MEDICARE PART A & B / Product Type: Medicare /    In time:11:05 AM   Out time:11:58 AM  Total Treatment Time (min): 53  Total Timed Codes (min): 53  1:1 Treatment Time ( only): 48   Visit #:  10    Treatment Area: Weakness [R53.1]    SUBJECTIVE  Pain Level (0-10 scale): 510 (arm pain)  Any medication changes, allergies to medications, adverse drug reactions, diagnosis change, or new procedure performed?: [x] No    [] Yes (see summary sheet for update)  Subjective functional status/changes:     I do help to put my clothes on, but I do not have walk to the table to eat. I am tired and I have to go to the MD on Wednesday and then dialysis 3 days a week. OBJECTIVE    53 min Therapeutic Exercise:  [x] See flow sheet :   Rationale: increase ROM, increase strength, improve coordination, improve balance and increase proprioception to improve the patients ability to   assist with transfers, bed mobilities and walk short distances safely with rolling walker. With     [] TE   [] TA   [] neuro   [] other: Patient Education: [x] Review HEP    [] Progressed/Changed HEP based on:   [] positioning   [] body mechanics   [] transfers   [] heat/ice application    [] other:      Other Objective: Transfers, Bed mobilities and sitting and supine exercises for LE strengthening    Pain Level (0-10 scale) post treatment: 5/10    ASSESSMENT/Changes in Function:   The pt tolerated treatment. Patient has a new caretaker. Much more alert today and answer questions appropriately. Patient was stronger today and participated in exercises. Patient continues to require motivation and encouragement. Exercises handout were reviewed with her new caretaker to continue increasing strength and mobility for patient to help herself more in home environment.  New goals have been established for patient to increase ambulation distances and return to performing standing activities and small household chores. Patient will continue to benefit from skilled PT services to address ROM deficits, address strength deficits, analyze and address soft tissue restrictions and analyze and cue movement patterns to attain remaining goals. [x]  See Plan of Care  []  See progress note/recertification  []  See Discharge Summary         Progress towards goals / Updated goals:  Caretaker will be able to assist with performing HEP for strengthening and maintaining mobility. Partially MET  Patient will be able to transfer from bed to [facility] with min. Assistant x 1 with rolling walker. MET     Long Term Goals: To be accomplished in 12 treatments. Patient will be able to ambulate with RW to kitchen and sit for meals with caretaker assistance. Progressing  Patient will be able to assist with bed mobilities for easy changing of briefs. MET  Patient will be able to walk from her car into the Physical Therapy Department with rolling walker with min. assistance x 1. (New Goal)  Patient will be able to stand with rolling walker  and get on off toilet independently.  (New Goal)          PLAN  [x]  Upgrade activities as tolerated     [x]  Continue plan of care  []  Update interventions per flow sheet     []  Discharge due to:_  []  Other:_      Ella Fall, PT,  6/6/2022

## 2022-06-06 NOTE — PROGRESS NOTES
[address]  [place], [place]  Ph: [phone]    Fax: [phone]    Progress Note    Name: Tara Bernal   : 1945   MD: Baldomero Frazier MD       Treatment Diagnosis: Weakness [R53.1]  Start of Care: 22   Visits from Start of Care: 10   Missed Visits: 3      Summary of Care / Assessment / Recommendations:   78year old black female who had a stroke in , but have mutiple medical problems as well. Patient lives at home with Caretakers and has a daughter in [place]. Patient was referred to Physical Therapy due to muscle weakness and decrease mobility. Since the referral,patient is getting dialysis 3 times a week. She is now much more alert day and answer questions appropriately. She is stronger and participates in exercises. Patient continues to require much motivation and encouragement to participate in activities. She is able to ambulate short distances with rolling walker and assist with transfers and improved bed mobilities  Exercises handout were reviewed with her new caretakers to continue increasing strength and mobility for patient to help herself more in home environment. New goals have been established for patient to increase ambulation distances and return to performing standing activities and small household chores. Patient will continue to benefit from skilled PT services to address ROM deficits, address strength deficits, analyze and address soft tissue restrictions and analyze and cue movement patterns to attain remaining goals. Progress towards goals / Updated goals:    Short Term Goals: To be accomplished in 6 visits. Caretaker will be able to assist with performing HEP for strengthening and maintaining mobility. Partially MET  Patient will be able to transfer from bed to [facility] 23 with min. Assistant x 1 with rolling walker. MET       Long Term Goals: To be accomplished in 20 treatments.   Patient will be able to ambulate with RW to kitchen and sit for meals with caretaker assistance. Progressing  Patient will be able to assist with bed mobilities for easy changing of briefs. MET  Patient will be able to walk from her car into the Physical Therapy Department with rolling walker with min. assistance x 1. (New Goal)  Patient will be able to stand with rolling walker  and get on off toilet independently. (New Goal)        Recertification Period: 4/7/22 to 7/5/22      Frequency/Duration:  2 treatments per week, for a total of 20 visits      Bertha Montes PT,  6/6/2022     ________________________________________________________________________  NOTE TO PHYSICIAN:  Please complete the following and fax to:  [facility]:   Fax: [phone]  . Retain this original for your records. If you are unable to process this request in 24 hours, please contact our office.        ____ I have read the above report and request that my patient continue therapy with the following changes/special instructions:  ____ I have read the above report and request that my patient be discharged from therapy    Physician's Signature:_________________ Date:___________Time:__________

## 2022-06-08 ENCOUNTER — APPOINTMENT (OUTPATIENT)
Dept: PHYSICAL THERAPY | Age: 77
End: 2022-06-08
Payer: MEDICARE

## 2022-06-10 ENCOUNTER — APPOINTMENT (OUTPATIENT)
Dept: PHYSICAL THERAPY | Age: 77
End: 2022-06-10
Payer: MEDICARE

## 2022-06-13 ENCOUNTER — APPOINTMENT (OUTPATIENT)
Dept: PHYSICAL THERAPY | Age: 77
End: 2022-06-13
Payer: MEDICARE

## 2022-06-15 ENCOUNTER — HOSPITAL ENCOUNTER (OUTPATIENT)
Dept: PHYSICAL THERAPY | Age: 77
Discharge: HOME | End: 2022-06-15
Payer: MEDICARE

## 2022-06-15 PROCEDURE — 97110 THERAPEUTIC EXERCISES: CPT

## 2022-06-15 PROCEDURE — 97116 GAIT TRAINING THERAPY: CPT

## 2022-06-15 NOTE — PROGRESS NOTES
PT DAILY TREATMENT NOTE - [facility] 2-15    Patient Name: Marni Senior  Date:6/15/2022  : 1945  [x]  Patient  Verified  Payor: VA MEDICARE / Plan: VA MEDICARE PART A & B / Product Type: Medicare /    In time: 11:00 AM  Out time:11:53 AM  Total Treatment Time (min): 53  Total Timed Codes (min): 53  1:1 Treatment Time ( [address] only): 48   Visit #:  11    Treatment Area: Weakness [R53.1]    SUBJECTIVE  Pain Level (0-10 scale): 0/10  Any medication changes, allergies to medications, adverse drug reactions, diagnosis change, or new procedure performed?: [x] No    [] Yes (see summary sheet for update)  Subjective functional status/changes:     I am feeling better today because of  my caretaker. OBJECTIVE      43 min Therapeutic Exercise:  [] See flow sheet :   Rationale: increase ROM, increase strength, improve coordination, improve balance and increase proprioception to improve   the patients ability to walk from her car into the Physical Therapy Department with rolling walker with min. assistance x 1.        10 min Gait Trainin feet with rolling walker on level surfaces with min assistance x1 on level surface   Rationale: increase ROM, increase strength, improve coordination, improve balance and increase proprioception  to improve the patients ability to walk from   her car into the Physical Therapy Department with rolling walker with min. assistance x 1. With   [] TE   [] TA   [] neuro   [] other: Patient Education: [x] Review HEP    [] Progressed/Changed HEP based on:   [] positioning   [] body mechanics   [] transfers   [] heat/ice application    [] other:      Other Objective/Functional Measures: Gait training with rolling walker with min assistance x 1 followed by WC and strengthening exercises for both LE  In sitting and standing. Pain Level (0-10 scale) post treatment: 0/10    ASSESSMENT/Changes in Function:   The pt tolerated treatment very well today.  Patient was more alert than all her treatment sessions. More energy and cooperative. Patient is complimentary of her   Caretaker who helps her to dress her better and improves her appearance. Patient will continue to benefit from skilled PT services to address ROM deficits, address strength deficits, analyze and address soft tissue restrictions and analyze and cue movement patterns to attain remaining goals. [x]  See Plan of Care  []  See progress note/recertification  []  See Discharge Summary         Progress towards goals / Updated goals:    Short Term Goals: To be accomplished in 6 visits. Caretaker will be able to assist with performing HEP for strengthening and maintaining mobility. Partially MET  Patient will be able to transfer from bed to [facility] with min. Assistant x 1 with rolling walker.  MET        Long Term Goals: To be accomplished in 20 treatments. Patient will be able to ambulate with RW to kitchen and sit for meals with caretaker assistance. Progressing  Patient will be able to assist with bed mobilities for easy changing of briefs.  MET  Patient will be able to walk from her car into the Physical Therapy Department with rolling walker with min. assistance x 1. (New Goal)  Patient will be able to stand with rolling walker  and get on off toilet independently.  (New Goal)      PLAN  [x]  Upgrade activities as tolerated     [x]  Continue plan of care  []  Update interventions per flow sheet       []  Discharge due to:_  []  Other:_      Lemmie Larissa, PT,  6/15/2022

## 2022-06-17 ENCOUNTER — APPOINTMENT (OUTPATIENT)
Dept: PHYSICAL THERAPY | Age: 77
End: 2022-06-17
Payer: MEDICARE

## 2022-06-20 ENCOUNTER — HOSPITAL ENCOUNTER (OUTPATIENT)
Dept: PHYSICAL THERAPY | Age: 77
Discharge: HOME | End: 2022-06-20
Payer: MEDICARE

## 2022-06-20 PROCEDURE — 97116 GAIT TRAINING THERAPY: CPT

## 2022-06-20 PROCEDURE — 97110 THERAPEUTIC EXERCISES: CPT

## 2022-06-20 NOTE — PROGRESS NOTES
PT DAILY TREATMENT NOTE - [facility] 2-15    Patient Name: Stella Levy  Date:2022  : 1945  [x]  Patient  Verified  Payor: Kwan Bravo / Plan: VA MEDICARE PART A & B / Product Type: Medicare /    In time:10:45  Out time:11:42  Total Treatment Time (min): 57  Total Timed Codes (min): 57  1:1 Treatment Time ( [address] only): 62   Visit #:  12    Treatment Area: Weakness [R53.1]    SUBJECTIVE  Pain Level (0-10 scale): 0/10  Any medication changes, allergies to medications, adverse drug reactions, diagnosis change, or new procedure performed?: [x] No    [] Yes (see summary sheet for update)  Subjective functional status/changes:     \"I laid in the bed all weekend and have a little bit of energy today. \"    OBJECTIVE     39 min Therapeutic Exercise:  [x] See flow sheet :   Rationale: increase ROM, increase strength, improve coordination and improve balance to improve the patients ability to assist with ADLs at home. 18 min Gait Trainin ft x1 and 50ft x1 with RW device on level surfaces with CGA level of assist   Rationale: increase ROM, increase strength, improve coordination and improve balance  to improve the patients ability to ambulate in home and to physical therapy. With   [x] TE   [] TA   [] neuro   [] other: Patient Education: [x] Review HEP    [] Progressed/Changed HEP based on:   [] positioning   [] body mechanics   [] transfers   [] heat/ice application    [] other:      Pain Level (0-10 scale) post treatment: 0/10    ASSESSMENT/Changes in Function:   The pt tolerated treatment very well today. She was able to complete 3 way hip exercise without taking a rest break today. She will benefit from using weights at the next visit during standing exercise to further build strength. We will continue to encourage the patient to assist caregiver more at home with ADLs and walking.  Patient will continue to benefit from skilled PT services to modify and progress therapeutic interventions, address functional mobility deficits, address ROM deficits, address strength deficits, analyze and address soft tissue restrictions, analyze and cue movement patterns, analyze and modify body mechanics/ergonomics, assess and modify postural abnormalities and instruct in home and community integration to attain remaining goals. [x]  See Plan of Care  []  See progress note/recertification  []  See Discharge Summary         Progress towards goals / Updated goals:  Short Term Goals: To be accomplished in 6 visits.   Caretaker will be able to assist with performing HEP for strengthening and maintaining mobility. Partially MET  Patient will be able to transfer from bed to [facility] with min. Assistant x 1 with rolling walker.  MET    Long Term Goals: To be accomplished in 20 treatments. Patient will be able to ambulate with RW to kitchen and sit for meals with caretaker assistance. Progressing  Patient will be able to assist with bed mobilities for easy changing of briefs.  MET  Patient will be able to walk from her car into the Physical Therapy Department with rolling walker with min. assistance x 1. (New Goal)  Patient will be able to stand with rolling walker  and get on off toilet independently.  (New Goal)      PLAN  [x]  Upgrade activities as tolerated     [x]  Continue plan of care  []  Update interventions per flow sheet       []  Discharge due to:_  []  Other:_      Portia Jackson, PT, DPT 6/20/2022

## 2022-06-22 ENCOUNTER — APPOINTMENT (OUTPATIENT)
Dept: PHYSICAL THERAPY | Age: 77
End: 2022-06-22
Payer: MEDICARE

## 2022-06-24 ENCOUNTER — APPOINTMENT (OUTPATIENT)
Dept: PHYSICAL THERAPY | Age: 77
End: 2022-06-24
Payer: MEDICARE

## 2022-06-27 ENCOUNTER — HOSPITAL ENCOUNTER (OUTPATIENT)
Dept: PHYSICAL THERAPY | Age: 77
Discharge: HOME | End: 2022-06-27
Payer: MEDICARE

## 2022-06-27 PROCEDURE — 97110 THERAPEUTIC EXERCISES: CPT

## 2022-06-27 NOTE — PROGRESS NOTES
PT DAILY TREATMENT NOTE - [facility] 2-15    Patient Name: Dl Landrum  Date:2022  : 1945  [x]  Patient  Verified  Payor: Chema Mendez / Plan: VA MEDICARE PART A & B / Product Type: Medicare /    In time:11:00 AM  Out time:11:50 AM  Total Treatment Time (min): 50  Total Timed Codes (min): 50  1:1 Treatment Time ( [address] only): 50   Visit #:  13    Treatment Area: Weakness [R53.1]    SUBJECTIVE  Pain Level (0-10 scale): 10  Any medication changes, allergies to medications, adverse drug reactions, diagnosis change, or new procedure performed?: [x] No    [] Yes (see summary sheet for update)  Subjective functional status/changes:       I feel weak today. I am eating better. I cannot do much walking today. I did walk to the door  and unlocked the door for my caretaker. OBJECTIVE    50 min Therapeutic Exercise:  [x] See flow sheet :   Rationale: increase ROM and increase strength to improve the patients ability to walk from her car into the Physical Therapy   Department with rolling walker with min. assistance x 1. With   [] TE   [] TA   [] neuro   [] other: Patient Education: [x] Review HEP    [] Progressed/Changed HEP based on:   [] positioning   [] body mechanics   [] transfers   [] heat/ice application    [] other:      Other Objective/Functional Measures: Standing exercises for balance and LE strengthening exercises  in sitting position    Pain Level (0-10 scale) post treatment: 4/10    ASSESSMENT/Changes in Function:   The pt tolerated treatment. Patient can increase activities, but have to be encouraged due to decreased motivation and fatigue. She has been cooperative and increased activities with her caretaker. Patient will continue to benefit from skilled PT services to address functional mobility deficits, address ROM deficits, address strength deficits and analyze and address soft tissue restrictions to attain remaining goals.      [x]  See Plan of Care  []  See progress note/recertification  [] See Discharge Summary         Progress towards goals / Updated goals:  Short Term Goals: To be accomplished in 6 visits.   Caretaker will be able to assist with performing HEP for strengthening and maintaining mobility. Partially MET  Patient will be able to transfer from bed to [facility] with min. Assistant x 1 with rolling walker.  MET     Long Term Goals: To be accomplished in 20 treatments. Patient will be able to ambulate with RW to kitchen and sit for meals with caretaker assistance. Progressing  Patient will be able to assist with bed mobilities for easy changing of briefs.  MET  Patient will be able to walk from her car into the Physical Therapy Department with rolling walker with min. assistance x 1. (New Goal)  Patient will be able to stand with rolling walker  and get on off toilet independently.  (New Goal)      PLAN  [x]  Upgrade activities as tolerated     [x]  Continue plan of care  []  Update interventions per flow sheet       []  Discharge due to:_  []  Other:_      Zachary Ross, PT,  6/27/2022

## 2022-06-29 ENCOUNTER — APPOINTMENT (OUTPATIENT)
Dept: PHYSICAL THERAPY | Age: 77
End: 2022-06-29
Payer: MEDICARE

## 2022-07-01 ENCOUNTER — APPOINTMENT (OUTPATIENT)
Dept: PHYSICAL THERAPY | Age: 77
End: 2022-07-01

## 2022-07-20 ENCOUNTER — HOSPITAL ENCOUNTER (INPATIENT)
Age: 77
LOS: 5 days | Discharge: HOME HEALTH | DRG: 312 | End: 2022-07-27
Attending: EMERGENCY MEDICINE | Admitting: INTERNAL MEDICINE
Payer: MEDICARE

## 2022-07-20 ENCOUNTER — APPOINTMENT (OUTPATIENT)
Dept: GENERAL RADIOLOGY | Age: 77
DRG: 312 | End: 2022-07-20
Attending: EMERGENCY MEDICINE
Payer: MEDICARE

## 2022-07-20 ENCOUNTER — APPOINTMENT (OUTPATIENT)
Dept: GENERAL RADIOLOGY | Age: 77
DRG: 312 | End: 2022-07-20
Attending: INTERNAL MEDICINE
Payer: MEDICARE

## 2022-07-20 ENCOUNTER — APPOINTMENT (OUTPATIENT)
Dept: CT IMAGING | Age: 77
DRG: 312 | End: 2022-07-20
Attending: EMERGENCY MEDICINE
Payer: MEDICARE

## 2022-07-20 DIAGNOSIS — Z99.2 END-STAGE RENAL DISEASE ON HEMODIALYSIS (HCC): ICD-10-CM

## 2022-07-20 DIAGNOSIS — N18.6 END-STAGE RENAL DISEASE ON HEMODIALYSIS (HCC): ICD-10-CM

## 2022-07-20 DIAGNOSIS — I95.9 HYPOTENSION, UNSPECIFIED HYPOTENSION TYPE: Primary | ICD-10-CM

## 2022-07-20 PROBLEM — I95.1 ORTHOSTATIC HYPOTENSION: Status: ACTIVE | Noted: 2022-07-20

## 2022-07-20 LAB
ALBUMIN SERPL-MCNC: 3.3 G/DL (ref 3.5–5)
ALBUMIN/GLOB SERPL: 0.8 {RATIO} (ref 1.1–2.2)
ALP SERPL-CCNC: 50 U/L (ref 45–117)
ALT SERPL-CCNC: 8 U/L (ref 12–78)
ANION GAP SERPL CALC-SCNC: 9 MMOL/L (ref 5–15)
AST SERPL W P-5'-P-CCNC: 21 U/L (ref 15–37)
BASOPHILS # BLD: 0 K/UL (ref 0–0.2)
BASOPHILS NFR BLD: 1 % (ref 0–2.5)
BILIRUB SERPL-MCNC: 0.3 MG/DL (ref 0.2–1)
BUN SERPL-MCNC: 45 MG/DL (ref 6–20)
BUN/CREAT SERPL: 7 (ref 12–20)
CA-I BLD-MCNC: 8.8 MG/DL (ref 8.5–10.1)
CHLORIDE SERPL-SCNC: 97 MMOL/L (ref 97–108)
CO2 SERPL-SCNC: 29 MMOL/L (ref 21–32)
CREAT SERPL-MCNC: 6.03 MG/DL (ref 0.55–1.02)
EOSINOPHIL # BLD: 0.1 K/UL (ref 0–0.7)
EOSINOPHIL NFR BLD: 2 % (ref 0.9–2.9)
ERYTHROCYTE [DISTWIDTH] IN BLOOD BY AUTOMATED COUNT: 19 % (ref 11.5–14.5)
GLOBULIN SER CALC-MCNC: 3.9 G/DL (ref 2–4)
GLUCOSE BLD STRIP.AUTO-MCNC: 109 MG/DL (ref 65–117)
GLUCOSE BLD STRIP.AUTO-MCNC: 165 MG/DL (ref 65–117)
GLUCOSE SERPL-MCNC: 109 MG/DL (ref 65–100)
HCT VFR BLD AUTO: 37.1 % (ref 36–46)
HGB BLD-MCNC: 11.9 G/DL (ref 13.5–17.5)
LACTATE SERPL-SCNC: 1.3 MMOL/L (ref 0.4–2)
LYMPHOCYTES # BLD: 1.6 K/UL (ref 1–4.8)
LYMPHOCYTES NFR BLD: 28 % (ref 20.5–51.1)
MCH RBC QN AUTO: 29 PG (ref 31–34)
MCHC RBC AUTO-ENTMCNC: 32 G/DL (ref 31–36)
MCV RBC AUTO: 90.9 FL (ref 80–100)
MONOCYTES # BLD: 0.5 K/UL (ref 0.2–2.4)
MONOCYTES NFR BLD: 8 % (ref 1.7–9.3)
NEUTS SEG # BLD: 3.5 K/UL (ref 1.8–7.7)
NEUTS SEG NFR BLD: 61 % (ref 42–75)
NRBC # BLD: 0 K/UL
NRBC BLD-RTO: 0.1 PER 100 WBC
PERFORMED BY, TECHID: ABNORMAL
PERFORMED BY, TECHID: NORMAL
PLATELET # BLD AUTO: 205 K/UL (ref 150–400)
PMV BLD AUTO: 8.8 FL (ref 6.5–11.5)
POTASSIUM SERPL-SCNC: 5.4 MMOL/L (ref 3.5–5.1)
PROT SERPL-MCNC: 7.2 G/DL (ref 6.4–8.2)
RBC # BLD AUTO: 4.08 M/UL (ref 4.5–5.9)
SODIUM SERPL-SCNC: 135 MMOL/L (ref 136–145)
TROPONIN-HIGH SENSITIVITY: 28 NG/L (ref 0–51)
WBC # BLD AUTO: 5.7 K/UL (ref 4.4–11.3)

## 2022-07-20 PROCEDURE — 82962 GLUCOSE BLOOD TEST: CPT

## 2022-07-20 PROCEDURE — 99285 EMERGENCY DEPT VISIT HI MDM: CPT

## 2022-07-20 PROCEDURE — 83605 ASSAY OF LACTIC ACID: CPT

## 2022-07-20 PROCEDURE — 80053 COMPREHEN METABOLIC PANEL: CPT

## 2022-07-20 PROCEDURE — 74011250637 HC RX REV CODE- 250/637: Performed by: INTERNAL MEDICINE

## 2022-07-20 PROCEDURE — 74011000250 HC RX REV CODE- 250: Performed by: INTERNAL MEDICINE

## 2022-07-20 PROCEDURE — 73560 X-RAY EXAM OF KNEE 1 OR 2: CPT

## 2022-07-20 PROCEDURE — 84484 ASSAY OF TROPONIN QUANT: CPT

## 2022-07-20 PROCEDURE — 87040 BLOOD CULTURE FOR BACTERIA: CPT

## 2022-07-20 PROCEDURE — 36415 COLL VENOUS BLD VENIPUNCTURE: CPT

## 2022-07-20 PROCEDURE — G0378 HOSPITAL OBSERVATION PER HR: HCPCS

## 2022-07-20 PROCEDURE — 85025 COMPLETE CBC W/AUTO DIFF WBC: CPT

## 2022-07-20 PROCEDURE — 93005 ELECTROCARDIOGRAM TRACING: CPT

## 2022-07-20 PROCEDURE — 74011250636 HC RX REV CODE- 250/636: Performed by: INTERNAL MEDICINE

## 2022-07-20 PROCEDURE — 74011250637 HC RX REV CODE- 250/637: Performed by: HOSPITALIST

## 2022-07-20 PROCEDURE — 70450 CT HEAD/BRAIN W/O DYE: CPT

## 2022-07-20 PROCEDURE — 71045 X-RAY EXAM CHEST 1 VIEW: CPT

## 2022-07-20 PROCEDURE — 96372 THER/PROPH/DIAG INJ SC/IM: CPT

## 2022-07-20 RX ORDER — ACETAMINOPHEN 325 MG/1
650 TABLET ORAL
Status: DISCONTINUED | OUTPATIENT
Start: 2022-07-20 | End: 2022-07-27 | Stop reason: HOSPADM

## 2022-07-20 RX ORDER — TRAZODONE HYDROCHLORIDE 50 MG/1
50 TABLET ORAL
COMMUNITY
End: 2022-07-27

## 2022-07-20 RX ORDER — DOCUSATE SODIUM 100 MG/1
100 CAPSULE, LIQUID FILLED ORAL DAILY
Status: COMPLETED | OUTPATIENT
Start: 2022-07-20 | End: 2022-07-20

## 2022-07-20 RX ORDER — DOCUSATE SODIUM 100 MG/1
100 CAPSULE, LIQUID FILLED ORAL 2 TIMES DAILY
COMMUNITY

## 2022-07-20 RX ORDER — TRAZODONE HYDROCHLORIDE 50 MG/1
50 TABLET ORAL
Status: COMPLETED | OUTPATIENT
Start: 2022-07-20 | End: 2022-07-20

## 2022-07-20 RX ORDER — ISOSORBIDE MONONITRATE 30 MG/1
30 TABLET, EXTENDED RELEASE ORAL DAILY
COMMUNITY
End: 2022-07-27

## 2022-07-20 RX ORDER — ACETAMINOPHEN 650 MG/1
650 SUPPOSITORY RECTAL
Status: DISCONTINUED | OUTPATIENT
Start: 2022-07-20 | End: 2022-07-27 | Stop reason: HOSPADM

## 2022-07-20 RX ORDER — MIRTAZAPINE 15 MG/1
15 TABLET, FILM COATED ORAL
Status: CANCELLED | OUTPATIENT
Start: 2022-07-20

## 2022-07-20 RX ORDER — ONDANSETRON 2 MG/ML
4 INJECTION INTRAMUSCULAR; INTRAVENOUS
Status: DISCONTINUED | OUTPATIENT
Start: 2022-07-20 | End: 2022-07-27 | Stop reason: HOSPADM

## 2022-07-20 RX ORDER — HEPARIN SODIUM 5000 [USP'U]/ML
5000 INJECTION, SOLUTION INTRAVENOUS; SUBCUTANEOUS EVERY 8 HOURS
Status: DISCONTINUED | OUTPATIENT
Start: 2022-07-20 | End: 2022-07-27 | Stop reason: HOSPADM

## 2022-07-20 RX ORDER — ATORVASTATIN CALCIUM 40 MG/1
40 TABLET, FILM COATED ORAL DAILY
Status: COMPLETED | OUTPATIENT
Start: 2022-07-20 | End: 2022-07-20

## 2022-07-20 RX ORDER — INSULIN LISPRO 100 [IU]/ML
INJECTION, SOLUTION INTRAVENOUS; SUBCUTANEOUS
Status: DISCONTINUED | OUTPATIENT
Start: 2022-07-20 | End: 2022-07-27 | Stop reason: HOSPADM

## 2022-07-20 RX ORDER — FLUTICASONE PROPIONATE AND SALMETEROL XINAFOATE 115; 21 UG/1; UG/1
2 AEROSOL, METERED RESPIRATORY (INHALATION)
COMMUNITY
Start: 2021-09-02

## 2022-07-20 RX ORDER — TRAMADOL HYDROCHLORIDE 50 MG/1
50 TABLET ORAL
Status: DISCONTINUED | OUTPATIENT
Start: 2022-07-20 | End: 2022-07-27 | Stop reason: HOSPADM

## 2022-07-20 RX ORDER — SODIUM CHLORIDE 0.9 % (FLUSH) 0.9 %
5-40 SYRINGE (ML) INJECTION EVERY 8 HOURS
Status: DISCONTINUED | OUTPATIENT
Start: 2022-07-20 | End: 2022-07-27 | Stop reason: HOSPADM

## 2022-07-20 RX ORDER — ONDANSETRON 4 MG/1
4 TABLET, ORALLY DISINTEGRATING ORAL
Status: DISCONTINUED | OUTPATIENT
Start: 2022-07-20 | End: 2022-07-27 | Stop reason: HOSPADM

## 2022-07-20 RX ORDER — MIDODRINE HYDROCHLORIDE 5 MG/1
5 TABLET ORAL
Status: DISCONTINUED | OUTPATIENT
Start: 2022-07-20 | End: 2022-07-25

## 2022-07-20 RX ORDER — MIRTAZAPINE 15 MG/1
15 TABLET, FILM COATED ORAL
Status: COMPLETED | OUTPATIENT
Start: 2022-07-20 | End: 2022-07-20

## 2022-07-20 RX ORDER — SODIUM CHLORIDE 0.9 % (FLUSH) 0.9 %
5-40 SYRINGE (ML) INJECTION AS NEEDED
Status: DISCONTINUED | OUTPATIENT
Start: 2022-07-20 | End: 2022-07-27 | Stop reason: HOSPADM

## 2022-07-20 RX ORDER — CARVEDILOL 12.5 MG/1
12.5 TABLET ORAL 2 TIMES DAILY
COMMUNITY
End: 2022-07-27

## 2022-07-20 RX ORDER — POLYETHYLENE GLYCOL 3350 17 G/17G
17 POWDER, FOR SOLUTION ORAL DAILY PRN
Status: DISCONTINUED | OUTPATIENT
Start: 2022-07-20 | End: 2022-07-27 | Stop reason: HOSPADM

## 2022-07-20 RX ORDER — MIRTAZAPINE 15 MG/1
15 TABLET, FILM COATED ORAL
Status: ON HOLD | COMMUNITY
End: 2022-07-27 | Stop reason: SDUPTHER

## 2022-07-20 RX ORDER — FLUTICASONE PROPIONATE 50 MCG
2 SPRAY, SUSPENSION (ML) NASAL DAILY
COMMUNITY

## 2022-07-20 RX ORDER — AZELASTINE 1 MG/ML
1 SPRAY, METERED NASAL 2 TIMES DAILY
COMMUNITY

## 2022-07-20 RX ADMIN — SODIUM CHLORIDE, PRESERVATIVE FREE 10 ML: 5 INJECTION INTRAVENOUS at 22:10

## 2022-07-20 RX ADMIN — DOCUSATE SODIUM 100 MG: 100 CAPSULE, LIQUID FILLED ORAL at 22:10

## 2022-07-20 RX ADMIN — ACETAMINOPHEN 650 MG: 325 TABLET ORAL at 22:10

## 2022-07-20 RX ADMIN — MIRTAZAPINE 15 MG: 15 TABLET, FILM COATED ORAL at 22:10

## 2022-07-20 RX ADMIN — TRAZODONE HYDROCHLORIDE 50 MG: 50 TABLET ORAL at 22:10

## 2022-07-20 RX ADMIN — HEPARIN SODIUM 5000 UNITS: 5000 INJECTION INTRAVENOUS; SUBCUTANEOUS at 17:43

## 2022-07-20 RX ADMIN — SODIUM CHLORIDE, PRESERVATIVE FREE 10 ML: 5 INJECTION INTRAVENOUS at 17:44

## 2022-07-20 RX ADMIN — ATORVASTATIN CALCIUM 40 MG: 40 TABLET, FILM COATED ORAL at 22:10

## 2022-07-20 RX ADMIN — MIDODRINE HYDROCHLORIDE 5 MG: 5 TABLET ORAL at 17:44

## 2022-07-20 RX ADMIN — HEPARIN SODIUM 5000 UNITS: 5000 INJECTION INTRAVENOUS; SUBCUTANEOUS at 22:10

## 2022-07-20 NOTE — ED TRIAGE NOTES
Pt arrives from PCP office for complaint of low blood pressure. BP in triage 111/61. Pt Aox4. Pt is a dialysis pt and went yesterday.

## 2022-07-20 NOTE — ROUTINE PROCESS
TRANSFER - IN REPORT:    Verbal report received from Pennie Hobbs RN(name) on Lauryn Sky  being received from ED(unit) for routine progression of care      Report consisted of patients Situation, Background, Assessment and   Recommendations(SBAR). Information from the following report(s) Kardex and MAR was reviewed with the receiving nurse. Opportunity for questions and clarification was provided. Assessment completed upon patients arrival to unit and care assumed.

## 2022-07-20 NOTE — ED PROVIDER NOTES
HPI 65-year-old female end-stage renal disease on hemodialysis which began approximately 4 weeks ago previous CVA known coronary disease status post LAD stent diabetes hypertension near blindness was seen today in Dr. Robert Sun and noted to have a low blood pressure of 70 systolic. The patient and her age notes she has been having dizzy spells and fell last week without injury. Patient lives alone and has caretakers during the daytime till approximately 30 at night when she is left alone her daughter lives out of town [address] [facility] coming down to help care for her. Her blood pressure medications have been recently adjusted by her nephrologist some difficulty with managing adequate blood pressure. She denies chest pain cough shortness of breath fever GI or  symptoms. Her caretaker notes declining appetite and poor p.o. intake. She normally does not get out of bed unassisted though attempted to last week and fell.     Past Medical History:   Diagnosis Date    Blindness/low vision 2008    due to dm    CHF (congestive heart failure) ([facility])     Chronic kidney disease     Chronic pain     SPINAL STENOSIS    DM (diabetes mellitus) ([facility] [address]     HTN (hypertension)     Menopause     Stroke [facility])        Past Surgical History:   Procedure Laterality Date    HX OOPHORECTOMY      STENT INSERTION  2001,2002    Shannan Ortega         Family History:   Problem Relation Age of Onset    Breast Cancer Maternal Aunt     Breast Cancer Cousin        Social History     Socioeconomic History    Marital status:      Spouse name: Not on file    Number of children: Not on file    Years of education: Not on file    Highest education level: Not on file   Occupational History    Not on file   Tobacco Use    Smoking status: Former    Smokeless tobacco: Never    Tobacco comments:     quit 1972   Substance and Sexual Activity    Alcohol use: No    Drug use: No    Sexual activity: Not Currently   Other Topics Concern     Service Not Asked    Blood Transfusions Not Asked    Caffeine Concern Not Asked    Occupational Exposure Not Asked    Hobby Hazards Not Asked    Sleep Concern Not Asked    Stress Concern Not Asked    Weight Concern Not Asked    Special Diet Not Asked    Back Care Not Asked    Exercise Not Asked    Bike Helmet Not Asked    Seat Belt Not Asked    Self-Exams Not Asked   Social History Narrative    Not on file     Social Determinants of Health     Financial Resource Strain: Not on file   Food Insecurity: Not on file   Transportation Needs: Not on file   Physical Activity: Not on file   Stress: Not on file   Social Connections: Not on file   Intimate Partner Violence: Not on file   Housing Stability: Not on file         ALLERGIES: Patient has no known allergies. Review of Systems   Constitutional:  Positive for appetite change. Negative for activity change, chills, diaphoresis, fatigue and fever. HENT: Negative. Eyes: Negative. Respiratory:  Negative for cough, chest tightness, shortness of breath and wheezing. Cardiovascular:  Negative for chest pain, palpitations and leg swelling. Gastrointestinal:  Negative for abdominal distention, abdominal pain, blood in stool, constipation, diarrhea, nausea and vomiting. Endocrine: Negative. Genitourinary:  Negative for difficulty urinating, dysuria, flank pain, frequency and hematuria. Musculoskeletal: Negative. Neurological:  Negative for dizziness, seizures, speech difficulty, weakness and numbness. Hematological: Negative. Psychiatric/Behavioral: Negative. Vitals:    07/20/22 1358 07/20/22 1402 07/20/22 1415 07/20/22 1425   BP: 111/61  114/63 112/65   Pulse: 73  70 73   Resp: 18      Temp: 97.7 °F (36.5 °C)      SpO2: 96% 98%     Weight: 77.1 kg (170 lb)      Height: 5' 4\" (1.626 m)             Thin frail elderly AA female in no immediate distress blood pressure here is 111/61  Physical Exam  Vitals and nursing note reviewed.    Constitutional: General: She is not in acute distress. Appearance: Normal appearance. She is not ill-appearing, toxic-appearing or diaphoretic. HENT:      Head: Normocephalic and atraumatic. Nose: Nose normal.      Mouth/Throat:      Mouth: Mucous membranes are moist.   Eyes:      Extraocular Movements: Extraocular movements intact. Conjunctiva/sclera: Conjunctivae normal.   Cardiovascular:      Rate and Rhythm: Normal rate and regular rhythm. Pulses: Normal pulses. Heart sounds: Normal heart sounds. No murmur heard. Pulmonary:      Effort: Pulmonary effort is normal. No respiratory distress. Breath sounds: Normal breath sounds. No wheezing, rhonchi or rales. Abdominal:      General: Bowel sounds are normal. There is no distension. Palpations: Abdomen is soft. There is no mass. Tenderness: There is no abdominal tenderness. There is no right CVA tenderness, left CVA tenderness, guarding or rebound. Hernia: No hernia is present. Musculoskeletal:         General: No swelling, tenderness, deformity or signs of injury. Normal range of motion. Cervical back: Normal range of motion and neck supple. No rigidity or tenderness. Right lower leg: No edema. Left lower leg: No edema. Lymphadenopathy:      Cervical: No cervical adenopathy. Skin:     General: Skin is warm and dry. Capillary Refill: Capillary refill takes less than 2 seconds. Findings: No erythema, lesion or rash. Neurological:      General: No focal deficit present. Mental Status: She is alert and oriented to person, place, and time. Mental status is at baseline. Cranial Nerves: No cranial nerve deficit. Sensory: No sensory deficit. Motor: No weakness.       Coordination: Coordination normal.      Comments: No focal neurologic deficit is noted patient is globally weak and unable to stand on her gait is not tested   Psychiatric:         Mood and Affect: Mood normal. Behavior: Behavior normal.         Thought Content: Thought content normal.        [facility]    EKG performed at 1356 interpreted 1400 shows a sinus rhythm at 70 SD interval is borderline prolonged QRS duration is mildly prolonged QRS axis is normal considerable baseline interference no acute injury pattern.     Elderly infirm dialysis patient with hypotension earlier today lives alone near blind discussed with hospitalist regarding admission for observation   Dr Olayinka Evans to admit for observation  Procedures

## 2022-07-20 NOTE — ROUTINE PROCESS
Bedside and Verbal shift change report given to Ana Rosa Dodd RN (oncoming nurse) by Marcelo Berkowitz RN (offgoing nurse). Report included the following information Kardex and MAR.

## 2022-07-20 NOTE — H&P
History & Physical    Primary Care Provider: Alexandro Oreilly MD  Source of Information: Patient nursing Aide    History of Presenting Illness:   Rob Willams is a 78 y.o. female with h/o ESRD on hemodialysis Tuesday, Thursdays and Saturdays. She was last dialyzed on Tuesday but reportedly felt unwell postdialysis. Denies fevers chills abdominal pain nausea or vomiting. Reportedly lives at home at night from 8:30 PM to 8 AM in the morning. Had difficulties getting out of bed to the bathroom. Had a follow-up appointment with PCP Dr. Yumi Bustos today. During appointment, blood pressure reading noted to be in the 46N systolic. She was sent over to the ED for further evaluation. Upon assessment in the ED, her blood pressure was noted to be in the low 100s. She was afebrile and not tachycardic. In retrospect, she reports a ground-level fall at home about 2 weeks ago. Post recent fall, she has been feeling extremely weak and difficulties with ambulation. She is requesting to go to acute inpatient rehab. We will monitor overnight with PT OT evaluation for possible placement       Review of Systems:  A comprehensive review of systems was negative except for that written in the History of Present Illness. Past Medical History:   Diagnosis Date    Blindness/low vision 2008    due to dm    CHF (congestive heart failure) ([facility])     Chronic kidney disease     Chronic pain     SPINAL STENOSIS    DM (diabetes mellitus) ([facility] [address]     HTN (hypertension)     Menopause     Stroke [facility])       Past Surgical History:   Procedure Laterality Date    HX OOPHORECTOMY      STENT INSERTION  [id number]    Shannan Jordan     Prior to Admission medications    Medication Sig Start Date End Date Taking? Authorizing Provider   fluticasone propion-salmeteroL (Advair HFA) 115-21 mcg/actuation inhaler Take 2 Puffs by inhalation.  9/2/21  Yes Other, MD Riana   ipratropium (ATROVENT) 21 mcg (0.03 %) nasal spray 1 SPRAY BY BOTH NOSTRILS ROUTE EVERY TWELVE (12) HOURS AS NEEDED FOR RUNNY NOSE 3/30/22   Niharika Rebollar MD   levoFLOXacin (LEVAQUIN) 250 mg tablet Take 1 Tablet by mouth daily. 3/8/22   Dottie Parker MD   furosemide (LASIX) 40 mg tablet Take 40 mg by mouth two (2) times a day. Provider, Historical   loratadine 10 mg cap Take  by mouth. Provider, Historical   traZODone (DESYREL) 100 mg tablet Take 100 mg by mouth nightly. Provider, Historical   augmented betamethasone dipropionate (DIPROLENE-AF) 0.05 % topical cream Apply  to affected area two (2) times a day. Provider, Historical   clobetasoL (CLOBEX) 0.05 % lotion Apply  to affected area two (2) times a day. Provider, Historical   ketoconazole (NIZORAL) 2 % shampoo Apply  to affected area daily as needed for Itching. Provider, Historical   hydrALAZINE (APRESOLINE) 100 mg tablet Take 1 Tab by mouth three (3) times daily. 5/6/21   Sally Granda MD   acetaminophen (TYLENOL) 325 mg tablet Take 2 Tabs by mouth every six (6) hours as needed for Pain or Fever. Patient not taking: Reported on 10/19/2021 5/6/21   Slaly Granda MD   carvediloL (COREG) 25 mg tablet Take 1 Tab by mouth two (2) times daily (with meals). 5/6/21   Sally Granda MD   nitroglycerin (NITRODUR) 0.2 mg/hr 1 Patch by TransDERmal route daily. 5/6/21   Sally Granda MD   insulin lispro (HUMALOG) 100 unit/mL injection INITIATE CORRECTIVE INSULIN PROTOCOL (EDWARD):  RX EDWARD Normal Sensitivity (Average weight)    AC (before meals), Q6H, and Q4H CORRECTIONAL SCALE only For Blood Sugar (mg/dl) of :             140-199=2 units            200-249=3 units  250-299=5 units  300-349=7 units  350 or greater = Call MD  Give in addition to basal medications.   Do Not Hold for NPO    BEDTIME CORRECTIONAL sliding scale when scheduled:  200-249=2 units  250-299=3 units   300-349=4 units  350 or greater = Call MD  Give in addition to basal medications. Do Not Hold for NPO Fast Acting - Administer Immediately - or within 15 minutes of start of meal, if mealtime coverage. 5/6/21   Vladimir Curiel MD   lidocaine (LIDODERM) 5 % 1 Patch by TransDERmal route daily. 4/20/21   Provider, Historical   albuterol (PROVENTIL HFA, VENTOLIN HFA, PROAIR HFA) 90 mcg/actuation inhaler Take 2 Puffs by inhalation every four (4) hours as needed for Shortness of Breath. 4/28/21   Provider, Historical   amLODIPine (NORVASC) 10 mg tablet Take 10 mg by mouth daily. 2/5/21   Provider, Historical   cholecalciferol, vitamin D3, 50 mcg (2,000 unit) tab Take 2,000 Units by mouth daily. 3/8/21   Provider, Historical   donepeziL (ARICEPT) 5 mg tablet Take 5 mg by mouth daily. Provider, Historical   lisinopriL (PRINIVIL, ZESTRIL) 40 mg tablet Take 40 mg by mouth daily. Provider, Historical   aspirin 81 mg chewable tablet Take 81 mg by mouth daily. Provider, Historical   doxazosin (CARDURA) 2 mg tablet Take 2 mg by mouth nightly. Patient not taking: Reported on 10/19/2021    Provider, Historical   cyanocobalamin, vitamin B-12, 3,000 mcg cap cyanocobalamin (vitamin B-12) 3,000 mcg capsule    Other, MD Riana   allopurinoL (ZYLOPRIM) 100 mg tablet allopurinol 100 mg tablet    Other, MD Riana   atorvastatin (LIPITOR) 40 mg tablet atorvastatin 40 mg tablet  Patient not taking: Reported on 10/19/2021    Other, MD Riana   fluticasone propion-salmeterol (ADVAIR HFA) 115-21 mcg/actuation inhaler Take 2 Puffs by inhalation two (2) times a day. Provider, Historical   sodium bicarbonate 650 mg tablet Take 650 mg by mouth two (2) times a day.     Provider, Historical     No Known Allergies   Family History   Problem Relation Age of Onset    Breast Cancer Maternal Aunt     Breast Cancer Cousin         SOCIAL HISTORY:  Patient resides:  Independently x   Assisted Living    SNF    With family care       Smoking history:   None x   Former    Chronic      Alcohol history: None x   Social    Chronic      Ambulates:   Independently    w/cane    w/walker x   w/wc    CODE STATUS:  DNR    Full x   Other      Objective:     Physical Exam:     Visit Vitals  BP (!) 103/52   Pulse 76   Temp 97.7 °F (36.5 °C)   Resp 19   Ht 5' 4\" (1.626 m)   Wt 77.1 kg (170 lb)   LMP  (LMP Unknown)   SpO2 98%   BMI 29.18 kg/m²      O2 Device: None    General:  Alert, cooperative, no distress, appears stated age. Head:  Normocephalic, without obvious abnormality, atraumatic. Eyes:  Conjunctivae/corneas clear. PERRL, EOMs intact. Nose: Nares normal. Septum midline. Mucosa normal. No drainage or sinus tenderness. Throat: Lips, mucosa, and tongue normal. Teeth and gums normal.   Neck: Supple, symmetrical, trachea midline, no adenopathy, thyroid: no enlargement/tenderness/nodules, no carotid bruit and no JVD. Back:   Symmetric, no curvature. ROM normal. No CVA tenderness. Lungs:   Clear to auscultation bilaterally. Chest wall:  No tenderness or deformity. Heart:  Regular rate and rhythm, S1, S2 normal, no murmur, click, rub or gallop. Abdomen:   Soft, non-tender. Bowel sounds normal. No masses,  No organomegaly. Extremities: Extremities normal, atraumatic, no cyanosis or edema. Pulses: 2+ and symmetric all extremities. Skin: Skin color, texture, turgor normal. No rashes or lesions   Neurologic: CNII-XII intact. No motor or sensory deficits. EKG:  normal EKG, normal sinus rhythm, unchanged from previous tracings, nonspecific ST and T waves changes. Data Review:     Recent Days:  Recent Labs     07/20/22  1444   WBC 5.7   HGB 11.9*   HCT 37.1        Recent Labs     07/20/22  1444   *   K 5.4*   CL 97   CO2 29   *   BUN 45*   CREA 6.03*   CA 8.8   ALB 3.3*   TBILI 0.3   ALT 8*     No results for input(s): PH, PCO2, PO2, HCO3, FIO2 in the last 72 hours.     24 Hour Results:  Recent Results (from the past 24 hour(s))   EKG, 12 LEAD, INITIAL    Collection Time: 07/20/22  1:56 PM   Result Value Ref Range    Ventricular Rate 70 BPM    Atrial Rate 70 BPM    P-R Interval 202 ms    QRS Duration 93 ms    Q-T Interval 422 ms    QTC Calculation (Bezet) 456 ms    Calculated P Axis -14 degrees    Calculated R Axis 21 degrees    Calculated T Axis 81 degrees    Diagnosis       Sinus rhythm  Abnormal R-wave progression, early transition  Baseline wander in lead(s) V4     CBC WITH AUTOMATED DIFF    Collection Time: 07/20/22  2:44 PM   Result Value Ref Range    WBC 5.7 4.4 - 11.3 K/uL    RBC 4.08 (L) 4.50 - 5.90 M/uL    HGB 11.9 (L) 13.5 - 17.5 g/dL    HCT 37.1 36 - 46 %    MCV 90.9 80 - 100 FL    MCH 29.0 (L) 31 - 34 PG    MCHC 32.0 31.0 - 36.0 g/dL    RDW 19.0 (H) 11.5 - 14.5 %    PLATELET 801 354 - 886 K/uL    MPV 8.8 6.5 - 11.5 FL    NRBC 0.1  WBC    ABSOLUTE NRBC 0.00 K/uL    NEUTROPHILS 61 42 - 75 %    LYMPHOCYTES 28 20.5 - 51.1 %    MONOCYTES 8 1.7 - 9.3 %    EOSINOPHILS 2 0.9 - 2.9 %    BASOPHILS 1 0.0 - 2.5 %    ABS. NEUTROPHILS 3.5 1.8 - 7.7 K/UL    ABS. LYMPHOCYTES 1.6 1.0 - 4.8 K/UL    ABS. MONOCYTES 0.5 0.2 - 2.4 K/UL    ABS. EOSINOPHILS 0.1 0.0 - 0.7 K/UL    ABS. BASOPHILS 0.0 0.0 - 0.2 K/UL   METABOLIC PANEL, COMPREHENSIVE    Collection Time: 07/20/22  2:44 PM   Result Value Ref Range    Sodium 135 (L) 136 - 145 mmol/L    Potassium 5.4 (H) 3.5 - 5.1 mmol/L    Chloride 97 97 - 108 mmol/L    CO2 29 21 - 32 mmol/L    Anion gap 9 5 - 15 mmol/L    Glucose 109 (H) 65 - 100 mg/dL    BUN 45 (H) 6 - 20 mg/dL    Creatinine 6.03 (H) 0.55 - 1.02 mg/dL    BUN/Creatinine ratio 7 (L) 12 - 20      GFR est AA 8 (L) >60 ml/min/1.73m2    GFR est non-AA 7 (L) >60 ml/min/1.73m2    Calcium 8.8 8.5 - 10.1 mg/dL    Bilirubin, total 0.3 0.2 - 1.0 mg/dL    AST (SGOT) 21 15 - 37 U/L    ALT (SGPT) 8 (L) 12 - 78 U/L    Alk.  phosphatase 50 45 - 117 U/L    Protein, total 7.2 6.4 - 8.2 g/dL    Albumin 3.3 (L) 3.5 - 5.0 g/dL    Globulin 3.9 2.0 - 4.0 g/dL    A-G Ratio 0.8 (L) 1.1 - 2.2 TROPONIN-HIGH SENSITIVITY    Collection Time: 07/20/22  2:44 PM   Result Value Ref Range    Troponin-High Sensitivity 28 0 - 51 ng/L         Imaging:   XR CHEST PORT   Final Result   Mild left basilar atelectasis. CT HEAD WO CONT   Final Result         No change or acute abnormality            Assessment:     Orthostatic hypotension  -Most likely related to poor oral intake and polypharmacy  -Hold all antihypertensives including amlodipine  -We will begin midodrine 5 mg oral 3 times daily  -Encourage oral intake    End-stage renal disease  -Hemodialysis Tuesday Thursdays and Saturdays  -Consult nephrologist for electrolyte management    Ambulatory and gait disturbance  -Secondary to recent fall  -At baseline she uses a rolling walker at home  -We will obtain x-rays of bilateral knee to rule out occult fracture  -Tramadol 50 mg q 6 hrs prn  -Obtain PT OT evaluation    Type 2 diabetes-  -Begin sliding scale insulin  -Blood sugar ACH S        Plan:     Admit to medical telemetry floor observation  Neuro check every 4 hours x24 hours  Hold antihypertensives  Potassium will be rechecked after hemodialysis in a.m.   Goals of care discussed with patient and caregiver  She is full code      Signed By: Teri Portillo MD     July 20, 2022

## 2022-07-20 NOTE — ED NOTES
Report given to Bita Lo RN acute care [facility]. No further questions at this time. Pt to be transported to assigned room momentarily.

## 2022-07-20 NOTE — ED NOTES
Per pt and home health aid, Pt has pressure ulcer to sacrum that is changed every morning and evening. Pts dressing usually consists of barrier cream and mepilex.  Last dressing change was this am.

## 2022-07-20 NOTE — ED NOTES
Orthostatics for lying sitting done and recorded. Pt states she has been unable to stand for 4 months. Dr. Meseret Castro notified.

## 2022-07-21 LAB
ANION GAP SERPL CALC-SCNC: 10 MMOL/L (ref 5–15)
ATRIAL RATE: 70 BPM
BUN SERPL-MCNC: 57 MG/DL (ref 6–20)
BUN/CREAT SERPL: 8 (ref 12–20)
CA-I BLD-MCNC: 8.5 MG/DL (ref 8.5–10.1)
CALCULATED P AXIS, ECG09: -14 DEGREES
CALCULATED R AXIS, ECG10: 21 DEGREES
CALCULATED T AXIS, ECG11: 81 DEGREES
CHLORIDE SERPL-SCNC: 97 MMOL/L (ref 97–108)
CO2 SERPL-SCNC: 30 MMOL/L (ref 21–32)
CREAT SERPL-MCNC: 7.14 MG/DL (ref 0.55–1.02)
DIAGNOSIS, 93000: NORMAL
GLUCOSE BLD STRIP.AUTO-MCNC: 190 MG/DL (ref 65–117)
GLUCOSE BLD STRIP.AUTO-MCNC: 82 MG/DL (ref 65–117)
GLUCOSE BLD STRIP.AUTO-MCNC: 91 MG/DL (ref 65–117)
GLUCOSE SERPL-MCNC: 119 MG/DL (ref 65–100)
P-R INTERVAL, ECG05: 202 MS
PERFORMED BY, TECHID: ABNORMAL
PERFORMED BY, TECHID: NORMAL
PERFORMED BY, TECHID: NORMAL
POTASSIUM SERPL-SCNC: 5 MMOL/L (ref 3.5–5.1)
Q-T INTERVAL, ECG07: 422 MS
QRS DURATION, ECG06: 93 MS
QTC CALCULATION (BEZET), ECG08: 456 MS
SODIUM SERPL-SCNC: 137 MMOL/L (ref 136–145)
VENTRICULAR RATE, ECG03: 70 BPM

## 2022-07-21 PROCEDURE — 86706 HEP B SURFACE ANTIBODY: CPT

## 2022-07-21 PROCEDURE — 36415 COLL VENOUS BLD VENIPUNCTURE: CPT

## 2022-07-21 PROCEDURE — G0378 HOSPITAL OBSERVATION PER HR: HCPCS

## 2022-07-21 PROCEDURE — 87340 HEPATITIS B SURFACE AG IA: CPT

## 2022-07-21 PROCEDURE — 96372 THER/PROPH/DIAG INJ SC/IM: CPT

## 2022-07-21 PROCEDURE — 74011000250 HC RX REV CODE- 250: Performed by: INTERNAL MEDICINE

## 2022-07-21 PROCEDURE — 97165 OT EVAL LOW COMPLEX 30 MIN: CPT

## 2022-07-21 PROCEDURE — 74011250637 HC RX REV CODE- 250/637: Performed by: INTERNAL MEDICINE

## 2022-07-21 PROCEDURE — 82962 GLUCOSE BLOOD TEST: CPT

## 2022-07-21 PROCEDURE — 74011250636 HC RX REV CODE- 250/636: Performed by: INTERNAL MEDICINE

## 2022-07-21 PROCEDURE — 80048 BASIC METABOLIC PNL TOTAL CA: CPT

## 2022-07-21 PROCEDURE — 5A1D70Z PERFORMANCE OF URINARY FILTRATION, INTERMITTENT, LESS THAN 6 HOURS PER DAY: ICD-10-PCS | Performed by: INTERNAL MEDICINE

## 2022-07-21 PROCEDURE — 97530 THERAPEUTIC ACTIVITIES: CPT

## 2022-07-21 PROCEDURE — 90935 HEMODIALYSIS ONE EVALUATION: CPT

## 2022-07-21 RX ORDER — ATORVASTATIN CALCIUM 40 MG/1
40 TABLET, FILM COATED ORAL
Status: DISCONTINUED | OUTPATIENT
Start: 2022-07-21 | End: 2022-07-27 | Stop reason: HOSPADM

## 2022-07-21 RX ORDER — DOCUSATE SODIUM 100 MG/1
100 CAPSULE, LIQUID FILLED ORAL 2 TIMES DAILY
Status: DISCONTINUED | OUTPATIENT
Start: 2022-07-21 | End: 2022-07-27 | Stop reason: HOSPADM

## 2022-07-21 RX ORDER — MIRTAZAPINE 15 MG/1
15 TABLET, FILM COATED ORAL
Status: DISCONTINUED | OUTPATIENT
Start: 2022-07-21 | End: 2022-07-25

## 2022-07-21 RX ORDER — DONEPEZIL HYDROCHLORIDE 5 MG/1
5 TABLET, FILM COATED ORAL DAILY
Status: DISCONTINUED | OUTPATIENT
Start: 2022-07-21 | End: 2022-07-27 | Stop reason: HOSPADM

## 2022-07-21 RX ADMIN — MIDODRINE HYDROCHLORIDE 5 MG: 5 TABLET ORAL at 08:45

## 2022-07-21 RX ADMIN — SODIUM CHLORIDE, PRESERVATIVE FREE 10 ML: 5 INJECTION INTRAVENOUS at 21:36

## 2022-07-21 RX ADMIN — HEPARIN SODIUM 5000 UNITS: 5000 INJECTION INTRAVENOUS; SUBCUTANEOUS at 21:31

## 2022-07-21 RX ADMIN — SODIUM CHLORIDE, PRESERVATIVE FREE 10 ML: 5 INJECTION INTRAVENOUS at 14:59

## 2022-07-21 RX ADMIN — ACETAMINOPHEN 650 MG: 325 TABLET ORAL at 15:52

## 2022-07-21 RX ADMIN — MIDODRINE HYDROCHLORIDE 5 MG: 5 TABLET ORAL at 17:32

## 2022-07-21 RX ADMIN — MIRTAZAPINE 15 MG: 15 TABLET, FILM COATED ORAL at 21:31

## 2022-07-21 RX ADMIN — DOCUSATE SODIUM 100 MG: 100 CAPSULE, LIQUID FILLED ORAL at 08:45

## 2022-07-21 RX ADMIN — SODIUM CHLORIDE, PRESERVATIVE FREE 10 ML: 5 INJECTION INTRAVENOUS at 06:12

## 2022-07-21 RX ADMIN — DONEPEZIL HYDROCHLORIDE 5 MG: 5 TABLET, FILM COATED ORAL at 08:45

## 2022-07-21 RX ADMIN — HEPARIN SODIUM 5000 UNITS: 5000 INJECTION INTRAVENOUS; SUBCUTANEOUS at 15:00

## 2022-07-21 RX ADMIN — DOCUSATE SODIUM 100 MG: 100 CAPSULE, LIQUID FILLED ORAL at 21:31

## 2022-07-21 RX ADMIN — HEPARIN SODIUM 5000 UNITS: 5000 INJECTION INTRAVENOUS; SUBCUTANEOUS at 06:12

## 2022-07-21 RX ADMIN — ATORVASTATIN CALCIUM 40 MG: 40 TABLET, FILM COATED ORAL at 21:31

## 2022-07-21 NOTE — PROGRESS NOTES
HOSPITALIST PROGRESS NOTE  Ken Suarez MD, [place]  [address]         Daily Progress Note: 7/21/2022      Subjective:     Patient is alert and oriented x4. Patient evaluated with home nursing aide at bedside. No overnight fever/chills, chest pain, nausea/vomiting, abdominal pain noted. Patient is pending nephrology evaluation as well as likely dialysis today. Medications reviewed  Current Facility-Administered Medications   Medication Dose Route Frequency    atorvastatin (LIPITOR) tablet 40 mg  40 mg Oral QHS    donepeziL (ARICEPT) tablet 5 mg  5 mg Oral DAILY    mirtazapine (REMERON) tablet 15 mg  15 mg Oral QHS    docusate sodium (COLACE) capsule 100 mg  100 mg Oral BID    sodium chloride (NS) flush 5-40 mL  5-40 mL IntraVENous Q8H    sodium chloride (NS) flush 5-40 mL  5-40 mL IntraVENous PRN    acetaminophen (TYLENOL) tablet 650 mg  650 mg Oral Q6H PRN    Or    acetaminophen (TYLENOL) suppository 650 mg  650 mg Rectal Q6H PRN    polyethylene glycol (MIRALAX) packet 17 g  17 g Oral DAILY PRN    ondansetron (ZOFRAN ODT) tablet 4 mg  4 mg Oral Q8H PRN    Or    ondansetron (ZOFRAN) injection 4 mg  4 mg IntraVENous Q6H PRN    heparin (porcine) injection 5,000 Units  5,000 Units SubCUTAneous Q8H    midodrine (PROAMATINE) tablet 5 mg  5 mg Oral TID WITH MEALS    insulin lispro (HUMALOG) injection   SubCUTAneous AC&HS    traMADoL (ULTRAM) tablet 50 mg  50 mg Oral Q6H PRN       Review of Systems:   A comprehensive review of systems was negative except for that written in the HPI.     Objective:   Physical Exam:     Visit Vitals  BP (!) 122/51   Pulse (!) 57   Temp 96.9 °F (36.1 °C)   Resp 16   Ht 5' 4\" (1.626 m)   Wt 77.1 kg (170 lb)   LMP  (LMP Unknown)   SpO2 97%   BMI 29.18 kg/m²      O2 Device: None (Room air)  Patient Vitals for the past 8 hrs:   Temp Pulse Resp BP SpO2   07/21/22 1145 -- (!) 57 -- (!) 122/51 --   22 1130 -- 61 -- (!) 130/58 --   22 1120 -- 61 -- 126/60 --   22 1104 96.9 °F (36.1 °C) 61 16 (!) 124/53 --   22 0800 -- 63 -- -- --   22 0723 97.5 °F (36.4 °C) 63 16 (!) 115/52 97 %   22 0407 97.7 °F (36.5 °C) 64 16 (!) 140/59 98 %          Temp (24hrs), Av.3 °F (36.3 °C), Min:96.9 °F (36.1 °C), Max:97.7 °F (36.5 °C)    No intake/output data recorded. No intake/output data recorded. General:  Alert, cooperative, no distress, appears stated age. Lungs:   Clear to auscultation bilaterally. Chest wall:  No tenderness or deformity. Heart:  Regular rate and rhythm, S1, S2 normal, no murmur, click, rub or gallop. Abdomen:   Soft, non-tender. Bowel sounds normal. No masses,  No organomegaly. Extremities: Extremities normal, atraumatic, no cyanosis or edema. Pulses: 2+ and symmetric all extremities. Skin: Skin color, texture, turgor normal. No rashes or lesions   Neurologic: CNII-XII intact. No gross sensory or motor deficits     Data Review:       Recent Days:  Recent Labs     22  1444   WBC 5.7   HGB 11.9*   HCT 37.1        Recent Labs     22  0517 22  1444    135*   K 5.0 5.4*   CL 97 97   CO2 30 29   * 109*   BUN 57* 45*   CREA 7.14* 6.03*   CA 8.5 8.8   ALB  --  3.3*   TBILI  --  0.3   ALT  --  8*     No results for input(s): PH, PCO2, PO2, HCO3, FIO2 in the last 72 hours.     24 Hour Results:  Recent Results (from the past 24 hour(s))   EKG, 12 LEAD, INITIAL    Collection Time: 22  1:56 PM   Result Value Ref Range    Ventricular Rate 70 BPM    Atrial Rate 70 BPM    P-R Interval 202 ms    QRS Duration 93 ms    Q-T Interval 422 ms    QTC Calculation (Bezet) 456 ms    Calculated P Axis -14 degrees    Calculated R Axis 21 degrees    Calculated T Axis 81 degrees    Diagnosis       Sinus rhythm  Abnormal R-wave progression, early transition  Baseline wander in lead(s) V4     CBC WITH AUTOMATED DIFF    Collection Time: 07/20/22  2:44 PM   Result Value Ref Range    WBC 5.7 4.4 - 11.3 K/uL    RBC 4.08 (L) 4.50 - 5.90 M/uL    HGB 11.9 (L) 13.5 - 17.5 g/dL    HCT 37.1 36 - 46 %    MCV 90.9 80 - 100 FL    MCH 29.0 (L) 31 - 34 PG    MCHC 32.0 31.0 - 36.0 g/dL    RDW 19.0 (H) 11.5 - 14.5 %    PLATELET 483 140 - 871 K/uL    MPV 8.8 6.5 - 11.5 FL    NRBC 0.1  WBC    ABSOLUTE NRBC 0.00 K/uL    NEUTROPHILS 61 42 - 75 %    LYMPHOCYTES 28 20.5 - 51.1 %    MONOCYTES 8 1.7 - 9.3 %    EOSINOPHILS 2 0.9 - 2.9 %    BASOPHILS 1 0.0 - 2.5 %    ABS. NEUTROPHILS 3.5 1.8 - 7.7 K/UL    ABS. LYMPHOCYTES 1.6 1.0 - 4.8 K/UL    ABS. MONOCYTES 0.5 0.2 - 2.4 K/UL    ABS. EOSINOPHILS 0.1 0.0 - 0.7 K/UL    ABS. BASOPHILS 0.0 0.0 - 0.2 K/UL   METABOLIC PANEL, COMPREHENSIVE    Collection Time: 07/20/22  2:44 PM   Result Value Ref Range    Sodium 135 (L) 136 - 145 mmol/L    Potassium 5.4 (H) 3.5 - 5.1 mmol/L    Chloride 97 97 - 108 mmol/L    CO2 29 21 - 32 mmol/L    Anion gap 9 5 - 15 mmol/L    Glucose 109 (H) 65 - 100 mg/dL    BUN 45 (H) 6 - 20 mg/dL    Creatinine 6.03 (H) 0.55 - 1.02 mg/dL    BUN/Creatinine ratio 7 (L) 12 - 20      GFR est AA 8 (L) >60 ml/min/1.73m2    GFR est non-AA 7 (L) >60 ml/min/1.73m2    Calcium 8.8 8.5 - 10.1 mg/dL    Bilirubin, total 0.3 0.2 - 1.0 mg/dL    AST (SGOT) 21 15 - 37 U/L    ALT (SGPT) 8 (L) 12 - 78 U/L    Alk.  phosphatase 50 45 - 117 U/L    Protein, total 7.2 6.4 - 8.2 g/dL    Albumin 3.3 (L) 3.5 - 5.0 g/dL    Globulin 3.9 2.0 - 4.0 g/dL    A-G Ratio 0.8 (L) 1.1 - 2.2     TROPONIN-HIGH SENSITIVITY    Collection Time: 07/20/22  2:44 PM   Result Value Ref Range    Troponin-High Sensitivity 28 0 - 51 ng/L   CULTURE, BLOOD    Collection Time: 07/20/22  3:48 PM    Specimen: Blood   Result Value Ref Range    Special Requests: No Special Requests      Culture result: No growth after 14 hours     LACTIC ACID    Collection Time: 07/20/22  3:48 PM   Result Value Ref Range    Lactic acid 1.3 0.4 - 2.0 mmol/L   CULTURE, BLOOD Collection Time: 07/20/22  4:16 PM    Specimen: Blood   Result Value Ref Range    Special Requests: No Special Requests      Culture result: No growth after 14 hours     GLUCOSE, POC    Collection Time: 07/20/22  4:42 PM   Result Value Ref Range    Glucose (POC) 109 65 - 117 mg/dL    Performed by Lyn Yin RN    GLUCOSE, POC    Collection Time: 07/20/22 10:16 PM   Result Value Ref Range    Glucose (POC) 165 (H) 65 - 117 mg/dL    Performed by Fredis Sierra, [facility]    Collection Time: 07/21/22  5:17 AM   Result Value Ref Range    Sodium 137 136 - 145 mmol/L    Potassium 5.0 3.5 - 5.1 mmol/L    Chloride 97 97 - 108 mmol/L    CO2 30 21 - 32 mmol/L    Anion gap 10 5 - 15 mmol/L    Glucose 119 (H) 65 - 100 mg/dL    BUN 57 (H) 6 - 20 mg/dL    Creatinine 7.14 (H) 0.55 - 1.02 mg/dL    BUN/Creatinine ratio 8 (L) 12 - 20      GFR est AA 7 (L) >60 ml/min/1.73m2    GFR est non-AA 6 (L) >60 ml/min/1.73m2    Calcium 8.5 8.5 - 10.1 mg/dL   GLUCOSE, POC    Collection Time: 07/21/22  7:14 AM   Result Value Ref Range    Glucose (POC) 91 65 - 117 mg/dL    Performed by Darya Talavera            Assessment/Plan:     Orthostatic hypotension  Most likely related to poor oral intake and polypharmacy  Holding all antihypertensives including Norvasc, Coreg, lisinopril, hydralazine, Imdur. Cont' midodrine 5 mg oral 3 times daily  Check daily orthostatics. Awaiting physical therapy evaluation. End-stage renal disease  Hemodialysis Tuesday Thursdays and Saturdays  Nephrology consultation pending. Ambulatory and gait disturbance  Secondary to recent fall  At baseline she uses a rolling walker at home  Negative x-rays of bilateral knee to rule out occult fracture. Tramadol 50 mg q 6 hrs prn  PT Eval pending. Type 2 diabetes  Siding scale insulin  Blood sugar ACHS    DVT prophylaxis  Heparin SC twice daily.       Await physical therapy evaluation for possibility of SNF versus home discharge and based on patient preference. Care Plan discussed with: Patient/Family, Nurse, and     Total time spent with patient: 30 minutes. With greater than 50% spent in coordination of care and counseling.     Alex Collins MD

## 2022-07-21 NOTE — PROGRESS NOTES
Problem: Pressure Injury - Risk of  Goal: *Prevention of pressure injury  Description: Document Price Scale and appropriate interventions in the flowsheet. Outcome: Progressing Towards Goal  Note: Pressure Injury Interventions:  Sensory Interventions: Float heels, Keep linens dry and wrinkle-free    Moisture Interventions: Absorbent underpads, Internal/External urinary devices    Activity Interventions: PT/OT evaluation    Mobility Interventions: PT/OT evaluation    Nutrition Interventions: Document food/fluid/supplement intake    Friction and Shear Interventions: Minimize layers, Foam dressings/transparent film/skin sealants                Problem: Patient Education: Go to Patient Education Activity  Goal: Patient/Family Education  Outcome: Progressing Towards Goal     Problem: Falls - Risk of  Goal: *Absence of Falls  Description: Document John Fall Risk and appropriate interventions in the flowsheet.   Outcome: Progressing Towards Goal  Note: Fall Risk Interventions:  Mobility Interventions: Patient to call before getting OOB, Bed/chair exit alarm         Medication Interventions: Bed/chair exit alarm    Elimination Interventions: Bed/chair exit alarm, Call light in reach, Patient to call for help with toileting needs    History of Falls Interventions: Bed/chair exit alarm, Room close to nurse's station         Problem: Patient Education: Go to Patient Education Activity  Goal: Patient/Family Education  Outcome: Progressing Towards Goal

## 2022-07-21 NOTE — PROGRESS NOTES
OCCUPATIONAL THERAPY EVALUATION  Patient: Luanne Del Valle (28 y.o. female)  Date: 7/21/2022  Primary Diagnosis: Orthostatic hypotension [I95.1]       Precautions: falls       ASSESSMENT  Based on the objective data described below, the patient presents with generalized weakness and decreased functional mobility impacting her ability to perform basic ADLs at her prior level of functioning. Current Level of Function Impacting Discharge (ADLs/self-care): Patient currently requires maximum assist with basic ADLs, including bathing and dressing tasks. Other factors to consider for discharge: Patient has personal based care aides during the day. Patient will benefit from skilled therapy intervention to address the above noted impairments. PLAN :  Recommendations and Planned Interventions: self care training, functional mobility training, therapeutic exercise, therapeutic activities, patient education, home safety training, and family training/education    Frequency/Duration: Patient will be followed by occupational therapy 4 times a week to address goals. Recommendation for discharge: (in order for the patient to meet his/her long term goals)  Therapy 3 hours per day 5-7 days per week    This discharge recommendation:  Has been made in collaboration with the attending provider and/or case management    IF patient discharges home will need the following DME: patient owns DME required for discharge       SUBJECTIVE:   Patient stated I am not feeling well overall.     OBJECTIVE DATA SUMMARY:   HISTORY:   Past Medical History:   Diagnosis Date    Blindness/low vision 2008    due to dm    CHF (congestive heart failure) ([facility])     Chronic kidney disease     Chronic pain     SPINAL STENOSIS    DM (diabetes mellitus) ([facility] [address]     HTN (hypertension)     Menopause     Stroke [facility])      Past Surgical History:   Procedure Laterality Date    HX OOPHORECTOMY      STENT INSERTION  [id number]    Garrett Parker or extensive additional review of patient history:     Home Situation  Home Environment: Private residence  # Steps to Enter: 3  Rails to Enter: Yes  Hand Rails : Bilateral  Wheelchair Ramp: No  One/Two Story Residence: One story  Living Alone: Yes  Support Systems: Child(jennifer), Caregiver/Home Care Staff  Patient Expects to be Discharged to[de-identified] Rehab hospital/unit acute  Current DME Used/Available at Home: Grab bars, Commode, bedside, Shower chair, Walker, rolling, Wheelchair      EXAMINATION OF PERFORMANCE DEFICITS:  Cognitive/Behavioral Status:  Neurologic State: Alert  Orientation Level: Oriented X4                Hearing: Auditory  Auditory Impairment: None    Vision/Perceptual:                                     Range of Motion:  B UE  AROM: Generally decreased, functional                         Strength:  Patient presents with 3/5 B UE strength per MMT. Strength: Grossly decreased, non-functional                Coordination:     Fine Motor Skills-Upper: Right Impaired;Left Impaired    Gross Motor Skills-Upper: Left Impaired;Right Impaired                       Balance:  Sitting: Intact; Without support    Functional Mobility and Transfers for ADLs:  Bed Mobility:       Transfers: Toilet Transfer :  Moderate assistance    ADL Assessment:  Feeding: Setup    Oral Facial Hygiene/Grooming: Setup;Minimum assistance    Bathing: Maximum assistance    Upper Body Dressing: Maximum assistance    Lower Body Dressing: Maximum assistance    Toileting: Maximum assistance                  Occupational Therapy Evaluation Charge Determination   History Examination Decision-Making   LOW Complexity : Brief history review  LOW Complexity : 1-3 performance deficits relating to physical, cognitive , or psychosocial skils that result in activity limitations and / or participation restrictions  LOW Complexity : No comorbidities that affect functional and no verbal or physical assistance needed to complete eval tasks       Based on the above components, the patient evaluation is determined to be of the following complexity level: LOW   Pain Ratin/10 buttocks    Activity Tolerance:   Good    After treatment patient left in no apparent distress:    Call bell within reach, Caregiver / family present, and sitting on [facility]    COMMUNICATION/EDUCATION:   The patients plan of care was discussed with: Registered nurse, Physician, and Case management. Patient/family agree to work toward stated goals and plan of care. This patients plan of care is appropriate for delegation to [facility]. Thank you for this referral.  LAKSHMI Hurst, OTR/L  Time Calculation: 25 mins   Problem: Self Care Deficits Care Plan (Adult)  Goal: *Acute Goals and Plan of Care (Insert Text)  Outcome: Not Met  Note:   FUNCTIONAL STATUS PRIOR TO ADMISSION: Patient required SBA/supervision with use of rolling walker for functional mobility tasks and required minimal assist to complete basic ADLs/IADLs. HOME SUPPORT: The patient lived alone with personal-based care aides to provide assistance. Occupational Therapy Goals  Initiated 2022  1. Patient will perform bathing with moderate assistance  within 7 day(s). 2.  Patient will perform upper body dressing with moderate assistance  within 7 day(s). 3.  Patient will perform lower body dressing with moderate assistance  within 7 day(s). 4.  Patient will perform toilet transfers with minimal assistance/contact guard assist within 7 day(s). 5.  Patient will perform all aspects of toileting with moderate assistance  within 7 day(s). 6.  Patient will participate in upper extremity therapeutic exercise/activities with supervision/set-up for 5-10 minutes within 7 day(s).             Problem: Patient Education: Go to Patient Education Activity  Goal: Patient/Family Education  Outcome: Not Met

## 2022-07-21 NOTE — PROGRESS NOTES
Pt daughter Corinne France, pts POA, requesting speech consult. States pt had an outpatient appointment scheduled and would like her assessed before discharge.

## 2022-07-21 NOTE — PROGRESS NOTES
Attempted PT evaluation twice today once at 11:00 AM and again at 2:15 PM but patient was on dialysis during both attempts.

## 2022-07-21 NOTE — PROGRESS NOTES
Patient has been A&O x4, Caregiver at bedside assisted with adls, dialysis nurse called earlier in shift and given an update and is at th bedside now.

## 2022-07-21 NOTE — PROGRESS NOTES
Patients case reviewed during interdisciplinary team meeting in [address] [place] [address]/Acute Care Unit. Rev.  Lisseth Dinesh Butt [address], [address]

## 2022-07-21 NOTE — PROCEDURES
[facility] / [phone]    Vitals Pre Post Assessment Pre Post   /60 138/64 LOC A & O X4 A & O X4   HR 61 64 Lungs No sob No sob   Resp 16 16 Cardiac regular regular   Temp 96.9 97.1 Skin warm warm   Weight  N/A N/A Edema None noted None noted   Tele status remote remote Pain Right leg 0/10     Orders   Duration: Start: 1120 End: 1420 Total: 3 hours   Dialyzer: Dialyzer/Set Up Inspection: Ofe Pena (07/21/22 1120)   K Bath: Dialysate K (mEq/L): 3 (07/21/22 1120)   Ca Bath: Dialysate CA (mEq/L): 2.5 (07/21/22 1120)   Na: Dialysate NA (mEq/L): 140 (07/21/22 1120)   Bicarb: Dialysate HCO3 (mEq/L): 35 (07/21/22 1120)   Target Fluid Removal: Goal/Amount of Fluid to Remove (mL): 500 mL (07/21/22 1120)     Access   Type & Location: Left upper arm graft   Comments:   +bruit/thrill; cannulated without difficulties 15g needles; no s/s of infection noted; post treatment hemostasis achieved no excessive bleeding noted                                   Labs   HBsAg (Antigen) / date: unknown     (pending as of 7/21/22)                            HBsAb (Antibody) / date: unknown (pending as of 7/21/22)   Source: [employer]   Obtained/Reviewed  Critical Results Called HGB   Date Value Ref Range Status   07/20/2022 11.9 (L) 13.5 - 17.5 g/dL Final     Comment:     Investigated per delta check protocol     Potassium   Date Value Ref Range Status   07/21/2022 5.0 3.5 - 5.1 mmol/L Final     Calcium   Date Value Ref Range Status   07/21/2022 8.5 8.5 - 10.1 mg/dL Final     BUN   Date Value Ref Range Status   07/21/2022 57 (H) 6 - 20 mg/dL Final     Creatinine   Date Value Ref Range Status   07/21/2022 7.14 (H) 0.55 - 1.02 mg/dL Final        Meds Given   Name Dose Route   None ordered                 Adequacy / Fluid    Total Liters Process: 76   Net Fluid Removed: 500 ml      Comments   Time Out Done:   (Time) Yes 1118   Admitting Diagnosis: Orthostatic hypotension   Consent obtained/signed: Informed Consent Verified:  Yes (chronic dialysis patient) (07/21/22 1120)   Machine / RO # Machine Number: [id number] (07/21/22 1120)   Primary Nurse Rpt Pre: Adriane Bueno RN   Primary Nurse Rpt Post: Adriane Bueno RN   Pt Education: Hemodialysis procedural; access care, hypotension   Care Plan: Continue dialysis per nephrologist   Pts outpatient clinic: [facility] [phone]     Tx Summary   Comments:     Treatment completed uneventful

## 2022-07-21 NOTE — PROGRESS NOTES
Ms Maggy Dhillon is ESRD patient on TTS dialysis schedule, who has not had any missed dialysis and presents with weakness and was found to be hypotensive. Patients Antihypertensives have been discontinued and patient has been started on midodrine 5 mg TID. No other infectious or inflammatory markers elevated. patient is being evaluated for PT/OT placement.     To continue dialysis as per Outpatient schedule with minimal UF at this gerald e

## 2022-07-21 NOTE — PROGRESS NOTES
Patient is requesting to go to "[facility]" in [facility]. Daughter, Dione Santamaria, does not want her mother going back to rehab. Dr. Josy Villa stated that the patient is capable of making her own decisions. Patient and daughter made aware that PT and OT will do an evaluation and determine what their recommendations are regarding patient's therapy needs. Patient and daughter both agreed and verbalized understanding.

## 2022-07-21 NOTE — PROGRESS NOTES
Pt medication list updated with daughter Bhavin Ray. HS medication orders reviewed with Dr. Cinthia Baptiste.

## 2022-07-22 ENCOUNTER — APPOINTMENT (OUTPATIENT)
Dept: VASCULAR SURGERY | Age: 77
DRG: 312 | End: 2022-07-22
Attending: INTERNAL MEDICINE
Payer: MEDICARE

## 2022-07-22 PROBLEM — N18.6 ESRD (END STAGE RENAL DISEASE) ON DIALYSIS (HCC): Status: ACTIVE | Noted: 2022-07-22

## 2022-07-22 PROBLEM — Z99.2 ESRD (END STAGE RENAL DISEASE) ON DIALYSIS (HCC): Status: ACTIVE | Noted: 2022-07-22

## 2022-07-22 PROBLEM — R42 DIZZINESSES: Status: ACTIVE | Noted: 2022-07-22

## 2022-07-22 LAB
ANION GAP SERPL CALC-SCNC: 7 MMOL/L (ref 5–15)
BUN SERPL-MCNC: 50 MG/DL (ref 6–20)
BUN/CREAT SERPL: 10 (ref 12–20)
CA-I BLD-MCNC: 8.5 MG/DL (ref 8.5–10.1)
CHLORIDE SERPL-SCNC: 99 MMOL/L (ref 97–108)
CO2 SERPL-SCNC: 32 MMOL/L (ref 21–32)
CREAT SERPL-MCNC: 5.12 MG/DL (ref 0.55–1.02)
ECHO AO ROOT DIAM: 3.7 CM
ECHO AO ROOT INDEX: 2.13 CM/M2
ECHO AV AREA PEAK VELOCITY: 1.9 CM2
ECHO AV AREA VTI: 2.1 CM2
ECHO AV AREA/BSA PEAK VELOCITY: 1.1 CM2/M2
ECHO AV AREA/BSA VTI: 1.2 CM2/M2
ECHO AV MEAN GRADIENT: 4 MMHG
ECHO AV MEAN VELOCITY: 0.9 M/S
ECHO AV PEAK GRADIENT: 9 MMHG
ECHO AV PEAK VELOCITY: 1.5 M/S
ECHO AV VELOCITY RATIO: 0.67
ECHO AV VTI: 37.6 CM
ECHO EST RA PRESSURE: 3 MMHG
ECHO LA DIAMETER INDEX: 1.72 CM/M2
ECHO LA DIAMETER: 3 CM
ECHO LA TO AORTIC ROOT RATIO: 0.81
ECHO LA VOL 2C: 41 ML (ref 22–52)
ECHO LA VOL 4C: 38 ML (ref 22–52)
ECHO LA VOL BP: 42 ML (ref 22–52)
ECHO LA VOL/BSA BIPLANE: 24 ML/M2 (ref 16–34)
ECHO LA VOLUME AREA LENGTH: 45 ML
ECHO LA VOLUME INDEX A2C: 24 ML/M2 (ref 16–34)
ECHO LA VOLUME INDEX A4C: 22 ML/M2 (ref 16–34)
ECHO LA VOLUME INDEX AREA LENGTH: 26 ML/M2 (ref 16–34)
ECHO LV E' LATERAL VELOCITY: 5 CM/S
ECHO LV E' SEPTAL VELOCITY: 4 CM/S
ECHO LV EDV A2C: 40 ML
ECHO LV EDV A4C: 74 ML
ECHO LV EDV BP: 57 ML (ref 56–104)
ECHO LV EDV INDEX A4C: 43 ML/M2
ECHO LV EDV INDEX BP: 33 ML/M2
ECHO LV EDV NDEX A2C: 23 ML/M2
ECHO LV EJECTION FRACTION A2C: 56 %
ECHO LV EJECTION FRACTION A4C: 68 %
ECHO LV EJECTION FRACTION BIPLANE: 62 % (ref 55–100)
ECHO LV ESV A2C: 18 ML
ECHO LV ESV A4C: 24 ML
ECHO LV ESV BP: 22 ML (ref 19–49)
ECHO LV ESV INDEX A2C: 10 ML/M2
ECHO LV ESV INDEX A4C: 14 ML/M2
ECHO LV ESV INDEX BP: 13 ML/M2
ECHO LV FRACTIONAL SHORTENING: 32 % (ref 28–44)
ECHO LV INTERNAL DIMENSION DIASTOLE INDEX: 1.78 CM/M2
ECHO LV INTERNAL DIMENSION DIASTOLIC: 3.1 CM (ref 3.9–5.3)
ECHO LV INTERNAL DIMENSION SYSTOLIC INDEX: 1.21 CM/M2
ECHO LV INTERNAL DIMENSION SYSTOLIC: 2.1 CM
ECHO LV IVSD: 1.4 CM (ref 0.6–0.9)
ECHO LV MASS 2D: 146.7 G (ref 67–162)
ECHO LV MASS INDEX 2D: 84.3 G/M2 (ref 43–95)
ECHO LV POSTERIOR WALL DIASTOLIC: 1.4 CM (ref 0.6–0.9)
ECHO LV RELATIVE WALL THICKNESS RATIO: 0.9
ECHO LVOT AREA: 2.8 CM2
ECHO LVOT AV VTI INDEX: 0.7
ECHO LVOT DIAM: 1.9 CM
ECHO LVOT MEAN GRADIENT: 2 MMHG
ECHO LVOT PEAK GRADIENT: 4 MMHG
ECHO LVOT PEAK VELOCITY: 1 M/S
ECHO LVOT STROKE VOLUME INDEX: 43 ML/M2
ECHO LVOT SV: 74.8 ML
ECHO LVOT VTI: 26.4 CM
ECHO MV A VELOCITY: 1.12 M/S
ECHO MV AREA VTI: 2 CM2
ECHO MV E DECELERATION TIME (DT): 376.8 MS
ECHO MV E VELOCITY: 0.78 M/S
ECHO MV E/A RATIO: 0.7
ECHO MV E/E' LATERAL: 15.6
ECHO MV E/E' RATIO (AVERAGED): 17.55
ECHO MV E/E' SEPTAL: 19.5
ECHO MV LVOT VTI INDEX: 1.42
ECHO MV MAX VELOCITY: 1.1 M/S
ECHO MV MEAN GRADIENT: 2 MMHG
ECHO MV MEAN VELOCITY: 0.7 M/S
ECHO MV PEAK GRADIENT: 5 MMHG
ECHO MV VTI: 37.6 CM
ECHO RIGHT VENTRICULAR SYSTOLIC PRESSURE (RVSP): 30 MMHG
ECHO RV INTERNAL DIMENSION: 2.5 CM
ECHO RV TAPSE: 1.5 CM (ref 1.7–?)
ECHO TV REGURGITANT MAX VELOCITY: 2.62 M/S
ECHO TV REGURGITANT PEAK GRADIENT: 27 MMHG
EST. AVERAGE GLUCOSE BLD GHB EST-MCNC: 105 MG/DL
GLUCOSE BLD STRIP.AUTO-MCNC: 106 MG/DL (ref 65–117)
GLUCOSE BLD STRIP.AUTO-MCNC: 111 MG/DL (ref 65–117)
GLUCOSE BLD STRIP.AUTO-MCNC: 127 MG/DL (ref 65–117)
GLUCOSE BLD STRIP.AUTO-MCNC: 314 MG/DL (ref 65–117)
GLUCOSE SERPL-MCNC: 130 MG/DL (ref 65–100)
HBA1C MFR BLD: 5.3 % (ref 4–5.6)
HBV SURFACE AB SER QL: REACTIVE
HBV SURFACE AB SER-ACNC: 114.38 MIU/ML
HBV SURFACE AG SER QL: <0.1 INDEX
HBV SURFACE AG SER QL: NEGATIVE
PERFORMED BY, TECHID: ABNORMAL
PERFORMED BY, TECHID: ABNORMAL
PERFORMED BY, TECHID: NORMAL
PERFORMED BY, TECHID: NORMAL
POTASSIUM SERPL-SCNC: 4.8 MMOL/L (ref 3.5–5.1)
SODIUM SERPL-SCNC: 138 MMOL/L (ref 136–145)
TSH SERPL DL<=0.05 MIU/L-ACNC: 1.8 UIU/ML (ref 0.36–3.74)

## 2022-07-22 PROCEDURE — G0378 HOSPITAL OBSERVATION PER HR: HCPCS

## 2022-07-22 PROCEDURE — 65270000029 HC RM PRIVATE

## 2022-07-22 PROCEDURE — 80048 BASIC METABOLIC PNL TOTAL CA: CPT

## 2022-07-22 PROCEDURE — 93306 TTE W/DOPPLER COMPLETE: CPT

## 2022-07-22 PROCEDURE — 83036 HEMOGLOBIN GLYCOSYLATED A1C: CPT

## 2022-07-22 PROCEDURE — 82533 TOTAL CORTISOL: CPT

## 2022-07-22 PROCEDURE — 74011250637 HC RX REV CODE- 250/637: Performed by: INTERNAL MEDICINE

## 2022-07-22 PROCEDURE — 74011000250 HC RX REV CODE- 250: Performed by: INTERNAL MEDICINE

## 2022-07-22 PROCEDURE — 82962 GLUCOSE BLOOD TEST: CPT

## 2022-07-22 PROCEDURE — 97163 PT EVAL HIGH COMPLEX 45 MIN: CPT

## 2022-07-22 PROCEDURE — 84443 ASSAY THYROID STIM HORMONE: CPT

## 2022-07-22 PROCEDURE — 96372 THER/PROPH/DIAG INJ SC/IM: CPT

## 2022-07-22 PROCEDURE — 74011250636 HC RX REV CODE- 250/636: Performed by: INTERNAL MEDICINE

## 2022-07-22 PROCEDURE — 36415 COLL VENOUS BLD VENIPUNCTURE: CPT

## 2022-07-22 PROCEDURE — 97162 PT EVAL MOD COMPLEX 30 MIN: CPT

## 2022-07-22 RX ADMIN — SODIUM CHLORIDE, PRESERVATIVE FREE 10 ML: 5 INJECTION INTRAVENOUS at 06:23

## 2022-07-22 RX ADMIN — SODIUM CHLORIDE, PRESERVATIVE FREE 10 ML: 5 INJECTION INTRAVENOUS at 22:10

## 2022-07-22 RX ADMIN — DONEPEZIL HYDROCHLORIDE 5 MG: 5 TABLET, FILM COATED ORAL at 08:28

## 2022-07-22 RX ADMIN — ATORVASTATIN CALCIUM 40 MG: 40 TABLET, FILM COATED ORAL at 22:06

## 2022-07-22 RX ADMIN — HEPARIN SODIUM 5000 UNITS: 5000 INJECTION INTRAVENOUS; SUBCUTANEOUS at 06:23

## 2022-07-22 RX ADMIN — MIDODRINE HYDROCHLORIDE 5 MG: 5 TABLET ORAL at 12:14

## 2022-07-22 RX ADMIN — HEPARIN SODIUM 5000 UNITS: 5000 INJECTION INTRAVENOUS; SUBCUTANEOUS at 22:06

## 2022-07-22 RX ADMIN — SODIUM CHLORIDE, PRESERVATIVE FREE 10 ML: 5 INJECTION INTRAVENOUS at 16:04

## 2022-07-22 RX ADMIN — DOCUSATE SODIUM 100 MG: 100 CAPSULE, LIQUID FILLED ORAL at 08:28

## 2022-07-22 RX ADMIN — MIDODRINE HYDROCHLORIDE 5 MG: 5 TABLET ORAL at 16:33

## 2022-07-22 RX ADMIN — MIDODRINE HYDROCHLORIDE 5 MG: 5 TABLET ORAL at 08:28

## 2022-07-22 RX ADMIN — MIRTAZAPINE 15 MG: 15 TABLET, FILM COATED ORAL at 22:06

## 2022-07-22 RX ADMIN — HEPARIN SODIUM 5000 UNITS: 5000 INJECTION INTRAVENOUS; SUBCUTANEOUS at 16:04

## 2022-07-22 NOTE — PROGRESS NOTES
Problem: Pressure Injury - Risk of  Goal: *Prevention of pressure injury  Description: Document Price Scale and appropriate interventions in the flowsheet. Outcome: Progressing Towards Goal  Note: Pressure Injury Interventions:  Sensory Interventions: Float heels    Moisture Interventions: Check for incontinence Q2 hours and as needed    Activity Interventions: PT/OT evaluation    Mobility Interventions: PT/OT evaluation    Nutrition Interventions: Document food/fluid/supplement intake    Friction and Shear Interventions: Minimize layers, Apply protective barrier, creams and emollients                Problem: Patient Education: Go to Patient Education Activity  Goal: Patient/Family Education  Outcome: Progressing Towards Goal     Problem: Falls - Risk of  Goal: *Absence of Falls  Description: Document John Fall Risk and appropriate interventions in the flowsheet.   Outcome: Progressing Towards Goal  Note: Fall Risk Interventions:  Mobility Interventions: Patient to call before getting OOB, Bed/chair exit alarm         Medication Interventions: Bed/chair exit alarm, Patient to call before getting OOB    Elimination Interventions: Bed/chair exit alarm, Call light in reach    History of Falls Interventions: Bed/chair exit alarm         Problem: Patient Education: Go to Patient Education Activity  Goal: Patient/Family Education  Outcome: Progressing Towards Goal     Problem: Patient Education: Go to Patient Education Activity  Goal: Patient/Family Education  Outcome: Progressing Towards Goal

## 2022-07-22 NOTE — ROUTINE PROCESS
Patient refused insulin coverage. Patient states daughter doesn't want her to have any insulin. Night nurse Rodney LIND also confirmed that there is to be no insulin coverage.

## 2022-07-22 NOTE — PROGRESS NOTES
HOSPITALIST PROGRESS NOTE  Elliott Peralta MD, [address]         Daily Progress Note: 7/22/2022      Subjective:     Patient is alert and oriented x4. No overnight fever/chills, chest pain, nausea/vomiting, abdominal pain noted. Continues to have severe dizziness and low blood pressures while attempting to stand up with physical therapy. Blood pressure dropped to 87/38 with attempted physical therapy this AM.  This has been a chronic issue for patient. Patient with significant history of multiple lacunar infarcts as recently as October, 2021. Medications reviewed  Current Facility-Administered Medications   Medication Dose Route Frequency    atorvastatin (LIPITOR) tablet 40 mg  40 mg Oral QHS    donepeziL (ARICEPT) tablet 5 mg  5 mg Oral DAILY    mirtazapine (REMERON) tablet 15 mg  15 mg Oral QHS    docusate sodium (COLACE) capsule 100 mg  100 mg Oral BID    sodium chloride (NS) flush 5-40 mL  5-40 mL IntraVENous Q8H    sodium chloride (NS) flush 5-40 mL  5-40 mL IntraVENous PRN    acetaminophen (TYLENOL) tablet 650 mg  650 mg Oral Q6H PRN    Or    acetaminophen (TYLENOL) suppository 650 mg  650 mg Rectal Q6H PRN    polyethylene glycol (MIRALAX) packet 17 g  17 g Oral DAILY PRN    ondansetron (ZOFRAN ODT) tablet 4 mg  4 mg Oral Q8H PRN    Or    ondansetron (ZOFRAN) injection 4 mg  4 mg IntraVENous Q6H PRN    heparin (porcine) injection 5,000 Units  5,000 Units SubCUTAneous Q8H    midodrine (PROAMATINE) tablet 5 mg  5 mg Oral TID WITH MEALS    insulin lispro (HUMALOG) injection   SubCUTAneous AC&HS    traMADoL (ULTRAM) tablet 50 mg  50 mg Oral Q6H PRN       Review of Systems:   A comprehensive review of systems was negative except for that written in the HPI.     Objective:   Physical Exam:     Visit Vitals  BP (!) 110/52 (BP Patient Position: Sitting)   Pulse 64   Temp 97.6 °F (36.4 °C)   Resp 18   Ht 5' 4\" (1.626 m)   Wt 69.3 kg (152 lb 12.8 oz)   LMP  (LMP Unknown)   SpO2 96%   BMI 26.23 kg/m²      O2 Device: None (Room air)  Patient Vitals for the past 8 hrs:   Temp Pulse Resp BP SpO2   22 1018 -- -- -- (!) 110/52 --   22 1017 -- -- -- (!) 109/50 --   22 0800 -- -- -- -- 96 %   22 0747 97.6 °F (36.4 °C) 64 18 (!) 114/47 96 %   22 0415 97.2 °F (36.2 °C) 64 16 (!) 140/56 98 %          Temp (24hrs), Av.2 °F (36.2 °C), Min:96.9 °F (36.1 °C), Max:97.6 °F (36.4 °C)    No intake/output data recorded.  1901 -  0700  In: -   Out: 500     General:  Alert, cooperative, no distress, appears stated age. Lungs:   Clear to auscultation bilaterally. Chest wall:  No tenderness or deformity. Heart:  Regular rate and rhythm, S1, S2 normal, no murmur, click, rub or gallop. Abdomen:   Soft, non-tender. Bowel sounds normal. No masses,  No organomegaly. Extremities: Extremities normal, atraumatic, no cyanosis or edema. Pulses: 2+ and symmetric all extremities. Skin: Skin color, texture, turgor normal. No rashes or lesions   Neurologic: No gross sensory or motor deficits     Data Review:       Recent Days:  Recent Labs     22  1444   WBC 5.7   HGB 11.9*   HCT 37.1        Recent Labs     22  0515 22  0517 22  1444    137 135*   K 4.8 5.0 5.4*   CL 99 97 97   CO2 32 30 29   * 119* 109*   BUN 50* 57* 45*   CREA 5.12* 7.14* 6.03*   CA 8.5 8.5 8.8   ALB  --   --  3.3*   TBILI  --   --  0.3   ALT  --   --  8*     No results for input(s): PH, PCO2, PO2, HCO3, FIO2 in the last 72 hours.     24 Hour Results:  Recent Results (from the past 24 hour(s))   GLUCOSE, POC    Collection Time: 22  2:52 PM   Result Value Ref Range    Glucose (POC) 82 65 - 117 mg/dL    Performed by [address], POC    Collection Time: 22  5:02 PM   Result Value Ref Range    Glucose (POC) 190 (H) 65 - 117 mg/dL    Performed by Curtis Delong METABOLIC PANEL, BASIC    Collection Time: 07/22/22  5:15 AM   Result Value Ref Range    Sodium 138 136 - 145 mmol/L    Potassium 4.8 3.5 - 5.1 mmol/L    Chloride 99 97 - 108 mmol/L    CO2 32 21 - 32 mmol/L    Anion gap 7 5 - 15 mmol/L    Glucose 130 (H) 65 - 100 mg/dL    BUN 50 (H) 6 - 20 mg/dL    Creatinine 5.12 (H) 0.55 - 1.02 mg/dL    BUN/Creatinine ratio 10 (L) 12 - 20      GFR est AA 10 (L) >60 ml/min/1.73m2    GFR est non-AA 8 (L) >60 ml/min/1.73m2    Calcium 8.5 8.5 - 10.1 mg/dL   TSH 3RD GENERATION    Collection Time: 07/22/22  5:15 AM   Result Value Ref Range    TSH 1.80 0.36 - 3.74 uIU/mL   GLUCOSE, POC    Collection Time: 07/22/22  8:17 AM   Result Value Ref Range    Glucose (POC) 314 (H) 65 - 117 mg/dL    Performed by Krystin Muhammad            Assessment/Plan:     Orthostatic hypotension  Most likely related to poor oral intake and polypharmacy  Holding all antihypertensives including Norvasc, Coreg, lisinopril, hydralazine, Imdur. Cont' midodrine 5 mg oral 3 times daily  Continue daily orthostatics. Patient unable to perform physical therapy due to low blood pressures as documented above. Will obtain TSH, a.m. cortisol level as well as a possible MRI of the brain with history of multiple CVAs and multiple falls previously. Will also obtain new ECHO. Patient's previous acute CVA with multiple lacunar infarcts in October 2021 noted. End-stage renal disease  Hemodialysis Tuesday Thursdays and Saturdays  Nephrology consultation pending. Ambulatory and gait disturbance  Secondary to recent fall  At baseline she uses a rolling walker at home  Negative x-rays of bilateral knee to rule out occult fracture. Tramadol 50 mg q 6 hrs prn. PT Eval pending. Type 2 diabetes  Siding scale insulin  Blood sugar ACHS. At home patient does not take any medications. Obtain A1c. DVT prophylaxis  Heparin SC twice daily.       Await physical therapy evaluation for possibility of SNF versus home discharge and based on patient preference. Care Plan discussed with: Patient/Family, Nurse, and     Total time spent with patient: 30 minutes. With greater than 50% spent in coordination of care and counseling.     King Adela MD

## 2022-07-22 NOTE — PROGRESS NOTES
PHYSICAL THERAPY EVALUATION  Patient: Maverick Bullock (47 y.o. female)  Date: 7/22/2022  Primary Diagnosis: Orthostatic hypotension [I95.1]       Precautions: Falls       ASSESSMENT  Based on the objective data described below, the patient presents with decreased activity tolerance and generalized weakness that is limiting her ability to sit up at the end of bed without severe dizziness or drops in blood pressure. During supine to sit pt's BP dropped to 87/38 pt was laid flat with feet elevated and instructed to do ankle pumps. Her BP stabilized to 111/53 and reported a small decrease in dizziness. Current Level of Function Impacting Discharge (mobility/balance): Decreased strength and activity tolerance. Other factors to consider for discharge: Pt wants to to be discharged to Inpatient rehab center in [place]     Patient will benefit from skilled therapy intervention to address the above noted impairments. PLAN :  Recommendations and Planned Interventions: bed mobility training, transfer training, gait training, therapeutic exercises, patient and family training/education, and therapeutic activities      Frequency/Duration: Patient will be followed by physical therapy:  1-2 times daily, up to 5 days a week, to address goals. Recommendation for discharge: (in order for the patient to meet his/her long term goals)  To be determined: Due to pt's poor activity tolerance pt is unable to be properly assessed at this time. Once pt's BP is stabilized  a better recommendation from physical therapy will be able to be made. Pt was assessed to by OT yesterday and they felt like acute rehab was appropriate, pt does appear to have some slurred speech at baseline but had plateaued with OP SLP prior to this admission so its unclear if this is baseline continuation or progression.      This discharge recommendation:  Has been made in collaboration with the attending provider and/or case management    IF patient discharges home will need the following DME: to be determined (TBD)         SUBJECTIVE:   Patient stated I am very dizzy today. My daughter will be here at 4 pm and she is coming from [place]. I used to be able to get up and walk with my walker and go to the bathroom but now I am so dizzy I cant even sit up at the end of the bed. I want to go to a rehab in [place] so that I can walk again.     OBJECTIVE DATA SUMMARY:   HISTORY:    Past Medical History:   Diagnosis Date    Blindness/low vision 2008    due to dm    CHF (congestive heart failure) ([facility])     Chronic kidney disease     Chronic pain     SPINAL STENOSIS    DM (diabetes mellitus) ([facility] [address]     HTN (hypertension)     Menopause     Stroke [facility])      Past Surgical History:   Procedure Laterality Date    HX OOPHORECTOMY      STENT INSERTION  2001,2002    Shannan Ortega       Personal factors and/or comorbidities impacting plan of care: Signs and symptoms consistent with possible Orthostatic hypotension    Home Situation  Home Environment: Private residence  # Steps to Enter: 3  Rails to Enter: Yes  Hand Rails : Bilateral  Wheelchair Ramp: No  One/Two Story Residence: One story  Living Alone: Yes  Support Systems: Caregiver/Home Care Staff  Patient Expects to be Discharged to[de-identified] Rehab hospital/unit acute  Current DME Used/Available at Home: Commode, bedside, Grab bars, Wheelchair, Walker, rolling, Shower chair    EXAMINATION/PRESENTATION/DECISION MAKING:   Critical Behavior:  Neurologic State: Alert  Orientation Level: Oriented X4        Hearing: Auditory  Auditory Impairment: None  Range Of Motion:  AROM: Generally decreased, functional                       Strength:    Strength: Grossly decreased, non-functional              Functional Mobility:  Bed Mobility:  Rolling: Stand-by assistance  Supine to Sit: Minimum assistance  Sit to Supine: Minimum assistance  Scooting: Maximum assistance    Balance:   Sitting: Intact; With support  Ambulation/Gait Training: Unable to assess due to poor activity tolerance                Treatment Session:   Pt's BP was monitored through out session. To began pt's BP was 112/54 in a semi reclined positioned supine in bed. Pt was then elevated to a fully upright position and she reported increase in dizziness. Her BP was taken again and had dropped to 111/42. She was instructed to pump ankles and regulate breathing and BP normalized. Pt then was sat up at EOB and her BP dropped to 87/38 and she reported extreme dizziness. Pt was laid back down flat and her feet were elevated and she performed ankle pumps. BP stabilized and she was returned to her initial semi reclined position. Her BP was measured at end of session and was 112/52. Functional Measure:     Physical Therapy Evaluation Charge Determination   History Examination Presentation Decision-Making   HIGH Complexity :3+ comorbidities / personal factors will impact the outcome/ POC  MEDIUM Complexity : 3 Standardized tests and measures addressing body structure, function, activity limitation and / or participation in recreation  MEDIUM Complexity : Evolving with changing characteristics  Medium      Based on the above components, the patient evaluation is determined to be of the following complexity level: MEDIUM    Pain Ratin/10    Activity Tolerance:   Poor and signs and symptoms of orthostatic hypotension    After treatment patient left in no apparent distress:   Supine in bed and Call bell within reach    COMMUNICATION/EDUCATION:   The patients plan of care was discussed with: Physical therapist, Registered nurse, Physician, and Case management. Fall prevention education was provided and the patient/caregiver indicated understanding.     Thank you for this referral.  Alvarez Marques, PT, DPT   Time Calculation: 31 mins       Problem: Mobility Impaired (Adult and Pediatric)  Goal: *Acute Goals and Plan of Care (Insert Text)  Outcome: Not Met  Note: FUNCTIONAL STATUS PRIOR TO ADMISSION: Patient was modified independent using a rolling walker for functional mobility. HOME SUPPORT PRIOR TO ADMISSION: The patient lived alone with 4 hired aids  to provide assistance. From 9 am to 8:30 pm    Physical Therapy Goals  Initiated 7/22/2022  1. Patient will move from supine to sit and sit to supine  in bed with supervision/set-up within 7 day(s). 2.  Patient will transfer from bed to chair and chair to bed with minimal assistance/contact guard assist using the least restrictive device within 7 day(s). 3.  Patient will perform sit to stand with minimal assistance/contact guard assist within 7 day(s). 4.  Patient will ambulate with minimal assistance/contact guard assist for 50 feet with the least restrictive device within 7 day(s). 5.  Patient will ascend/descend 3 stairs with B handrail(s) with minimal assistance/contact guard assist within 7 day(s).

## 2022-07-22 NOTE — PROGRESS NOTES
Patients case reviewed during interdisciplinary team meeting in [address] [place], [place] [address]Acute Care Unit. Rev.  Dinesh Guzman 41, [address]

## 2022-07-22 NOTE — ROUTINE PROCESS
Spoke with Monica Oshea regarding refusal of insulin. States \"He will address it with the daughter\".

## 2022-07-22 NOTE — ROUTINE PROCESS
Informed Dr. Karey Gavin that the MRI Truck does not come until Tuesdays and Thursdays. Stated that he will wait till Tuesday.

## 2022-07-22 NOTE — PROGRESS NOTES
Spiritual Care Assessment/Progress Note  [facility]      NAME: Magda Solis      MRN: [id number]  AGE: 78 y.o.  SEX: female  Yarsani Affiliation: Mandaeism   Language: English     7/21/2022     Total Time (in minutes): 20     Spiritual Assessment begun in [facility] [address] through conversation with:         [x]Patient        [] Family    [] Friend(s)        Reason for Consult: Initial/Spiritual assessment, patient floor     Spiritual beliefs: (Please include comment if needed)     [x] Identifies with a pascale tradition:  Mandaeism       [] Supported by a pascale community:            [] Claims no spiritual orientation:           [] Seeking spiritual identity:                [] Adheres to an individual form of spirituality:           [] Not able to assess:                           Identified resources for coping:      [] Prayer                               [] Music                  [] Guided Imagery     [x] Family/friends                 [] Pet visits     [] Devotional reading                         [] Unknown     [] Other:                                               Interventions offered during this visit: (See comments for more details)    Patient Interventions: Affirmation of emotions/emotional suffering, Affirmation of pascale, Iconic (affirming the presence of God/Higher Power), Yarsani beliefs/image of God discussed           Plan of Care:     [x] Support spiritual and/or cultural needs    [] Support AMD and/or advance care planning process      [] Support grieving process   [] Coordinate Rites and/or Rituals    [] Coordination with community clergy   [] No spiritual needs identified at this time   [] Detailed Plan of Care below (See Comments)  [] Make referral to Music Therapy  [] Make referral to Pet Therapy     [] Make referral to Addiction services  [] Make referral to [facility]  [] Make referral to Spiritual Care Partner  [] No future visits requested        [x] Contact Spiritual Care for further referrals     Comments: Visited with patient while rounding in the [address]. Patient shared that she is [facility] and has support.  provided words of care and concern, assistance with some needs she had at that moment, and presence. Advised patient of  availability. Reviewed chart. Rev.  Diensh Finnegan 51, [address]

## 2022-07-23 LAB
ALBUMIN SERPL-MCNC: 2.9 G/DL (ref 3.5–5)
ANION GAP SERPL CALC-SCNC: 8 MMOL/L (ref 5–15)
ANION GAP SERPL CALC-SCNC: 9 MMOL/L (ref 5–15)
BUN SERPL-MCNC: 37 MG/DL (ref 6–20)
BUN SERPL-MCNC: 72 MG/DL (ref 6–20)
BUN/CREAT SERPL: 11 (ref 12–20)
BUN/CREAT SERPL: 9 (ref 12–20)
CA-I BLD-MCNC: 8.7 MG/DL (ref 8.5–10.1)
CA-I BLD-MCNC: 8.8 MG/DL (ref 8.5–10.1)
CHLORIDE SERPL-SCNC: 100 MMOL/L (ref 97–108)
CHLORIDE SERPL-SCNC: 99 MMOL/L (ref 97–108)
CO2 SERPL-SCNC: 29 MMOL/L (ref 21–32)
CO2 SERPL-SCNC: 32 MMOL/L (ref 21–32)
CORTIS AM PEAK SERPL-MCNC: 13.1 UG/DL (ref 4.3–22.45)
CREAT SERPL-MCNC: 4.2 MG/DL (ref 0.55–1.02)
CREAT SERPL-MCNC: 6.64 MG/DL (ref 0.55–1.02)
GLUCOSE BLD STRIP.AUTO-MCNC: 106 MG/DL (ref 65–117)
GLUCOSE BLD STRIP.AUTO-MCNC: 135 MG/DL (ref 65–117)
GLUCOSE BLD STRIP.AUTO-MCNC: 153 MG/DL (ref 65–117)
GLUCOSE SERPL-MCNC: 160 MG/DL (ref 65–100)
GLUCOSE SERPL-MCNC: 78 MG/DL (ref 65–100)
PERFORMED BY, TECHID: ABNORMAL
PERFORMED BY, TECHID: ABNORMAL
PERFORMED BY, TECHID: NORMAL
PHOSPHATE SERPL-MCNC: 3.3 MG/DL (ref 2.6–4.7)
POTASSIUM SERPL-SCNC: 4 MMOL/L (ref 3.5–5.1)
POTASSIUM SERPL-SCNC: 5.5 MMOL/L (ref 3.5–5.1)
SODIUM SERPL-SCNC: 138 MMOL/L (ref 136–145)
SODIUM SERPL-SCNC: 139 MMOL/L (ref 136–145)

## 2022-07-23 PROCEDURE — 90935 HEMODIALYSIS ONE EVALUATION: CPT

## 2022-07-23 PROCEDURE — 74011250636 HC RX REV CODE- 250/636: Performed by: INTERNAL MEDICINE

## 2022-07-23 PROCEDURE — 65270000029 HC RM PRIVATE

## 2022-07-23 PROCEDURE — 74011250637 HC RX REV CODE- 250/637: Performed by: HOSPITALIST

## 2022-07-23 PROCEDURE — 74011250637 HC RX REV CODE- 250/637: Performed by: INTERNAL MEDICINE

## 2022-07-23 PROCEDURE — 80048 BASIC METABOLIC PNL TOTAL CA: CPT

## 2022-07-23 PROCEDURE — 5A1D70Z PERFORMANCE OF URINARY FILTRATION, INTERMITTENT, LESS THAN 6 HOURS PER DAY: ICD-10-PCS | Performed by: INTERNAL MEDICINE

## 2022-07-23 PROCEDURE — 36415 COLL VENOUS BLD VENIPUNCTURE: CPT

## 2022-07-23 PROCEDURE — 82962 GLUCOSE BLOOD TEST: CPT

## 2022-07-23 PROCEDURE — 74011000250 HC RX REV CODE- 250: Performed by: INTERNAL MEDICINE

## 2022-07-23 PROCEDURE — 80069 RENAL FUNCTION PANEL: CPT

## 2022-07-23 RX ORDER — TRAZODONE HYDROCHLORIDE 50 MG/1
50 TABLET ORAL
Status: DISCONTINUED | OUTPATIENT
Start: 2022-07-23 | End: 2022-07-27 | Stop reason: HOSPADM

## 2022-07-23 RX ORDER — SODIUM CHLORIDE 9 MG/ML
2000 INJECTION, SOLUTION INTRAVENOUS
Status: DISCONTINUED | OUTPATIENT
Start: 2022-07-23 | End: 2022-07-27 | Stop reason: HOSPADM

## 2022-07-23 RX ADMIN — ATORVASTATIN CALCIUM 40 MG: 40 TABLET, FILM COATED ORAL at 21:54

## 2022-07-23 RX ADMIN — MIDODRINE HYDROCHLORIDE 5 MG: 5 TABLET ORAL at 09:27

## 2022-07-23 RX ADMIN — MIDODRINE HYDROCHLORIDE 5 MG: 5 TABLET ORAL at 17:11

## 2022-07-23 RX ADMIN — SODIUM CHLORIDE, PRESERVATIVE FREE 10 ML: 5 INJECTION INTRAVENOUS at 14:17

## 2022-07-23 RX ADMIN — SODIUM CHLORIDE, PRESERVATIVE FREE 10 ML: 5 INJECTION INTRAVENOUS at 06:03

## 2022-07-23 RX ADMIN — DONEPEZIL HYDROCHLORIDE 5 MG: 5 TABLET, FILM COATED ORAL at 09:28

## 2022-07-23 RX ADMIN — SODIUM CHLORIDE, PRESERVATIVE FREE 10 ML: 5 INJECTION INTRAVENOUS at 21:54

## 2022-07-23 RX ADMIN — SODIUM CHLORIDE 2000 ML: 9 INJECTION, SOLUTION INTRAVENOUS at 05:54

## 2022-07-23 RX ADMIN — HEPARIN SODIUM 5000 UNITS: 5000 INJECTION INTRAVENOUS; SUBCUTANEOUS at 14:15

## 2022-07-23 RX ADMIN — TRAZODONE HYDROCHLORIDE 50 MG: 50 TABLET ORAL at 21:54

## 2022-07-23 RX ADMIN — MIDODRINE HYDROCHLORIDE 5 MG: 5 TABLET ORAL at 14:14

## 2022-07-23 RX ADMIN — MIRTAZAPINE 15 MG: 15 TABLET, FILM COATED ORAL at 21:54

## 2022-07-23 RX ADMIN — HEPARIN SODIUM 5000 UNITS: 5000 INJECTION INTRAVENOUS; SUBCUTANEOUS at 21:54

## 2022-07-23 NOTE — PROGRESS NOTES
HOSPITALIST PROGRESS NOTE  Gaby Gregg MD, [address]         Daily Progress Note: 7/23/2022      Subjective:     Patient is alert and oriented x4. No overnight fever/chills, chest pain, nausea/vomiting, abdominal pain noted. Continues to have severe dizziness and low blood pressures while attempting to stand up with physical therapy. Able to tolerate hemodialysis today. Patient with significant history of multiple lacunar infarcts as recently as October, 2021. Patient continues to have dizziness therefore MRI has been ordered for next Tuesday would likely discharge home with home health. Patient has spoken about POC with her daughter who has arrived from [place] and she will stay with her for the next 3 weeks to help with home health physical therapy.       Medications reviewed  Current Facility-Administered Medications   Medication Dose Route Frequency    0.9% sodium chloride infusion 2,000 mL  2,000 mL IntraVENous DIALYSIS PRN    atorvastatin (LIPITOR) tablet 40 mg  40 mg Oral QHS    donepeziL (ARICEPT) tablet 5 mg  5 mg Oral DAILY    mirtazapine (REMERON) tablet 15 mg  15 mg Oral QHS    docusate sodium (COLACE) capsule 100 mg  100 mg Oral BID    sodium chloride (NS) flush 5-40 mL  5-40 mL IntraVENous Q8H    sodium chloride (NS) flush 5-40 mL  5-40 mL IntraVENous PRN    acetaminophen (TYLENOL) tablet 650 mg  650 mg Oral Q6H PRN    Or    acetaminophen (TYLENOL) suppository 650 mg  650 mg Rectal Q6H PRN    polyethylene glycol (MIRALAX) packet 17 g  17 g Oral DAILY PRN    ondansetron (ZOFRAN ODT) tablet 4 mg  4 mg Oral Q8H PRN    Or    ondansetron (ZOFRAN) injection 4 mg  4 mg IntraVENous Q6H PRN    heparin (porcine) injection 5,000 Units  5,000 Units SubCUTAneous Q8H    midodrine (PROAMATINE) tablet 5 mg  5 mg Oral TID WITH MEALS    insulin lispro (HUMALOG) injection   SubCUTAneous AC&HS traMADoL (ULTRAM) tablet 50 mg  50 mg Oral Q6H PRN       Review of Systems:   A comprehensive review of systems was negative except for that written in the HPI. Objective:   Physical Exam:     Visit Vitals  BP (!) 136/59   Pulse 66   Temp 97.3 °F (36.3 °C)   Resp 16   Ht 5' 4\" (1.626 m)   Wt 69.3 kg (152 lb 12.8 oz)   LMP  (LMP Unknown)   SpO2 97%   BMI 26.23 kg/m²      O2 Device: None (Room air)  Patient Vitals for the past 8 hrs:   Temp Pulse Resp BP   22 1200 -- 66 -- --   22 0919 97.3 °F (36.3 °C) 61 16 (!) 136/59   22 0835 97 °F (36.1 °C) 61 16 (!) 136/59   22 0820 -- (!) 57 -- (!) 127/58   22 0805 -- 60 -- (!) 122/56   22 0800 -- 61 -- --   22 0750 -- (!) 56 -- (!) 126/59   22 0735 -- 62 -- 108/61   22 0720 -- (!) 59 -- (!) 114/48   22 0705 -- 60 -- (!) 125/54   22 0650 -- (!) 59 -- (!) 124/58   22 0635 -- 62 -- (!) 118/50   22 0620 -- (!) 59 -- (!) 111/49   22 0605 -- (!) 59 -- (!) 108/53   22 0550 -- (!) 58 -- (!) 143/54   22 0535 96.9 °F (36.1 °C) 63 16 (!) 151/64          Temp (24hrs), Av.1 °F (36.2 °C), Min:96.9 °F (36.1 °C), Max:98 °F (36.7 °C)     0701 -  1900  In: -   Out: 800    No intake/output data recorded. General:  Alert, cooperative, no distress, appears stated age. Lungs:   Clear to auscultation bilaterally. Chest wall:  No tenderness or deformity. Heart:  Regular rate and rhythm, S1, S2 normal, no murmur, click, rub or gallop. Abdomen:   Soft, non-tender. Bowel sounds normal. No masses,  No organomegaly. Extremities: Extremities normal, atraumatic, no cyanosis or edema. Pulses: 2+ and symmetric all extremities.    Skin: Skin color, texture, turgor normal. No rashes or lesions   Neurologic: No gross sensory or motor deficits     Data Review:       Recent Days:  Recent Labs     22  1444   WBC 5.7   HGB 11.9*   HCT 37.1        Recent Labs     22  0520 07/22/22  0515 07/21/22  0517 07/20/22  1444    138 137 135*   K 5.5* 4.8 5.0 5.4*    99 97 97   CO2 29 32 30 29   GLU 78 130* 119* 109*   BUN 72* 50* 57* 45*   CREA 6.64* 5.12* 7.14* 6.03*   CA 8.8 8.5 8.5 8.8   ALB  --   --   --  3.3*   TBILI  --   --   --  0.3   ALT  --   --   --  8*     No results for input(s): PH, PCO2, PO2, HCO3, FIO2 in the last 72 hours.     24 Hour Results:  Recent Results (from the past 24 hour(s))   ECHO ADULT COMPLETE    Collection Time: 07/22/22  3:00 PM   Result Value Ref Range    IVSd 1.4 (A) 0.6 - 0.9 cm    LVIDd 3.1 (A) 3.9 - 5.3 cm    LVIDs 2.1 cm    LVOT Diameter 1.9 cm    LVPWd 1.4 (A) 0.6 - 0.9 cm    EF BP 62 55 - 100 %    LV Ejection Fraction A2C 56 %    LV Ejection Fraction A4C 68 %    LV EDV A2C 40 mL    LV EDV A4C 74 mL    LV EDV BP 57 56 - 104 mL    LV ESV A2C 18 mL    LV ESV A4C 24 mL    LV ESV BP 22 19 - 49 mL    LVOT Peak Gradient 4 mmHg    LVOT Mean Gradient 2 mmHg    LVOT SV 74.8 ml    LVOT Peak Velocity 1.0 m/s    LVOT VTI 26.4 cm    RVIDd 2.5 cm    LA Diameter 3.0 cm    LA Volume A/L 45 mL    LA Volume 2C 41 22 - 52 mL    LA Volume 4C 38 22 - 52 mL    LA Volume BP 42 22 - 52 mL    AV Area by Peak Velocity 1.9 cm2    AV Area by VTI 2.1 cm2    AV Peak Gradient 9 mmHg    AV Mean Gradient 4 mmHg    AV Peak Velocity 1.5 m/s    AV Mean Velocity 0.9 m/s    AV VTI 37.6 cm    MV A Velocity 1.12 m/s    MV E Wave Deceleration Time 376.8 ms    MV E Velocity 0.78 m/s    LV E' Lateral Velocity 5 cm/s    LV E' Septal Velocity 4 cm/s    MV Area by VTI 2.0 cm2    MV Peak Gradient 5 mmHg    MV Mean Gradient 2 mmHg    MV Max Velocity 1.1 m/s    MV Mean Velocity 0.7 m/s    MV VTI 37.6 cm    TAPSE 1.5 (A) 1.7 cm    TR Peak Gradient 27 mmHg    TR Max Velocity 2.62 m/s    Aortic Root 3.7 cm    Fractional Shortening 2D 32 28 - 44 %    LV ESV Index BP 13 mL/m2    LV EDV Index BP 33 mL/m2    LV ESV Index A4C 14 mL/m2    LV EDV Index A4C 43 mL/m2    LV ESV Index A2C 10 mL/m2    LV EDV Index A2C 23 mL/m2    LVIDd Index 1.78 cm/m2    LVIDs Index 1.21 cm/m2    LV RWT Ratio 0.90     LV Mass 2D 146.7 67 - 162 g    LV Mass 2D Index 84.3 43 - 95 g/m2    MV E/A 0.70     E/E' Ratio (Averaged) 17.55     E/E' Lateral 15.60     E/E' Septal 19.50     LA Volume Index BP 24 16 - 34 ml/m2    LA Volume Index A/L 26 16 - 34 mL/m2    LVOT Stroke Volume Index 43.0 mL/m2    LVOT Area 2.8 cm2    LA Volume Index 2C 24 16 - 34 mL/m2    LA Volume Index 4C 22 16 - 34 mL/m2    LA Size Index 1.72 cm/m2    LA/AO Root Ratio 0.81     Ao Root Index 2.13 cm/m2    AV Velocity Ratio 0.67     LVOT:AV VTI Index 0.70     NURA/BSA VTI 1.2 cm2/m2    NURA/BSA Peak Velocity 1.1 cm2/m2    MV:LVOT VTI Index 1.42     Est. RA Pressure 3 mmHg    RVSP 30 mmHg   GLUCOSE, POC    Collection Time: 07/22/22  4:36 PM   Result Value Ref Range    Glucose (POC) 111 65 - 117 mg/dL    Performed by [address] [place] [address], POC    Collection Time: 07/22/22 10:05 PM   Result Value Ref Range    Glucose (POC) 127 (H) 65 - 117 mg/dL    Performed by [address] [place] [address] Swidjit, BASIC    Collection Time: 07/23/22  5:20 AM   Result Value Ref Range    Sodium 138 136 - 145 mmol/L    Potassium 5.5 (H) 3.5 - 5.1 mmol/L    Chloride 100 97 - 108 mmol/L    CO2 29 21 - 32 mmol/L    Anion gap 9 5 - 15 mmol/L    Glucose 78 65 - 100 mg/dL    BUN 72 (H) 6 - 20 mg/dL    Creatinine 6.64 (H) 0.55 - 1.02 mg/dL    BUN/Creatinine ratio 11 (L) 12 - 20      GFR est AA 7 (L) >60 ml/min/1.73m2    GFR est non-AA 6 (L) >60 ml/min/1.73m2    Calcium 8.8 8.5 - 10.1 mg/dL   GLUCOSE, POC    Collection Time: 07/23/22 11:40 AM   Result Value Ref Range    Glucose (POC) 135 (H) 65 - 117 mg/dL    Performed by DON COLIN            Assessment/Plan:     Orthostatic hypotension  Most likely related to poor oral intake and polypharmacy  Holding all antihypertensives including Norvasc, Coreg, lisinopril, hydralazine, Imdur.   Cont' midodrine 5 mg oral 3 times daily  Continue daily orthostatics. Patient unable to perform physical therapy due to low blood pressures as documented above. Normal TSH, pending  - a.m. cortisol level as well as a MRI of the brain with history of multiple CVAs and multiple falls previously. ECHO is normal with normal left ventricular ejection fraction noted, no significant valvular pathology. .  Patient's previous acute CVA with multiple lacunar infarcts in October 2021 noted. MRI of the brain pending. POC is to discharge home with home health as per patient's wishes for further physical therapy and care per patient's daughter who is visiting from [place]. End-stage renal disease  Hemodialysis Tuesday Thursdays and Saturdays  Nephrology consultation pending. Ambulatory and gait disturbance  Secondary to recent fall  At baseline she uses a rolling walker at home  Negative x-rays of bilateral knee to rule out occult fracture. Tramadol 50 mg q 6 hrs prn. PT Eval pending. Type 2 diabetes  Siding scale insulin  Blood sugar ACHS. At home patient does not take any medications. Obtain A1c. DVT prophylaxis  Heparin SC twice daily. Await physical therapy evaluation for possibility of SNF versus home discharge and based on patient preference. Care Plan discussed with: Patient/Family, Nurse, and     Total time spent with patient: 30 minutes. With greater than 50% spent in coordination of care and counseling.     Adalberto Griffith MD

## 2022-07-23 NOTE — ROUTINE PROCESS
Orthostatic vital signs obtained. C/o dizziness when sitting on side of bed but  denies after sitting on BSC. Encouraged to move slow. Had large soft brown BM. Assisted back to bed times one. Denies any c/o.

## 2022-07-23 NOTE — PROGRESS NOTES
Problem: Pressure Injury - Risk of  Goal: *Prevention of pressure injury  Description: Document Price Scale and appropriate interventions in the flowsheet. Outcome: Progressing Towards Goal  Note: Pressure Injury Interventions:  Sensory Interventions: Keep linens dry and wrinkle-free, Float heels    Moisture Interventions: Minimize layers, Absorbent underpads, Apply protective barrier, creams and emollients    Activity Interventions: PT/OT evaluation    Mobility Interventions: PT/OT evaluation, Float heels    Nutrition Interventions: Document food/fluid/supplement intake    Friction and Shear Interventions: Minimize layers, Apply protective barrier, creams and emollients                Problem: Patient Education: Go to Patient Education Activity  Goal: Patient/Family Education  Outcome: Progressing Towards Goal     Problem: Falls - Risk of  Goal: *Absence of Falls  Description: Document John Fall Risk and appropriate interventions in the flowsheet.   Outcome: Progressing Towards Goal  Note: Fall Risk Interventions:  Mobility Interventions: Bed/chair exit alarm, Patient to call before getting OOB         Medication Interventions: Bed/chair exit alarm, Teach patient to arise slowly, Patient to call before getting OOB    Elimination Interventions: Bed/chair exit alarm, Call light in reach, Toileting schedule/hourly rounds    History of Falls Interventions: Bed/chair exit alarm         Problem: Patient Education: Go to Patient Education Activity  Goal: Patient/Family Education  Outcome: Progressing Towards Goal

## 2022-07-23 NOTE — DIALYSIS
Hemodialysis / [phone]    Vitals Pre Post Assessment Pre Post   BP BP: (!) 153/57 (07/23/22 0436)   139/66 LOC A & O x 4 A & O x 4   HR Pulse (Heart Rate): (!) 58 (07/23/22 0436)   60 Lungs Room air,no SOB,clear No SOB   Resp Resp Rate: 18 (07/23/22 0436) 16 Cardiac Regular rate,rhythm Regular rate,rhythm   Temp Temp: 96.9 °F (36.1 °C) (07/23/22 0436) 97 Skin No uncovered wounds noted No uncovered wounds noted   Weight  Obtain by hospital staff  Edema No edema No edema   Tele status Monitor by hospital staff  Pain Pain Intensity 1: 0 (07/23/22 0436) none     Orders   Duration: QEWXO:3066  IOP:0977  Total:3    Dialyzer: Dialyzer/Set Up Inspection: Samuel Thakur (07/21/22 1120)   K Bath: Dialysate K (mEq/L): 3 (07/21/22 1120)   Ca Bath: Dialysate CA (mEq/L): 2.5 (07/21/22 1120)   Na: Dialysate NA (mEq/L): 140 (07/21/22 1120)   Bicarb: Dialysate HCO3 (mEq/L): 35 (07/21/22 1120)   Target Fluid Removal: Goal/Amount of Fluid to Remove (mL): 500 mL (07/21/22 1120)     Access   Type & Location: LUE AVG, bruit and thrill noted / disinfect per P & P / cannulate 15 gg needles,no difficulty   Comments:                                        Labs   HBsAg (Antigen) / date:                    7/21/22 negative                           HBsAb (Antibody) / date: 7/21/22 immune   Source: Hospital lab   Obtained/Reviewed  Critical Results Called HGB   Date Value Ref Range Status   07/20/2022 11.9 (L) 13.5 - 17.5 g/dL Final     Comment:     Investigated per delta check protocol     Potassium   Date Value Ref Range Status   07/22/2022 4.8 3.5 - 5.1 mmol/L Final     Calcium   Date Value Ref Range Status   07/22/2022 8.5 8.5 - 10.1 mg/dL Final     BUN   Date Value Ref Range Status   07/22/2022 50 (H) 6 - 20 mg/dL Final     Creatinine   Date Value Ref Range Status   07/22/2022 5.12 (H) 0.55 - 1.02 mg/dL Final     Comment:     Investigated per delta check protocol        Meds Given   Name Dose Route   none                 Adequacy / Fluid Total Liters Process: 59   Net Fluid Removed: 500 ml      Comments   Time Out Done:   (Time) 0530   Admitting Diagnosis: hypotension   Consent obtained/signed: Informed Consent Verified: Yes (chronic dialysis patient) (07/21/22 1120)   Machine / RO # Machine Number: [id number] (07/21/22 1120)   Primary Nurse Rpt Pre: Nell Rodriguez RN   Primary Nurse Rpt Post: Romero Weldon RN   Pt Education: Access care, blood pressure monitor   Care Plan: Continue with hemodialysis as ordered   Pts outpatient clinic: [facility] Summary   Comments:          no distress, no complaints, no SOB,no pain / rested much of treatment, and post treatment, all possible blood returned / hemostasis / bruit and thrill of AVG noted

## 2022-07-24 LAB
GLUCOSE BLD STRIP.AUTO-MCNC: 114 MG/DL (ref 65–117)
GLUCOSE BLD STRIP.AUTO-MCNC: 186 MG/DL (ref 65–117)
GLUCOSE BLD STRIP.AUTO-MCNC: 81 MG/DL (ref 65–117)
GLUCOSE BLD STRIP.AUTO-MCNC: 92 MG/DL (ref 65–117)
PERFORMED BY, TECHID: ABNORMAL
PERFORMED BY, TECHID: NORMAL

## 2022-07-24 PROCEDURE — 74011250637 HC RX REV CODE- 250/637: Performed by: HOSPITALIST

## 2022-07-24 PROCEDURE — 65270000029 HC RM PRIVATE

## 2022-07-24 PROCEDURE — 82962 GLUCOSE BLOOD TEST: CPT

## 2022-07-24 PROCEDURE — 74011000250 HC RX REV CODE- 250: Performed by: INTERNAL MEDICINE

## 2022-07-24 PROCEDURE — 74011250637 HC RX REV CODE- 250/637: Performed by: INTERNAL MEDICINE

## 2022-07-24 PROCEDURE — 74011250636 HC RX REV CODE- 250/636: Performed by: INTERNAL MEDICINE

## 2022-07-24 RX ORDER — CARVEDILOL 3.12 MG/1
3.12 TABLET ORAL 2 TIMES DAILY WITH MEALS
Status: DISCONTINUED | OUTPATIENT
Start: 2022-07-24 | End: 2022-07-27 | Stop reason: HOSPADM

## 2022-07-24 RX ORDER — ASPIRIN 81 MG/1
81 TABLET ORAL DAILY
Status: DISCONTINUED | OUTPATIENT
Start: 2022-07-24 | End: 2022-07-27 | Stop reason: HOSPADM

## 2022-07-24 RX ADMIN — ATORVASTATIN CALCIUM 40 MG: 40 TABLET, FILM COATED ORAL at 21:04

## 2022-07-24 RX ADMIN — HEPARIN SODIUM 5000 UNITS: 5000 INJECTION INTRAVENOUS; SUBCUTANEOUS at 06:04

## 2022-07-24 RX ADMIN — SODIUM CHLORIDE, PRESERVATIVE FREE 10 ML: 5 INJECTION INTRAVENOUS at 13:54

## 2022-07-24 RX ADMIN — HEPARIN SODIUM 5000 UNITS: 5000 INJECTION INTRAVENOUS; SUBCUTANEOUS at 21:03

## 2022-07-24 RX ADMIN — MIDODRINE HYDROCHLORIDE 5 MG: 5 TABLET ORAL at 12:11

## 2022-07-24 RX ADMIN — DOCUSATE SODIUM 100 MG: 100 CAPSULE, LIQUID FILLED ORAL at 21:04

## 2022-07-24 RX ADMIN — ASPIRIN 81 MG: 81 TABLET, COATED ORAL at 12:43

## 2022-07-24 RX ADMIN — SODIUM CHLORIDE, PRESERVATIVE FREE 10 ML: 5 INJECTION INTRAVENOUS at 22:22

## 2022-07-24 RX ADMIN — MIDODRINE HYDROCHLORIDE 5 MG: 5 TABLET ORAL at 08:18

## 2022-07-24 RX ADMIN — CARVEDILOL 3.12 MG: 3.12 TABLET, FILM COATED ORAL at 17:05

## 2022-07-24 RX ADMIN — MIRTAZAPINE 15 MG: 15 TABLET, FILM COATED ORAL at 21:04

## 2022-07-24 RX ADMIN — HEPARIN SODIUM 5000 UNITS: 5000 INJECTION INTRAVENOUS; SUBCUTANEOUS at 14:05

## 2022-07-24 RX ADMIN — DONEPEZIL HYDROCHLORIDE 5 MG: 5 TABLET, FILM COATED ORAL at 08:18

## 2022-07-24 RX ADMIN — SODIUM CHLORIDE, PRESERVATIVE FREE 10 ML: 5 INJECTION INTRAVENOUS at 06:04

## 2022-07-24 RX ADMIN — MIDODRINE HYDROCHLORIDE 5 MG: 5 TABLET ORAL at 17:05

## 2022-07-24 RX ADMIN — DOCUSATE SODIUM 100 MG: 100 CAPSULE, LIQUID FILLED ORAL at 08:18

## 2022-07-24 NOTE — PROGRESS NOTES
Daily Progress Note: 7/24/2022      Subjective:   /As per Admitting provider on 7/20:  Evangelina Will is a 78 y.o. female with h/o ESRD on hem:odialysis Tuesday, Thursdays and Saturdays. She was last dialyzed on Tuesday but reportedly felt unwell postdialysis. Denies fevers chills abdominal pain nausea or vomiting. Reportedly lives at home at night from 8:30 PM to 8 AM in the morning. Had difficulties getting out of bed to the bathroom. Had a follow-up appointment with PCP Dr. Iris Lang today. During appointment, blood pressure reading noted to be in the 12P systolic. She was sent over to the ED for further evaluation. Upon assessment in the ED, her blood pressure was noted to be in the low 100s. She was afebrile and not tachycardic. In retrospect, she reports a ground-level fall at home about 2 weeks ago. Post recent fall, she has been feeling extremely weak and difficulties with ambulation. She is requesting to go to acute inpatient rehab. We will monitor overnight with PT OT evaluation for possible placement  Patient is alert and oriented x4. Able to tolerate hemodialysis on 7/23  Patient with significant history of multiple lacunar infarcts as recently as October, 2021. Patient continues to have dizziness therefore MRI has been ordered for next Tuesday would likely discharge home with home health. Patient has spoken about POC with her daughter who has arrived from [place] and she will stay with her for the next 3 weeks to help with home health physical therapy. 7/24: seen on follow up , awake and alert. Extensive conversation with patient daugther. Blood pressure improved and restart lower dosing of home meds.  Awaiting PT evaluation in AM and MRI to be done on Tuesday       Medications reviewed  Current Facility-Administered Medications   Medication Dose Route Frequency    aspirin delayed-release tablet 81 mg  81 mg Oral DAILY    carvediloL (COREG) tablet 3.125 mg  3.125 mg Oral BID WITH MEALS    0.9% sodium chloride infusion 2,000 mL  2,000 mL IntraVENous DIALYSIS PRN    traZODone (DESYREL) tablet 50 mg  50 mg Oral QHS PRN    atorvastatin (LIPITOR) tablet 40 mg  40 mg Oral QHS    donepeziL (ARICEPT) tablet 5 mg  5 mg Oral DAILY    mirtazapine (REMERON) tablet 15 mg  15 mg Oral QHS    docusate sodium (COLACE) capsule 100 mg  100 mg Oral BID    sodium chloride (NS) flush 5-40 mL  5-40 mL IntraVENous Q8H    sodium chloride (NS) flush 5-40 mL  5-40 mL IntraVENous PRN    acetaminophen (TYLENOL) tablet 650 mg  650 mg Oral Q6H PRN    Or    acetaminophen (TYLENOL) suppository 650 mg  650 mg Rectal Q6H PRN    polyethylene glycol (MIRALAX) packet 17 g  17 g Oral DAILY PRN    ondansetron (ZOFRAN ODT) tablet 4 mg  4 mg Oral Q8H PRN    Or    ondansetron (ZOFRAN) injection 4 mg  4 mg IntraVENous Q6H PRN    heparin (porcine) injection 5,000 Units  5,000 Units SubCUTAneous Q8H    midodrine (PROAMATINE) tablet 5 mg  5 mg Oral TID WITH MEALS    insulin lispro (HUMALOG) injection   SubCUTAneous AC&HS    traMADoL (ULTRAM) tablet 50 mg  50 mg Oral Q6H PRN       Review of Systems:   A comprehensive review of systems was negative except for that written in the HPI. Objective:   Physical Exam:     Visit Vitals  BP (!) 145/58 (BP 1 Location: Right upper arm, BP Patient Position: At rest)   Pulse 63   Temp 96.9 °F (36.1 °C)   Resp 18   Ht 5' 4\" (1.626 m)   Wt 70.3 kg (154 lb 15.7 oz)   LMP  (LMP Unknown)   SpO2 95%   BMI 26.60 kg/m²      O2 Device: None (Room air)  Patient Vitals for the past 8 hrs:   Temp Pulse Resp BP SpO2   22 1148 96.9 °F (36.1 °C) 63 18 (!) 145/58 95 %   22 0800 -- 66 -- -- --   22 0739 96.9 °F (36.1 °C) 60 18 (!) 139/58 98 %            Temp (24hrs), Av.2 °F (36.2 °C), Min:96.9 °F (36.1 °C), Max:98.3 °F (36.8 °C)    No intake/output data recorded.  1901 -  0700  In: -   Out: 800     General:  Alert, cooperative, no distress, appears stated age.    Lungs: Clear to auscultation bilaterally. Chest wall:  No tenderness or deformity. Heart:  Regular rate and rhythm, S1, S2 normal, no murmur, click, rub or gallop. Abdomen:   Soft, non-tender. Bowel sounds normal. No masses,  No organomegaly. Extremities: Extremities normal, atraumatic, no cyanosis or edema. Pulses: 2+ and symmetric all extremities. Skin: Skin color, texture, turgor normal. No rashes or lesions   Neurologic: No gross sensory or motor deficits     Data Review:       Recent Days:  No results for input(s): WBC, HGB, HCT, PLT, HGBEXT, HCTEXT, PLTEXT, HGBEXT, HCTEXT, PLTEXT in the last 72 hours. Recent Labs     07/23/22  1415 07/23/22  0520 07/22/22  0515    138 138   K 4.0 5.5* 4.8   CL 99 100 99   CO2 32 29 32   * 78 130*   BUN 37* 72* 50*   CREA 4.20* 6.64* 5.12*   CA 8.7 8.8 8.5   PHOS 3.3  --   --    ALB 2.9*  --   --        No results for input(s): PH, PCO2, PO2, HCO3, FIO2 in the last 72 hours.     24 Hour Results:  Recent Results (from the past 24 hour(s))   RENAL FUNCTION PANEL    Collection Time: 07/23/22  2:15 PM   Result Value Ref Range    Sodium 139 136 - 145 mmol/L    Potassium 4.0 3.5 - 5.1 mmol/L    Chloride 99 97 - 108 mmol/L    CO2 32 21 - 32 mmol/L    Anion gap 8 5 - 15 mmol/L    Glucose 160 (H) 65 - 100 mg/dL    BUN 37 (H) 6 - 20 mg/dL    Creatinine 4.20 (H) 0.55 - 1.02 mg/dL    BUN/Creatinine ratio 9 (L) 12 - 20      GFR est AA 13 (L) >60 ml/min/1.73m2    GFR est non-AA 10 (L) >60 ml/min/1.73m2    Calcium 8.7 8.5 - 10.1 mg/dL    Phosphorus 3.3 2.6 - 4.7 mg/dL    Albumin 2.9 (L) 3.5 - 5.0 g/dL   GLUCOSE, POC    Collection Time: 07/23/22  4:38 PM   Result Value Ref Range    Glucose (POC) 106 65 - 117 mg/dL    Performed by DON COLIN    GLUCOSE, POC    Collection Time: 07/23/22  9:08 PM   Result Value Ref Range    Glucose (POC) 153 (H) 65 - 117 mg/dL    Performed by Wendi [address], POC    Collection Time: 07/24/22  7:01 AM   Result Value Ref Range Glucose (POC) 81 65 - 117 mg/dL    Performed by Pamela Haskins POC    Collection Time: 07/24/22 11:32 AM   Result Value Ref Range    Glucose (POC) 92 65 - 117 mg/dL    Performed by Samuel Orozco            Assessment/Plan:     Orthostatic hypotension  - Most likely related to poor oral intake and polypharmacy  - Holding all antihypertensives including  Coreg, Imdur. As previously on outpatient basis decreased coreg dosing from 25mg to 12.5mg and no longer takes norvasc, hydralazine or lisinopril or lasix dosing.   - Cont' midodrine 5 mg oral 3 times daily  - Continue daily orthostatics. - Patient unable to perform physical therapy due to low blood pressures and will be reevaluated in AM   - Normal TSH, pending  - a.m. cortisol level as well as a MRI of the brain with history of multiple CVAs and multiple falls previously. - ECHO is normal with normal left ventricular ejection fraction noted, no significant valvular pathology. .  - Patient's previous acute CVA with multiple lacunar infarcts in October 2021 noted and prior to that muliple events noted by daughter   - currently patient baseline is noted to be ambulating with assistance with walker and has multiple aides in the home paid for out of pocket   - MRI of the brain pending scheduled for Tuesday   - POC is to discharge home with home health as per patient's wishes for further physical therapy and care per patient's daughter who is visiting from [place]. - follow up PT recommendations and if SNF recommended then will need discussion with patient and her daugther who has stated [facility] in [place] could be a possiblity but ultimately would have her mother be at home and do outpatient therapy  - 7/24 : blood pressure improved and increased. Will restart coreg but lowest dose at 3.125mg bid as HR still in low 60 range. Will continue to monitor but hold imdur dosing for now.      End-stage renal disease  - follows with nephrologist and dialysis center in [place]  - Hemodialysis Tuesday Thursdays and Saturday and tolerated treatment on 7/23  - Nephrology consultation appreciated      Ambulatory and gait disturbance  - Secondary to recent fall  - At baseline she uses a rolling walker at home  - Negative x-rays of bilateral knee to rule out occult fracture. - Tramadol 50 mg q 6 hrs prn.  - PT Eval pending. Type 2 diabetes  - Siding scale insulin  - Blood sugar ACHS. - At home patient does not take any medications. - Obtain A1c. DVT prophylaxis  - Heparin SC twice daily. CODE STATUS: Full code  Patient's designated decision-maker is her daughter Magan Peralta [phone]    Await physical therapy evaluation for possibility of SNF versus home discharge and based on patient preference. Care Plan discussed with: Patient/Family, Nurse, and     Total time spent with patient: 30 minutes. With greater than 50% spent in coordination of care and counseling.     Jax Lyons MD

## 2022-07-24 NOTE — ROUTINE PROCESS
Lt arm fistula monitor on, no c/o. Sat on edge of bed. C/o dizzy and weak. Plan for PT. Brief changed BM soft brown. Ate well.

## 2022-07-24 NOTE — PROGRESS NOTES
Problem: Pressure Injury - Risk of  Goal: *Prevention of pressure injury  Description: Document Price Scale and appropriate interventions in the flowsheet. Outcome: Progressing Towards Goal  Note: Pressure Injury Interventions:  Sensory Interventions: Keep linens dry and wrinkle-free, Float heels    Moisture Interventions: Minimize layers    Activity Interventions: PT/OT evaluation    Mobility Interventions: PT/OT evaluation    Nutrition Interventions: Document food/fluid/supplement intake    Friction and Shear Interventions: Apply protective barrier, creams and emollients, Foam dressings/transparent film/skin sealants                Problem: Patient Education: Go to Patient Education Activity  Goal: Patient/Family Education  Outcome: Progressing Towards Goal     Problem: Falls - Risk of  Goal: *Absence of Falls  Description: Document John Fall Risk and appropriate interventions in the flowsheet.   Outcome: Progressing Towards Goal  Note: Fall Risk Interventions:  Mobility Interventions: Bed/chair exit alarm         Medication Interventions: Bed/chair exit alarm, Patient to call before getting OOB    Elimination Interventions: Call light in reach, Bed/chair exit alarm    History of Falls Interventions: Bed/chair exit alarm         Problem: Patient Education: Go to Patient Education Activity  Goal: Patient/Family Education  Outcome: Progressing Towards Goal     Problem: Patient Education: Go to Patient Education Activity  Goal: Patient/Family Education  Outcome: Progressing Towards Goal

## 2022-07-25 LAB
ALBUMIN SERPL-MCNC: 2.9 G/DL (ref 3.5–5)
ANION GAP SERPL CALC-SCNC: 11 MMOL/L (ref 5–15)
BASOPHILS # BLD: 0 K/UL (ref 0–0.2)
BASOPHILS NFR BLD: 1 % (ref 0–2.5)
BUN SERPL-MCNC: 82 MG/DL (ref 6–20)
BUN/CREAT SERPL: 12 (ref 12–20)
CA-I BLD-MCNC: 8.7 MG/DL (ref 8.5–10.1)
CHLORIDE SERPL-SCNC: 101 MMOL/L (ref 97–108)
CO2 SERPL-SCNC: 28 MMOL/L (ref 21–32)
CREAT SERPL-MCNC: 6.99 MG/DL (ref 0.55–1.02)
EOSINOPHIL # BLD: 0.2 K/UL (ref 0–0.7)
EOSINOPHIL NFR BLD: 3 % (ref 0.9–2.9)
ERYTHROCYTE [DISTWIDTH] IN BLOOD BY AUTOMATED COUNT: 17.9 % (ref 11.5–14.5)
GLUCOSE BLD STRIP.AUTO-MCNC: 100 MG/DL (ref 65–117)
GLUCOSE BLD STRIP.AUTO-MCNC: 108 MG/DL (ref 65–117)
GLUCOSE BLD STRIP.AUTO-MCNC: 114 MG/DL (ref 65–117)
GLUCOSE BLD STRIP.AUTO-MCNC: 91 MG/DL (ref 65–117)
GLUCOSE SERPL-MCNC: 93 MG/DL (ref 65–100)
HCT VFR BLD AUTO: 35 % (ref 36–46)
HGB BLD-MCNC: 11.3 G/DL (ref 13.5–17.5)
LYMPHOCYTES # BLD: 2.4 K/UL (ref 1–4.8)
LYMPHOCYTES NFR BLD: 43 % (ref 20.5–51.1)
MCH RBC QN AUTO: 29.5 PG (ref 31–34)
MCHC RBC AUTO-ENTMCNC: 32.4 G/DL (ref 31–36)
MCV RBC AUTO: 91.2 FL (ref 80–100)
MONOCYTES # BLD: 0.5 K/UL (ref 0.2–2.4)
MONOCYTES NFR BLD: 8 % (ref 1.7–9.3)
NEUTS SEG # BLD: 2.4 K/UL (ref 1.8–7.7)
NEUTS SEG NFR BLD: 45 % (ref 42–75)
NRBC # BLD: 0.02 K/UL
NRBC BLD-RTO: 0.3 PER 100 WBC
PERFORMED BY, TECHID: NORMAL
PHOSPHATE SERPL-MCNC: 7.1 MG/DL (ref 2.6–4.7)
PLATELET # BLD AUTO: 200 K/UL (ref 150–400)
PMV BLD AUTO: 9.2 FL (ref 6.5–11.5)
POTASSIUM SERPL-SCNC: 4.9 MMOL/L (ref 3.5–5.1)
RBC # BLD AUTO: 3.84 M/UL (ref 4.5–5.9)
SODIUM SERPL-SCNC: 140 MMOL/L (ref 136–145)
WBC # BLD AUTO: 5.5 K/UL (ref 4.4–11.3)

## 2022-07-25 PROCEDURE — 74011250636 HC RX REV CODE- 250/636: Performed by: INTERNAL MEDICINE

## 2022-07-25 PROCEDURE — 36415 COLL VENOUS BLD VENIPUNCTURE: CPT

## 2022-07-25 PROCEDURE — 97530 THERAPEUTIC ACTIVITIES: CPT

## 2022-07-25 PROCEDURE — 80069 RENAL FUNCTION PANEL: CPT

## 2022-07-25 PROCEDURE — 74011250637 HC RX REV CODE- 250/637: Performed by: HOSPITALIST

## 2022-07-25 PROCEDURE — 74011000250 HC RX REV CODE- 250: Performed by: INTERNAL MEDICINE

## 2022-07-25 PROCEDURE — 65270000029 HC RM PRIVATE

## 2022-07-25 PROCEDURE — 85025 COMPLETE CBC W/AUTO DIFF WBC: CPT

## 2022-07-25 PROCEDURE — 74011250637 HC RX REV CODE- 250/637: Performed by: INTERNAL MEDICINE

## 2022-07-25 PROCEDURE — 82962 GLUCOSE BLOOD TEST: CPT

## 2022-07-25 PROCEDURE — 94640 AIRWAY INHALATION TREATMENT: CPT

## 2022-07-25 RX ORDER — MIRTAZAPINE 15 MG/1
30 TABLET, FILM COATED ORAL
Status: DISCONTINUED | OUTPATIENT
Start: 2022-07-25 | End: 2022-07-27 | Stop reason: HOSPADM

## 2022-07-25 RX ORDER — FLUTICASONE PROPIONATE 50 MCG
2 SPRAY, SUSPENSION (ML) NASAL DAILY
Status: DISCONTINUED | OUTPATIENT
Start: 2022-07-25 | End: 2022-07-27 | Stop reason: HOSPADM

## 2022-07-25 RX ORDER — TRAZODONE HYDROCHLORIDE 50 MG/1
50 TABLET ORAL
Status: DISCONTINUED | OUTPATIENT
Start: 2022-07-25 | End: 2022-07-25

## 2022-07-25 RX ORDER — NYSTATIN 100000 [USP'U]/ML
500000 SUSPENSION ORAL 4 TIMES DAILY
Status: DISCONTINUED | OUTPATIENT
Start: 2022-07-25 | End: 2022-07-27 | Stop reason: HOSPADM

## 2022-07-25 RX ORDER — UREA 10 %
100 LOTION (ML) TOPICAL DAILY
Status: DISCONTINUED | OUTPATIENT
Start: 2022-07-25 | End: 2022-07-27 | Stop reason: HOSPADM

## 2022-07-25 RX ORDER — ALPRAZOLAM 0.25 MG/1
0.25 TABLET ORAL EVERY 12 HOURS
Status: DISCONTINUED | OUTPATIENT
Start: 2022-07-25 | End: 2022-07-27

## 2022-07-25 RX ORDER — ISOSORBIDE MONONITRATE 30 MG/1
30 TABLET, EXTENDED RELEASE ORAL DAILY
Status: DISCONTINUED | OUTPATIENT
Start: 2022-07-25 | End: 2022-07-27 | Stop reason: HOSPADM

## 2022-07-25 RX ORDER — ALPRAZOLAM 0.25 MG/1
0.25 TABLET ORAL 2 TIMES DAILY
Status: DISCONTINUED | OUTPATIENT
Start: 2022-07-25 | End: 2022-07-25

## 2022-07-25 RX ORDER — ACETAMINOPHEN 500 MG
2000 TABLET ORAL DAILY
Status: DISCONTINUED | OUTPATIENT
Start: 2022-07-25 | End: 2022-07-27 | Stop reason: HOSPADM

## 2022-07-25 RX ADMIN — MOMETASONE FUROATE AND FORMOTEROL FUMARATE DIHYDRATE 2 PUFF: 200; 5 AEROSOL RESPIRATORY (INHALATION) at 20:19

## 2022-07-25 RX ADMIN — MOMETASONE FUROATE AND FORMOTEROL FUMARATE DIHYDRATE 2 PUFF: 200; 5 AEROSOL RESPIRATORY (INHALATION) at 08:27

## 2022-07-25 RX ADMIN — SODIUM CHLORIDE, PRESERVATIVE FREE 10 ML: 5 INJECTION INTRAVENOUS at 16:11

## 2022-07-25 RX ADMIN — HEPARIN SODIUM 5000 UNITS: 5000 INJECTION INTRAVENOUS; SUBCUTANEOUS at 21:30

## 2022-07-25 RX ADMIN — DONEPEZIL HYDROCHLORIDE 5 MG: 5 TABLET, FILM COATED ORAL at 09:54

## 2022-07-25 RX ADMIN — MIDODRINE HYDROCHLORIDE 5 MG: 5 TABLET ORAL at 12:16

## 2022-07-25 RX ADMIN — VITAM B12 100 MCG: 100 TAB at 09:53

## 2022-07-25 RX ADMIN — Medication 2000 UNITS: at 09:54

## 2022-07-25 RX ADMIN — HEPARIN SODIUM 5000 UNITS: 5000 INJECTION INTRAVENOUS; SUBCUTANEOUS at 06:26

## 2022-07-25 RX ADMIN — SODIUM CHLORIDE, PRESERVATIVE FREE 10 ML: 5 INJECTION INTRAVENOUS at 21:31

## 2022-07-25 RX ADMIN — CARVEDILOL 3.12 MG: 3.12 TABLET, FILM COATED ORAL at 09:53

## 2022-07-25 RX ADMIN — HEPARIN SODIUM 5000 UNITS: 5000 INJECTION INTRAVENOUS; SUBCUTANEOUS at 17:06

## 2022-07-25 RX ADMIN — CARVEDILOL 3.12 MG: 3.12 TABLET, FILM COATED ORAL at 17:06

## 2022-07-25 RX ADMIN — NYSTATIN 500000 UNITS: 100000 SUSPENSION ORAL at 17:06

## 2022-07-25 RX ADMIN — FLUTICASONE PROPIONATE 2 SPRAY: 50 SPRAY, METERED NASAL at 09:53

## 2022-07-25 RX ADMIN — DOCUSATE SODIUM 100 MG: 100 CAPSULE, LIQUID FILLED ORAL at 21:30

## 2022-07-25 RX ADMIN — ATORVASTATIN CALCIUM 40 MG: 40 TABLET, FILM COATED ORAL at 21:30

## 2022-07-25 RX ADMIN — DOCUSATE SODIUM 100 MG: 100 CAPSULE, LIQUID FILLED ORAL at 09:53

## 2022-07-25 RX ADMIN — MIRTAZAPINE 30 MG: 15 TABLET, FILM COATED ORAL at 21:30

## 2022-07-25 RX ADMIN — ASPIRIN 81 MG: 81 TABLET, COATED ORAL at 09:53

## 2022-07-25 RX ADMIN — ALPRAZOLAM 0.25 MG: 0.25 TABLET ORAL at 20:08

## 2022-07-25 RX ADMIN — SODIUM CHLORIDE, PRESERVATIVE FREE 10 ML: 5 INJECTION INTRAVENOUS at 06:26

## 2022-07-25 RX ADMIN — NYSTATIN 500000 UNITS: 100000 SUSPENSION ORAL at 21:30

## 2022-07-25 NOTE — PROGRESS NOTES
Problem: Pressure Injury - Risk of  Goal: *Prevention of pressure injury  Description: Document Price Scale and appropriate interventions in the flowsheet.   Outcome: Progressing Towards Goal  Note: Pressure Injury Interventions:  Sensory Interventions: Avoid rigorous massage over bony prominences    Moisture Interventions: Absorbent underpads, Minimize layers    Activity Interventions: Increase time out of bed, PT/OT evaluation    Mobility Interventions: PT/OT evaluation    Nutrition Interventions: Document food/fluid/supplement intake    Friction and Shear Interventions: Apply protective barrier, creams and emollients, Minimize layers, Feet elevated on foot rest

## 2022-07-25 NOTE — PROGRESS NOTES
PHYSICAL THERAPY TREATMENT  Patient: Eddie Marroquin (29 y.o. female)  Date: 7/25/2022  Diagnosis: Orthostatic hypotension [I95.1]  Dizzinesses [R42]  ES[facility]end stage renal [facility] [N18.6, Z99.2] Orthostatic hypotension      Precautions:    Chart, physical therapy assessment, plan of care and goals were reviewed. ASSESSMENT  Patient continues with skilled PT services and is progressing towards goals. Pt continues to have unstable changes in BP with position change. However, today her dizziness was much less with sitting and she was able to tolerate standing long enough for a BP to be recorded. Current Level of Function Impacting Discharge (mobility/balance): Decreased strength, mobility, and activity tolerance. Other factors to consider for discharge: Pt wants to be discharged to an inpatient rehab if she can decrease her dizziness. PLAN :  Patient continues to benefit from skilled intervention to address the above impairments. Continue treatment per established plan of care. to address goals. Recommendation for discharge: (in order for the patient to meet his/her long term goals)  Therapy up to 5 days/week in SNF setting    This discharge recommendation:  Has been made in collaboration with the attending provider and/or case management    IF patient discharges home will need the following DME: rolling walker       SUBJECTIVE:   Patient stated I am feeling a little better today, but I am still dizzy. I want to get up and sit in a chair today if I can. Darnell Torres    OBJECTIVE DATA SUMMARY:   Critical Behavior:  Neurologic State: Alert  Orientation Level: Oriented X4        Functional Mobility Training:  Bed Mobility:  Rolling: Minimum assistance  Supine to Sit: Minimum assistance  Sit to Supine: Minimum assistance  Scooting: Maximum assistance        Transfers:  Sit to Stand: Maximum assistance (With FWW)  Stand to Sit: Maximum assistance Balance:  Sitting: Intact; With support  Standing: Intact; With support          Treatment Session:   Pt BP was assessed in sitting and standing today. Upon entrance to room pt was sitting up in bed and BP was 153/52 and MAP of 127 and pt reported minimal dizziness. Pt required max assist and FWW to sit <--> stand but was able to stand for up to 30 seconds. Pt performed sit to stand 3 times and BP was monitored each time. The first time standing she was not able to stand longer than 5 seconds and her BP was 174/11 and MAP of 145. The second time standing she was able to stand for 30 seconds and her BP was 144/87 and MAP of 100. On the third time standing she was able to stand for 10 seconds and her BP was 209/63 and MAP of 163. She became dizzy with all three standing attempts. Pain Ratin/10    Activity Tolerance:   Poor    After treatment patient left in no apparent distress:   Supine in bed, Call bell within reach, Bed / chair alarm activated, Caregiver / family present, and Side rails x 3    COMMUNICATION/COLLABORATION:   The patients plan of care was discussed with: Physical therapist, Registered nurse, Physician, and Case management. Sendy Trent, PT, DPT    Time Calculation: 31 mins         Problem: Mobility Impaired (Adult and Pediatric)  Goal: *Acute Goals and Plan of Care (Insert Text)  Description: Physical Therapy Goals  Initiated 2022  1. Patient will move from supine to sit and sit to supine  in bed with supervision/set-up within 7 day(s). 2.  Patient will transfer from bed to chair and chair to bed with minimal assistance/contact guard assist using the least restrictive device within 7 day(s). 3.  Patient will perform sit to stand with minimal assistance/contact guard assist within 7 day(s). 4.  Patient will ambulate with minimal assistance/contact guard assist for 50 feet with the least restrictive device within 7 day(s).   5.  Patient will ascend/descend 3 stairs with B handrail(s) with minimal assistance/contact guard assist within 7 day(s).       Outcome: Progressing Towards Goal

## 2022-07-25 NOTE — ROUTINE PROCESS
Report from [facility] JOCY IRIZARRY. Patient in bed resting, no complaints, HOB elevated, and call light within reach.

## 2022-07-25 NOTE — PROGRESS NOTES
Patients case reviewed during interdisciplinary team meeting in [address] [place] [address]/Acute Care Unit. Rev.  Dinesh Early 04, [address]

## 2022-07-25 NOTE — ROUTINE PROCESS
The pt have sleeping off & on thus far. She does get anxious at times. She was assisted up to the [facility]. The pt did have a lge soft stool. She was cleansed & was assisted back to bed. The present Iv site is patent. No diabetic symptoms have been noted. The lt AV fistula have a + bruit & thrill.

## 2022-07-25 NOTE — ROUTINE PROCESS
Report was received from the offgoing nurse Nu ALMANZAR. Care was resumed via the incoming nurse [facility] JOCY IRIZARRY. The report consist of using SBAR information.

## 2022-07-25 NOTE — PROGRESS NOTES
Daily Progress Note: 7/25/2022      Subjective:   /As per Admitting provider on 7/20:  Catherine Jones is a 78 y.o. female with h/o ESRD on hem:odialysis Tuesday, Thursdays and Saturdays. She was last dialyzed on Tuesday but reportedly felt unwell postdialysis. Denies fevers chills abdominal pain nausea or vomiting. Reportedly lives at home at night from 8:30 PM to 8 AM in the morning. Had difficulties getting out of bed to the bathroom. Had a follow-up appointment with PCP Dr. Denys Erazo today. During appointment, blood pressure reading noted to be in the 17W systolic. She was sent over to the ED for further evaluation. Upon assessment in the ED, her blood pressure was noted to be in the low 100s. She was afebrile and not tachycardic. In retrospect, she reports a ground-level fall at home about 2 weeks ago. Post recent fall, she has been feeling extremely weak and difficulties with ambulation. She is requesting to go to acute inpatient rehab. We will monitor overnight with PT OT evaluation for possible placement  Patient is alert and oriented x4. Able to tolerate hemodialysis on 7/23  Patient with significant history of multiple lacunar infarcts as recently as October, 2021. Patient continues to have dizziness therefore MRI has been ordered for next Tuesday would likely discharge home with home health. Patient has spoken about POC with her daughter who has arrived from [place] and she will stay with her for the next 3 weeks to help with home health physical therapy. 7/25: was seen of follow up. Daughter in room and all questions answsered. Patient was able to stand for PT today but did get dizzy and noted that diastolic bp was low especially on standing. Patient not able to tolerated intense rehab and not a candidate for inpatient rehab. Continue to complain about drainage down sinus into mouth and but appears that mouth is dry and no mucous or discharge noted.  Does appear to have increased anxiety and has continues to have complaints of drainage.        Medications reviewed  Current Facility-Administered Medications   Medication Dose Route Frequency    [Held by provider] isosorbide mononitrate ER (IMDUR) tablet 30 mg  30 mg Oral DAILY    cyanocobalamin (VITAMIN B12) tablet 100 mcg  100 mcg Oral DAILY    fluticasone propionate (FLONASE) 50 mcg/actuation nasal spray 2 Spray  2 Spray Both Nostrils DAILY    mometasone-formoterol (DULERA) 200mcg-5mcg/puff  2 Puff Inhalation BID RT    cholecalciferol (VITAMIN D3) capsule 2,000 Units  2,000 Units Oral DAILY    nystatin (MYCOSTATIN) 100,000 unit/mL oral suspension 500,000 Units  500,000 Units Oral QID    mirtazapine (REMERON) tablet 30 mg  30 mg Oral QHS    aspirin delayed-release tablet 81 mg  81 mg Oral DAILY    carvediloL (COREG) tablet 3.125 mg  3.125 mg Oral BID WITH MEALS    0.9% sodium chloride infusion 2,000 mL  2,000 mL IntraVENous DIALYSIS PRN    traZODone (DESYREL) tablet 50 mg  50 mg Oral QHS PRN    atorvastatin (LIPITOR) tablet 40 mg  40 mg Oral QHS    donepeziL (ARICEPT) tablet 5 mg  5 mg Oral DAILY    docusate sodium (COLACE) capsule 100 mg  100 mg Oral BID    sodium chloride (NS) flush 5-40 mL  5-40 mL IntraVENous Q8H    sodium chloride (NS) flush 5-40 mL  5-40 mL IntraVENous PRN    acetaminophen (TYLENOL) tablet 650 mg  650 mg Oral Q6H PRN    Or    acetaminophen (TYLENOL) suppository 650 mg  650 mg Rectal Q6H PRN    polyethylene glycol (MIRALAX) packet 17 g  17 g Oral DAILY PRN    ondansetron (ZOFRAN ODT) tablet 4 mg  4 mg Oral Q8H PRN    Or    ondansetron (ZOFRAN) injection 4 mg  4 mg IntraVENous Q6H PRN    heparin (porcine) injection 5,000 Units  5,000 Units SubCUTAneous Q8H    midodrine (PROAMATINE) tablet 5 mg  5 mg Oral TID WITH MEALS    insulin lispro (HUMALOG) injection   SubCUTAneous AC&HS    traMADoL (ULTRAM) tablet 50 mg  50 mg Oral Q6H PRN       Review of Systems:   A comprehensive review of systems was negative except for that written in the HPI. Objective:   Physical Exam:     Visit Vitals  BP (!) 176/61 (BP 1 Location: Right upper arm, BP Patient Position: At rest;Lying)   Pulse 65   Temp 97.6 °F (36.4 °C)   Resp 18   Ht 5' 4\" (1.626 m)   Wt 68.8 kg (151 lb 10.8 oz)   LMP  (LMP Unknown)   SpO2 95%   BMI 26.04 kg/m²      O2 Device: None (Room air)  Patient Vitals for the past 8 hrs:   Temp Pulse Resp BP SpO2   22 1502 -- 65 18 (!) 176/61 95 %   22 1224 97.6 °F (36.4 °C) 62 18 (!) 146/53 93 %   22 0945 -- 65 -- (!) 140/90 --   22 0944 -- 67 -- (!) 131/90 --   22 0943 -- 66 -- 134/65 --   22 0827 -- -- -- -- 96 %   22 0802 97.6 °F (36.4 °C) 63 18 135/89 93 %            Temp (24hrs), Av.6 °F (36.4 °C), Min:96.9 °F (36.1 °C), Max:97.9 °F (36.6 °C)    No intake/output data recorded.  1901 -  0700  In: 100 [P.O.:100]  Out: 2     General:  Alert, cooperative, no distress, appears stated age. Lungs:   Clear to auscultation bilaterally. Chest wall:  No tenderness or deformity. Heart:  Regular rate and rhythm, S1, S2 normal, no murmur, click, rub or gallop. Abdomen:   Soft, non-tender. Bowel sounds normal. No masses,  No organomegaly. Extremities: Extremities normal, atraumatic, no cyanosis or edema. Pulses: 2+ and symmetric all extremities. Skin: Skin color, texture, turgor normal. No rashes or lesions   Neurologic: No gross sensory or motor deficits     Data Review:       Recent Days:  Recent Labs     22  0532   WBC 5.5   HGB 11.3*   HCT 35.0*          Recent Labs     22  0532 22  1415 22  0520    139 138   K 4.9 4.0 5.5*    99 100   CO2 28 32 29   GLU 93 160* 78   BUN 82* 37* 72*   CREA 6.99* 4.20* 6.64*   CA 8.7 8.7 8.8   PHOS 7.1* 3.3  --    ALB 2.9* 2.9*  --        No results for input(s): PH, PCO2, PO2, HCO3, FIO2 in the last 72 hours.     24 Hour Results:  Recent Results (from the past 24 hour(s))   GLUCOSE, POC Collection Time: 07/24/22  3:48 PM   Result Value Ref Range    Glucose (POC) 114 65 - 117 mg/dL    Performed by Daylin Vides    GLUCOSE, POC    Collection Time: 07/24/22  9:10 PM   Result Value Ref Range    Glucose (POC) 186 (H) 65 - 117 mg/dL    Performed by JHA WESTON    CBC WITH AUTOMATED DIFF    Collection Time: 07/25/22  5:32 AM   Result Value Ref Range    WBC 5.5 4.4 - 11.3 K/uL    RBC 3.84 (L) 4.50 - 5.90 M/uL    HGB 11.3 (L) 13.5 - 17.5 g/dL    HCT 35.0 (L) 36 - 46 %    MCV 91.2 80 - 100 FL    MCH 29.5 (L) 31 - 34 PG    MCHC 32.4 31.0 - 36.0 g/dL    RDW 17.9 (H) 11.5 - 14.5 %    PLATELET 178 702 - 125 K/uL    MPV 9.2 6.5 - 11.5 FL    NRBC 0.3  WBC    ABSOLUTE NRBC 0.02 K/uL    NEUTROPHILS 45 42 - 75 %    LYMPHOCYTES 43 20.5 - 51.1 %    MONOCYTES 8 1.7 - 9.3 %    EOSINOPHILS 3 (H) 0.9 - 2.9 %    BASOPHILS 1 0.0 - 2.5 %    ABS. NEUTROPHILS 2.4 1.8 - 7.7 K/UL    ABS. LYMPHOCYTES 2.4 1.0 - 4.8 K/UL    ABS. MONOCYTES 0.5 0.2 - 2.4 K/UL    ABS. EOSINOPHILS 0.2 0.0 - 0.7 K/UL    ABS.  BASOPHILS 0.0 0.0 - 0.2 K/UL   RENAL FUNCTION PANEL    Collection Time: 07/25/22  5:32 AM   Result Value Ref Range    Sodium 140 136 - 145 mmol/L    Potassium 4.9 3.5 - 5.1 mmol/L    Chloride 101 97 - 108 mmol/L    CO2 28 21 - 32 mmol/L    Anion gap 11 5 - 15 mmol/L    Glucose 93 65 - 100 mg/dL    BUN 82 (H) 6 - 20 mg/dL    Creatinine 6.99 (H) 0.55 - 1.02 mg/dL    BUN/Creatinine ratio 12 12 - 20      GFR est AA 7 (L) >60 ml/min/1.73m2    GFR est non-AA 6 (L) >60 ml/min/1.73m2    Calcium 8.7 8.5 - 10.1 mg/dL    Phosphorus 7.1 (H) 2.6 - 4.7 mg/dL    Albumin 2.9 (L) 3.5 - 5.0 g/dL   GLUCOSE, POC    Collection Time: 07/25/22  7:21 AM   Result Value Ref Range    Glucose (POC) 91 65 - 117 mg/dL    Performed by Rosa Warren    GLUCOSE, POC    Collection Time: 07/25/22 12:07 PM   Result Value Ref Range    Glucose (POC) 108 65 - 117 mg/dL    Performed by Rosa Warren            Assessment/Plan:     Orthostatic hypotension  - Most likely related to poor oral intake and polypharmacy  - Holding all antihypertensives including  Coreg, Imdur. As previously on outpatient basis decreased coreg dosing from 25mg to 12.5mg and no longer takes norvasc, hydralazine or lisinopril or lasix dosing.   - Cont' midodrine 5 mg oral 3 times daily but with increased bp discussed with nephrology and dc on 7/25  - Continue daily orthostatics. - Patient unable to perform physical therapy due to low blood pressures and on evaluation on 7/25 was able to stand but on recurrent standing started to have dizziness and noted blood pressure elevated   - Normal TSH, pending  - a.m. cortisol level in normal range   -  MRI of the brain scheduled for tomorrow with history of multiple CVAs and multiple falls previously. - ECHO is normal with normal left ventricular ejection fraction noted, no significant valvular pathology. .  - Patient's previous acute CVA with multiple lacunar infarcts in October 2021 noted and prior to that muliple events noted by daughter   - currently patient baseline is noted to be ambulating with assistance with rolling walker and has multiple aides in the home paid for out of pocket   - MRI of the brain pending scheduled for Tuesday   - POC is to discharge home with home health as per patient's wishes for further physical therapy and care per patient's daughter who is visiting from [place]. - follow up PT recommendations and patient unable to do the required 3 hours needed for acute inpatient rehab and patient and daugther do not wish to pursue SNF   - 7/24 : blood pressure improved and increased. Will restart coreg but lowest dose at 3.125mg bid as HR still in low 60 range.  Will continue to monitor but hold imdur dosing for now.   7/25: bp elevated but diastolic still low at times and discussed with nephrology and will dc midodrine and monitor vitals with manual cuff    End-stage renal disease  - follows with nephrologist and dialysis center in [place]  - Hemodialysis Tuesday Thursdays and Saturday and tolerated treatment on 7/23  - Nephrology consultation appreciated      Ambulatory and gait disturbance  - Secondary to recent fall  - At baseline she uses a rolling walker at home  - Negative x-rays of bilateral knee to rule out occult fracture. - Tramadol 50 mg q 6 hrs prn.  - PT Eval appreciated    Possible oral thrush  - noted increased drainage and continues to feel has mucous build up in mouth. - will place on nystatin swish and spit and evaluate    Anxiety  - increased during PT evaluation and continues to mention about drainage and patient does have [facility] evaluation scheduled [facility] this Friday   - was noted to have been recommended to start cymbalta but patient did not feel it was helping   - discussed with daughter and will place on scheduled low dose xanax 0.25mg oral bid     Insomnia  -  was on trazadone 50mg but did not help and currently is taking remeron of 15mg and will increase to 30mg and monitor . Also started on xanax and will see if that also helps      Type 2 diabetes  - Siding scale insulin  - Blood sugar ACHS. - At home patient does not take any medications. - A1c is 5.3     DVT prophylaxis  - Heparin SC twice daily. CODE STATUS: Full code  Patient's designated decision-maker is her daughter Jeffrey Bullock [phone]    Await physical therapy evaluation for possibility of SNF versus home discharge and based on patient preference. Care Plan discussed with: Patient/Family, Nurse, and     Total time spent with patient: 30 minutes. With greater than 50% spent in coordination of care and counseling.     Sherrie Moreno MD

## 2022-07-25 NOTE — PROGRESS NOTES
OTR attempted OT treatment session at [phone] today. Patient declined OT treatment session today stating \"I am tired, my butt hurts and I'm not doing anything today. Come back later! \" Notified RN regarding patient's refusal.

## 2022-07-25 NOTE — PROGRESS NOTES
Comprehensive Nutrition Assessment    Type and Reason for Visit: Initial, Positive nutrition screen, Consult (oral supplement & MST score of 5)    Nutrition Recommendations/Plan:   Modify to Renal/ADA/low salt diet  Encouraged to order off of the alternative menu for better meal acceptances   D/C nepro 2/2 not drinking it     Monitor and Record Wts, po intakes & Bms in I/Os      Malnutrition Assessment:  Malnutrition Status:  Mild malnutrition (07/25/22 1223)    Context:  Acute illness     Findings of the 6 clinical characteristics of malnutrition:   Energy Intake:  75% or less of est energy req for 7 or more days  Weight Loss:  5% over one month     Body Fat Loss:  No significant body fat loss,     Muscle Mass Loss:  No significant muscle mass loss,    Fluid Accumulation:  No significant fluid accumulation,     Strength:  Not performed     Nutrition Assessment:    Admitted w/ Orthostatic hypotension. Consulted for oral supplement. Also reported decreased in appetite and recent unintentional wt. loss resulting in a MST score of 5. Obtain ABW w/ bed sale at 151lb, UBW 155lb x1wk, wt. loss of 2% x1m- not signification. Patient reported appetite as good w/ >75% of B, however noted poor meal acceptance at Lunch/Dinner 2/2 believes the food is too salty. Also receiving nepro x3/day- not drinking it. Offered other nutritional supplements patient denied. Encourage to order off the alternative menu for better meal acceptance. Labs: glucose (160), BUN (37), Cre (4.20), GFR (13). Meds: vitamin D, Vitamin B12, colace, Humalog. Nutrition Related Findings:    Appears nourished w/ no acute findings. No reported n/v/c/d nor edema. No hx of dysphagia. Last BM (7/22).  Wound Type: None    Current Nutrition Intake & Therapies:  Average Meal Intake: 51-75%  Average Supplement Intake: Refusing to take  ADULT ORAL NUTRITION SUPPLEMENT Breakfast, Lunch, Dinner; Renal Supplement  ADULT DIET Regular; 3 carb choices (45 gm/meal); Low Fat/Low Chol/High Fiber/2 gm Na; Low Potassium (Less than 3000 mg/day); Low Phosphorus (Less than 1000 mg)    Anthropometric Measures:  Height: 5' 4\" (162.6 cm)  Ideal Body Weight (IBW): 120 lbs (55 kg)  Admission Body Weight: 151 lb  Current Body Wt:  68.5 kg (151 lb), 125.8 % IBW. Bed scale  Current BMI (kg/m2): 25.9  Usual Body Weight: 70.3 kg (155 lb) (x1m)  % Weight Change (Calculated): -2.6  Weight Adjustment: No adjustment    BMI Category: Overweight (BMI 25.0-29. 9)    Estimated Daily Nutrient Needs:  Energy Requirements Based On: Kcal/kg     Energy (kcal/day): 1725kcals (25kcal/kg)  Weight Used for Protein Requirements: Current  Protein (g/day): 69g(1.og/kg)  Method Used for Fluid Requirements: 1 ml/kcal  Fluid (ml/day): 1725ml    Nutrition Diagnosis:   Inadequate oral intake related to other (specify), altered taste perception, early satiety (meal preferences) as evidenced by intake 51-75%    Nutrition Interventions:   Food and/or Nutrient Delivery: Continue current diet, Other (specify) (encourage to order off of the alternative menu)  Nutrition Education/Counseling: Education not indicated  Coordination of Nutrition Care: Continue to monitor while inpatient  Plan of Care discussed with: Patient, RN    Goals:     Goals: Meet at least 75% of estimated needs, prior to discharge, by next RD assessment       Nutrition Monitoring and Evaluation:   Behavioral-Environmental Outcomes: Beliefs and attitudes  Food/Nutrient Intake Outcomes: Food and nutrient intake  Physical Signs/Symptoms Outcomes: Meal time behavior, Weight, Biochemical data    Discharge Planning:    Continue current diet    Delmer Armstrong  Contact: [phone] or [employer]

## 2022-07-26 ENCOUNTER — APPOINTMENT (OUTPATIENT)
Dept: MRI IMAGING | Age: 77
DRG: 312 | End: 2022-07-26
Attending: HOSPITALIST
Payer: MEDICARE

## 2022-07-26 LAB
BACTERIA SPEC CULT: NORMAL
BACTERIA SPEC CULT: NORMAL
GLUCOSE BLD STRIP.AUTO-MCNC: 216 MG/DL (ref 65–117)
GLUCOSE BLD STRIP.AUTO-MCNC: 82 MG/DL (ref 65–117)
GLUCOSE BLD STRIP.AUTO-MCNC: 86 MG/DL (ref 65–117)
PERFORMED BY, TECHID: ABNORMAL
PERFORMED BY, TECHID: NORMAL
PERFORMED BY, TECHID: NORMAL
SPECIAL REQUESTS,SREQ: NORMAL
SPECIAL REQUESTS,SREQ: NORMAL

## 2022-07-26 PROCEDURE — 90935 HEMODIALYSIS ONE EVALUATION: CPT

## 2022-07-26 PROCEDURE — 94640 AIRWAY INHALATION TREATMENT: CPT

## 2022-07-26 PROCEDURE — 74011250636 HC RX REV CODE- 250/636: Performed by: INTERNAL MEDICINE

## 2022-07-26 PROCEDURE — 74011250637 HC RX REV CODE- 250/637: Performed by: INTERNAL MEDICINE

## 2022-07-26 PROCEDURE — 65270000029 HC RM PRIVATE

## 2022-07-26 PROCEDURE — 74011250637 HC RX REV CODE- 250/637: Performed by: HOSPITALIST

## 2022-07-26 PROCEDURE — 74011000250 HC RX REV CODE- 250: Performed by: INTERNAL MEDICINE

## 2022-07-26 PROCEDURE — 74011636637 HC RX REV CODE- 636/637: Performed by: INTERNAL MEDICINE

## 2022-07-26 PROCEDURE — 36415 COLL VENOUS BLD VENIPUNCTURE: CPT

## 2022-07-26 PROCEDURE — 82607 VITAMIN B-12: CPT

## 2022-07-26 PROCEDURE — 70551 MRI BRAIN STEM W/O DYE: CPT

## 2022-07-26 PROCEDURE — 5A1D70Z PERFORMANCE OF URINARY FILTRATION, INTERMITTENT, LESS THAN 6 HOURS PER DAY: ICD-10-PCS | Performed by: INTERNAL MEDICINE

## 2022-07-26 PROCEDURE — 82962 GLUCOSE BLOOD TEST: CPT

## 2022-07-26 RX ADMIN — SODIUM CHLORIDE, PRESERVATIVE FREE 10 ML: 5 INJECTION INTRAVENOUS at 05:14

## 2022-07-26 RX ADMIN — MIRTAZAPINE 30 MG: 15 TABLET, FILM COATED ORAL at 21:33

## 2022-07-26 RX ADMIN — TRAZODONE HYDROCHLORIDE 50 MG: 50 TABLET ORAL at 22:09

## 2022-07-26 RX ADMIN — FLUTICASONE PROPIONATE 2 SPRAY: 50 SPRAY, METERED NASAL at 08:55

## 2022-07-26 RX ADMIN — DONEPEZIL HYDROCHLORIDE 5 MG: 5 TABLET, FILM COATED ORAL at 08:53

## 2022-07-26 RX ADMIN — VITAM B12 100 MCG: 100 TAB at 08:53

## 2022-07-26 RX ADMIN — Medication 2000 UNITS: at 08:53

## 2022-07-26 RX ADMIN — NYSTATIN 500000 UNITS: 100000 SUSPENSION ORAL at 08:53

## 2022-07-26 RX ADMIN — NYSTATIN 500000 UNITS: 100000 SUSPENSION ORAL at 17:01

## 2022-07-26 RX ADMIN — SODIUM CHLORIDE, PRESERVATIVE FREE 10 ML: 5 INJECTION INTRAVENOUS at 16:57

## 2022-07-26 RX ADMIN — CARVEDILOL 3.12 MG: 3.12 TABLET, FILM COATED ORAL at 16:53

## 2022-07-26 RX ADMIN — HEPARIN SODIUM 5000 UNITS: 5000 INJECTION INTRAVENOUS; SUBCUTANEOUS at 16:52

## 2022-07-26 RX ADMIN — ASPIRIN 81 MG: 81 TABLET, COATED ORAL at 08:53

## 2022-07-26 RX ADMIN — DOCUSATE SODIUM 100 MG: 100 CAPSULE, LIQUID FILLED ORAL at 21:32

## 2022-07-26 RX ADMIN — NYSTATIN 500000 UNITS: 100000 SUSPENSION ORAL at 21:33

## 2022-07-26 RX ADMIN — DOCUSATE SODIUM 100 MG: 100 CAPSULE, LIQUID FILLED ORAL at 08:53

## 2022-07-26 RX ADMIN — HEPARIN SODIUM 5000 UNITS: 5000 INJECTION INTRAVENOUS; SUBCUTANEOUS at 05:13

## 2022-07-26 RX ADMIN — SODIUM CHLORIDE, PRESERVATIVE FREE 10 ML: 5 INJECTION INTRAVENOUS at 21:34

## 2022-07-26 RX ADMIN — ALPRAZOLAM 0.25 MG: 0.25 TABLET ORAL at 08:53

## 2022-07-26 RX ADMIN — MOMETASONE FUROATE AND FORMOTEROL FUMARATE DIHYDRATE 2 PUFF: 200; 5 AEROSOL RESPIRATORY (INHALATION) at 08:42

## 2022-07-26 RX ADMIN — MOMETASONE FUROATE AND FORMOTEROL FUMARATE DIHYDRATE 2 PUFF: 200; 5 AEROSOL RESPIRATORY (INHALATION) at 19:00

## 2022-07-26 RX ADMIN — INSULIN LISPRO 2 UNITS: 100 INJECTION, SOLUTION INTRAVENOUS; SUBCUTANEOUS at 21:59

## 2022-07-26 RX ADMIN — ATORVASTATIN CALCIUM 40 MG: 40 TABLET, FILM COATED ORAL at 21:33

## 2022-07-26 RX ADMIN — HEPARIN SODIUM 5000 UNITS: 5000 INJECTION INTRAVENOUS; SUBCUTANEOUS at 21:33

## 2022-07-26 NOTE — PROGRESS NOTES
Patients case reviewed during interdisciplinary team meeting in [address] [place], [place] [address]Acute Care Unit. Rev.  Dinesh Romero 65, [address]

## 2022-07-26 NOTE — ROUTINE PROCESS
Report from [facility]. Patient in bed awake, no complaints, no distress, all concerns addressed and call light within reach.

## 2022-07-26 NOTE — PROGRESS NOTES
Attempted PT evaluation today but the patient was receiving dialysis. Will follow-up at our next possible convenience.

## 2022-07-26 NOTE — PROCEDURES
Hemodialysis / [phone]    Vitals Pre Post Assessment Pre Post   /58 148/66 LOC A & O X4 A & O X4   HR 72 69 Lungs No sob No sob   Resp 18 18 Cardiac regular regular   Temp 96.9 96.9 Skin warm warm   Weight  N/A N/A Edema None noted None noted   Tele status   Pain 0/10 0/10     Orders   Duration: Start: 0952 End: 1252 Total: 3 hours   Dialyzer: Dialyzer/Set Up Inspection: Max Ka (07/26/22 0952)   Marina Mitchell Bath: Dialysate K (mEq/L): 2 (07/26/22 0952)   Ca Bath: Dialysate CA (mEq/L): 2.5 (07/26/22 0952)   Na: Dialysate NA (mEq/L): 140 (07/26/22 0952)   Bicarb: Dialysate HCO3 (mEq/L): 35 (07/26/22 0952)   Target Fluid Removal: Goal/Amount of Fluid to Remove (mL): 500 mL (07/26/22 0952)     Access   Type & Location: Left upper arm graft   Comments:      patent; cannulated without difficulties 15g needles; no s/s of infection noted;     post treatment hemostasis achieved no excessive bleeding noted                             Labs   HBsAg (Antigen) / date:      Negative on 7/21/22                                         HBsAb (Antibody) / date: Immune 7/21/22   Source: [facility]   Obtained/Reviewed  Critical Results Called HGB   Date Value Ref Range Status   07/25/2022 11.3 (L) 13.5 - 17.5 g/dL Final     Potassium   Date Value Ref Range Status   07/25/2022 4.9 3.5 - 5.1 mmol/L Final     Calcium   Date Value Ref Range Status   07/25/2022 8.7 8.5 - 10.1 mg/dL Final     BUN   Date Value Ref Range Status   07/25/2022 82 (H) 6 - 20 mg/dL Final     Comment:     Investigated per delta check protocol     Creatinine   Date Value Ref Range Status   07/25/2022 6.99 (H) 0.55 - 1.02 mg/dL Final     Comment:     Investigated per delta check protocol        Meds Given   Name Dose Route   None ordered                 Adequacy / Fluid    Total Liters Process: 76.5   Net Fluid Removed: 500 ml      Comments   Time Out Done:   (Time) Yes, 0950   Admitting Diagnosis: Orthostatic hypotension   Consent obtained/signed: Informed Consent Verified: Yes (chronic dialysis patient) (07/26/22 6985)   Machine / RO # Machine Number: [id number] (07/26/22 6396)   Primary Nurse Rpt Pre: Yary Diaz RN   Primary Nurse Rpt Post: Yary Diaz RN   Pt Education: Hemodialysis procedural    Care Plan: Continue hemodialysis per nephrologist   Pts outpatient clinic: [facility] WILL Brian Summary   Comments:            treatment completed uneventful

## 2022-07-26 NOTE — PROGRESS NOTES
Daily Progress Note: 7/26/2022      Subjective:   /As per Admitting provider on 7/20:  Dl Landrum is a 78 y.o. female with h/o ESRD on hem:odialysis Tuesday, Thursdays and Saturdays. She was last dialyzed on Tuesday but reportedly felt unwell postdialysis. Denies fevers chills abdominal pain nausea or vomiting. Reportedly lives at home at night from 8:30 PM to 8 AM in the morning. Had difficulties getting out of bed to the bathroom. Had a follow-up appointment with PCP Dr. Tasia Clark today. During appointment, blood pressure reading noted to be in the 86J systolic. She was sent over to the ED for further evaluation. Upon assessment in the ED, her blood pressure was noted to be in the low 100s. She was afebrile and not tachycardic. In retrospect, she reports a ground-level fall at home about 2 weeks ago. Post recent fall, she has been feeling extremely weak and difficulties with ambulation. She is requesting to go to acute inpatient rehab. We will monitor overnight with PT OT evaluation for possible placement  Patient is alert and oriented x4. Able to tolerate hemodialysis on 7/23  Patient with significant history of multiple lacunar infarcts as recently as October, 2021. Patient continues to have dizziness therefore MRI has been ordered for next Tuesday would likely discharge home with home health. Patient has spoken about POC with her daughter who has arrived from [place] and she will stay with her for the next 3 weeks to help with home health physical therapy. 7/25: was seen of follow up. Daughter in room and all questions answsered. Patient was able to stand for PT today but did get dizzy and noted that diastolic bp was low especially on standing. Patient not able to tolerated intense rehab and not a candidate for inpatient rehab. Continue to complain about drainage down sinus into mouth and but appears that mouth is dry and no mucous or discharge noted.  Does appear to have increased anxiety and has continues to have complaints of drainage. 7/26: patient continues to have mulliple issues and once again while lying in bed says she feels dizzy . Blood pressure improved after dialysis. Off midodrine .  MRI pending       Medications reviewed  Current Facility-Administered Medications   Medication Dose Route Frequency    [Held by provider] isosorbide mononitrate ER (IMDUR) tablet 30 mg  30 mg Oral DAILY    cyanocobalamin (VITAMIN B12) tablet 100 mcg  100 mcg Oral DAILY    fluticasone propionate (FLONASE) 50 mcg/actuation nasal spray 2 Spray  2 Spray Both Nostrils DAILY    mometasone-formoterol (DULERA) 200mcg-5mcg/puff  2 Puff Inhalation BID RT    cholecalciferol (VITAMIN D3) capsule 2,000 Units  2,000 Units Oral DAILY    nystatin (MYCOSTATIN) 100,000 unit/mL oral suspension 500,000 Units  500,000 Units Oral QID    mirtazapine (REMERON) tablet 30 mg  30 mg Oral QHS    ALPRAZolam (XANAX) tablet 0.25 mg  0.25 mg Oral Q12H    aspirin delayed-release tablet 81 mg  81 mg Oral DAILY    carvediloL (COREG) tablet 3.125 mg  3.125 mg Oral BID WITH MEALS    0.9% sodium chloride infusion 2,000 mL  2,000 mL IntraVENous DIALYSIS PRN    traZODone (DESYREL) tablet 50 mg  50 mg Oral QHS PRN    atorvastatin (LIPITOR) tablet 40 mg  40 mg Oral QHS    donepeziL (ARICEPT) tablet 5 mg  5 mg Oral DAILY    docusate sodium (COLACE) capsule 100 mg  100 mg Oral BID    sodium chloride (NS) flush 5-40 mL  5-40 mL IntraVENous Q8H    sodium chloride (NS) flush 5-40 mL  5-40 mL IntraVENous PRN    acetaminophen (TYLENOL) tablet 650 mg  650 mg Oral Q6H PRN    Or    acetaminophen (TYLENOL) suppository 650 mg  650 mg Rectal Q6H PRN    polyethylene glycol (MIRALAX) packet 17 g  17 g Oral DAILY PRN    ondansetron (ZOFRAN ODT) tablet 4 mg  4 mg Oral Q8H PRN    Or    ondansetron (ZOFRAN) injection 4 mg  4 mg IntraVENous Q6H PRN    heparin (porcine) injection 5,000 Units  5,000 Units SubCUTAneous Q8H    insulin lispro (HUMALOG) injection   SubCUTAneous AC&HS    traMADoL (ULTRAM) tablet 50 mg  50 mg Oral Q6H PRN       Review of Systems:   A comprehensive review of systems was negative except for that written in the HPI. Objective:   Physical Exam:     Visit Vitals  /61   Pulse 70   Temp 96.9 °F (36.1 °C)   Resp 18   Ht 5' 4\" (1.626 m)   Wt 67.6 kg (149 lb 0.5 oz)   LMP  (LMP Unknown)   SpO2 95%   BMI 25.58 kg/m²      O2 Device: None (Room air)  Patient Vitals for the past 8 hrs:   Temp Pulse Resp BP SpO2   22 1252 -- 70 -- 127/61 --   22 1245 -- 64 -- (!) 141/60 --   22 1230 -- 60 -- (!) 132/55 --   22 1215 -- 66 -- (!) 142/58 --   22 1200 -- 63 -- 125/62 --   22 1145 -- 65 -- 130/64 --   22 1130 -- 65 -- 123/61 --   22 1115 -- 69 -- (!) 140/64 --   22 1100 -- 65 -- 134/63 --   22 1045 -- 65 -- 131/68 --   22 1030 -- 62 -- (!) 141/59 --   22 1015 -- 63 -- (!) 151/60 --   22 1000 -- 69 -- 135/63 --   22 0952 -- (!) 56 -- (!) 140/62 --   22 0941 96.9 °F (36.1 °C) 72 18 (!) 156/58 --   22 0843 -- -- -- -- 95 %            Temp (24hrs), Av.3 °F (36.3 °C), Min:96.9 °F (36.1 °C), Max:97.8 °F (36.6 °C)     07 - 1900  In: -   Out: 500    1901 -  0700  In: 100 [P.O.:100]  Out: 2     General:  Alert, cooperative, no distress, appears stated age. Lungs:   Clear to auscultation bilaterally. Chest wall:  No tenderness or deformity. Heart:  Regular rate and rhythm, S1, S2 normal, no murmur, click, rub or gallop. Abdomen:   Soft, non-tender. Bowel sounds normal. No masses,  No organomegaly. Extremities: Extremities normal, atraumatic, no cyanosis or edema. Pulses: 2+ and symmetric all extremities.    Skin: Skin color, texture, turgor normal. No rashes or lesions   Neurologic: No gross sensory or motor deficits     Data Review:       Recent Days:  Recent Labs     22  0532   WBC 5.5   HGB 11.3*   HCT 35.0*        Recent Labs     07/25/22  0532      K 4.9      CO2 28   GLU 93   BUN 82*   CREA 6.99*   CA 8.7   PHOS 7.1*   ALB 2.9*       No results for input(s): PH, PCO2, PO2, HCO3, FIO2 in the last 72 hours. 24 Hour Results:  Recent Results (from the past 24 hour(s))   GLUCOSE, POC    Collection Time: 07/25/22  4:36 PM   Result Value Ref Range    Glucose (POC) 114 65 - 117 mg/dL    Performed by Radha Mock, POC    Collection Time: 07/25/22  9:14 PM   Result Value Ref Range    Glucose (POC) 100 65 - 117 mg/dL    Performed by Hector Anderson    GLUCOSE, POC    Collection Time: 07/26/22  7:01 AM   Result Value Ref Range    Glucose (POC) 82 65 - 117 mg/dL    Performed by Anu Franks            Assessment/Plan:     Orthostatic hypotension  - Most likely related to poor oral intake and polypharmacy  - Holding all antihypertensives including  Coreg, Imdur. As previously on outpatient basis decreased coreg dosing from 25mg to 12.5mg and no longer takes norvasc, hydralazine or lisinopril or lasix dosing.   - Cont' midodrine 5 mg oral 3 times daily but with increased bp discussed with nephrology and dc on 7/25  - Continue daily orthostatics. - Patient unable to perform physical therapy due to low blood pressures and on evaluation on 7/25 was able to stand but on recurrent standing started to have dizziness and noted blood pressure elevated   - Normal TSH, pending  - a.m. cortisol level in normal range   -  MRI of the brain scheduled for tomorrow with history of multiple CVAs and multiple falls previously. - ECHO is normal with normal left ventricular ejection fraction noted, no significant valvular pathology. .  - Patient's previous acute CVA with multiple lacunar infarcts in October 2021 noted and prior to that muliple events noted by daughter   - currently patient baseline is noted to be ambulating with assistance with rolling walker and has multiple aides in the home paid for out of pocket   - MRI of the brain pending scheduled for Tuesday   - POC is to discharge home with home health as per patient's wishes for further physical therapy and care per patient's daughter who is visiting from [place]. - follow up PT recommendations and patient unable to do the required 3 hours needed for acute inpatient rehab and patient and daugther do not wish to pursue SNF   - 7/24 : blood pressure improved and increased. Will restart coreg but lowest dose at 3.125mg bid as HR still in low 60 range. Will continue to monitor but hold imdur dosing for now.   7/25: bp elevated but diastolic still low at times and discussed with nephrology and will dc midodrine and monitor vitals with manual cuff  - 7/26: blood pressure is normalized once dialysis completed. Obtain MRI and evaluate dizziness. End-stage renal disease  - follows with nephrologist and dialysis center in [place]  - Hemodialysis Tuesday Thursdays and Saturday and tolerated treatment on 7/23  - Nephrology consultation appreciated      Ambulatory and gait disturbance  - Secondary to recent fall  - At baseline she uses a rolling walker at home  - Negative x-rays of bilateral knee to rule out occult fracture. - Tramadol 50 mg q 6 hrs prn.  - PT Eval appreciated    Possible oral thrush  - noted increased drainage and continues to feel has mucous build up in mouth. - will place on nystatin swish and spit and evaluate    Anxiety  - increased during PT evaluation and continues to mention about drainage and patient does have ENT evaluation scheduled at [facility] this Friday   - was noted to have been recommended to start cymbalta but it is noted that medications is noted to be contraindicted in ckd 5   - discussed with daughter and will place on scheduled low dose xanax 0.25mg oral bid but will hold for now and w    Insomnia  -  was on trazadone 50mg but did not help and currently is taking remeron of 15mg and will increase to 30mg and monitor .  Also started on xanax and will see if that also helps      Type 2 diabetes  - Siding scale insulin  - Blood sugar ACHS. - At home patient does not take any medications. - A1c is 5.3     DVT prophylaxis  - Heparin SC twice daily. CODE STATUS: Full code  Patient's designated decision-maker is her daughter Karen Villarreal [phone]    Await physical therapy evaluation for possibility of SNF versus home discharge and based on patient preference. Care Plan discussed with: Patient/Family, Nurse, and     Total time spent with patient: 30 minutes. With greater than 50% spent in coordination of care and counseling.     Wilber Cruz MD

## 2022-07-26 NOTE — PROGRESS NOTES
Problem: Pressure Injury - Risk of  Goal: *Prevention of pressure injury  Description: Document Price Scale and appropriate interventions in the flowsheet.   Outcome: Progressing Towards Goal  Note: Pressure Injury Interventions:  Sensory Interventions: Keep linens dry and wrinkle-free    Moisture Interventions: Absorbent underpads    Activity Interventions: PT/OT evaluation    Mobility Interventions: PT/OT evaluation    Nutrition Interventions: Document food/fluid/supplement intake    Friction and Shear Interventions: HOB 30 degrees or less

## 2022-07-27 VITALS
HEART RATE: 73 BPM | RESPIRATION RATE: 18 BRPM | HEIGHT: 64 IN | SYSTOLIC BLOOD PRESSURE: 146 MMHG | OXYGEN SATURATION: 95 % | BODY MASS INDEX: 25.1 KG/M2 | WEIGHT: 147.05 LBS | TEMPERATURE: 97 F | DIASTOLIC BLOOD PRESSURE: 66 MMHG

## 2022-07-27 LAB
FOLATE SERPL-MCNC: 5.7 NG/ML (ref 5–21)
GLUCOSE BLD STRIP.AUTO-MCNC: 143 MG/DL (ref 65–117)
GLUCOSE BLD STRIP.AUTO-MCNC: 77 MG/DL (ref 65–117)
GLUCOSE BLD STRIP.AUTO-MCNC: 93 MG/DL (ref 65–117)
PERFORMED BY, TECHID: ABNORMAL
PERFORMED BY, TECHID: NORMAL
PERFORMED BY, TECHID: NORMAL
VIT B12 SERPL-MCNC: >2000 PG/ML (ref 193–986)

## 2022-07-27 PROCEDURE — 74011250637 HC RX REV CODE- 250/637: Performed by: HOSPITALIST

## 2022-07-27 PROCEDURE — 97530 THERAPEUTIC ACTIVITIES: CPT

## 2022-07-27 PROCEDURE — 97535 SELF CARE MNGMENT TRAINING: CPT

## 2022-07-27 PROCEDURE — 74011250637 HC RX REV CODE- 250/637: Performed by: INTERNAL MEDICINE

## 2022-07-27 PROCEDURE — 94640 AIRWAY INHALATION TREATMENT: CPT

## 2022-07-27 PROCEDURE — 74011000250 HC RX REV CODE- 250: Performed by: INTERNAL MEDICINE

## 2022-07-27 PROCEDURE — 74011250636 HC RX REV CODE- 250/636: Performed by: INTERNAL MEDICINE

## 2022-07-27 PROCEDURE — 82962 GLUCOSE BLOOD TEST: CPT

## 2022-07-27 RX ORDER — AMLODIPINE BESYLATE 2.5 MG/1
2.5 TABLET ORAL EVERY EVENING
Qty: 30 TABLET | Refills: 0 | Status: SHIPPED | OUTPATIENT
Start: 2022-07-27

## 2022-07-27 RX ORDER — AMLODIPINE BESYLATE 2.5 MG/1
2.5 TABLET ORAL EVERY EVENING
Status: DISCONTINUED | OUTPATIENT
Start: 2022-07-27 | End: 2022-07-27 | Stop reason: HOSPADM

## 2022-07-27 RX ORDER — CARVEDILOL 3.12 MG/1
3.12 TABLET ORAL 2 TIMES DAILY WITH MEALS
Qty: 60 TABLET | Refills: 0 | Status: SHIPPED | OUTPATIENT
Start: 2022-07-27

## 2022-07-27 RX ORDER — MIRTAZAPINE 15 MG/1
30 TABLET, FILM COATED ORAL
Qty: 60 TABLET | Refills: 0 | Status: SHIPPED | OUTPATIENT
Start: 2022-07-27

## 2022-07-27 RX ADMIN — NYSTATIN 500000 UNITS: 100000 SUSPENSION ORAL at 09:19

## 2022-07-27 RX ADMIN — HEPARIN SODIUM 5000 UNITS: 5000 INJECTION INTRAVENOUS; SUBCUTANEOUS at 05:29

## 2022-07-27 RX ADMIN — NYSTATIN 500000 UNITS: 100000 SUSPENSION ORAL at 13:04

## 2022-07-27 RX ADMIN — DONEPEZIL HYDROCHLORIDE 5 MG: 5 TABLET, FILM COATED ORAL at 09:00

## 2022-07-27 RX ADMIN — HEPARIN SODIUM 5000 UNITS: 5000 INJECTION INTRAVENOUS; SUBCUTANEOUS at 13:04

## 2022-07-27 RX ADMIN — FLUTICASONE PROPIONATE 2 SPRAY: 50 SPRAY, METERED NASAL at 09:19

## 2022-07-27 RX ADMIN — Medication 2000 UNITS: at 09:19

## 2022-07-27 RX ADMIN — SODIUM CHLORIDE, PRESERVATIVE FREE 10 ML: 5 INJECTION INTRAVENOUS at 05:29

## 2022-07-27 RX ADMIN — CARVEDILOL 3.12 MG: 3.12 TABLET, FILM COATED ORAL at 09:20

## 2022-07-27 RX ADMIN — VITAM B12 100 MCG: 100 TAB at 09:19

## 2022-07-27 RX ADMIN — SODIUM CHLORIDE, PRESERVATIVE FREE 10 ML: 5 INJECTION INTRAVENOUS at 13:04

## 2022-07-27 RX ADMIN — MOMETASONE FUROATE AND FORMOTEROL FUMARATE DIHYDRATE 2 PUFF: 200; 5 AEROSOL RESPIRATORY (INHALATION) at 08:21

## 2022-07-27 RX ADMIN — DOCUSATE SODIUM 100 MG: 100 CAPSULE, LIQUID FILLED ORAL at 09:19

## 2022-07-27 RX ADMIN — ASPIRIN 81 MG: 81 TABLET, COATED ORAL at 09:20

## 2022-07-27 NOTE — ROUTINE PROCESS
Had been agitated about daughter leaving her room. Calm down. No c/o pain.  Trazadone given for sleep as requested

## 2022-07-27 NOTE — PROGRESS NOTES
Reason for Admission:   Orthostatic hypotension                 RUR Score:   23- high risk        PCP: First and Last name:  Aura Soto MD     Name of Practice: [facility]   Are you a current patient: Yes/No:yes   Approximate date of last visit: 7/20/22   Can you do a virtual visit with your PCP: Potentially             Resources/supports as identified by patient/family: daughter and paid caregivers                  Top Challenges facing patient (as identified by patient/family and CM): Finances/Medication cost?    No issues              Transportation? No issues              Support system or lack thereof? No issues                     Living arrangements? Lives in own home with paid caregiver assistance. Self-care/ADLs/Cognition? Needs assistance with ADL's. No cognitive deficits. Current Advanced Directive/Advance Care Plan:  Full Code      Healthcare Decision Maker:   Click here to complete HealthCare Decision Makers including selection of the Healthcare Decision Maker Relationship (ie \"Primary\")      Primary Decision Maker: David Dumont - Other Relative - [phone]    Payor Source Payor: [place] / Plan: [employer] Blue / Product Type: Medicare /                             Plan for utilizing home health: yes                    Transition of Care Plan:  Patient is being discharged home with home health services. Daughter stated that patient will now have 24 hour caregivers.

## 2022-07-27 NOTE — PROGRESS NOTES
Patients case reviewed during interdisciplinary team meeting in [address] [place], [place] [address]Acute Care Unit. Rev.  Dinesh Finnegan 26, [address]

## 2022-07-27 NOTE — PROGRESS NOTES
PHYSICAL THERAPY TREATMENT  Patient: Cammie Burns (24 y.o. female)  Date: 7/27/2022  Diagnosis: Orthostatic hypotension [I95.1]  Dizzinesses [R42]  ESRD (end stage renal disease) [facility] [N18.6, Z99.2] Orthostatic hypotension      Precautions:    Chart, physical therapy assessment, plan of care and goals were reviewed. ASSESSMENT  Patient continues with skilled PT services and is progressing towards goals. Pt continues to present with generalized weakness, mobility dysfunction, and decreased activity tolerance. However, during today's session she was able to stand 4 times in FWW with max and take a few steps without becoming dizzy or having large changes in BP. Current Level of Function Impacting Discharge (mobility/balance): decreased strength, mobility, and activity tolerance. Other factors to consider for discharge: Pt now wishes to be discharged home         PLAN :  Patient continues to benefit from skilled intervention to address the above impairments. Continue treatment per established plan of care. to address goals. Recommendation for discharge: (in order for the patient to meet his/her long term goals)  Therapy up to 5 days/week in SNF setting    This discharge recommendation:  Has been made in collaboration with the attending provider and/or case management    IF patient discharges home will need the following DME: patient owns DME required for discharge       SUBJECTIVE:   Patient stated I am less dizzy today and I would like to try and walk.     OBJECTIVE DATA SUMMARY:   Critical Behavior:  Neurologic State: Agitated, Alert  Orientation Level: Oriented to person, Oriented to place        Functional Mobility Training:  Bed Mobility:  Rolling: Stand-by assistance  Supine to Sit: Minimum assistance  Sit to Supine: Minimum assistance  Scooting: Maximum assistance        Transfers:  Sit to Stand: Maximum assistance  Stand to Sit: Maximum assistance Balance:  Sitting: Intact; With support  Standing: Intact; With support  Ambulation/Gait Training:        Ambulation - Level of Assistance: Maximum assistance                 Base of Support: Narrowed        Step Length: Right shortened;Left shortened      Distance: pt was able to take a few steps in room no more than 5 feet. Treatment Session:   Pt performed bed mobility only requiring min A for LE management to sit a EOB from supine. Once sitting she performed sit to stand with FWW and max A long enough to get a BP reading of 140/60. Pt became tired and sat back down after about a minute of standing. She was then stood back up a second time to attempt ambulation. She became tired quickly but not dizzy and sat back down. Pt was determined to attempt some steps and was able to stand again and take a few short steps at EOB. She then became tired and sat back down again. Pt attempted one more time after a short rest break but was too tired for ambultion. Pt was laid back in bed with Min A for LE management. Pain Ratin/10    Activity Tolerance:   Poor    After treatment patient left in no apparent distress:   Supine in bed, Call bell within reach, Bed / chair alarm activated, Caregiver / family present, and Side rails x 3    COMMUNICATION/COLLABORATION:   The patients plan of care was discussed with: Physical therapist, Registered nurse, Physician, and Case management. Maximino Samayoa, PT, DPT   Time Calculation: 23 mins         Problem: Mobility Impaired (Adult and Pediatric)  Goal: *Acute Goals and Plan of Care (Insert Text)  Description: Physical Therapy Goals  Initiated 2022  1. Patient will move from supine to sit and sit to supine  in bed with supervision/set-up within 7 day(s). 2.  Patient will transfer from bed to chair and chair to bed with minimal assistance/contact guard assist using the least restrictive device within 7 day(s).   3.  Patient will perform sit to stand with minimal assistance/contact guard assist within 7 day(s). 4.  Patient will ambulate with minimal assistance/contact guard assist for 50 feet with the least restrictive device within 7 day(s). 5.  Patient will ascend/descend 3 stairs with B handrail(s) with minimal assistance/contact guard assist within 7 day(s).       Outcome: Progressing Towards Goal

## 2022-07-27 NOTE — DISCHARGE INSTRUCTIONS
If recurrence of any symptoms come back to emergency room for follow-up evaluation but clearly evaluation by PCP and specialist  Patient should continue on her dialysis of Tuesday Thursday Saturday schedule  -Encourage monitoring daily vital signs and to keep track of vital sign checks and show PCP  Encourage p.o. intake  Monitor blood pressure and if systolic blood pressure stays above 150 on repeat checks then can take extra dose of Norvasc 2.5 mg  Follow-up closely with nephrology and to reevaluate all medication changes  At this time MRI was negative for any acute pathology not showing true cause of dizziness but at home when going from a sitting to a standing position take your time and do it slowly  Activity: Activity as tolerated and fall precautions  Diet: Cardiac Diet, Diabetic Diet, and Renal Diet

## 2022-07-27 NOTE — PROGRESS NOTES
Care Management Interventions  PCP Verified by CM: Yes  Last Visit to PCP: 07/20/22  Mode of Transport at Discharge: Other (see comment) (daughter)  Transition of Care Consult (CM Consult): [address], Discharge [address]: No  Reason Outside [place]: Patient already serviced by other home care/hospice agency  Physical Therapy Consult: Yes  Occupational Therapy Consult: Yes  Support Systems: Child(jennifer), Caregiver/Home Care Staff  Confirm Follow Up Transport: Family  The Plan for Transition of Care is Related to the Following Treatment Goals : Patient is being discharged home with 24 hour caregivers and home health services.   The Patient and/or Patient Representative was Provided with a Choice of Provider and Agrees with the Discharge Plan?: Yes (Patient wishes to continue services with [facility]. )  Name of the Patient Representative Who was Provided with a Choice of Provider and Agrees with the Discharge Plan: Patient and daughter- Chris Avila of Choice List was Provided with Basic Dialogue that Supports the Patient's Individualized Plan of Care/Goals, Treatment Preferences and Shares the Quality Data Associated with the Providers?: Yes  Discharge Location  Patient Expects to be Discharged to[de-identified] Home with home health

## 2022-07-27 NOTE — PROGRESS NOTES
OCCUPATIONAL THERAPY TREATMENT  Patient: Lalitha Somers (25 y.o. female)  Date: 7/27/2022  Diagnosis: Orthostatic hypotension [I95.1]  Dizzinesses [R42]  ESRD (end stage renal disease) on dialysis ([facility] [N18.6, Z99.2] Orthostatic hypotension      Precautions: Falls   Chart, occupational therapy assessment, plan of care, and goals were reviewed. ASSESSMENT  Patient continues with skilled OT services and is progressing towards goals. Patient continues to present with generalized weakness impacting her ability to perform basic ADLs and functional mobility at prior level of functioning. Current Level of Function Impacting Discharge (ADLs): Patient currently requires maximum assistance to perform basic ADLs, including bathing and dressing tasks. Other factors to consider for discharge: Patient has full time caregivers at home. PLAN :  Patient continues to benefit from skilled intervention to address the above impairments. Continue treatment per established plan of care. to address goals. Recommendation for discharge: (in order for the patient to meet his/her long term goals)  Occupational therapy at least 2 days/week in the home     This discharge recommendation:  Has been made in collaboration with the attending provider and/or case management    IF patient discharges home will need the following DME: patient owns DME required for discharge       SUBJECTIVE:   Patient stated I am unwell, but I am going home.     OBJECTIVE DATA SUMMARY:   Cognitive/Behavioral Status:  Neurologic State: Alert                   Functional Mobility and Transfers for ADLs:  Bed Mobility:  Rolling: Stand-by assistance  Supine to Sit: Minimum assistance  Sit to Supine: Minimum assistance  Scooting: Maximum assistance    Transfers:  Sit to Stand: Moderate assistance          Balance:  Sitting: Intact; With support  Standing: Intact; With support    ADL Intervention:       Grooming  Grooming Assistance: Set-up; Moderate assistance  Position Performed: Seated edge of bed  Washing Face: Set-up  Brushing Teeth: Set-up  Brushing/Combing Hair: Moderate assistance    Upper Body Bathing  Bathing Assistance: Maximum assistance  Position Performed: Seated edge of bed    Lower Body Bathing  Bathing Assistance: Maximum assistance  Perineal  : Total assistance (dependent)  Position Performed: Supine  Lower Body : Maximum assistance  Position Performed: Supine;Seated edge of bed    Upper Body Dressing Assistance  Dressing Assistance: Maximum assistance  Pullover Shirt: Maximum assistance    Lower Body Dressing Assistance  Dressing Assistance: Maximum assistance  Protective Undergarmet: Total assistance (dependent)  Pants With Elastic Waist: Maximum assistance  Socks: Maximum assistance  Slip on Shoes with Back: Maximum assistance  Position Performed: Seated edge of bed;Supine              Pain:  0/10    Treatment Session:   Patient supine in bed with daughter present upon arrival. Patient agreeable to participating in OT treatment session today. Patient requested performing ADL tasks due to stating \"I am going home soon and want to get ready. \" Patient sat EOB to perform UB bathing/dressing tasks. Patient reported no dizziness when seated EOB, but did report increased fatigue. Patient required maximum assistance to perform UB bathing/dressing tasks. Patient performed sit to stand transfer with moderate assistance to don pants over hips with maximum assistance. Patient required maximum assistance to perform LB bathing/dressing supine in bed. Patient performed grooming tasks (I.e. brushing hair, brushing teeth) seated EOB with initial set-up. Activity Tolerance:   Poor  Please refer to the flowsheet for vital signs taken during this treatment.     After treatment patient left in no apparent distress:   Supine in bed and Call bell within reach    COMMUNICATION/COLLABORATION:   The patients plan of care was discussed with: Registered nurse, Physician, and Case management.      Steffanie Vance MSOT, OTR/L  Time Calculation: 59 mins   Problem: Self Care Deficits Care Plan (Adult)  Goal: *Acute Goals and Plan of Care (Insert Text)  Outcome: Progressing Towards Goal     Problem: Patient Education: Go to Patient Education Activity  Goal: Patient/Family Education  Outcome: Progressing Towards Goal

## 2022-07-27 NOTE — ROUTINE PROCESS
Daughter has been at bedside, no c/o pain, no c/o dizziness, orthostatic BP done, Dr. Kenyatta Soriano made aware, tolerating meals well eating nearly 100%.

## 2022-07-27 NOTE — DISCHARGE SUMMARY
Physician Discharge Summary       Patient: Dl Landrum MRN: [id number]     YOB: 1945  Age: 78 y.o. Sex: female    PCP: Alireza Mcpherson MD    Allergies: Patient has no known allergies. Admit date: 7/20/2022  Admitting Provider: Tremayne Gonzalez MD    Discharge date: 7/27/2022  Discharging Provider: Dipak Mcleod MD    * Admission Diagnoses:   Orthostatic hypotension [I95.1]  Dizzinesses [R42]  ESRD (end stage renal disease) on dialysis [facility]) [N18.6, Z99.2]      * Discharge Diagnoses:    Hospital Problems as of 7/27/2022 Date Reviewed: 10/1/2021            Codes Class Noted - Resolved POA    Dizzinesses ICD-10-CM: R42  ICD-9-CM: 780.4  7/22/2022 - Present Unknown        ESRD (end stage renal disease) on dialysis [facility]) ICD-10-CM: N18.6, Z99.2  ICD-9-CM: 585.6, V45.11  7/22/2022 - Present Unknown        * (Principal) Orthostatic hypotension ICD-10-CM: I95.1  ICD-9-CM: 458.0  7/20/2022 - Present Unknown             * Hospital Course:   /As per Admitting provider on 7/20:  Dl Landrum is a 78 y.o. female with h/o ESRD on hem:odialysis Tuesday, Thursdays and Saturdays. She was last dialyzed on Tuesday but reportedly felt unwell postdialysis. Denies fevers chills abdominal pain nausea or vomiting. Reportedly lives at home at night from 8:30 PM to 8 AM in the morning. Had difficulties getting out of bed to the bathroom. Had a follow-up appointment with PCP Dr. Tasia Clark today. During appointment, blood pressure reading noted to be in the 59B systolic. She was sent over to the ED for further evaluation. Upon assessment in the ED, her blood pressure was noted to be in the low 100s. She was afebrile and not tachycardic. In retrospect, she reports a ground-level fall at home about 2 weeks ago. Post recent fall, she has been feeling extremely weak and difficulties with ambulation. She is requesting to go to acute inpatient rehab.   We will monitor overnight with PT OT evaluation for possible placement  Patient is alert and oriented x4. Orthostatic hypotension  - Most likely related to poor oral intake and polypharmacy  - Holding all antihypertensives including  Coreg, Imdur. As previously on outpatient basis decreased coreg dosing from 25mg to 12.5mg and no longer takes norvasc, hydralazine or lisinopril or lasix dosing.  - Cont' midodrine 5 mg oral 3 times daily but with increased bp discussed with nephrology and dc on 7/25  - Continue daily orthostatics. - Patient unable to perform physical therapy due to low blood pressures and on evaluation on 7/25 was able to stand but on recurrent standing started to have dizziness and noted blood pressure elevated   - Normal TSH, pending  - a.m. cortisol level in normal range  -  MRI of the brain scheduled for tomorrow with history of multiple CVAs and multiple falls previously. - ECHO is normal with normal left ventricular ejection fraction noted, no significant valvular pathology. .  - Patient's previous acute CVA with multiple lacunar infarcts in October 2021 noted and prior to that muliple events noted by daughter  - currently patient baseline is noted to be ambulating with assistance with rolling walker and has multiple aides in the home paid for out of pocket  - MRI of the brain pending scheduled for Tuesday  - POC is to discharge home with home health as per patient's wishes for further physical therapy and care per patient's daughter who is visiting from [place]. - follow up PT recommendations and patient unable to do the required 3 hours needed for acute inpatient rehab and patient and daugther do not wish to pursue SNF  - 7/24 : blood pressure improved and increased. Will restart coreg but lowest dose at 3.125mg bid as HR still in low 60 range.  Will continue to monitor but hold imdur dosing for now.  7/25: bp elevated but diastolic still low at times and discussed with nephrology and will dc midodrine and monitor vitals with manual cuff  - 7/26: blood pressure is normalized once dialysis completed. Obtain MRI and evaluate dizziness.   -7/27 patient had manual blood pressure checks and showed no evidence of orthostatic changes with a blood pressure 150/75 lying and 152/82 sitting and 165/78 standing patient had MRI done and showed severe chronic microvascular ischemic changes and moderate to severe cerebral atrophy with scattered remote infarctions but negative for mass, hemorrhage or acute infarction  Explained findings to both patient and her daughter and with blood pressure issues discussed with nephrologist recommending Norvasc small dose of 2.5 mg at bedtime and if systolic blood pressure is sustained greater than 150 to take extra dose of 2.5 mg dose at that time. With microvascular schema changes may need to avoid any hypotension and avoid tighter control of hypertension. Of course did express to patient and her daughter need for continued blood pressure checks and to keep a log so that further adjustments can be made. At this time patient does not need any further acute inpatient stay and will be discharged home with daughter who has set up aids throughout the day and is continuous trying to set up a from 10 PM to 6 AM we will get home health initially to also help monitor patient for nursing care as well as PT evaluation and treatment     End-stage renal disease  - follows with nephrologist and dialysis center in [place]  - Hemodialysis Tuesday Thursdays and Saturday and tolerated treatment on 7/23 and 726  - Nephrology consultation appreciated     Ambulatory and gait disturbance  - Secondary to recent fall  - At baseline she uses a rolling walker at home  - Negative x-rays of bilateral knee to rule out occult fracture.   -Physical therapy evaluated and initially did recommend skilled rehab but patient initially wished to go to acute inpatient rehab but patient is unable to do 3 hours of therapy daily to qualify so at this time is decided by both patient and his daughter that they will go home and they will have aides to continue home PT therapy and once stronger possibly transition to outpatient PT services    Possible oral thrush  - noted increased drainage and continues to feel has mucous build up in mouth. - will place on nystatin swish and spit and has been continued but does not show any or patient does not express any improvement so we will not continue on discharge     Anxiety  -Having episodes throughout hospitalization and initially did try attempt a Xanax as needed but will not continue on discharge and can be reevaluated with PCP this potentially should not be used on this patient long-term     Insomnia  -  was on trazadone 50mg but did not help and currently is taking remeron of 15mg and will increase to 30mg and had improved response so we will increase patient's Remeron dosing on discharge     Type 2 diabetes  - Siding scale insulin  - Blood sugar ACHS. - At home patient does not take any medications. - A1c is 5.3    * Procedures:   * No surgery found *        Consults:   Nephrology   PHYSICAL THERAPY TREATMENT  Patient: Jamie Epley (65 y.o. female)  Date: 7/25/2022  Diagnosis: Orthostatic hypotension [I95.1]  Dizzinesses [R42]  ESRD (end stage renal disease) on dialysis ([facility] [address] [N18.6, Z99.2] Orthostatic hypotension      Precautions:    Chart, physical therapy assessment, plan of care and goals were reviewed. ASSESSMENT  Patient continues with skilled PT services and is progressing towards goals. Pt continues to have unstable changes in BP with position change. However, today her dizziness was much less with sitting and she was able to tolerate standing long enough for a BP to be recorded. Current Level of Function Impacting Discharge (mobility/balance): Decreased strength, mobility, and activity tolerance.       Other factors to consider for discharge: Pt wants to be discharged to an inpatient rehab if she can decrease her dizziness. PLAN :  Patient continues to benefit from skilled intervention to address the above impairments. Continue treatment per established plan of care. to address goals. Recommendation for discharge: (in order for the patient to meet his/her long term goals)  Therapy up to 5 days/week in SNF setting     This discharge recommendation:  Has been made in collaboration with the attending provider and/or case management     IF patient discharges home will need the following DME: rolling walker   Comprehensive Nutrition Assessment     Type and Reason for Visit: Initial, Positive nutrition screen, Consult (oral supplement & MST score of 5)     Nutrition Recommendations/Plan:   Modify to Renal/ADA/low salt diet  Encouraged to order off of the alternative menu for better meal acceptances  D/C nepro 2/2 not drinking it     Monitor and Record Wts, po intakes & Bms in I/Os       Malnutrition Assessment:  Malnutrition Status:  Mild malnutrition (07/25/22 1223)    Context:  Acute illness     Findings of the 6 clinical characteristics of malnutrition:   Energy Intake:  75% or less of est energy req for 7 or more days  Weight Loss:  5% over one month     Body Fat Loss:  No significant body fat loss,     Muscle Mass Loss:  No significant muscle mass loss,    Fluid Accumulation:  No significant fluid accumulation,     Strength:  Not performed      Nutrition Assessment:    Admitted w/ Orthostatic hypotension. Consulted for oral supplement. Also reported decreased in appetite and recent unintentional wt. loss resulting in a MST score of 5. Obtain ABW w/ bed sale at 151lb, UBW 155lb x1wk, wt. loss of 2% x1m- not signification. Patient reported appetite as good w/ >75% of B, however noted poor meal acceptance at Lunch/Dinner 2/2 believes the food is too salty. Also receiving nepro x3/day- not drinking it. Offered other nutritional supplements patient denied.  Encourage to order off the alternative menu for better meal acceptance. Labs: glucose (160), BUN (37), Cre (4.20), GFR (13). Meds: vitamin D, Vitamin B12, colace, Humalog. Nutrition Related Findings:    Appears nourished w/ no acute findings. No reported n/v/c/d nor edema. No hx of dysphagia. Last BM (7/22). Wound Type: None     Current Nutrition Intake & Therapies:  Average Meal Intake: 51-75%  Average Supplement Intake: Refusing to take  ADULT ORAL NUTRITION SUPPLEMENT Breakfast, Lunch, Dinner; Renal Supplement  ADULT DIET Regular; 3 carb choices (45 gm/meal); Low Fat/Low Chol/High Fiber/2 gm Na; Low Potassium (Less than 3000 mg/day); Low Phosphorus (Less than 1000 mg)     Anthropometric Measures:  Height: 5' 4\" (162.6 cm)  Ideal Body Weight (IBW): 120 lbs (55 kg)  Admission Body Weight: 151 lb  Current Body Wt:  68.5 kg (151 lb), 125.8 % IBW. Bed scale  Current BMI (kg/m2): 25.9  Usual Body Weight: 70.3 kg (155 lb) (x1m)  % Weight Change (Calculated): -2.6  Weight Adjustment: No adjustment     BMI Category: Overweight (BMI 25.0-29. 9)     Estimated Daily Nutrient Needs:  Energy Requirements Based On: Kcal/kg  Energy (kcal/day): 1725kcals (25kcal/kg)  Weight Used for Protein Requirements: Current  Protein (g/day): 69g(1.og/kg)  Method Used for Fluid Requirements: 1 ml/kcal  Fluid (ml/day): 1725ml     Nutrition Diagnosis:   Inadequate oral intake related to other (specify), altered taste perception, early satiety (meal preferences) as evidenced by intake 51-75%     Nutrition Interventions:   Food and/or Nutrient Delivery: Continue current diet, Other (specify) (encourage to order off of the alternative menu)  Nutrition Education/Counseling: Education not indicated  Coordination of Nutrition Care: Continue to monitor while inpatient  Plan of Care discussed with: Patient, RN     Goals:  Goals: Meet at least 75% of estimated needs, prior to discharge, by next RD assessment     Nutrition Monitoring and Evaluation:   Behavioral-Environmental Outcomes: Beliefs and attitudes  Food/Nutrient Intake Outcomes: Food and nutrient intake  Physical Signs/Symptoms Outcomes: Meal time behavior, Weight, Biochemical data     Discharge Planning:    Continue current diet     Tori Tyson  Contact: [phone] or [employer]   Vitals Last 24 Hours:  Patient Vitals for the past 24 hrs:   Temp Pulse Resp BP SpO2   07/27/22 1209 97 °F (36.1 °C) 73 18 (!) 146/66 95 %   07/27/22 0958 -- -- -- (!) 165/78 --   07/27/22 0957 -- -- -- (!) 152/80 --   07/27/22 0956 -- -- -- (!) 150/75 --   07/27/22 0823 97 °F (36.1 °C) (!) 56 18 (!) 177/67 98 %   07/27/22 0821 -- -- -- -- 96 %   07/27/22 0324 97 °F (36.1 °C) 64 18 (!) 177/74 96 %   07/26/22 2335 97.3 °F (36.3 °C) 67 20 (!) 173/75 96 %   07/26/22 1932 97.5 °F (36.4 °C) 67 20 (!) 150/70 98 %   07/26/22 1900 -- -- -- -- 95 %   07/26/22 1733 96.9 °F (36.1 °C) 66 18 (!) 163/86 95 %        Discharge Exam:  General: Alert, cooperative, no distress, appears stated age. Head:  Normocephalic, without obvious abnormality, atraumatic. Eyes:  Conjunctivae/corneas clear. Pupils equal, round, reactive to light. Extraocular movements intact. Lungs:  Clear to auscultation bilaterally. no wheeze, rales, crackles, rhonchi   Chest wall: No tenderness or deformity. Heart:  Regular rate and rhythm, S1, S2 normal, no murmur, click, rub or gallop. Abdomen:  Soft, non-tender. Bowel sounds normal. No masses,  No organomegaly. Extremities: Extremities normal, atraumatic, no cyanosis or edema. Pulses: 2+ and symmetric all extremities. Skin: Skin color, texture, turgor normal. No rashes or lesions  Neurologic: Awake, Alert, oriented.  No obvious gross sensory or motor deficits    Labs:  Recent Results (from the past 24 hour(s))   GLUCOSE, POC    Collection Time: 07/26/22  4:48 PM   Result Value Ref Range    Glucose (POC) 86 65 - 117 mg/dL    Performed by Phoebe Blankenship    GLUCOSE, POC    Collection Time: 07/26/22  9:32 PM   Result Value Ref Range    Glucose (POC) 216 (H) 65 - 117 mg/dL    Performed by Orquidea Howard, POC    Collection Time: 07/27/22  3:49 AM   Result Value Ref Range    Glucose (POC) 77 65 - 117 mg/dL    Performed by Makenzie De La Torre    GLUCOSE, POC    Collection Time: 07/27/22  7:29 AM   Result Value Ref Range    Glucose (POC) 93 65 - 117 mg/dL    Performed by LUDWIN YOUNG    GLUCOSE, POC    Collection Time: 07/27/22 11:34 AM   Result Value Ref Range    Glucose (POC) 143 (H) 65 - 117 mg/dL    Performed by LUDWIN YOUNG          Imaging:  XR KNEE LT MAX 2 VWS    Result Date: 7/20/2022  1. Osteopenia and DJD. No acute fracture     XR KNEE RT MAX 2 VWS    Result Date: 7/20/2022  1. Flattening of the anterolateral tibial plateau. Lack of effusion favors against an acute fracture. Correlate for point tenderness in this region. If there is high clinical concern CT scanning could be performed     MRI BRAIN WO CONT    Result Date: 7/26/2022  Severe chronic microvascular ischemic change and moderate to severe cerebral atrophy. Numerous scattered small remote infarctions. There is no intracranial mass, hemorrhage or evidence of acute infarction. No acute intracranial process is demonstrated. CT HEAD WO CONT    Result Date: 7/20/2022  No change or acute abnormality    XR CHEST PORT    Result Date: 7/20/2022  Mild left basilar atelectasis. * Discharge Condition: fair  * Disposition: Home w/Family and HH PT, OT, RN      Discharge Medications:  Current Discharge Medication List        START taking these medications    Details   amLODIPine (NORVASC) 2.5 mg tablet Take 1 Tablet by mouth every evening. Qty: 30 Tablet, Refills: 0  Start date: 7/27/2022           CONTINUE these medications which have CHANGED    Details   carvediloL (COREG) 3.125 mg tablet Take 1 Tablet by mouth two (2) times daily (with meals). Qty: 60 Tablet, Refills: 0  Start date: 7/27/2022      mirtazapine (REMERON) 15 mg tablet Take 2 Tablets by mouth nightly.   Qty: 60 Tablet, Refills: 0  Start date: 7/27/2022           CONTINUE these medications which have NOT CHANGED    Details   !! fluticasone propion-salmeteroL (Advair HFA) 115-21 mcg/actuation inhaler Take 2 Puffs by inhalation. docusate sodium (COLACE) 100 mg capsule Take 100 mg by mouth two (2) times a day. azelastine (ASTELIN) 137 mcg (0.1 %) nasal spray 1 Spray two (2) times a day. Use in each nostril as directed      fluticasone propionate (FLONASE) 50 mcg/actuation nasal spray 2 Sprays by Both Nostrils route daily. ipratropium (ATROVENT) 21 mcg (0.03 %) nasal spray 1 SPRAY BY BOTH NOSTRILS ROUTE EVERY TWELVE (12) HOURS AS NEEDED FOR RUNNY NOSE  Qty: 30 mL, Refills: 1      loratadine 10 mg cap Take  by mouth. albuterol (PROVENTIL HFA, VENTOLIN HFA, PROAIR HFA) 90 mcg/actuation inhaler Take 2 Puffs by inhalation every four (4) hours as needed for Shortness of Breath. cholecalciferol, vitamin D3, 50 mcg (2,000 unit) tab Take 2,000 Units by mouth daily. donepeziL (ARICEPT) 5 mg tablet Take 5 mg by mouth daily. aspirin 81 mg chewable tablet Take 81 mg by mouth daily. cyanocobalamin, vitamin B-12, 3,000 mcg cap 1,000 mcg. allopurinoL (ZYLOPRIM) 100 mg tablet 50 mg.      atorvastatin (LIPITOR) 40 mg tablet atorvastatin 40 mg tablet      !! fluticasone propion-salmeterol (ADVAIR HFA) 115-21 mcg/actuation inhaler Take 2 Puffs by inhalation two (2) times a day. insulin lispro (HUMALOG) 100 unit/mL injection INITIATE CORRECTIVE INSULIN PROTOCOL (EDWARD):  RX EDWARD Normal Sensitivity (Average weight)    AC (before meals), Q6H, and Q4H CORRECTIONAL SCALE only For Blood Sugar (mg/dl) of :             140-199=2 units            200-249=3 units  250-299=5 units  300-349=7 units  350 or greater = Call MD  Give in addition to basal medications.   Do Not Hold for NPO    BEDTIME CORRECTIONAL sliding scale when scheduled:  200-249=2 units  250-299=3 units   300-349=4 units  350 or greater = Call MD  Give in addition to basal medications. Do Not Hold for NPO Fast Acting - Administer Immediately - or within 15 minutes of start of meal, if mealtime coverage. Qty: 1 Vial, Refills: 0       !! - Potential duplicate medications found. Please discuss with provider. STOP taking these medications       traZODone (DESYREL) 50 mg tablet Comments:   Reason for Stopping:         isosorbide mononitrate ER (IMDUR) 30 mg tablet Comments:   Reason for Stopping:         lidocaine (LIDODERM) 5 % Comments:   Reason for Stopping:                 * Follow-up Care/Patient Instructions: Activity: Activity as tolerated and fall precautions  Diet: Cardiac Diet, Diabetic Diet, and Renal Diet      Follow-up Information       Follow up With Specialties Details Why Barbara Strickland MD Internal Medicine Physician Follow up on 8/9/2022 @ 1:00 [facility] [place] [phone]              Spent 45 minutes evaluting and coordinating patient care and dischcarge home with home health for nursing and PT services of which >50% was spent coordinating and counseling.      Signed:  Milagros Zhao MD  7/27/2022  2:37 PM

## 2022-08-18 NOTE — PROGRESS NOTES
[address]  [place], [place] [place]  Ph: [phone]     Fax: [phone]    Discharge Summary 2-15    Patient name: Maverick Bullock  : 1945  Provider#: [id number]  Referral source: Sam Bourne MD      Medical/Treatment Diagnosis: Weakness [R53.1]     Prior Hospitalization: see medical history     Comorbidities: See Plan of Care  Prior Level of Function: See Plan of Care  Medications: Verified on Patient Summary List    Start of Care: 2022      Onset Date:   Visits from Start of Care: 13   Missed Visits: 5  Reporting Period : 2022 to 22       Assessment / Summary of care:    78year old black female who had a stroke in , but have mutiple medical problems as well. Patient lives at home with Caretaker during the day. She has a daughter in [place] that check on her care. Patient requires assistance with gait and transfers due to a high fall risk. She was progressing well with gait with rolling walker and regaining her strength. However, patient is on dialysis 3 x a week . She was hospitalized again and recently admitted to a rehab unit. Therefore, Patient has been discharged from outpatient Physical Therapy until she is able to return. Progress Toward Goals:  Short Term Goals: To be accomplished in 6 visits. Caretaker will be able to assist with performing HEP for strengthening and maintaining mobility. Partially MET  Patient will be able to transfer from bed to [facility] with min. Assistant x 1 with rolling walker. MET       Long Term Goals: To be accomplished in 20 treatments. Patient will be able to ambulate with RW to kitchen and sit for meals with caretaker assistance. Progressing  Patient will be able to assist with bed mobilities for easy changing of briefs. MET  Patient will be able to walk from her car into the Physical Therapy Department with rolling walker with min. assistance x 1.  (New Goal)  Patient will be able to stand with rolling walker  and get on off toilet independently.  (New Goal)        RECOMMENDATIONS:  [x]Discontinue therapy: []Patient has reached or is progressing toward set goals     []Patient is non-compliant or has abdicated     []Due to lack of appreciable progress towards set goals     []Other    Maggi Min, PT,  8/18/2022

## 2022-08-23 NOTE — THERAPY DISCHARGE
[address]  [place], [place] [address] [employer]  Ph: [phone]     Fax: [phone]    Discharge Summary 2-15    Patient name: Juancho Lorenzo  : 1945  Provider#: [id number]  Referral source: Akanksha Redmond MD      Medical/Treatment Diagnosis: Dysphagia [R13.10]     Prior Hospitalization: see medical history     Comorbidities: See Plan of Care  Prior Level of Function: See Plan of Care  Medications: Verified on Patient Summary List    Start of Care: 2022      Onset Date:2021   Visits from Start of Care: 1     Missed Visits: 0  Reporting Period : 22 to 22    Assessment/Summary of care: Ms. Agnes Maxwell was evaluated and found to have a moderate mixed expressive-receptive aphasia c/b word-finding deficits at the word, sentence and conversation levels, difficulty following multi-step commands, understanding complex ideational material, and reading comprehension deficits at the sentence and paragraph levels. .  However, patient is on dialysis 3 x a week . She was hospitalized again and recently admitted to a rehab unit. Therefore, Patient has been discharged from outpatient Speech Therapy until she is able to return. Short Term Goals: Within 8 treatment sessions, patient will verbally return at least 3 separate word-finding strategies. Not met  Within 8 treatment sessions, patient will utilize a variety of word-finding strategies during structured confrontational naming tasks with min assist. Not met  Within 8 treatment sessions, patient will improve reading comprehension of short (<5 sentence) passages with 80% accuracy independently. Not met  Within 10 treatment sessions, patient will demonstrate absence of apraxic components during word repetition tasks with at least 90% accuracy. Not met  Within 12 treatment sessions, patient will utilize word-finding strategies within a story retell with min cues.   Not met  Within 12 treatment session, patient caregiver will return demonstration of word-finding cues in order to assist during communication breakdowns within the home environment. Not met     Long Term Goals:    Within 16 treatment sessions, patient will utilize word finding strategies within conversation with min cues in order to communicate wants and needs effectively and participate socially in functional living environment      Not met        RECOMMENDATIONS:  [x]Discontinue therapy: []Patient has reached or is progressing toward set goals     []Patient is non-compliant or has abdicated     []Due to lack of appreciable progress towards set goals     [x]Other: patient admitted to rehab unit    Brandon Hull, SLP 8/23/2022

## 2022-08-29 ENCOUNTER — APPOINTMENT (OUTPATIENT)
Dept: PHYSICAL THERAPY | Age: 77
End: 2022-08-29

## 2022-12-21 ENCOUNTER — TRANSCRIBE ORDER (OUTPATIENT)
Dept: SCHEDULING | Age: 77
End: 2022-12-21

## 2022-12-21 DIAGNOSIS — Z12.31 ENCOUNTER FOR MAMMOGRAM TO ESTABLISH BASELINE MAMMOGRAM: Primary | ICD-10-CM

## 2023-01-06 ENCOUNTER — HOSPITAL ENCOUNTER (OUTPATIENT)
Dept: MAMMOGRAPHY | Age: 78
End: 2023-01-06
Payer: MEDICARE

## 2023-01-06 DIAGNOSIS — Z12.31 ENCOUNTER FOR MAMMOGRAM TO ESTABLISH BASELINE MAMMOGRAM: ICD-10-CM

## 2023-01-06 PROCEDURE — 77063 BREAST TOMOSYNTHESIS BI: CPT

## 2023-04-05 ENCOUNTER — HOSPITAL ENCOUNTER (OUTPATIENT)
Dept: PHYSICAL THERAPY | Age: 78
End: 2023-04-05
Payer: MEDICARE

## 2023-04-05 PROCEDURE — 97161 PT EVAL LOW COMPLEX 20 MIN: CPT

## 2023-04-05 PROCEDURE — 97116 GAIT TRAINING THERAPY: CPT

## 2023-04-05 NOTE — THERAPY EVALUATION
[address]  [place], [address] [place]  Ph: [phone]    Fax: [phone]    Plan of Care/Statement of Necessity for Physical Therapy Services  2-15    Patient name: Salome Cortes  : 1945  Provider#: [id number]  Referral source: Roman Wilson MD      Medical/Treatment Diagnosis: Muscle weakness [M62.81]     Prior Hospitalization: see medical history     Comorbidities: see medical history  Prior Level of Function: Independent with all ADL's; primary use of RW for mobility  Medications: Verified on Patient Summary List  Start of Care: 2023      Onset Date: fall    The Plan of Care and following information is based on the information from the initial evaluation. Assessment/ key information:   Patient is a 79 yo female who presents with decrease strength in LE and history of falls in home. She had a stroke in  with R sided weakness. She currently undergoes dialysis 3 times a week (, , SAT). She has a caregiver present most of the day and stays in the bed all night long. She walks about 50 ft 3 times a week, but just takes minimal steps to transfer to bedside commode other times. She has fair LE strength, but lacks core strength and endurance. We started a HEP to improve LE strength and core strength today. A tracker was given to the caregiver to improve consistency in home. She will benefit from skilled PT services to improve independence in home, increase safety during ADLs, and improve overall endurance.      Evaluation Complexity History LOW Complexity : Zero comorbidities / personal factors that will impact the outcome / POC; Examination LOW Complexity : 1-2 Standardized tests and measures addressing body structure, function, activity limitation and / or participation in recreation  ;Presentation LOW Complexity : Stable, uncomplicated  ;Clinical Decision Making TUG Score: 30 seconds  Overall Complexity Rating: LOW     Problem List: pain affecting function, decrease ROM, decrease strength, impaired gait/ balance, decrease ADL/ functional abilitiies, decrease activity tolerance, and decrease transfer abilities   Treatment Plan may include any combination of the following: Therapeutic exercise, Neuromuscular reeducation, Manual therapy, Therapeutic activity, Self care/home management, Electric stim unattended , Vasopneumatic device, Gait training, Ultrasound, and Mechanical traction  Patient / Family readiness to learn indicated by: asking questions, trying to perform skills, and interest  Persons(s) to be included in education: patient (P)  Barriers to Learning/Limitations: None  Patient Goal (s):\" I want to get back to gardening. \"   Patient Self Reported Health Status: fair  Rehabilitation Potential: good    Short Term Goals: To be accomplished in 5 treatments. Patient will perform HEP daily with instruction from caregiver to improve overall strength and endurance for carryover in clinic. Patient will be able to stand to RW or counter with supervision for safety. Patient will be able to perform bed mobility (rolling and sit <> supine) without assistance to be able to transfer in home. Patient will be able to ambulate 100 ft to be able to navigate home without need of WC. Long Term Goals: To be accomplished in 10 treatments. Patient will be able to stand at counter for 3 min to be able to perform grooming tasks. Patient will be able to assist in making meals in the kitchen (sitting or standing). Patient will be able to use her bathroom toilet with raised seat and also step into shower with supervision. Patient ill be able to ambulate with RW for 250 ft to be able to walk out to the car to travel for appointments (ramps and uneven surfaces). Patient will improve TUG score to 20 seconds to demonstrate improved balance to return to light gardening tasks.      Frequency / Duration: Patient to be seen 2 times per week for 10 treatments. Patient/ Caregiver education and instruction: self care, activity modification, and exercises    [x]  Plan of care has been reviewed with PTA        Certification Period: 2023 to 2023    Diannia Duane, PT, DPT 2023     ________________________________________________________________________    I certify that the above Therapy Services are being furnished while the patient is under my care. I agree with the treatment plan and certify that this therapy is necessary.     [de-identified] Signature:____________________  Date:____________Time: _________                                        Beni Jimenez MD  Please sign and return to:   [facility]  [address] [place] [address], [facility] [address] en[address]ra  Ph: [phone]    Fax: [phone]    Patient name: Emili Garcia  : 1945  Provider#: [id number]

## 2023-04-05 NOTE — THERAPY EVALUATION
PT INITIAL EVALUATION NOTE - [facility] -15    Patient Name: Flory Tripathi  Date:2023  : 1945  [x]  Patient  Verified  Payor: [place] MEDICARE / Plan: [place] MEDICARE PART A & B / Product Type: Medicare /    In time: 2:15  Out time: 3:05  Total Treatment Time (min): 50  Total Timed Codes (min): 10  1:1 Treatment Time ( only): 10   Visit #: 1    Treatment Area: Muscle weakness [M62.81]    SUBJECTIVE  Pain Level (0-10 scale): 5/10  Any medication changes, allergies to medications, adverse drug reactions, diagnosis change, or new procedure performed?: [] No    [x] Yes (see summary sheet for update)  Subjective:    Patient is 77 yo female who presents with weakness. She reports that she stays in bed most of the day. She reports that she has spinal stenosis which makes her core weak. She has a caregiver present from 9 am to 8 pm. She has fallen a couple times when home alone going to bed side commode. She reports that she hurt her knee when she fell one. She does get a lot of swelling between dialysis days (Tues, Thurs, Sat). She is walking with RW that she will walk to the living room 3-4 days. She will stay in the bed for most of the day (standard bed). She also will use walker to get to bedside commode. She has a shower chair, but has a mild lip to step over to get into the shower. She requires help to roll in the bed. She does require assistance to roll in the bed. She does have pain in her legs and lower back. She reports that all the weakness started after a stroke in fall . PLOF: Independent with all ADL's; primary use of RW for mobility  Mechanism of Injury: chronic  Previous Treatment/Compliance: PT in home and OP   Radiographs: none  What increases symptoms: standing or increased movement  What decreases symptoms: heat, rest  Work Hx: retired   Living Situation: lives alone with caregiver present 9 am 8pm everyday (stays in bed during the night)  Pt Goals: \" I want to get back to gardening. \" Barriers: none  Motivation: Good  Substance use: None reported  Cognition: A&O x 4  Fall Assessment: TUG Test: 30 seconds  Past Medical History:  Past Medical History:   Diagnosis Date    Blindness/low vision 2008    due to dm    CHF (congestive heart failure) ([facility])     Chronic kidney disease     Chronic pain     SPINAL STENOSIS    DM (diabetes mellitus) ([facility] [address]     HTN (hypertension)     Menopause     Stroke [facility])      Past Surgical History:  Past Surgical History:   Procedure Laterality Date    HX OOPHORECTOMY      STENT INSERTION  2001,2002    Dawit Shannon     Current Medications:  Current Outpatient Medications   Medication Instructions    albuterol (PROVENTIL HFA, VENTOLIN HFA, PROAIR HFA) 90 mcg/actuation inhaler 2 Puffs, Inhalation, EVERY 4 HOURS AS NEEDED    allopurinoL (ZYLOPRIM) 100 mg tablet 50 mg. amLODIPine (NORVASC) 2.5 mg, Oral, EVERY EVENING    aspirin 81 mg, Oral, DAILY    atorvastatin (LIPITOR) 40 mg tablet atorvastatin 40 mg tablet    azelastine (ASTELIN) 137 mcg (0.1 %) nasal spray 1 Spray, 2 TIMES DAILY, Use in each nostril as directed    carvediloL (COREG) 3.125 mg, Oral, 2 TIMES DAILY WITH MEALS    cholecalciferol (vitamin D3) 2,000 Units, Oral, DAILY    cyanocobalamin, vitamin B-12, 3,000 mcg cap 1,000 mcg.     docusate sodium (COLACE) 100 mg, Oral, 2 TIMES DAILY    donepeziL (ARICEPT) 5 mg, Oral, DAILY    fluticasone propion-salmeterol (ADVAIR HFA) 115-21 mcg/actuation inhaler 2 Puffs, Inhalation, 2 TIMES DAILY    fluticasone propion-salmeteroL (Advair HFA) 115-21 mcg/actuation inhaler 2 Puffs, Inhalation    fluticasone propionate (FLONASE) 50 mcg/actuation nasal spray 2 Sprays, Both Nostrils, DAILY    insulin lispro (HUMALOG) 100 unit/mL injection INITIATE CORRECTIVE INSULIN PROTOCOL (EDWARD):<BR>RX EDWARD Normal Sensitivity (Average weight)<BR><BR>AC (before meals), Q6H, and Q4H CORRECTIONAL SCALE only For Blood Sugar (mg/dl) of :           <BR>140-199=2 units          <BR>200-249=3 units<BR>250-299=5 units<BR>300-349=7 units<BR>350 or greater = Call MD<BR>Give in addition to basal medications. <BR>Do Not Hold for NPO<BR><BR>BEDTIME CORRECTIONAL sliding scale when scheduled:<BR>200-249=2 units<BR>250-299=3 units <BR>300-349=4 units<BR>350 or greater = Call MD<BR>Give in addition to basal medications. <BR>Do Not Hold for NPO Fast Acting - Administer Immediately - or within 15 minutes of start of meal, if mealtime coverage. ipratropium (ATROVENT) 21 mcg (0.03 %) nasal spray 1 SPRAY BY BOTH NOSTRILS ROUTE EVERY TWELVE (12) HOURS AS NEEDED FOR RUNNY NOSE    loratadine 10 mg cap Oral    mirtazapine (REMERON) 30 mg, Oral, EVERY BEDTIME        Allergies:  No Known Allergies    OBJECTIVE/EXAMINATION  Gait and Functional Mobility:  slow shuffling gait pattern with RW  Palpation: slight tenderness at R lower back      Manual Muscle Testing    L   R  Hip Flexion                   4+/5               4-/5  Hip Abduction               4+/5               4+/5  Hip Adduction               4+/5               4+/5  Knee Extension            4+/5                4/5  Knee Flexion                4+/5  4/5  Ankle PF   5/5  4+/5  Ankle DF   5/5  4+/5      10 min Gait Trainin feet with RW device on level surfaces with CGA level of assist   Rationale: increase ROM, increase strength, and improve coordination  to improve the patients ability to improve safety while ambulating.      With   [] TE   [] TA   [] neuro   [] other: Patient Education: [x] Provided HEP    [] Progressed/Changed HEP based on:   [] positioning   [] body mechanics   [] transfers   [] heat/ice application    [] other:              Pain Level (0-10 scale) post treatment: 5/10    ASSESSMENT/Changes in Function:   [x]  See Plan of [address] [place], [place], DPT 2023

## 2023-04-13 ENCOUNTER — HOSPITAL ENCOUNTER (OUTPATIENT)
Dept: PHYSICAL THERAPY | Age: 78
Discharge: HOME | End: 2023-04-13
Payer: MEDICARE

## 2023-04-13 PROCEDURE — 97110 THERAPEUTIC EXERCISES: CPT

## 2023-04-18 ENCOUNTER — HOSPITAL ENCOUNTER (OUTPATIENT)
Dept: PHYSICAL THERAPY | Age: 78
Discharge: HOME | End: 2023-04-18
Payer: MEDICARE

## 2023-04-18 PROCEDURE — 97110 THERAPEUTIC EXERCISES: CPT

## 2023-04-18 NOTE — PROGRESS NOTES
PT DAILY TREATMENT NOTE - [facility] 2-15    Patient Name: Jonel Sicard  Date:2023  : 1945  [x]  Patient  Verified  Payor: Lavelle Cleaves / Plan: [place] MEDICARE PART A & B / Product Type: Medicare /    In time: 1003  Out time: 1058  Total Treatment Time (min): 55  Total Timed Codes (min): 55  1:1 Treatment Time ( only): 55   Visit #:  4 of 10    Treatment Area: Muscle weakness [M62.81]    SUBJECTIVE  Pain Level (0-10 scale): 0/10  Any medication changes, allergies to medications, adverse drug reactions, diagnosis change, or new procedure performed?: [x] No    [] Yes (see summary sheet for update)  Subjective functional status/changes:     \"I walked one time at home. \"    OBJECTIVE    55 min Therapeutic Exercise:  [x] See flow sheet :   Rationale: increase ROM, increase strength, improve coordination, and improve balance to improve the patients ability to perform daily tasks with improved stablity. With   [x] TE   [] TA   [] neuro   [] other: Patient Education: [x] Review HEP    [] Progressed/Changed HEP based on:   [] positioning   [] body mechanics   [] transfers   [] heat/ice application    [] other:      Pain Level (0-10 scale) post treatment: 0/10    ASSESSMENT/Changes in Function:   The pt tolerated treatment fair today. Initiated treatment with gait around room with RW, CGA. Then progressed to seated exercises. Pt required Georgina-modA with 3 sit to stand transfers today, demonstrating progress from prior session. Pt required increased cues to ambulate to/from different exercises, however was able to do so with increased encouragement using RW. Pt with poor stand to sit technique, requiring cues for proper form. Aid present for end of treatment to see pt ambulate to [facility], and aid was educated on importance of increased mobility and gait at home.   Patient will continue to benefit from skilled PT services to modify and progress therapeutic interventions, address functional mobility deficits, address ROM deficits, address strength deficits, analyze and cue movement patterns, address imbalance/dizziness, and instruct in home and community integration to attain remaining goals. [x]  See Plan of Care  []  See progress note/recertification  []  See Discharge Summary         Progress towards goals / Updated goals:  Short Term Goals: To be accomplished in 5 treatments. Patient will perform HEP daily with instruction from caregiver to improve overall strength and endurance for carryover in clinic. Patient will be able to stand to RW or counter with supervision for safety. Patient will be able to perform bed mobility (rolling and sit <> supine) without assistance to be able to transfer in home. Patient will be able to ambulate 100 ft to be able to navigate home without need of WC. Long Term Goals: To be accomplished in 10 treatments. Patient will be able to stand at counter for 3 min to be able to perform grooming tasks. Patient will be able to assist in making meals in the kitchen (sitting or standing). Patient will be able to use her bathroom toilet with raised seat and also step into shower with supervision. Patient ill be able to ambulate with RW for 250 ft to be able to walk out to the car to travel for appointments (ramps and uneven surfaces). Patient will improve TUG score to 20 seconds to demonstrate improved balance to return to light gardening tasks.         PLAN  [x]  Upgrade activities as tolerated     [x]  Continue plan of care  []  Update interventions per flow sheet       []  Discharge due to:_  []  Other:_      Ori Saucedo, PT, DPT 4/18/2023

## 2023-04-21 ENCOUNTER — HOSPITAL ENCOUNTER (OUTPATIENT)
Dept: PHYSICAL THERAPY | Age: 78
Discharge: HOME | End: 2023-04-21
Payer: MEDICARE

## 2023-04-21 PROCEDURE — 97110 THERAPEUTIC EXERCISES: CPT

## 2023-04-25 ENCOUNTER — HOSPITAL ENCOUNTER (OUTPATIENT)
Dept: PHYSICAL THERAPY | Age: 78
Discharge: HOME | End: 2023-04-25
Payer: MEDICARE

## 2023-04-25 PROCEDURE — 97110 THERAPEUTIC EXERCISES: CPT

## 2023-04-25 PROCEDURE — 97116 GAIT TRAINING THERAPY: CPT

## 2023-04-25 NOTE — PROGRESS NOTES
PT DAILY TREATMENT NOTE - [facility] 2-15    Patient Name: Mitchell Hernandez  Date:2023  : 1945  [x]  Patient  Verified  Payor: Sigrid Mason / Plan: [place] MEDICARE PART A & B / Product Type: Medicare /    In time:10:20 AM  Out time:11:15 AM  Total Treatment Time (min): 55  Total Timed Codes (min): 55  1:1 Treatment Time ( [address] only): 54   Visit #:  6 of 10    Treatment Area: Muscle weakness [M62.81]    SUBJECTIVE  Pain Level (0-10 scale): 5/10  Any medication changes, allergies to medications, adverse drug reactions, diagnosis change, or new procedure performed?: [x] No    [] Yes (see summary sheet for update)  Subjective functional status/changes:     \"Caretaker stated that she has to be pushed more to perform exercises at home. They try to go outside on warm days. She received dialysis on Tuesday, Thursday and Saturday after therapy. Julisa Gonzales \"    OBJECTIVE    45 min Therapeutic Exercise:  [x] See flow sheet :   Rationale: increase strength and improve balance to improve the patients ability to ambulate 100 ft to be able to navigate home without need of WC.          10 min Gait Trainin feet with rolling walker on level surfaces with min  level of assist followed by WC due to 4 rest periods   Rationale: increase strength, improve coordination, and improve balance  to improve the patients ability to ill be able to ambulate 100 ft to be able to navigate home without need of WC. With   [x] TE   [] TA   [] neuro   [] other: Patient Education: [x] Review HEP    [] Progressed/Changed HEP based on:   [] positioning   [] body mechanics   [] transfers   [] heat/ice application    [] other:        Pain Level (0-10 scale) post treatment: 5/10    ASSESSMENT/Changes in Function:   The pt tolerated treatment with frequent periods due to endurance level. Patient is spontaneous and fall risks. Dialysis is scheduled following Physical Therapy. Patient is able to do more for herself.  Requires much motivation and encouragement to increase activities. Caretaker does a good job to keep patient active as possible without resistance. Patient will continue to benefit from skilled PT services to address ROM deficits, address strength deficits, analyze and address soft tissue restrictions, and analyze and cue movement patterns to attain remaining goals. [x]  See Plan of Care  []  See progress note/recertification  []  See Discharge Summary         Progress towards goals / Updated goals:  Short Term Goals: To be accomplished in 5 treatments. Patient will perform HEP daily with instruction from caregiver to improve overall strength and endurance for carryover in clinic. Patient will be able to stand to RW or counter with supervision for safety. Patient will be able to perform bed mobility (rolling and sit <> supine) without assistance to be able to transfer in home. Patient will be able to ambulate 100 ft to be able to navigate home without need of WC. Long Term Goals: To be accomplished in 10 treatments. Patient will be able to stand at counter for 3 min to be able to perform grooming tasks. Patient will be able to assist in making meals in the kitchen (sitting or standing). Patient will be able to use her bathroom toilet with raised seat and also step into shower with supervision. Patient ill be able to ambulate with RW for 250 ft to be able to walk out to the car to travel for appointments (ramps and uneven surfaces). Patient will improve TUG score to 20 seconds to demonstrate improved balance to return to light gardening tasks.      PLAN  [x]  Upgrade activities as tolerated     [x]  Continue plan of care  []  Update interventions per flow sheet       []  Discharge due to:_  []  Other:_      Dmitry Berry, PT,  4/25/2023

## 2023-04-28 ENCOUNTER — HOSPITAL ENCOUNTER (OUTPATIENT)
Dept: PHYSICAL THERAPY | Age: 78
Discharge: HOME | End: 2023-04-28
Payer: MEDICARE

## 2023-04-28 PROCEDURE — 97110 THERAPEUTIC EXERCISES: CPT

## 2023-05-01 ENCOUNTER — APPOINTMENT (OUTPATIENT)
Facility: HOSPITAL | Age: 78
End: 2023-05-01
Payer: MEDICARE

## 2023-05-02 ENCOUNTER — HOSPITAL ENCOUNTER (OUTPATIENT)
Dept: PHYSICAL THERAPY | Age: 78
Discharge: HOME | End: 2023-05-02
Payer: MEDICARE

## 2023-05-02 PROCEDURE — 97110 THERAPEUTIC EXERCISES: CPT

## 2023-05-02 PROCEDURE — 9990 CHARGE CONVERSION

## 2023-05-04 ENCOUNTER — HOSPITAL ENCOUNTER (OUTPATIENT)
Dept: PHYSICAL THERAPY | Age: 78
Discharge: HOME | End: 2023-05-04
Payer: MEDICARE

## 2023-05-04 PROCEDURE — 9990 CHARGE CONVERSION

## 2023-05-04 PROCEDURE — 97110 THERAPEUTIC EXERCISES: CPT

## 2023-05-09 ENCOUNTER — APPOINTMENT (OUTPATIENT)
Facility: HOSPITAL | Age: 78
End: 2023-05-09
Payer: MEDICARE

## 2023-05-12 ENCOUNTER — APPOINTMENT (OUTPATIENT)
Facility: HOSPITAL | Age: 78
End: 2023-05-12
Payer: MEDICARE

## 2023-05-16 ENCOUNTER — APPOINTMENT (OUTPATIENT)
Facility: HOSPITAL | Age: 78
End: 2023-05-16
Payer: MEDICARE

## 2023-05-18 ENCOUNTER — APPOINTMENT (OUTPATIENT)
Facility: HOSPITAL | Age: 78
End: 2023-05-18
Payer: MEDICARE

## 2023-05-24 RX ORDER — AZELASTINE 1 MG/ML
1 SPRAY, METERED NASAL 2 TIMES DAILY
COMMUNITY

## 2023-05-24 RX ORDER — ALBUTEROL SULFATE 90 UG/1
2 AEROSOL, METERED RESPIRATORY (INHALATION) EVERY 4 HOURS PRN
COMMUNITY
Start: 2021-04-28

## 2023-05-24 RX ORDER — CARVEDILOL 3.12 MG/1
3.12 TABLET ORAL 2 TIMES DAILY WITH MEALS
COMMUNITY
Start: 2022-07-27

## 2023-05-24 RX ORDER — AMLODIPINE BESYLATE 2.5 MG/1
2.5 TABLET ORAL EVERY EVENING
COMMUNITY
Start: 2022-07-27

## 2023-05-24 RX ORDER — DONEPEZIL HYDROCHLORIDE 5 MG/1
5 TABLET, FILM COATED ORAL DAILY
COMMUNITY

## 2023-05-24 RX ORDER — ALLOPURINOL 100 MG/1
50 TABLET ORAL
COMMUNITY

## 2023-05-24 RX ORDER — IPRATROPIUM BROMIDE 21 UG/1
SPRAY, METERED NASAL
COMMUNITY
Start: 2022-03-30

## 2023-05-24 RX ORDER — MIRTAZAPINE 15 MG/1
2 TABLET, FILM COATED ORAL NIGHTLY
COMMUNITY
Start: 2022-07-27 | End: 2023-06-08

## 2023-05-24 RX ORDER — FLUTICASONE PROPIONATE AND SALMETEROL XINAFOATE 115; 21 UG/1; UG/1
2 AEROSOL, METERED RESPIRATORY (INHALATION)
COMMUNITY
Start: 2021-09-02

## 2023-05-24 RX ORDER — ATORVASTATIN CALCIUM 40 MG/1
TABLET, FILM COATED ORAL
COMMUNITY

## 2023-05-24 RX ORDER — FLUTICASONE PROPIONATE 50 MCG
2 SPRAY, SUSPENSION (ML) NASAL DAILY
COMMUNITY

## 2023-05-24 RX ORDER — PSEUDOEPHEDRINE HCL 30 MG
100 TABLET ORAL 2 TIMES DAILY
COMMUNITY

## 2023-05-24 RX ORDER — INSULIN LISPRO 100 [IU]/ML
INJECTION, SOLUTION INTRAVENOUS; SUBCUTANEOUS
COMMUNITY
Start: 2021-05-06 | End: 2023-06-08

## 2023-05-24 RX ORDER — ASPIRIN 81 MG/1
81 TABLET, CHEWABLE ORAL DAILY
COMMUNITY

## 2023-05-24 RX ORDER — LORATADINE 10 MG/1
CAPSULE, LIQUID FILLED ORAL
COMMUNITY

## 2023-06-08 ENCOUNTER — APPOINTMENT (OUTPATIENT)
Facility: HOSPITAL | Age: 78
End: 2023-06-08
Payer: MEDICARE

## 2023-06-08 ENCOUNTER — HOSPITAL ENCOUNTER (EMERGENCY)
Facility: HOSPITAL | Age: 78
Discharge: HOME | End: 2023-06-09
Attending: EMERGENCY MEDICINE
Payer: MEDICARE

## 2023-06-08 DIAGNOSIS — S82.201A CLOSED FRACTURE OF RIGHT TIBIA AND FIBULA, INITIAL ENCOUNTER: Primary | ICD-10-CM

## 2023-06-08 DIAGNOSIS — S82.401A CLOSED FRACTURE OF RIGHT TIBIA AND FIBULA, INITIAL ENCOUNTER: Primary | ICD-10-CM

## 2023-06-08 DIAGNOSIS — S82.141A TIBIAL PLATEAU FRACTURE, RIGHT, CLOSED, INITIAL ENCOUNTER: ICD-10-CM

## 2023-06-08 PROCEDURE — 99284 EMERGENCY DEPT VISIT MOD MDM: CPT

## 2023-06-08 PROCEDURE — 6370000000 HC RX 637 (ALT 250 FOR IP): Performed by: EMERGENCY MEDICINE

## 2023-06-08 PROCEDURE — 96375 TX/PRO/DX INJ NEW DRUG ADDON: CPT

## 2023-06-08 PROCEDURE — 96374 THER/PROPH/DIAG INJ IV PUSH: CPT

## 2023-06-08 PROCEDURE — 73700 CT LOWER EXTREMITY W/O DYE: CPT

## 2023-06-08 PROCEDURE — 6360000002 HC RX W HCPCS: Performed by: EMERGENCY MEDICINE

## 2023-06-08 PROCEDURE — 73590 X-RAY EXAM OF LOWER LEG: CPT

## 2023-06-08 RX ORDER — FENTANYL CITRATE 50 UG/ML
50 INJECTION, SOLUTION INTRAMUSCULAR; INTRAVENOUS
Status: COMPLETED | OUTPATIENT
Start: 2023-06-08 | End: 2023-06-08

## 2023-06-08 RX ORDER — HYDROCODONE BITARTRATE AND ACETAMINOPHEN 5; 325 MG/1; MG/1
1 TABLET ORAL EVERY 6 HOURS PRN
Qty: 16 TABLET | Refills: 0 | Status: SHIPPED | OUTPATIENT
Start: 2023-06-08 | End: 2023-06-12

## 2023-06-08 RX ORDER — SEVELAMER HYDROCHLORIDE 800 MG/1
800 TABLET, FILM COATED ORAL
COMMUNITY

## 2023-06-08 RX ORDER — ONDANSETRON 4 MG/1
4 TABLET, ORALLY DISINTEGRATING ORAL EVERY 8 HOURS PRN
Qty: 12 TABLET | Refills: 0 | Status: SHIPPED | OUTPATIENT
Start: 2023-06-08 | End: 2023-06-12

## 2023-06-08 RX ORDER — MORPHINE SULFATE 2 MG/ML
2 INJECTION, SOLUTION INTRAMUSCULAR; INTRAVENOUS
Status: COMPLETED | OUTPATIENT
Start: 2023-06-08 | End: 2023-06-08

## 2023-06-08 RX ORDER — ONDANSETRON 2 MG/ML
4 INJECTION INTRAMUSCULAR; INTRAVENOUS ONCE
Status: COMPLETED | OUTPATIENT
Start: 2023-06-08 | End: 2023-06-08

## 2023-06-08 RX ORDER — DOXEPIN HYDROCHLORIDE 3 MG/1
3 TABLET ORAL NIGHTLY
COMMUNITY

## 2023-06-08 RX ORDER — HYDROCODONE BITARTRATE AND ACETAMINOPHEN 5; 325 MG/1; MG/1
1 TABLET ORAL
Status: COMPLETED | OUTPATIENT
Start: 2023-06-08 | End: 2023-06-08

## 2023-06-08 RX ADMIN — MORPHINE SULFATE 2 MG: 2 INJECTION, SOLUTION INTRAMUSCULAR; INTRAVENOUS at 19:50

## 2023-06-08 RX ADMIN — ONDANSETRON 4 MG: 2 INJECTION INTRAMUSCULAR; INTRAVENOUS at 22:55

## 2023-06-08 RX ADMIN — FENTANYL CITRATE 50 MCG: 50 INJECTION, SOLUTION INTRAMUSCULAR; INTRAVENOUS at 22:09

## 2023-06-08 RX ADMIN — HYDROCODONE BITARTRATE AND ACETAMINOPHEN 1 TABLET: 5; 325 TABLET ORAL at 20:55

## 2023-06-08 ASSESSMENT — PAIN DESCRIPTION - PAIN TYPE: TYPE: ACUTE PAIN

## 2023-06-08 ASSESSMENT — LIFESTYLE VARIABLES
HOW MANY STANDARD DRINKS CONTAINING ALCOHOL DO YOU HAVE ON A TYPICAL DAY: PATIENT DOES NOT DRINK
HOW OFTEN DO YOU HAVE A DRINK CONTAINING ALCOHOL: NEVER

## 2023-06-08 ASSESSMENT — PAIN SCALES - GENERAL
PAINLEVEL_OUTOF10: 10

## 2023-06-08 ASSESSMENT — PAIN DESCRIPTION - ORIENTATION: ORIENTATION: RIGHT;LOWER

## 2023-06-08 ASSESSMENT — PAIN - FUNCTIONAL ASSESSMENT: PAIN_FUNCTIONAL_ASSESSMENT: 0-10

## 2023-06-08 ASSESSMENT — PAIN DESCRIPTION - LOCATION: LOCATION: LEG

## 2023-06-08 NOTE — ED TRIAGE NOTES
Pt presents to ED from home per EMS for eval fall. Pt was reportedly getting off the commode and \"felt a pop\" to her RLE and fell. Swelling noted inferior to R knee anteriorly.  Split applie per EMS prior to arrival.

## 2023-06-09 VITALS
BODY MASS INDEX: 30.8 KG/M2 | DIASTOLIC BLOOD PRESSURE: 64 MMHG | TEMPERATURE: 98 F | HEART RATE: 77 BPM | HEIGHT: 64 IN | RESPIRATION RATE: 18 BRPM | OXYGEN SATURATION: 96 % | SYSTOLIC BLOOD PRESSURE: 126 MMHG | WEIGHT: 180.4 LBS

## 2023-06-09 ASSESSMENT — ENCOUNTER SYMPTOMS
RESPIRATORY NEGATIVE: 1
GASTROINTESTINAL NEGATIVE: 1

## 2023-06-24 ENCOUNTER — HOSPITAL ENCOUNTER (OUTPATIENT)
Facility: HOSPITAL | Age: 78
Setting detail: SPECIMEN
Discharge: HOME | End: 2023-06-27

## 2023-06-24 LAB
ANION GAP SERPL CALC-SCNC: 6 MMOL/L (ref 5–15)
BUN SERPL-MCNC: 25 MG/DL (ref 6–20)
BUN/CREAT SERPL: 5 (ref 12–20)
CA-I BLD-MCNC: 8.7 MG/DL (ref 8.5–10.1)
CHLORIDE SERPL-SCNC: 102 MMOL/L (ref 97–108)
CO2 SERPL-SCNC: 27 MMOL/L (ref 21–32)
CREAT SERPL-MCNC: 4.94 MG/DL (ref 0.55–1.02)
GLUCOSE SERPL-MCNC: 135 MG/DL (ref 65–100)
POTASSIUM SERPL-SCNC: 4.7 MMOL/L (ref 3.5–5.1)
SODIUM SERPL-SCNC: 135 MMOL/L (ref 136–145)

## 2023-06-24 PROCEDURE — 36415 COLL VENOUS BLD VENIPUNCTURE: CPT

## 2023-06-24 PROCEDURE — 80048 BASIC METABOLIC PNL TOTAL CA: CPT

## 2023-06-26 ENCOUNTER — HOSPITAL ENCOUNTER (OUTPATIENT)
Facility: HOSPITAL | Age: 78
Setting detail: SPECIMEN
Discharge: HOME | End: 2023-06-29

## 2023-06-26 LAB
BASOPHILS # BLD: 0 K/UL (ref 0–0.1)
BASOPHILS NFR BLD: 0 % (ref 0–1)
DIFFERENTIAL METHOD BLD: ABNORMAL
EOSINOPHIL # BLD: 0 K/UL (ref 0–0.4)
EOSINOPHIL NFR BLD: 0 % (ref 0–7)
ERYTHROCYTE [DISTWIDTH] IN BLOOD BY AUTOMATED COUNT: 16 % (ref 11.5–14.5)
HCT VFR BLD AUTO: 27.7 % (ref 35–47)
HGB BLD-MCNC: 8.2 G/DL (ref 11.5–16)
IMM GRANULOCYTES # BLD AUTO: 0 K/UL
IMM GRANULOCYTES NFR BLD AUTO: 0 %
LYMPHOCYTES # BLD: 1.2 K/UL (ref 0.8–3.5)
LYMPHOCYTES NFR BLD: 12 % (ref 12–49)
MCH RBC QN AUTO: 29 PG (ref 26–34)
MCHC RBC AUTO-ENTMCNC: 29.6 G/DL (ref 30–36.5)
MCV RBC AUTO: 97.9 FL (ref 80–99)
METAMYELOCYTES NFR BLD MANUAL: 3 %
MONOCYTES # BLD: 0.3 K/UL (ref 0–1)
MONOCYTES NFR BLD: 3 % (ref 5–13)
MYELOCYTES NFR BLD MANUAL: 1 %
NEUTS BAND NFR BLD MANUAL: 2 % (ref 0–6)
NEUTS SEG # BLD: 7.9 K/UL (ref 1.8–8)
NEUTS SEG NFR BLD: 79 % (ref 32–75)
NRBC # BLD: 0.02 K/UL (ref 0–0.01)
NRBC BLD-RTO: 0.2 PER 100 WBC
PLATELET # BLD AUTO: 347 K/UL (ref 150–400)
PMV BLD AUTO: 10.8 FL (ref 8.9–12.9)
RBC # BLD AUTO: 2.83 M/UL (ref 3.8–5.2)
RBC MORPH BLD: ABNORMAL
WBC # BLD AUTO: 9.8 K/UL (ref 3.6–11)

## 2023-06-26 PROCEDURE — 85025 COMPLETE CBC W/AUTO DIFF WBC: CPT

## 2023-06-26 PROCEDURE — 80053 COMPREHEN METABOLIC PANEL: CPT

## 2023-06-26 PROCEDURE — 36415 COLL VENOUS BLD VENIPUNCTURE: CPT

## 2023-06-27 ENCOUNTER — TRANSCRIBE ORDERS (OUTPATIENT)
Facility: HOSPITAL | Age: 78
End: 2023-06-27

## 2023-06-27 ENCOUNTER — HOSPITAL ENCOUNTER (OUTPATIENT)
Facility: HOSPITAL | Age: 78
Discharge: HOME | End: 2023-06-30
Payer: COMMERCIAL

## 2023-06-27 ENCOUNTER — HOSPITAL ENCOUNTER (OUTPATIENT)
Facility: HOSPITAL | Age: 78
Setting detail: SPECIMEN
Discharge: HOME | End: 2023-06-30

## 2023-06-27 DIAGNOSIS — R41.0 CONFUSION: ICD-10-CM

## 2023-06-27 DIAGNOSIS — S82.443K: Primary | ICD-10-CM
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
## 2023-06-27 DIAGNOSIS — S82.243A: ICD-10-CM

## 2023-06-27 DIAGNOSIS — R41.0 CONFUSION: Primary | ICD-10-CM

## 2023-06-27 DIAGNOSIS — S82.409A CLOSED FRACTURE OF TIBIA AND FIBULA, UNSPECIFIED LATERALITY, INITIAL ENCOUNTER: ICD-10-CM

## 2023-06-27 DIAGNOSIS — S82.209A CLOSED FRACTURE OF TIBIA AND FIBULA, UNSPECIFIED LATERALITY, INITIAL ENCOUNTER: ICD-10-CM

## 2023-06-27 LAB
ALBUMIN SERPL-MCNC: 2.4 G/DL (ref 3.5–5)
ALBUMIN/GLOB SERPL: 0.6 (ref 1.1–2.2)
ALP SERPL-CCNC: 111 U/L (ref 45–117)
ALT SERPL-CCNC: 14 U/L (ref 12–78)
AMMONIA PLAS-SCNC: 38 UMOL/L
ANION GAP SERPL CALC-SCNC: 6 MMOL/L (ref 5–15)
AST SERPL W P-5'-P-CCNC: 16 U/L (ref 15–37)
BASOPHILS # BLD: 0 K/UL (ref 0–0.1)
BASOPHILS NFR BLD: 0 % (ref 0–1)
BILIRUB SERPL-MCNC: 0.3 MG/DL (ref 0.2–1)
BUN SERPL-MCNC: 33 MG/DL (ref 6–20)
BUN/CREAT SERPL: 6 (ref 12–20)
CA-I BLD-MCNC: 8.4 MG/DL (ref 8.5–10.1)
CHLORIDE SERPL-SCNC: 99 MMOL/L (ref 97–108)
CO2 SERPL-SCNC: 30 MMOL/L (ref 21–32)
CREAT SERPL-MCNC: 5.47 MG/DL (ref 0.55–1.02)
DIFFERENTIAL METHOD BLD: ABNORMAL
EOSINOPHIL # BLD: 0.1 K/UL (ref 0–0.4)
EOSINOPHIL NFR BLD: 2 % (ref 0–7)
ERYTHROCYTE [DISTWIDTH] IN BLOOD BY AUTOMATED COUNT: 15.7 % (ref 11.5–14.5)
GLOBULIN SER CALC-MCNC: 3.7 G/DL (ref 2–4)
GLUCOSE SERPL-MCNC: 131 MG/DL (ref 65–100)
HCT VFR BLD AUTO: 22.8 % (ref 35–47)
HGB BLD-MCNC: 6.7 G/DL (ref 11.5–16)
IMM GRANULOCYTES # BLD AUTO: 0.1 K/UL (ref 0–0.04)
IMM GRANULOCYTES NFR BLD AUTO: 2 % (ref 0–0.5)
LYMPHOCYTES # BLD: 1.2 K/UL (ref 0.8–3.5)
LYMPHOCYTES NFR BLD: 15 % (ref 12–49)
MCH RBC QN AUTO: 29.1 PG (ref 26–34)
MCHC RBC AUTO-ENTMCNC: 29.4 G/DL (ref 30–36.5)
MCV RBC AUTO: 99.1 FL (ref 80–99)
MONOCYTES # BLD: 0.7 K/UL (ref 0–1)
MONOCYTES NFR BLD: 9 % (ref 5–13)
NEUTS SEG # BLD: 5.9 K/UL (ref 1.8–8)
NEUTS SEG NFR BLD: 72 % (ref 32–75)
NRBC # BLD: 0 K/UL (ref 0–0.01)
NRBC BLD-RTO: 0 PER 100 WBC
PLATELET # BLD AUTO: 324 K/UL (ref 150–400)
PMV BLD AUTO: 10.6 FL (ref 8.9–12.9)
POTASSIUM SERPL-SCNC: 4.2 MMOL/L (ref 3.5–5.1)
PROT SERPL-MCNC: 6.1 G/DL (ref 6.4–8.2)
RBC # BLD AUTO: 2.3 M/UL (ref 3.8–5.2)
SODIUM SERPL-SCNC: 135 MMOL/L (ref 136–145)
WBC # BLD AUTO: 8.1 K/UL (ref 3.6–11)

## 2023-06-27 PROCEDURE — 70450 CT HEAD/BRAIN W/O DYE: CPT

## 2023-06-27 PROCEDURE — 82140 ASSAY OF AMMONIA: CPT

## 2023-06-28 ENCOUNTER — HOSPITAL ENCOUNTER (OUTPATIENT)
Facility: HOSPITAL | Age: 78
Setting detail: SPECIMEN
Discharge: HOME | End: 2023-07-01

## 2023-06-28 LAB
ALBUMIN SERPL-MCNC: 2.7 G/DL (ref 3.5–5)
ALBUMIN/GLOB SERPL: 0.6 (ref 1.1–2.2)
ALP SERPL-CCNC: 137 U/L (ref 45–117)
ALT SERPL-CCNC: 15 U/L (ref 12–78)
ANION GAP SERPL CALC-SCNC: 7 MMOL/L (ref 5–15)
AST SERPL W P-5'-P-CCNC: 19 U/L (ref 15–37)
BASOPHILS # BLD: 0 K/UL (ref 0–0.1)
BASOPHILS NFR BLD: 0 % (ref 0–1)
BILIRUB SERPL-MCNC: 0.4 MG/DL (ref 0.2–1)
BUN SERPL-MCNC: 49 MG/DL (ref 6–20)
BUN/CREAT SERPL: 6 (ref 12–20)
CA-I BLD-MCNC: 9.1 MG/DL (ref 8.5–10.1)
CHLORIDE SERPL-SCNC: 101 MMOL/L (ref 97–108)
CO2 SERPL-SCNC: 28 MMOL/L (ref 21–32)
CREAT SERPL-MCNC: 7.64 MG/DL (ref 0.55–1.02)
DIFFERENTIAL METHOD BLD: ABNORMAL
EOSINOPHIL # BLD: 0.2 K/UL (ref 0–0.4)
EOSINOPHIL NFR BLD: 2 % (ref 0–7)
ERYTHROCYTE [DISTWIDTH] IN BLOOD BY AUTOMATED COUNT: 15.7 % (ref 11.5–14.5)
GLOBULIN SER CALC-MCNC: 4.2 G/DL (ref 2–4)
GLUCOSE SERPL-MCNC: 137 MG/DL (ref 65–100)
HCT VFR BLD AUTO: 24.2 % (ref 35–47)
HGB BLD-MCNC: 7 G/DL (ref 11.5–16)
IMM GRANULOCYTES # BLD AUTO: 0.1 K/UL (ref 0–0.04)
IMM GRANULOCYTES NFR BLD AUTO: 1 % (ref 0–0.5)
LYMPHOCYTES # BLD: 1.6 K/UL (ref 0.8–3.5)
LYMPHOCYTES NFR BLD: 20 % (ref 12–49)
MCH RBC QN AUTO: 28.7 PG (ref 26–34)
MCHC RBC AUTO-ENTMCNC: 28.9 G/DL (ref 30–36.5)
MCV RBC AUTO: 99.2 FL (ref 80–99)
MONOCYTES # BLD: 0.6 K/UL (ref 0–1)
MONOCYTES NFR BLD: 8 % (ref 5–13)
NEUTS SEG # BLD: 5.5 K/UL (ref 1.8–8)
NEUTS SEG NFR BLD: 69 % (ref 32–75)
NRBC # BLD: 0 K/UL (ref 0–0.01)
NRBC BLD-RTO: 0 PER 100 WBC
PHOSPHATE SERPL-MCNC: 3.9 MG/DL (ref 2.6–4.7)
PLATELET # BLD AUTO: 345 K/UL (ref 150–400)
PMV BLD AUTO: 10.4 FL (ref 8.9–12.9)
POTASSIUM SERPL-SCNC: 4.5 MMOL/L (ref 3.5–5.1)
PROT SERPL-MCNC: 6.9 G/DL (ref 6.4–8.2)
RBC # BLD AUTO: 2.44 M/UL (ref 3.8–5.2)
RBC MORPH BLD: ABNORMAL
SODIUM SERPL-SCNC: 136 MMOL/L (ref 136–145)
WBC # BLD AUTO: 8 K/UL (ref 3.6–11)

## 2023-06-28 PROCEDURE — 84100 ASSAY OF PHOSPHORUS: CPT

## 2023-06-28 PROCEDURE — 85025 COMPLETE CBC W/AUTO DIFF WBC: CPT

## 2023-06-28 PROCEDURE — 80053 COMPREHEN METABOLIC PANEL: CPT

## 2023-06-30 ENCOUNTER — HOSPITAL ENCOUNTER (OUTPATIENT)
Facility: HOSPITAL | Age: 78
Setting detail: SPECIMEN
Discharge: HOME | End: 2023-07-03

## 2023-06-30 LAB
ALBUMIN SERPL-MCNC: 2.7 G/DL (ref 3.5–5)
ALBUMIN/GLOB SERPL: 0.6 (ref 1.1–2.2)
ALP SERPL-CCNC: 151 U/L (ref 45–117)
ALT SERPL-CCNC: 16 U/L (ref 12–78)
ANION GAP SERPL CALC-SCNC: 9 MMOL/L (ref 5–15)
AST SERPL W P-5'-P-CCNC: 18 U/L (ref 15–37)
BASOPHILS # BLD: 0 K/UL (ref 0–0.1)
BASOPHILS NFR BLD: 0 % (ref 0–1)
BILIRUB SERPL-MCNC: 0.4 MG/DL (ref 0.2–1)
BUN SERPL-MCNC: 40 MG/DL (ref 6–20)
BUN/CREAT SERPL: 6 (ref 12–20)
CA-I BLD-MCNC: 8.9 MG/DL (ref 8.5–10.1)
CHLORIDE SERPL-SCNC: 96 MMOL/L (ref 97–108)
CO2 SERPL-SCNC: 31 MMOL/L (ref 21–32)
CREAT SERPL-MCNC: 6.56 MG/DL (ref 0.55–1.02)
DIFFERENTIAL METHOD BLD: ABNORMAL
EOSINOPHIL # BLD: 0.1 K/UL (ref 0–0.4)
EOSINOPHIL NFR BLD: 1 % (ref 0–7)
ERYTHROCYTE [DISTWIDTH] IN BLOOD BY AUTOMATED COUNT: 15.7 % (ref 11.5–14.5)
GLOBULIN SER CALC-MCNC: 4.2 G/DL (ref 2–4)
GLUCOSE SERPL-MCNC: 132 MG/DL (ref 65–100)
HCT VFR BLD AUTO: 23.5 % (ref 35–47)
HGB BLD-MCNC: 7.1 G/DL (ref 11.5–16)
IMM GRANULOCYTES # BLD AUTO: 0.1 K/UL (ref 0–0.04)
IMM GRANULOCYTES NFR BLD AUTO: 1 % (ref 0–0.5)
LYMPHOCYTES # BLD: 1.2 K/UL (ref 0.8–3.5)
LYMPHOCYTES NFR BLD: 15 % (ref 12–49)
MCH RBC QN AUTO: 29.1 PG (ref 26–34)
MCHC RBC AUTO-ENTMCNC: 30.2 G/DL (ref 30–36.5)
MCV RBC AUTO: 96.3 FL (ref 80–99)
MONOCYTES # BLD: 0.7 K/UL (ref 0–1)
MONOCYTES NFR BLD: 9 % (ref 5–13)
NEUTS SEG # BLD: 5.8 K/UL (ref 1.8–8)
NEUTS SEG NFR BLD: 74 % (ref 32–75)
NRBC # BLD: 0 K/UL (ref 0–0.01)
NRBC BLD-RTO: 0 PER 100 WBC
PHOSPHATE SERPL-MCNC: 4.6 MG/DL (ref 2.6–4.7)
PLATELET # BLD AUTO: 331 K/UL (ref 150–400)
PMV BLD AUTO: 10.8 FL (ref 8.9–12.9)
POTASSIUM SERPL-SCNC: 4.7 MMOL/L (ref 3.5–5.1)
PROT SERPL-MCNC: 6.9 G/DL (ref 6.4–8.2)
RBC # BLD AUTO: 2.44 M/UL (ref 3.8–5.2)
SODIUM SERPL-SCNC: 136 MMOL/L (ref 136–145)
WBC # BLD AUTO: 7.9 K/UL (ref 3.6–11)

## 2023-06-30 PROCEDURE — 85025 COMPLETE CBC W/AUTO DIFF WBC: CPT

## 2023-06-30 PROCEDURE — 36415 COLL VENOUS BLD VENIPUNCTURE: CPT

## 2023-06-30 PROCEDURE — 80053 COMPREHEN METABOLIC PANEL: CPT

## 2023-06-30 PROCEDURE — 84100 ASSAY OF PHOSPHORUS: CPT

## 2023-07-03 ENCOUNTER — HOSPITAL ENCOUNTER (OUTPATIENT)
Facility: HOSPITAL | Age: 78
Setting detail: SPECIMEN
Discharge: HOME | End: 2023-07-06

## 2023-07-03 LAB
ALBUMIN SERPL-MCNC: 2.5 G/DL (ref 3.5–5)
ALBUMIN/GLOB SERPL: 0.6 (ref 1.1–2.2)
ALP SERPL-CCNC: 148 U/L (ref 45–117)
ALT SERPL-CCNC: 17 U/L (ref 12–78)
ANION GAP SERPL CALC-SCNC: 11 MMOL/L (ref 5–15)
AST SERPL W P-5'-P-CCNC: 14 U/L (ref 15–37)
BASOPHILS # BLD: 0.1 K/UL (ref 0–0.1)
BASOPHILS NFR BLD: 1 % (ref 0–1)
BILIRUB SERPL-MCNC: 0.4 MG/DL (ref 0.2–1)
BUN SERPL-MCNC: 52 MG/DL (ref 6–20)
BUN/CREAT SERPL: 7 (ref 12–20)
CA-I BLD-MCNC: 8.3 MG/DL (ref 8.5–10.1)
CHLORIDE SERPL-SCNC: 100 MMOL/L (ref 97–108)
CO2 SERPL-SCNC: 25 MMOL/L (ref 21–32)
CREAT SERPL-MCNC: 7.88 MG/DL (ref 0.55–1.02)
DIFFERENTIAL METHOD BLD: ABNORMAL
EOSINOPHIL # BLD: 0.2 K/UL (ref 0–0.4)
EOSINOPHIL NFR BLD: 2 % (ref 0–7)
ERYTHROCYTE [DISTWIDTH] IN BLOOD BY AUTOMATED COUNT: 16.2 % (ref 11.5–14.5)
GLOBULIN SER CALC-MCNC: 3.9 G/DL (ref 2–4)
GLUCOSE SERPL-MCNC: 113 MG/DL (ref 65–100)
HCT VFR BLD AUTO: 23.2 % (ref 35–47)
HGB BLD-MCNC: 6.9 G/DL (ref 11.5–16)
IMM GRANULOCYTES # BLD AUTO: 0.1 K/UL (ref 0–0.04)
IMM GRANULOCYTES NFR BLD AUTO: 1 % (ref 0–0.5)
LIPASE SERPL-CCNC: 184 U/L (ref 73–393)
LYMPHOCYTES # BLD: 1.8 K/UL (ref 0.8–3.5)
LYMPHOCYTES NFR BLD: 22 % (ref 12–49)
MCH RBC QN AUTO: 28.9 PG (ref 26–34)
MCHC RBC AUTO-ENTMCNC: 29.7 G/DL (ref 30–36.5)
MCV RBC AUTO: 97.1 FL (ref 80–99)
MONOCYTES # BLD: 0.9 K/UL (ref 0–1)
MONOCYTES NFR BLD: 11 % (ref 5–13)
NEUTS SEG # BLD: 5 K/UL (ref 1.8–8)
NEUTS SEG NFR BLD: 63 % (ref 32–75)
NRBC # BLD: 0 K/UL (ref 0–0.01)
NRBC BLD-RTO: 0 PER 100 WBC
PLATELET # BLD AUTO: 340 K/UL (ref 150–400)
PMV BLD AUTO: 10.9 FL (ref 8.9–12.9)
POTASSIUM SERPL-SCNC: 5.1 MMOL/L (ref 3.5–5.1)
PROT SERPL-MCNC: 6.4 G/DL (ref 6.4–8.2)
RBC # BLD AUTO: 2.39 M/UL (ref 3.8–5.2)
RBC MORPH BLD: ABNORMAL
SODIUM SERPL-SCNC: 136 MMOL/L (ref 136–145)
WBC # BLD AUTO: 8.1 K/UL (ref 3.6–11)

## 2023-07-03 PROCEDURE — 80053 COMPREHEN METABOLIC PANEL: CPT

## 2023-07-03 PROCEDURE — 36415 COLL VENOUS BLD VENIPUNCTURE: CPT

## 2023-07-03 PROCEDURE — 83690 ASSAY OF LIPASE: CPT

## 2023-07-03 PROCEDURE — 85025 COMPLETE CBC W/AUTO DIFF WBC: CPT

## 2023-07-04 ENCOUNTER — HOSPITAL ENCOUNTER (OUTPATIENT)
Facility: HOSPITAL | Age: 78
Setting detail: SPECIMEN
Discharge: HOME | End: 2023-07-07

## 2023-07-04 LAB
BASOPHILS # BLD: 0 K/UL (ref 0–0.1)
BASOPHILS NFR BLD: 0 % (ref 0–1)
DIFFERENTIAL METHOD BLD: ABNORMAL
EOSINOPHIL # BLD: 0.1 K/UL (ref 0–0.4)
EOSINOPHIL NFR BLD: 2 % (ref 0–7)
ERYTHROCYTE [DISTWIDTH] IN BLOOD BY AUTOMATED COUNT: 16.3 % (ref 11.5–14.5)
HCT VFR BLD AUTO: 23 % (ref 35–47)
HGB BLD-MCNC: 7 G/DL (ref 11.5–16)
IMM GRANULOCYTES # BLD AUTO: 0.1 K/UL (ref 0–0.04)
IMM GRANULOCYTES NFR BLD AUTO: 1 % (ref 0–0.5)
LYMPHOCYTES # BLD: 1.9 K/UL (ref 0.8–3.5)
LYMPHOCYTES NFR BLD: 21 % (ref 12–49)
MCH RBC QN AUTO: 29 PG (ref 26–34)
MCHC RBC AUTO-ENTMCNC: 30.4 G/DL (ref 30–36.5)
MCV RBC AUTO: 95.4 FL (ref 80–99)
MONOCYTES # BLD: 0.8 K/UL (ref 0–1)
MONOCYTES NFR BLD: 9 % (ref 5–13)
NEUTS SEG # BLD: 6.1 K/UL (ref 1.8–8)
NEUTS SEG NFR BLD: 67 % (ref 32–75)
NRBC # BLD: 0 K/UL (ref 0–0.01)
NRBC BLD-RTO: 0 PER 100 WBC
PLATELET # BLD AUTO: 295 K/UL (ref 150–400)
PMV BLD AUTO: 11 FL (ref 8.9–12.9)
RBC # BLD AUTO: 2.41 M/UL (ref 3.8–5.2)
WBC # BLD AUTO: 9.1 K/UL (ref 3.6–11)

## 2023-07-04 PROCEDURE — 85025 COMPLETE CBC W/AUTO DIFF WBC: CPT

## 2023-07-05 ENCOUNTER — HOSPITAL ENCOUNTER (OUTPATIENT)
Facility: HOSPITAL | Age: 78
Setting detail: SPECIMEN
Discharge: HOME | End: 2023-07-08

## 2023-07-05 PROCEDURE — 82728 ASSAY OF FERRITIN: CPT

## 2023-07-05 PROCEDURE — 36415 COLL VENOUS BLD VENIPUNCTURE: CPT

## 2023-07-05 PROCEDURE — 83550 IRON BINDING TEST: CPT

## 2023-07-05 PROCEDURE — 83540 ASSAY OF IRON: CPT

## 2023-07-06 ENCOUNTER — HOSPITAL ENCOUNTER (OUTPATIENT)
Facility: HOSPITAL | Age: 78
Discharge: HOME | End: 2023-07-09

## 2023-07-06 DIAGNOSIS — R10.9 ABDOMINAL PAIN, UNSPECIFIED ABDOMINAL LOCATION: ICD-10-CM

## 2023-07-06 LAB — FERRITIN SERPL-MCNC: 789 NG/ML (ref 26–388)

## 2023-07-06 PROCEDURE — 74176 CT ABD & PELVIS W/O CONTRAST: CPT

## 2023-07-08 LAB
IRON SATN MFR SERPL: 15 % (ref 15–55)
IRON: 28 UG/DL (ref 27–139)
TIBC SERPL-MCNC: 182 UG/DL (ref 250–450)
UIBC SERPL-MCNC: 154 UG/DL (ref 118–369)

## 2023-08-05 ENCOUNTER — APPOINTMENT (OUTPATIENT)
Facility: HOSPITAL | Age: 78
End: 2023-08-05
Attending: EMERGENCY MEDICINE
Payer: MEDICARE

## 2023-08-05 ENCOUNTER — HOSPITAL ENCOUNTER (EMERGENCY)
Facility: HOSPITAL | Age: 78
Discharge: HOME | End: 2023-08-05
Attending: EMERGENCY MEDICINE
Payer: MEDICARE

## 2023-08-05 VITALS
HEART RATE: 75 BPM | DIASTOLIC BLOOD PRESSURE: 71 MMHG | TEMPERATURE: 98.4 F | WEIGHT: 187.9 LBS | RESPIRATION RATE: 18 BRPM | HEIGHT: 64 IN | SYSTOLIC BLOOD PRESSURE: 177 MMHG | OXYGEN SATURATION: 95 % | BODY MASS INDEX: 32.08 KG/M2

## 2023-08-05 DIAGNOSIS — I77.89 ASCENDING AORTA ENLARGEMENT (HCC): ICD-10-CM

## 2023-08-05 DIAGNOSIS — I28.8 ENLARGED PULMONARY ARTERY (HCC): ICD-10-CM

## 2023-08-05 DIAGNOSIS — T14.8XXD WOUND HEALING, DELAYED: ICD-10-CM

## 2023-08-05 DIAGNOSIS — S30.1XXA CONTUSION OF ABDOMINAL WALL, INITIAL ENCOUNTER: Primary | ICD-10-CM

## 2023-08-05 PROCEDURE — 71250 CT THORAX DX C-: CPT

## 2023-08-05 PROCEDURE — 74176 CT ABD & PELVIS W/O CONTRAST: CPT

## 2023-08-05 PROCEDURE — 99284 EMERGENCY DEPT VISIT MOD MDM: CPT

## 2023-08-05 PROCEDURE — 6370000000 HC RX 637 (ALT 250 FOR IP): Performed by: EMERGENCY MEDICINE

## 2023-08-05 RX ORDER — HYDROCODONE BITARTRATE AND ACETAMINOPHEN 5; 325 MG/1; MG/1
1 TABLET ORAL
Status: COMPLETED | OUTPATIENT
Start: 2023-08-05 | End: 2023-08-05

## 2023-08-05 RX ORDER — BACITRACIN ZINC 500 [USP'U]/G
OINTMENT TOPICAL ONCE
Status: COMPLETED | OUTPATIENT
Start: 2023-08-05 | End: 2023-08-05

## 2023-08-05 RX ADMIN — HYDROCODONE BITARTRATE AND ACETAMINOPHEN 1 TABLET: 5; 325 TABLET ORAL at 09:13

## 2023-08-05 RX ADMIN — BACITRACIN ZINC: 500 OINTMENT TOPICAL at 10:07

## 2023-08-05 ASSESSMENT — PAIN DESCRIPTION - PAIN TYPE: TYPE: ACUTE PAIN

## 2023-08-05 ASSESSMENT — PAIN - FUNCTIONAL ASSESSMENT: PAIN_FUNCTIONAL_ASSESSMENT: 0-10

## 2023-08-05 ASSESSMENT — PAIN SCALES - GENERAL: PAINLEVEL_OUTOF10: 10

## 2023-08-05 NOTE — DISCHARGE INSTRUCTIONS
1.  Your CAT scan did not show any findings of any injury as a result of the incident. There were a couple of incidental findings that I have included in your problem list including a slight enlarged aorta and slightly enlarged pulmonary artery. These are considered incidental findings as they did not result from the reason for your visit and they are not causing you any symptoms at this time. It is important to discuss these findings with your primary care doctor. They can determine if any additional testing and/or treatment needs to be done. 2.  Continue the wound care at home. I gave you the information in your paperwork to call for the outpatient wound clinic in [facility] if you are unable to get seen in [place] as planned. Continue with the dressing changes at home. 3.  Go directly to your dialysis treatment at 1130 today as planned. Dialysis is critical to keeping you alive and keeping you suffering complications such as elevated potassium level, fluid overload and difficulty breathing, confusion and other complications as a result of not doing dialysis and it is very important that she do the treatment that your kidney doctor has prescribed. 4.  Return for any worsening including chest pain, passing out shortness of breath or any changes in the meantime. Follow-up with your primary care doctor and kidney doctor. Thank you for allowing us to provide you with excellent care today. We hope we addressed all of your concerns and needs. We strive to provide excellent quality care in the Emergency Department. Anything less than excellent does not meet our expectations for you. Incidental findings are findings on labs or imaging studies that do not necessarily correlate with the reason for your visit.   We make every effort to inform you of these incidental findings and the proper follow-up, however it is important that you call your primary care doctor or a primary care doctor in 1 to 2 days to set up a follow-up visit so they can go through your results in detail with you, and determine if additional testing is needed. The emergency room is not intended to be complete or comprehensive care, and it serves as a means to rule out life-threatening pathologies. It is very important that you follow-up with your primary care doctor to arrange additional testing and/or treatment if needed. The exam and treatment you received in the Emergency Department were for an urgent problem and are not intended as complete care. It is important that you follow-up with a doctor, nurse practitioner, or physician assistant to:  (1) confirm your diagnosis,  (2) re-evaluation of changes in your illness and treatment, and  (3) for ongoing care. If your symptoms become worse or you do not improve as expected, or you develop any new symptoms that concern you and/or you are unable to reach your usual health care provider, you should return to the Emergency Department. We are available 24 hours a day. If your blood pressure is greater than 120/80, your blood pressure is considered elevated above normal and you need to follow up with your primary care physician or the physician provided on your discharge instructions for a blood pressure recheck. If you were prescribed narcotic medications such as hydrocodone, oxycodone, diazepam, lorazepam, alprazolam, tramadol or codeine, these medications will NOT typically be refilled in the Emergency Room. Follow up with your primary care doctor or specialist to discuss this, or you may return to the Emergency room if your condition worsens. CT CHEST WO CONTRAST   Final Result   1. No acute soft tissue or bony abnormality. 2. Multiple incidental and degenerative findings throughout the chest and   abdomen described above. CT ABDOMEN PELVIS WO CONTRAST Additional Contrast? None   Final Result   1. No acute soft tissue or bony abnormality.    2. Multiple incidental and

## 2023-08-05 NOTE — ED TRIAGE NOTES
Pt reports her aide was assisting her into a car yesterday. PT reports left sided flank and hip pain. PT reports she has dialysis today 11am. PT reports she broke her right leg in June but denies any pain to right leg.
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
## 2023-08-11 ENCOUNTER — HOSPITAL ENCOUNTER (OUTPATIENT)
Facility: HOSPITAL | Age: 78
Discharge: HOME | End: 2023-08-11
Payer: MEDICARE

## 2023-08-11 VITALS
TEMPERATURE: 97.7 F | SYSTOLIC BLOOD PRESSURE: 138 MMHG | DIASTOLIC BLOOD PRESSURE: 79 MMHG | HEART RATE: 74 BPM | RESPIRATION RATE: 18 BRPM

## 2023-08-11 DIAGNOSIS — L89.893 PRESSURE ULCER OF RIGHT LEG, STAGE 3 (HCC): ICD-10-CM

## 2023-08-11 PROCEDURE — 11042 DBRDMT SUBQ TIS 1ST 20SQCM/<: CPT

## 2023-08-11 PROCEDURE — 99204 OFFICE O/P NEW MOD 45 MIN: CPT

## 2023-08-11 RX ORDER — GENTAMICIN SULFATE 1 MG/G
OINTMENT TOPICAL ONCE
OUTPATIENT
Start: 2023-08-11 | End: 2023-08-11

## 2023-08-11 RX ORDER — BACITRACIN ZINC AND POLYMYXIN B SULFATE 500; 1000 [USP'U]/G; [USP'U]/G
OINTMENT TOPICAL ONCE
OUTPATIENT
Start: 2023-08-11 | End: 2023-08-11

## 2023-08-11 RX ORDER — LIDOCAINE HYDROCHLORIDE 20 MG/ML
JELLY TOPICAL ONCE
OUTPATIENT
Start: 2023-08-11 | End: 2023-08-11

## 2023-08-11 RX ORDER — IBUPROFEN 200 MG
TABLET ORAL ONCE
OUTPATIENT
Start: 2023-08-11 | End: 2023-08-11

## 2023-08-11 RX ORDER — LIDOCAINE 40 MG/G
CREAM TOPICAL ONCE
OUTPATIENT
Start: 2023-08-11 | End: 2023-08-11

## 2023-08-11 RX ORDER — CLOBETASOL PROPIONATE 0.5 MG/G
OINTMENT TOPICAL ONCE
OUTPATIENT
Start: 2023-08-11 | End: 2023-08-11

## 2023-08-11 RX ORDER — BETAMETHASONE DIPROPIONATE 0.05 %
OINTMENT (GRAM) TOPICAL ONCE
OUTPATIENT
Start: 2023-08-11 | End: 2023-08-11

## 2023-08-11 RX ORDER — LIDOCAINE HYDROCHLORIDE 40 MG/ML
SOLUTION TOPICAL ONCE
OUTPATIENT
Start: 2023-08-11 | End: 2023-08-11

## 2023-08-11 RX ORDER — GINSENG 100 MG
CAPSULE ORAL ONCE
OUTPATIENT
Start: 2023-08-11 | End: 2023-08-11

## 2023-08-11 RX ORDER — LIDOCAINE 50 MG/G
OINTMENT TOPICAL ONCE
OUTPATIENT
Start: 2023-08-11 | End: 2023-08-11

## 2023-08-11 RX ORDER — SODIUM CHLOR/HYPOCHLOROUS ACID 0.033 %
SOLUTION, IRRIGATION IRRIGATION ONCE
OUTPATIENT
Start: 2023-08-11 | End: 2023-08-11

## 2023-08-11 ASSESSMENT — PAIN DESCRIPTION - LOCATION: LOCATION: LEG

## 2023-08-11 ASSESSMENT — PAIN SCALES - GENERAL: PAINLEVEL_OUTOF10: 7

## 2023-08-11 ASSESSMENT — PAIN DESCRIPTION - ORIENTATION: ORIENTATION: RIGHT

## 2023-08-11 ASSESSMENT — PAIN DESCRIPTION - DESCRIPTORS: DESCRIPTORS: SORE;SHOOTING

## 2023-08-11 NOTE — H&P
Patient presents to wound clinic for: Lv Winchester is a 70 y.o. female who presents for evaluation of lateral right leg ulcer. She is accompanied by a caretaker who helps relate her history. She recently got out of rehab for an right tibia fracture. While she was there, she developed a lateral RLE pressure ulcer. She has been having home health care come out and dress it with medihoney. Her caretake notes that they recently have noted some swelling to the area around the wound which was concerning. Their home health care nurse recommended that they follow-up in the wound care center for further evaluation. Pertinent Medical History:  Past Medical History:   Diagnosis Date    Blindness/low vision 2008    due to dm    CHF (congestive heart failure) ([facility])     Chronic kidney disease     Chronic pain     SPINAL STENOSIS    DM (diabetes mellitus) ([facility])     HTN (hypertension)     Menopause     Stroke [facility])      Past Surgical History:   Procedure Laterality Date    OVARY REMOVAL      STENT INSERTION  1091,2005    Waldo Beckham     Prior to Admission medications    Medication Sig Start Date End Date Taking?  Authorizing Provider   VITAMIN D PO Take by mouth    Historical Provider, MD   Cyanocobalamin (VITAMIN B-12 PO) Take by mouth    Historical Provider, MD   diclofenac sodium (VOLTAREN) 1 % GEL Apply topically 2 times daily    Historical Provider, MD   doxepin (SILENOR) 3 MG TABS tablet Take 1 tablet by mouth nightly    Historical Provider, MD   linaCLOtide (LINZESS PO) Take by mouth    Historical Provider, MD   LIDOCAINE EX Apply topically    Historical Provider, MD   sevelamer hcl (RENAGEL) 800 MG tablet Take 1 tablet by mouth 3 times daily (with meals)    Historical Provider, MD   albuterol sulfate HFA (PROVENTIL;VENTOLIN;PROAIR) 108 (90 Base) MCG/ACT inhaler Inhale 2 puffs into the lungs every 4 hours as needed 4/28/21   Ar Automatic Reconciliation   allopurinol (ZYLOPRIM) 100 MG tablet 0.5 tablets    Ar Automatic

## 2023-08-11 NOTE — FLOWSHEET NOTE
08/11/23 1158   Right Leg Edema Point of Measurement   Compression Therapy Tubular elastic support bandage  (patient tolerated well)   Tubular Elastic Support Bandage Compression Pressure Medium   Wound 08/11/23 Leg Right; Lower; Lateral   Date First Assessed: 08/11/23   Wound Approximate Age at First Assessment (Weeks): 6 weeks  Primary Wound Type: Pressure Injury  Location: Leg  Wound Location Orientation: Right; Lower; Lateral   Dressing Status New dressing applied   Dressing/Treatment Hydrating gel;Gauze dressing/dressing sponge;Tape change

## 2023-08-11 NOTE — DISCHARGE INSTRUCTIONS
Discharge Instructions for          [address], [address]  Phone: [phone] Fax: [phone]    NAME:  Steven Diego OF BIRTH:  1945  MEDICAL RECORD NUMBER:  [id number]  DATE:  August 11, 2023  WOUND CARE ORDERS:  Right lower leg wound - Cleanse with baby shampoo. Rinse and pat dry. Apply medihoney gel to wound base. Cover with gauze and gauze roll. Double layer tubigrip size E for mild compression. Change three times a week with home health. Please take your antibiotics as prescribed. Activity:  [x] Elevate leg(s) above the level of the heart when sitting. [x] Avoid prolonged standing in one place. [x] Do no get dressing/wrap wet. Dietary:  [x] Diet as tolerated      [] Diabetic Diet            [x] Increase Protein: examples (Meat, cheese, eggs, greek yogurt, fish, nuts)          [x] Neville Therapeutic Nutrition Powder  Return Appointment:  [x] Return Appointment: Follow up with a provider at the [address] [place] [address] due to location. Their phone number is [phone]. [] Nurse Visit : *** days  [] Ordered tests:    Electronically signed Vitor Griffin RN on 8/11/2023 at 11:31 AM     [address] [place] [address] Information: Should you experience any significant changes in your wound(s) or have questions about your wound care, please contact the [address] [place] [address] at [address] [facility] 8:00 am - 4:30. If you need help with your wound outside these hours and cannot wait until we are again available, contact your PCP or go to the hospital emergency room. PLEASE NOTE: IF YOU ARE UNABLE TO OBTAIN WOUND SUPPLIES, CONTINUE TO USE THE SUPPLIES YOU HAVE AVAILABLE UNTIL YOU ARE ABLE TO REACH US. IT IS MOST IMPORTANT TO KEEP THE WOUND COVERED AT ALL TIMES.      Physician Signature:_______________________    Date: ___________ Time:  ____________

## 2023-08-15 ENCOUNTER — TELEPHONE (OUTPATIENT)
Facility: HOSPITAL | Age: 78
End: 2023-08-15

## 2023-08-15 NOTE — TELEPHONE ENCOUNTER
Received phone call from patient's daughter today stating that they have not received a prescription yet that Dr Hansel Squires was to call in to [facility] pharmacy. I called Dr Hansel Squires and she gave me a verbal order for Keflex 500mg tablets to be taken one by mouth three times per day x 7 days. Prescription for Keflex 500mg Qty #21 with 0 refills sent to [facility] pharmacy in [place]. Called daughter back to confirm that patient has had antibiotic phoned in to pharmacy.

## 2023-09-08 ENCOUNTER — HOSPITAL ENCOUNTER (OUTPATIENT)
Facility: HOSPITAL | Age: 78
Discharge: HOME | End: 2023-09-08
Payer: MEDICARE

## 2023-09-08 VITALS
RESPIRATION RATE: 18 BRPM | HEART RATE: 76 BPM | SYSTOLIC BLOOD PRESSURE: 125 MMHG | TEMPERATURE: 97.1 F | DIASTOLIC BLOOD PRESSURE: 56 MMHG

## 2023-09-08 DIAGNOSIS — L89.893 PRESSURE ULCER OF RIGHT LEG, STAGE 3 (HCC): Primary | ICD-10-CM

## 2023-09-08 PROCEDURE — 11043 DBRDMT MUSC&/FSCA 1ST 20/<: CPT

## 2023-09-08 RX ORDER — LIDOCAINE HYDROCHLORIDE 40 MG/ML
SOLUTION TOPICAL ONCE
OUTPATIENT
Start: 2023-09-08 | End: 2023-09-08

## 2023-09-08 RX ORDER — CLOBETASOL PROPIONATE 0.5 MG/G
OINTMENT TOPICAL ONCE
OUTPATIENT
Start: 2023-09-08 | End: 2023-09-08

## 2023-09-08 RX ORDER — LIDOCAINE HYDROCHLORIDE 20 MG/ML
JELLY TOPICAL ONCE
OUTPATIENT
Start: 2023-09-08 | End: 2023-09-08

## 2023-09-08 RX ORDER — GINSENG 100 MG
CAPSULE ORAL ONCE
OUTPATIENT
Start: 2023-09-08 | End: 2023-09-08

## 2023-09-08 RX ORDER — GENTAMICIN SULFATE 1 MG/G
OINTMENT TOPICAL ONCE
OUTPATIENT
Start: 2023-09-08 | End: 2023-09-08

## 2023-09-08 RX ORDER — LIDOCAINE 40 MG/G
CREAM TOPICAL ONCE
OUTPATIENT
Start: 2023-09-08 | End: 2023-09-08

## 2023-09-08 RX ORDER — SODIUM CHLOR/HYPOCHLOROUS ACID 0.033 %
SOLUTION, IRRIGATION IRRIGATION ONCE
OUTPATIENT
Start: 2023-09-08 | End: 2023-09-08

## 2023-09-08 RX ORDER — BACITRACIN ZINC AND POLYMYXIN B SULFATE 500; 1000 [USP'U]/G; [USP'U]/G
OINTMENT TOPICAL ONCE
OUTPATIENT
Start: 2023-09-08 | End: 2023-09-08

## 2023-09-08 RX ORDER — BETAMETHASONE DIPROPIONATE 0.05 %
OINTMENT (GRAM) TOPICAL ONCE
OUTPATIENT
Start: 2023-09-08 | End: 2023-09-08

## 2023-09-08 RX ORDER — LIDOCAINE 50 MG/G
OINTMENT TOPICAL ONCE
OUTPATIENT
Start: 2023-09-08 | End: 2023-09-08

## 2023-09-08 RX ORDER — IBUPROFEN 200 MG
TABLET ORAL ONCE
OUTPATIENT
Start: 2023-09-08 | End: 2023-09-08

## 2023-09-08 ASSESSMENT — PAIN DESCRIPTION - ORIENTATION: ORIENTATION: RIGHT

## 2023-09-08 ASSESSMENT — PAIN SCALES - GENERAL: PAINLEVEL_OUTOF10: 8

## 2023-09-08 ASSESSMENT — PAIN DESCRIPTION - LOCATION: LOCATION: LEG

## 2023-09-08 NOTE — DISCHARGE INSTRUCTIONS
Discharge Instructions for          [address], [address]  Phone: [phone] Fax: [phone]    NAME:  Hemant Party OF BIRTH:  1945  MEDICAL RECORD NUMBER:  [id number]  DATE:  September 8, 2023  WOUND CARE ORDERS:  Right lower leg wound - Cleanse with baby shampoo. Rinse and pat dry. Apply nj (collagen with silver) to wound base. Cover with gauze and gauze roll. Double layer tubigrip size E for mild compression. Change three times a week with home health. Activity:  [x] Elevate leg(s) above the level of the heart when sitting. [x] Avoid prolonged standing in one place. [x] Do no get dressing/wrap wet. Dietary:  [x] Diet as tolerated      [] Diabetic Diet            [] Increase Protein: examples (Meat, cheese, eggs, greek yogurt, fish, nuts)          [x] Neville Therapeutic Nutrition Powder  Return Appointment:  [x] Return Appointment: With Dr. Estefany Ashley in 1 week. Electronically signed Francisco Javier Grove RN on 9/8/2023 at 11:09 AM     [address] [place] [address] Information: Should you experience any significant changes in your wound(s) or have questions about your wound care, please contact the [address] [place] [address] at [address] [facility] 8:00 am - 4:30. If you need help with your wound outside these hours and cannot wait until we are again available, contact your PCP or go to the hospital emergency room. PLEASE NOTE: IF YOU ARE UNABLE TO OBTAIN WOUND SUPPLIES, CONTINUE TO USE THE SUPPLIES YOU HAVE AVAILABLE UNTIL YOU ARE ABLE TO REACH US. IT IS MOST IMPORTANT TO KEEP THE WOUND COVERED AT ALL TIMES.      Physician Signature:_______________________    Date: ___________ Time:  ____________

## 2023-09-08 NOTE — PROGRESS NOTES
Descriptions are Pre Debridement except measurements:    Wound Care Documentation:  Wound 08/11/23 Leg Right; Lower; Lateral (Active)   Wound Image   09/08/23 1049   Wound Etiology Pressure Unstageable 09/08/23 1049   Dressing Status Intact; Old drainage noted 09/08/23 1049   Wound Cleansed Cleansed with saline 09/08/23 1049   Dressing/Treatment Hydrating gel;Gauze dressing/dressing sponge;Tape change 08/11/23 1158   Offloading for Diabetic Foot Ulcers Offloading not ordered 09/08/23 1049   Wound Length (cm) 3 cm 09/08/23 1049   Wound Width (cm) 2.3 cm 09/08/23 1049   Wound Depth (cm) 0.4 cm 09/08/23 1049   Wound Surface Area (cm^2) 6.9 cm^2 09/08/23 1049   Change in Wound Size % (l*w) 16.67 09/08/23 1049   Wound Volume (cm^3) 2.76 cm^3 09/08/23 1049   Wound Healing % 17 09/08/23 1049   Wound Assessment Slough; Hyper granulation tissue;Pink/red 09/08/23 1049   Drainage Amount Moderate (25-50%) 09/08/23 1049   Drainage Description Serosanguinous 09/08/23 1049   Odor None 09/08/23 1049   Bettie-wound Assessment Hemosiderin staining (brown yellow); Induration 09/08/23 1049   Margins Defined edges 09/08/23 1049   Wound Thickness Description not for Pressure Injury Full thickness 09/08/23 1049   Number of days: 28         Total Surface Area Debrided:  6.9 sq cm     Estimated Blood Loss:  Minimal    Hemostasis Achieved: Pressure and Silver Nitrate    Procedural Pain: 2 / 10     Post Procedural Pain: 0 / 10     Response to treatment: Well tolerated by patient     Assessment:   Stage III pressure ulcer of right leg  Diabetes mellitus    Plan:   -Patient seen and assessed  -Soft tissue of lateral right leg pressure ulcer debrided as noted above.  Silver nitrate applied to hypergranular tissue  -Collagen, dsd  -Follow-up in 1 month

## 2023-09-15 ENCOUNTER — HOSPITAL ENCOUNTER (OUTPATIENT)
Facility: HOSPITAL | Age: 78
Discharge: HOME | End: 2023-09-15
Payer: MEDICARE

## 2023-09-15 VITALS
RESPIRATION RATE: 19 BRPM | DIASTOLIC BLOOD PRESSURE: 69 MMHG | TEMPERATURE: 97.6 F | SYSTOLIC BLOOD PRESSURE: 109 MMHG | HEART RATE: 70 BPM

## 2023-09-15 DIAGNOSIS — L89.893 PRESSURE ULCER OF RIGHT LEG, STAGE 3 (HCC): Primary | ICD-10-CM

## 2023-09-15 PROCEDURE — 11042 DBRDMT SUBQ TIS 1ST 20SQCM/<: CPT

## 2023-09-15 RX ORDER — CLOBETASOL PROPIONATE 0.5 MG/G
OINTMENT TOPICAL ONCE
OUTPATIENT
Start: 2023-09-15 | End: 2023-09-15

## 2023-09-15 RX ORDER — BETAMETHASONE DIPROPIONATE 0.05 %
OINTMENT (GRAM) TOPICAL ONCE
OUTPATIENT
Start: 2023-09-15 | End: 2023-09-15

## 2023-09-15 RX ORDER — LIDOCAINE HYDROCHLORIDE 20 MG/ML
JELLY TOPICAL ONCE
OUTPATIENT
Start: 2023-09-15 | End: 2023-09-15

## 2023-09-15 RX ORDER — SODIUM CHLOR/HYPOCHLOROUS ACID 0.033 %
SOLUTION, IRRIGATION IRRIGATION ONCE
OUTPATIENT
Start: 2023-09-15 | End: 2023-09-15

## 2023-09-15 RX ORDER — GENTAMICIN SULFATE 1 MG/G
OINTMENT TOPICAL ONCE
OUTPATIENT
Start: 2023-09-15 | End: 2023-09-15

## 2023-09-15 RX ORDER — BACITRACIN ZINC AND POLYMYXIN B SULFATE 500; 1000 [USP'U]/G; [USP'U]/G
OINTMENT TOPICAL ONCE
OUTPATIENT
Start: 2023-09-15 | End: 2023-09-15

## 2023-09-15 RX ORDER — LIDOCAINE 40 MG/G
CREAM TOPICAL ONCE
OUTPATIENT
Start: 2023-09-15 | End: 2023-09-15

## 2023-09-15 RX ORDER — LIDOCAINE HYDROCHLORIDE 40 MG/ML
SOLUTION TOPICAL ONCE
OUTPATIENT
Start: 2023-09-15 | End: 2023-09-15

## 2023-09-15 RX ORDER — GINSENG 100 MG
CAPSULE ORAL ONCE
OUTPATIENT
Start: 2023-09-15 | End: 2023-09-15

## 2023-09-15 RX ORDER — LIDOCAINE 50 MG/G
OINTMENT TOPICAL ONCE
OUTPATIENT
Start: 2023-09-15 | End: 2023-09-15

## 2023-09-15 RX ORDER — IBUPROFEN 200 MG
TABLET ORAL ONCE
OUTPATIENT
Start: 2023-09-15 | End: 2023-09-15

## 2023-09-15 NOTE — PROGRESS NOTES
Patient presents to wound clinic for: Lee Del Toro is a 70 y.o. female who presents for evaluation of lateral right leg ulcer. She is accompanied by a caretaker who helps relate her history. She recently got out of rehab for an right tibia fracture. While she was there, she developed a lateral RLE pressure ulcer. She has been having home health care come out and dress it with medihoney. Her caretake notes that they recently have noted some swelling to the area around the wound which was concerning. Their home health care nurse recommended that they follow-up in the wound care center for further evaluation. Update 9/15/23: Patient presents today for a one week follow-up. She is accompanied at today's visit by her caretaker. They note that the wound appears to be improving. Home health care has been changing the dressing. No f/c/n/v/d. Pertinent Medical History:  Past Medical History:   Diagnosis Date    Blindness/low vision 2008    due to dm    CHF (congestive heart failure) ([facility])     Chronic kidney disease     Chronic pain     SPINAL STENOSIS    DM (diabetes mellitus) ([facility])     HTN (hypertension)     Menopause     Stroke [facility])      Past Surgical History:   Procedure Laterality Date    OVARY REMOVAL      STENT INSERTION  1169,2110    Anjelica Conti     Prior to Admission medications    Medication Sig Start Date End Date Taking?  Authorizing Provider   VITAMIN D PO Take by mouth    Historical Provider, MD   Cyanocobalamin (VITAMIN B-12 PO) Take by mouth    Historical Provider, MD   diclofenac sodium (VOLTAREN) 1 % GEL Apply topically 2 times daily    Historical Provider, MD   doxepin (SILENOR) 3 MG TABS tablet Take 1 tablet by mouth nightly    Historical Provider, MD   linaCLOtide (LINZESS PO) Take by mouth    Historical Provider, MD   LIDOCAINE EX Apply topically    Historical Provider, MD   sevelamer hcl (RENAGEL) 800 MG tablet Take 1 tablet by mouth 3 times daily (with meals)    Historical Provider, MD

## 2023-09-15 NOTE — DISCHARGE INSTRUCTIONS
Discharge Instructions/Wound Orders  [address], [address]  Telephone: [phone]    NAME:  Jose Calle OF BIRTH:  1945  MEDICAL RECORD NUMBER:  [id number]  DATE:  September 15, 2023  Wound Care Orders:  Right lower leg wound - Cleanse with baby shampoo. Rinse and pat dry. Apply nj (collagen with silver) to wound base. Cover with gauze and gauze roll. Double layer tubigrip size E for mild compression. Change three times a week with home health. Activity:  [x] Activity as tolerated:   Elevate leg when sitting  [] Remain off Work:       [] May return to full duty work:                                     [] Return to work with restrictions:  Dietary:  [x] Diet as tolerated      [] Diabetic Diet            [] Increase Protein: examples (Meat, cheese, eggs, greek yogurt, fish, nuts)          [] [address]  [] Other:  [] Dial a Dietician : Call [place] at [phone] enter code [phone] when prompted. M-F 9am-5pm EST. Return Appointment:  [x] Return Appointment: With Dr. Magdi Willson in [facility])  [] Ordered tests:    Electronically signed Aga Miller RN on 9/15/2023 at 10:47 AM     [employer] Information: Should you experience any significant changes in your wound(s) or have questions about your wound care, please contact the [facility] at [email or web address] 8:00 am - 4:30. If you need help with your wound outside these hours and cannot wait until we are again available, contact your PCP or go to the hospital emergency room. PLEASE NOTE: IF YOU ARE UNABLE TO OBTAIN WOUND SUPPLIES, CONTINUE TO USE THE SUPPLIES YOU HAVE AVAILABLE UNTIL YOU ARE ABLE TO REACH US. IT IS MOST IMPORTANT TO KEEP THE WOUND COVERED AT ALL TIMES.      Physician Signature:_______________________    Date: ___________ Time:  ____________

## 2023-10-06 ENCOUNTER — HOSPITAL ENCOUNTER (OUTPATIENT)
Facility: HOSPITAL | Age: 78
Discharge: HOME | End: 2023-10-06
Attending: PODIATRIST

## 2023-10-06 VITALS — TEMPERATURE: 97.3 F | DIASTOLIC BLOOD PRESSURE: 62 MMHG | SYSTOLIC BLOOD PRESSURE: 124 MMHG | HEART RATE: 70 BPM

## 2023-10-06 DIAGNOSIS — L89.893 PRESSURE ULCER OF RIGHT LEG, STAGE 3 (HCC): Primary | ICD-10-CM

## 2023-10-06 RX ORDER — CLOBETASOL PROPIONATE 0.5 MG/G
OINTMENT TOPICAL ONCE
OUTPATIENT
Start: 2023-10-06 | End: 2023-10-06

## 2023-10-06 RX ORDER — LIDOCAINE HYDROCHLORIDE 20 MG/ML
JELLY TOPICAL ONCE
OUTPATIENT
Start: 2023-10-06 | End: 2023-10-06

## 2023-10-06 RX ORDER — LIDOCAINE HYDROCHLORIDE 40 MG/ML
SOLUTION TOPICAL ONCE
OUTPATIENT
Start: 2023-10-06 | End: 2023-10-06

## 2023-10-06 RX ORDER — LIDOCAINE 40 MG/G
CREAM TOPICAL ONCE
OUTPATIENT
Start: 2023-10-06 | End: 2023-10-06

## 2023-10-06 RX ORDER — GINSENG 100 MG
CAPSULE ORAL ONCE
OUTPATIENT
Start: 2023-10-06 | End: 2023-10-06

## 2023-10-06 RX ORDER — SODIUM CHLOR/HYPOCHLOROUS ACID 0.033 %
SOLUTION, IRRIGATION IRRIGATION ONCE
OUTPATIENT
Start: 2023-10-06 | End: 2023-10-06

## 2023-10-06 RX ORDER — GENTAMICIN SULFATE 1 MG/G
OINTMENT TOPICAL ONCE
OUTPATIENT
Start: 2023-10-06 | End: 2023-10-06

## 2023-10-06 RX ORDER — IBUPROFEN 200 MG
TABLET ORAL ONCE
OUTPATIENT
Start: 2023-10-06 | End: 2023-10-06

## 2023-10-06 RX ORDER — TRIAMCINOLONE ACETONIDE 1 MG/G
OINTMENT TOPICAL ONCE
OUTPATIENT
Start: 2023-10-06 | End: 2023-10-06

## 2023-10-06 RX ORDER — LIDOCAINE 50 MG/G
OINTMENT TOPICAL ONCE
OUTPATIENT
Start: 2023-10-06 | End: 2023-10-06

## 2023-10-06 RX ORDER — BETAMETHASONE DIPROPIONATE 0.05 %
OINTMENT (GRAM) TOPICAL ONCE
OUTPATIENT
Start: 2023-10-06 | End: 2023-10-06

## 2023-10-06 RX ORDER — BACITRACIN ZINC AND POLYMYXIN B SULFATE 500; 1000 [USP'U]/G; [USP'U]/G
OINTMENT TOPICAL ONCE
OUTPATIENT
Start: 2023-10-06 | End: 2023-10-06

## 2023-10-06 ASSESSMENT — PAIN SCALES - GENERAL: PAINLEVEL_OUTOF10: 3

## 2023-10-06 NOTE — DISCHARGE INSTRUCTIONS
Discharge Instructions/Wound Orders  [address], [address]  Telephone: [phone]    NAME:  Paulina Simon OF BIRTH:  1945  MEDICAL RECORD NUMBER:  [id number]  DATE:  October 6, 2023  Wound Care Orders:  Right lower leg wound - Cleanse with baby shampoo. Rinse and pat dry. Apply nj (collagen with silver) to wound base. Cover with gauze and gauze roll. Double layer tubigrip size E for mild compression. Change three times a week with home health. Place towel or blanket at lateral hip and thigh to rotate leg inward, to offload pressure on wound. Do not cross legs. Activity:  [x] Activity as tolerated   [] Remain off Work:       [] May return to full duty work:                                     [] Return to work with restrictions:  Dietary:  [x] Diet as tolerated      [] Diabetic Diet            [] Increase Protein: examples (Meat, cheese, eggs, greek yogurt, fish, nuts)          [] [address]  [] Other:  [] Dial a Dietician : Call [place] at [phone] enter code [phone] when prompted. M-F 9am-5pm EST. Return Appointment:  [x] Return Appointment: With Dr. Joel Garce in 2 [facility])  [] Ordered tests:    Electronically signed Cj Oconnor RN on 10/6/2023 at 10:06 AM     [address]: Should you experience any significant changes in your wound(s) or have questions about your wound care, please contact the [address] [place] [address] at 1 [employer] 8:00 am - 4:30. If you need help with your wound outside these hours and cannot wait until we are again available, contact your PCP or go to the hospital emergency room. PLEASE NOTE: IF YOU ARE UNABLE TO OBTAIN WOUND SUPPLIES, CONTINUE TO USE THE SUPPLIES YOU HAVE AVAILABLE UNTIL YOU ARE ABLE TO REACH US. IT IS MOST IMPORTANT TO KEEP THE WOUND COVERED AT ALL TIMES.      Physician

## 2023-10-06 NOTE — FLOWSHEET NOTE
10/06/23 1021   Wound 08/11/23 Leg Right; Lower; Lateral   Date First Assessed: 08/11/23   Wound Approximate Age at First Assessment (Weeks): 6 weeks  Primary Wound Type: Pressure Injury  Location: Leg  Wound Location Orientation: Right; Lower; Lateral   Dressing Status New dressing applied   Dressing/Treatment   (nj, gauze, gauze roll tape double tubi E)

## 2023-10-20 ENCOUNTER — HOSPITAL ENCOUNTER (OUTPATIENT)
Facility: HOSPITAL | Age: 78
Discharge: HOME | End: 2023-10-20
Attending: PODIATRIST
Payer: MEDICARE

## 2023-10-20 VITALS — TEMPERATURE: 98.2 F | HEART RATE: 75 BPM | DIASTOLIC BLOOD PRESSURE: 70 MMHG | SYSTOLIC BLOOD PRESSURE: 118 MMHG

## 2023-10-20 DIAGNOSIS — L89.893 PRESSURE ULCER OF RIGHT LEG, STAGE 3 (HCC): Primary | ICD-10-CM

## 2023-10-20 PROCEDURE — 11042 DBRDMT SUBQ TIS 1ST 20SQCM/<: CPT

## 2023-10-20 PROCEDURE — 99213 OFFICE O/P EST LOW 20 MIN: CPT

## 2023-10-20 RX ORDER — TRIAMCINOLONE ACETONIDE 1 MG/G
OINTMENT TOPICAL ONCE
OUTPATIENT
Start: 2023-10-20 | End: 2023-10-20

## 2023-10-20 RX ORDER — BACITRACIN ZINC AND POLYMYXIN B SULFATE 500; 1000 [USP'U]/G; [USP'U]/G
OINTMENT TOPICAL ONCE
OUTPATIENT
Start: 2023-10-20 | End: 2023-10-20

## 2023-10-20 RX ORDER — CLOBETASOL PROPIONATE 0.5 MG/G
OINTMENT TOPICAL ONCE
OUTPATIENT
Start: 2023-10-20 | End: 2023-10-20

## 2023-10-20 RX ORDER — BETAMETHASONE DIPROPIONATE 0.05 %
OINTMENT (GRAM) TOPICAL ONCE
OUTPATIENT
Start: 2023-10-20 | End: 2023-10-20

## 2023-10-20 RX ORDER — LIDOCAINE HYDROCHLORIDE 40 MG/ML
SOLUTION TOPICAL ONCE
OUTPATIENT
Start: 2023-10-20 | End: 2023-10-20

## 2023-10-20 RX ORDER — IBUPROFEN 200 MG
TABLET ORAL ONCE
OUTPATIENT
Start: 2023-10-20 | End: 2023-10-20

## 2023-10-20 RX ORDER — GINSENG 100 MG
CAPSULE ORAL ONCE
OUTPATIENT
Start: 2023-10-20 | End: 2023-10-20

## 2023-10-20 RX ORDER — LIDOCAINE 50 MG/G
OINTMENT TOPICAL ONCE
OUTPATIENT
Start: 2023-10-20 | End: 2023-10-20

## 2023-10-20 RX ORDER — GENTAMICIN SULFATE 1 MG/G
OINTMENT TOPICAL ONCE
OUTPATIENT
Start: 2023-10-20 | End: 2023-10-20

## 2023-10-20 RX ORDER — LIDOCAINE 40 MG/G
CREAM TOPICAL ONCE
OUTPATIENT
Start: 2023-10-20 | End: 2023-10-20

## 2023-10-20 RX ORDER — LIDOCAINE HYDROCHLORIDE 20 MG/ML
JELLY TOPICAL ONCE
OUTPATIENT
Start: 2023-10-20 | End: 2023-10-20

## 2023-10-20 RX ORDER — SODIUM CHLOR/HYPOCHLOROUS ACID 0.033 %
SOLUTION, IRRIGATION IRRIGATION ONCE
OUTPATIENT
Start: 2023-10-20 | End: 2023-10-20

## 2023-10-20 NOTE — FLOWSHEET NOTE
10/20/23 1027   Right Leg Edema Point of Measurement   Compression Therapy Tubular elastic support bandage   Tubular Elastic Support Bandage Compression Pressure Medium   Wound 08/11/23 Leg Right; Lower; Lateral   Date First Assessed: 08/11/23   Wound Approximate Age at First Assessment (Weeks): 6 weeks  Primary Wound Type: Pressure Injury  Location: Leg  Wound Location Orientation: Right; Lower; Lateral   Dressing Status New dressing applied   Dressing/Treatment Collagen with Ag;Gauze dressing/dressing sponge;Roll gauze

## 2023-10-20 NOTE — PROGRESS NOTES
Patient presents to wound clinic for: Pati Scheuermann is a 70 y.o. female who presents for evaluation of lateral right leg ulcer. She is accompanied by a caretaker who helps relate her history. She recently got out of rehab for an right tibia fracture. While she was there, she developed a lateral RLE pressure ulcer. She has been having home health care come out and dress it with medihoney. Her caretake notes that they recently have noted some swelling to the area around the wound which was concerning. Their home health care nurse recommended that they follow-up in the wound care center for further evaluation. Update 10/20/23: Patient presents today for a two week follow-up. She is accompanied at today's visit by her caretaker. Her caretaker notes that the wound has made significant improvements over the past 2 weeks. Pertinent Medical History:  Past Medical History:   Diagnosis Date    Blindness/low vision 2008    due to dm    CHF (congestive heart failure) ([facility])     Chronic kidney disease     Chronic pain     SPINAL STENOSIS    DM (diabetes mellitus) ([facility])     HTN (hypertension)     Menopause     Stroke [facility])      Past Surgical History:   Procedure Laterality Date    OVARY REMOVAL      STENT INSERTION  2011,6817    [facility]     Prior to Admission medications    Medication Sig Start Date End Date Taking?  Authorizing Provider   VITAMIN D PO Take by mouth    Amanda Oneill MD   Cyanocobalamin (VITAMIN B-12 PO) Take by mouth    Amanda Oneill MD   diclofenac sodium (VOLTAREN) 1 % GEL Apply topically 2 times daily    Amanda Oneill MD   doxepin (SILENOR) 3 MG TABS tablet Take 1 tablet by mouth nightly    Amanda Oneill MD   linaCLOtide (LINZESS PO) Take by mouth    Amanad Oneill MD   LIDOCAINE EX Apply topically    Amanda Oneill MD   sevelamer hcl (RENAGEL) 800 MG tablet Take 1 tablet by mouth 3 times daily (with meals)    Amanda Oneill MD   albuterol sulfate

## 2023-10-20 NOTE — DISCHARGE INSTRUCTIONS
Discharge Instructions/Wound Orders  [address], [address]  Telephone: [phone]    NAME:  Lamonte Guillen OF BIRTH:  1945  MEDICAL RECORD NUMBER:  [id number]  DATE:  October 20, 2023  Wound Care Orders:  Right lower leg wound - Cleanse with baby shampoo. Rinse and pat dry. Apply nj (collagen with silver) to wound base. Cover with gauze and gauze roll. Double layer tubigrip size E for mild compression. Change three times a week with home health. Place towel or blanket at lateral hip and thigh to rotate leg inward, to offload pressure on wound. Do not cross legs. Activity:  [x] Activity as tolerated:   Elevate leg as much as possible. [] Remain off Work:       [] May return to full duty work:                                     [] Return to work with restrictions:  Dietary:  [x] Diet as tolerated      [] Diabetic Diet            [] Increase Protein: examples (Meat, cheese, eggs, greek yogurt, fish, nuts)          [] [address]  [] Other:  [] Dial a Dietician : Call [place] at [phone] enter code [phone] when prompted. M-F 9am-5pm EST. Return Appointment:  [x] Return Appointment: With Dr. Mellisa Lombardi in [address] [place] [address])  [] Ordered tests:    Electronically signed Erica Foote RN on 10/20/2023 at 10:13 AM     [address] [place] [address] Information: Should you experience any significant changes in your wound(s) or have questions about your wound care, please contact the [address] [place] [address] at 1 [employer] 8:00 am - 4:30. If you need help with your wound outside these hours and cannot wait until we are again available, contact your PCP or go to the hospital emergency room. PLEASE NOTE: IF YOU ARE UNABLE TO OBTAIN WOUND SUPPLIES, CONTINUE TO USE THE SUPPLIES YOU HAVE AVAILABLE UNTIL YOU ARE ABLE TO REACH US. IT IS MOST IMPORTANT TO KEEP THE WOUND COVERED AT ALL TIMES.

## 2023-11-03 ENCOUNTER — HOSPITAL ENCOUNTER (OUTPATIENT)
Facility: HOSPITAL | Age: 78
Discharge: HOME | End: 2023-11-03
Attending: PODIATRIST
Payer: MEDICARE

## 2023-11-03 VITALS
RESPIRATION RATE: 19 BRPM | TEMPERATURE: 96.8 F | DIASTOLIC BLOOD PRESSURE: 57 MMHG | HEART RATE: 71 BPM | SYSTOLIC BLOOD PRESSURE: 107 MMHG

## 2023-11-03 DIAGNOSIS — L89.893 PRESSURE ULCER OF RIGHT LEG, STAGE 3 (HCC): Primary | ICD-10-CM

## 2023-11-03 PROCEDURE — 11042 DBRDMT SUBQ TIS 1ST 20SQCM/<: CPT

## 2023-11-03 RX ORDER — LIDOCAINE HYDROCHLORIDE 20 MG/ML
JELLY TOPICAL ONCE
OUTPATIENT
Start: 2023-11-03 | End: 2023-11-03

## 2023-11-03 RX ORDER — TRIAMCINOLONE ACETONIDE 1 MG/G
OINTMENT TOPICAL ONCE
OUTPATIENT
Start: 2023-11-03 | End: 2023-11-03

## 2023-11-03 RX ORDER — LIDOCAINE HYDROCHLORIDE 40 MG/ML
SOLUTION TOPICAL ONCE
OUTPATIENT
Start: 2023-11-03 | End: 2023-11-03

## 2023-11-03 RX ORDER — BACITRACIN ZINC AND POLYMYXIN B SULFATE 500; 1000 [USP'U]/G; [USP'U]/G
OINTMENT TOPICAL ONCE
OUTPATIENT
Start: 2023-11-03 | End: 2023-11-03

## 2023-11-03 RX ORDER — GINSENG 100 MG
CAPSULE ORAL ONCE
OUTPATIENT
Start: 2023-11-03 | End: 2023-11-03

## 2023-11-03 RX ORDER — IBUPROFEN 200 MG
TABLET ORAL ONCE
OUTPATIENT
Start: 2023-11-03 | End: 2023-11-03

## 2023-11-03 RX ORDER — LIDOCAINE 50 MG/G
OINTMENT TOPICAL ONCE
OUTPATIENT
Start: 2023-11-03 | End: 2023-11-03

## 2023-11-03 RX ORDER — LIDOCAINE 40 MG/G
CREAM TOPICAL ONCE
OUTPATIENT
Start: 2023-11-03 | End: 2023-11-03

## 2023-11-03 RX ORDER — BETAMETHASONE DIPROPIONATE 0.05 %
OINTMENT (GRAM) TOPICAL ONCE
OUTPATIENT
Start: 2023-11-03 | End: 2023-11-03

## 2023-11-03 RX ORDER — SODIUM CHLOR/HYPOCHLOROUS ACID 0.033 %
SOLUTION, IRRIGATION IRRIGATION ONCE
OUTPATIENT
Start: 2023-11-03 | End: 2023-11-03

## 2023-11-03 RX ORDER — GENTAMICIN SULFATE 1 MG/G
OINTMENT TOPICAL ONCE
OUTPATIENT
Start: 2023-11-03 | End: 2023-11-03

## 2023-11-03 RX ORDER — CLOBETASOL PROPIONATE 0.5 MG/G
OINTMENT TOPICAL ONCE
OUTPATIENT
Start: 2023-11-03 | End: 2023-11-03

## 2023-11-03 NOTE — DISCHARGE INSTRUCTIONS
Discharge Instructions/Wound Orders  [address], [address]   Telephone: [phone]    NAME:  Monique Bain OF BIRTH:  1945  MEDICAL RECORD NUMBER:  [id number]  DATE:  November 3, 2023  Wound Care Orders:  Right lower leg wound - Cleanse with baby shampoo. Rinse and pat dry. Apply nj (collagen with silver) to wound base. Cover with gauze and gauze roll. Double layer tubigrip size E for mild compression. Change three times a week with home health. Place towel or blanket at lateral hip and thigh to rotate leg inward, to offload pressure on wound. Do not cross legs. Activity:  [x] Activity as tolerated:   Elevate leg as much as possible. [] Remain off Work:       [] May return to full duty work:                                     [] Return to work with restrictions:  Dietary:  [x] Diet as tolerated      [] Diabetic Diet            [] Increase Protein: examples (Meat, cheese, eggs, greek yogurt, fish, nuts)          [] [address]  [] Other:  [] Dial a Dietician : Call [place] at [phone] enter code [phone] when prompted. M-F 9am-5pm EST. Return Appointment:  [x] Return Appointment: With Dr. Marivel Lee in 2 [facility])  [] Ordered tests:    Electronically signed Vanessa Levy RN on 11/3/2023 at 11:46 AM     [address] [place] [address] Information: Should you experience any significant changes in your wound(s) or have questions about your wound care, please contact the [address] [place] [address] at 1 [employer] 8:00 am - 4:30. If you need help with your wound outside these hours and cannot wait until we are again available, contact your PCP or go to the hospital emergency room. PLEASE NOTE: IF YOU ARE UNABLE TO OBTAIN WOUND SUPPLIES, CONTINUE TO USE THE SUPPLIES YOU HAVE AVAILABLE UNTIL YOU ARE ABLE TO REACH US. IT IS MOST IMPORTANT TO KEEP THE WOUND COVERED AT ALL TIMES.

## 2023-11-03 NOTE — FLOWSHEET NOTE
11/03/23 1125   Right Leg Edema Point of Measurement   Leg circumference 31 cm   Ankle circumference 19.5 cm   Compression Therapy   (none)   RLE Neurovascular Assessment   Capillary Refill Less than/Equal to 3 seconds   Color Appropriate for Ethnicity   Temperature Cool   RLE Sensation  Full sensation   R Pedal Pulse +1   Wound 08/11/23 Leg Right; Lower; Lateral   Date First Assessed: 08/11/23   Wound Approximate Age at First Assessment (Weeks): 6 weeks  Primary Wound Type: Pressure Injury  Location: Leg  Wound Location Orientation: Right; Lower; Lateral   Wound Image    Wound Etiology Pressure Unstageable   Dressing Status New drainage noted; Intact   Wound Cleansed Cleansed with saline   Offloading for Diabetic Foot Ulcers Offloading not required   Wound Length (cm) 0.9 cm   Wound Width (cm) 1.1 cm   Wound Depth (cm) 0.1 cm   Wound Surface Area (cm^2) 0.99 cm^2   Change in Wound Size % (l*w) 88.04   Wound Volume (cm^3) 0.099 cm^3   Wound Healing % 97   Wound Assessment Pink/red   Drainage Amount Small (< 25%)   Drainage Description Serosanguinous   Odor None   Bettie-wound Assessment Dry/flaky; Induration   Margins Defined edges   Wound Thickness Description not for Pressure Injury Full thickness   Pain Assessment   Pain Assessment None - Denies Pain     . vs

## 2023-11-03 NOTE — FLOWSHEET NOTE
11/03/23 1155   Right Leg Edema Point of Measurement   Compression Therapy Tubular elastic support bandage   Tubular Elastic Support Bandage Compression Pressure Medium   Wound 08/11/23 Leg Right; Lower; Lateral   Date First Assessed: 08/11/23   Wound Approximate Age at First Assessment (Weeks): 6 weeks  Primary Wound Type: Pressure Injury  Location: Leg  Wound Location Orientation: Right; Lower; Lateral   Dressing Status New dressing applied   Dressing/Treatment Collagen with Ag;Gauze dressing/dressing sponge;Roll gauze;Tape/Soft cloth adhesive tape

## 2023-11-03 NOTE — PROGRESS NOTES
Patient presents to wound clinic for: Arielle Soliman is a 70 y.o. female who presents for evaluation of lateral right leg ulcer. She is accompanied by a caretaker who helps relate her history. She recently got out of rehab for an right tibia fracture. While she was there, she developed a lateral RLE pressure ulcer. She has been having home health care come out and dress it with medihoney. Her caretake notes that they recently have noted some swelling to the area around the wound which was concerning. Their home health care nurse recommended that they follow-up in the wound care center for further evaluation. Update 11/3/23: Patient presents today for a two week follow-up. She is accompanied at today's visit by her caretaker. They report no new issues over the past 2 weeks. Pertinent Medical History:  Past Medical History:   Diagnosis Date    Blindness/low vision 2008    due to dm    CHF (congestive heart failure) (HCC)     Chronic kidney disease     Chronic pain     SPINAL STENOSIS    DM (diabetes mellitus) ([facility])     HTN (hypertension)     Menopause     Stroke [facility])      Past Surgical History:   Procedure Laterality Date    OVARY REMOVAL      STENT INSERTION  8061,3324    [place]     Prior to Admission medications    Medication Sig Start Date End Date Taking?  Authorizing Provider   VITAMIN D PO Take by mouth    Amanda Oneill MD   Cyanocobalamin (VITAMIN B-12 PO) Take by mouth    Amanda Oneill MD   diclofenac sodium (VOLTAREN) 1 % GEL Apply topically 2 times daily    Amanda Oneill MD   doxepin (SILENOR) 3 MG TABS tablet Take 1 tablet by mouth nightly    Amanda Oneill MD   linaCLOtide (LINZESS PO) Take by mouth    Amanda Oneill MD   LIDOCAINE EX Apply topically    Amanda Oneill MD   sevelamer hcl (RENAGEL) 800 MG tablet Take 1 tablet by mouth 3 times daily (with meals)    Amanda Oneill MD   albuterol sulfate HFA (PROVENTIL;VENTOLIN;PROAIR) 108 (90

## 2023-11-14 ENCOUNTER — HOSPITAL ENCOUNTER (EMERGENCY)
Facility: HOSPITAL | Age: 78
Discharge: HOME | End: 2023-11-14
Attending: EMERGENCY MEDICINE
Payer: MEDICARE

## 2023-11-14 VITALS
BODY MASS INDEX: 28.17 KG/M2 | HEART RATE: 67 BPM | SYSTOLIC BLOOD PRESSURE: 153 MMHG | OXYGEN SATURATION: 99 % | WEIGHT: 165 LBS | TEMPERATURE: 98.3 F | HEIGHT: 64 IN | RESPIRATION RATE: 18 BRPM | DIASTOLIC BLOOD PRESSURE: 72 MMHG

## 2023-11-14 DIAGNOSIS — R09.81 NASAL CONGESTION: Primary | ICD-10-CM

## 2023-11-14 DIAGNOSIS — Z99.2 ESRD ON DIALYSIS (HCC): ICD-10-CM

## 2023-11-14 DIAGNOSIS — N18.6 ESRD ON DIALYSIS (HCC): ICD-10-CM

## 2023-11-14 DIAGNOSIS — R73.9 HYPERGLYCEMIA: ICD-10-CM

## 2023-11-14 LAB
ANION GAP SERPL CALC-SCNC: 12 MMOL/L (ref 5–15)
BASOPHILS # BLD: 0.1 K/UL (ref 0–0.1)
BASOPHILS NFR BLD: 1 % (ref 0–1)
BUN SERPL-MCNC: 73 MG/DL (ref 6–20)
BUN/CREAT SERPL: 9 (ref 12–20)
CA-I BLD-MCNC: 9.1 MG/DL (ref 8.5–10.1)
CHLORIDE SERPL-SCNC: 99 MMOL/L (ref 97–108)
CO2 SERPL-SCNC: 27 MMOL/L (ref 21–32)
CREAT SERPL-MCNC: 8.06 MG/DL (ref 0.55–1.02)
DIFFERENTIAL METHOD BLD: ABNORMAL
EOSINOPHIL # BLD: 0.3 K/UL (ref 0–0.4)
EOSINOPHIL NFR BLD: 4 % (ref 0–7)
ERYTHROCYTE [DISTWIDTH] IN BLOOD BY AUTOMATED COUNT: 17.9 % (ref 11.5–14.5)
GLUCOSE SERPL-MCNC: 102 MG/DL (ref 65–100)
HCT VFR BLD AUTO: 35.5 % (ref 35–47)
HGB BLD-MCNC: 10.9 G/DL (ref 11.5–16)
IMM GRANULOCYTES # BLD AUTO: 0.1 K/UL (ref 0–0.04)
IMM GRANULOCYTES NFR BLD AUTO: 1 % (ref 0–0.5)
LYMPHOCYTES # BLD: 1.7 K/UL (ref 0.8–3.5)
LYMPHOCYTES NFR BLD: 25 % (ref 12–49)
MAGNESIUM SERPL-MCNC: 2.6 MG/DL (ref 1.6–2.4)
MCH RBC QN AUTO: 27.5 PG (ref 26–34)
MCHC RBC AUTO-ENTMCNC: 30.7 G/DL (ref 30–36.5)
MCV RBC AUTO: 89.6 FL (ref 80–99)
MONOCYTES # BLD: 0.5 K/UL (ref 0–1)
MONOCYTES NFR BLD: 8 % (ref 5–13)
NEUTS SEG # BLD: 3.9 K/UL (ref 1.8–8)
NEUTS SEG NFR BLD: 61 % (ref 32–75)
NRBC # BLD: 0 K/UL (ref 0–0.01)
NRBC BLD-RTO: 0 PER 100 WBC
PHOSPHATE SERPL-MCNC: 5.3 MG/DL (ref 2.6–4.7)
PLATELET # BLD AUTO: 297 K/UL (ref 150–400)
PMV BLD AUTO: 12.1 FL (ref 8.9–12.9)
POTASSIUM SERPL-SCNC: 5.2 MMOL/L (ref 3.5–5.1)
RBC # BLD AUTO: 3.96 M/UL (ref 3.8–5.2)
RBC MORPH BLD: ABNORMAL
SODIUM SERPL-SCNC: 138 MMOL/L (ref 136–145)
WBC # BLD AUTO: 6.6 K/UL (ref 3.6–11)

## 2023-11-14 PROCEDURE — 36415 COLL VENOUS BLD VENIPUNCTURE: CPT

## 2023-11-14 PROCEDURE — 80048 BASIC METABOLIC PNL TOTAL CA: CPT

## 2023-11-14 PROCEDURE — 85025 COMPLETE CBC W/AUTO DIFF WBC: CPT

## 2023-11-14 PROCEDURE — 99283 EMERGENCY DEPT VISIT LOW MDM: CPT

## 2023-11-14 PROCEDURE — 83735 ASSAY OF MAGNESIUM: CPT

## 2023-11-14 PROCEDURE — 84100 ASSAY OF PHOSPHORUS: CPT

## 2023-11-14 NOTE — ED NOTES
home health nurse at bedside reports patient ate a snickers bar just before EMS arrived and check bedside glucose       Jeremías Red RN  11/14/23 6367

## 2023-11-14 NOTE — ED NOTES
Lab states blood is hemolyzed with skewed results. Lab will come for redraw.        Wesly Carter RN  11/14/23 8907

## 2023-11-14 NOTE — ED TRIAGE NOTES
Patient has nasal congestion that started a few years ago after her stroke patient has no other complaints assessment with EMS revealed bgl over 500 patient not taking medication currently for diabetes patient refused to go to dialysis today at well per her home health nurse patient reports she doesn't think she needs to go

## 2023-11-15 NOTE — ED NOTES
Patient stable at time of discharge. Reviewed discharge instructions and education. Patient verbalized understanding. Patient states no questions at this time.       Adam Denny RN  11/14/23 1931

## 2023-11-17 ENCOUNTER — HOSPITAL ENCOUNTER (OUTPATIENT)
Facility: HOSPITAL | Age: 78
Discharge: HOME | End: 2023-11-17
Attending: PODIATRIST
Payer: MEDICARE

## 2023-11-17 VITALS
DIASTOLIC BLOOD PRESSURE: 68 MMHG | RESPIRATION RATE: 19 BRPM | HEART RATE: 80 BPM | SYSTOLIC BLOOD PRESSURE: 120 MMHG | TEMPERATURE: 96.7 F

## 2023-11-17 DIAGNOSIS — L89.893 PRESSURE ULCER OF RIGHT LEG, STAGE 3 (HCC): Primary | ICD-10-CM

## 2023-11-17 PROCEDURE — 11042 DBRDMT SUBQ TIS 1ST 20SQCM/<: CPT

## 2023-11-17 RX ORDER — CLOBETASOL PROPIONATE 0.5 MG/G
OINTMENT TOPICAL ONCE
OUTPATIENT
Start: 2023-11-17 | End: 2023-11-17

## 2023-11-17 RX ORDER — LIDOCAINE HYDROCHLORIDE 20 MG/ML
JELLY TOPICAL ONCE
OUTPATIENT
Start: 2023-11-17 | End: 2023-11-17

## 2023-11-17 RX ORDER — BETAMETHASONE DIPROPIONATE 0.05 %
OINTMENT (GRAM) TOPICAL ONCE
OUTPATIENT
Start: 2023-11-17 | End: 2023-11-17

## 2023-11-17 RX ORDER — BACITRACIN ZINC AND POLYMYXIN B SULFATE 500; 1000 [USP'U]/G; [USP'U]/G
OINTMENT TOPICAL ONCE
OUTPATIENT
Start: 2023-11-17 | End: 2023-11-17

## 2023-11-17 RX ORDER — GINSENG 100 MG
CAPSULE ORAL ONCE
OUTPATIENT
Start: 2023-11-17 | End: 2023-11-17

## 2023-11-17 RX ORDER — SODIUM CHLOR/HYPOCHLOROUS ACID 0.033 %
SOLUTION, IRRIGATION IRRIGATION ONCE
OUTPATIENT
Start: 2023-11-17 | End: 2023-11-17

## 2023-11-17 RX ORDER — LIDOCAINE 50 MG/G
OINTMENT TOPICAL ONCE
OUTPATIENT
Start: 2023-11-17 | End: 2023-11-17

## 2023-11-17 RX ORDER — GENTAMICIN SULFATE 1 MG/G
OINTMENT TOPICAL ONCE
OUTPATIENT
Start: 2023-11-17 | End: 2023-11-17

## 2023-11-17 RX ORDER — LIDOCAINE 40 MG/G
CREAM TOPICAL ONCE
OUTPATIENT
Start: 2023-11-17 | End: 2023-11-17

## 2023-11-17 RX ORDER — LIDOCAINE HYDROCHLORIDE 40 MG/ML
SOLUTION TOPICAL ONCE
OUTPATIENT
Start: 2023-11-17 | End: 2023-11-17

## 2023-11-17 RX ORDER — TRIAMCINOLONE ACETONIDE 1 MG/G
OINTMENT TOPICAL ONCE
OUTPATIENT
Start: 2023-11-17 | End: 2023-11-17

## 2023-11-17 RX ORDER — IBUPROFEN 200 MG
TABLET ORAL ONCE
OUTPATIENT
Start: 2023-11-17 | End: 2023-11-17

## 2023-11-17 NOTE — ED PROVIDER NOTES
[facility] EMERGENCY DEPT  EMERGENCY DEPARTMENT HISTORY AND PHYSICAL EXAM      Date: 11/14/2023  Patient Name: Rolo Rao  MRN: [id number]  [address] [facility] 1945  Date of evaluation: 11/14/2023  Provider: Brittany Griffin MD   Note Started: 9:22 PM EST 11/16/23    HISTORY OF PRESENT ILLNESS     Chief Complaint   Patient presents with    Nasal Congestion       History Provided By: Patient    HPI: Rolo Rao is a 70 y.o. female presenting to ed with congestion, elevated bg levels. Pt is a poor historian, accompanied by her aids. Brought by EMS. Was noted by aid to have elevated glucose to 500 at home and EMS was called. Per report pt's sensation of congestion and running nose is chronic and associated with prior stroke. Pt is repeatedly asking to be let go  back home. She urrently denies any symptoms except for this chronic sensation of nasal drainage. Additionally, pt missed dialysis today. PAST MEDICAL HISTORY   Past Medical History:  Past Medical History:   Diagnosis Date    Blindness/low vision 2008    due to dm    CHF (congestive heart failure) (HCC)     Chronic kidney disease     Chronic pain     SPINAL STENOSIS    DM (diabetes mellitus) ([address] )     HTN (hypertension)     Menopause     Stroke [facility])        Past Surgical History:  Past Surgical History:   Procedure Laterality Date    OVARY REMOVAL      STENT INSERTION  1357,5609    Elise Jones       Family History:  Family History   Problem Relation Age of Onset    Breast Cancer Maternal Aunt     Breast Cancer Cousin        Social History:  Social History     Tobacco Use    Smoking status: Former    Smokeless tobacco: Never    Tobacco comments:     Quit smoking: quit 1972   Substance Use Topics    Alcohol use: No    Drug use: No       Allergies:   Allergies   Allergen Reactions    Ibuprofen Nausea And Vomiting     Due to renal function  Other reaction(s): ibuprofen ([id number])      Naproxen Itching     Due to renal function  Other reaction(s):

## 2023-11-17 NOTE — PROGRESS NOTES
Patient presents to wound clinic for: Rolo Estes is a 70 y.o. female who presents for evaluation of lateral right leg ulcer. She is accompanied by a caretaker who helps relate her history. She recently got out of rehab for an right tibia fracture. While she was there, she developed a lateral RLE pressure ulcer. She has been having home health care come out and dress it with medihoney. Her caretake notes that they recently have noted some swelling to the area around the wound which was concerning. Their home health care nurse recommended that they follow-up in the wound care center for further evaluation. Update 11/17/23: Patient presents today for a two week follow-up. She is accompanied at today's visit by her caretaker. Pertinent Medical History:  Past Medical History:   Diagnosis Date    Blindness/low vision 2008    due to dm    CHF (congestive heart failure) ([facility])     Chronic kidney disease     Chronic pain     SPINAL STENOSIS    DM (diabetes mellitus) ([facility])     HTN (hypertension)     Menopause     Stroke [facility])      Past Surgical History:   Procedure Laterality Date    OVARY REMOVAL      STENT INSERTION  8100,6355    Zilphitoyin Rival     Prior to Admission medications    Medication Sig Start Date End Date Taking?  Authorizing Provider   VITAMIN D PO Take by mouth    Amanda Oneill MD   Cyanocobalamin (VITAMIN B-12 PO) Take by mouth    Amanda Oneill MD   diclofenac sodium (VOLTAREN) 1 % GEL Apply topically 2 times daily    Amanda Oneill MD   doxepin (SILENOR) 3 MG TABS tablet Take 1 tablet by mouth nightly    Amanda Oneill MD   linaCLOtide (LINZESS PO) Take by mouth    Amanda Oneill MD   LIDOCAINE EX Apply topically    Amanda Oneill MD   sevelamer hcl (RENAGEL) 800 MG tablet Take 1 tablet by mouth 3 times daily (with meals)    Amanda Oneill MD   albuterol sulfate HFA (PROVENTIL;VENTOLIN;PROAIR) 108 (90 Base) MCG/ACT inhaler Inhale 2 puffs into the lungs

## 2023-11-17 NOTE — DISCHARGE INSTRUCTIONS
Discharge Instructions/Wound Orders  [address] [place] [address]  Telephone: [phone]    NAME:  Jose Calle OF BIRTH:  1945  MEDICAL RECORD NUMBER:  [id number]  DATE:  November 17, 2023  Wound Care Orders:  Right lower leg wound - Cleanse with baby shampoo. Rinse and pat dry. Apply nj (collagen with silver) to wound base. Cover with gauze and gauze roll. Double layer tubigrip size E for mild compression. Change three times a week with home health. Place towel or blanket at lateral hip and thigh to rotate leg inward, to offload pressure on wound. Do not cross legs. Activity:  [x] Activity as tolerated:  Elevate legs as much as possible. [] Remain off Work:       [] May return to full duty work:                                     [] Return to work with restrictions:  Dietary:  [x] Diet as tolerated      [] Diabetic Diet            [] Increase Protein: examples (Meat, cheese, eggs, greek yogurt, fish, nuts)          [] [address]  [] Other:  [] Dial a Dietician : Call [employer] [facility] at [phone] enter code [phone] when prompted. M-F 9am-5pm EST. Return Appointment:  [x] Return Appointment: With Dr. Magdi Willson in 3 [facility])  [] Ordered tests:    Electronically signed Aga Miller RN on 11/17/2023 at 11:42 AM     [address] [place] [address] Information: Should you experience any significant changes in your wound(s) or have questions about your wound care, please contact the [address] [place] [address] at 1 [employer] 8:00 am - 4:30. If you need help with your wound outside these hours and cannot wait until we are again available, contact your PCP or go to the hospital emergency room. PLEASE NOTE: IF YOU ARE UNABLE TO OBTAIN WOUND SUPPLIES, CONTINUE TO USE THE SUPPLIES YOU HAVE AVAILABLE UNTIL YOU ARE ABLE TO REACH US. IT IS MOST IMPORTANT TO KEEP THE WOUND COVERED AT ALL TIMES.

## 2023-11-17 NOTE — FLOWSHEET NOTE
11/17/23 1151   Wound Leg Right; Lower   No Date First Assessed or Time First Assessed found. Location: Leg  Wound Location Orientation: Right; Lower   Dressing Status New dressing applied   Dressing/Treatment Collagen with Ag;Gauze dressing/dressing sponge;Roll gauze

## 2023-12-08 ENCOUNTER — HOSPITAL ENCOUNTER (OUTPATIENT)
Facility: HOSPITAL | Age: 78
Discharge: HOME | End: 2023-12-08
Attending: PODIATRIST
Payer: MEDICARE

## 2023-12-08 VITALS
TEMPERATURE: 97.8 F | HEART RATE: 63 BPM | DIASTOLIC BLOOD PRESSURE: 58 MMHG | SYSTOLIC BLOOD PRESSURE: 126 MMHG | RESPIRATION RATE: 18 BRPM

## 2023-12-08 DIAGNOSIS — L89.893 PRESSURE ULCER OF RIGHT LEG, STAGE 3 (HCC): Primary | ICD-10-CM

## 2023-12-08 PROCEDURE — 99213 OFFICE O/P EST LOW 20 MIN: CPT

## 2023-12-08 RX ORDER — BACITRACIN ZINC AND POLYMYXIN B SULFATE 500; 1000 [USP'U]/G; [USP'U]/G
OINTMENT TOPICAL ONCE
OUTPATIENT
Start: 2023-12-08 | End: 2023-12-08

## 2023-12-08 RX ORDER — TRIAMCINOLONE ACETONIDE 1 MG/G
OINTMENT TOPICAL ONCE
OUTPATIENT
Start: 2023-12-08 | End: 2023-12-08

## 2023-12-08 RX ORDER — LIDOCAINE HYDROCHLORIDE 20 MG/ML
JELLY TOPICAL ONCE
OUTPATIENT
Start: 2023-12-08 | End: 2023-12-08

## 2023-12-08 RX ORDER — LIDOCAINE HYDROCHLORIDE 40 MG/ML
SOLUTION TOPICAL ONCE
OUTPATIENT
Start: 2023-12-08 | End: 2023-12-08

## 2023-12-08 RX ORDER — GENTAMICIN SULFATE 1 MG/G
OINTMENT TOPICAL ONCE
OUTPATIENT
Start: 2023-12-08 | End: 2023-12-08

## 2023-12-08 RX ORDER — BETAMETHASONE DIPROPIONATE 0.05 %
OINTMENT (GRAM) TOPICAL ONCE
OUTPATIENT
Start: 2023-12-08 | End: 2023-12-08

## 2023-12-08 RX ORDER — CLOBETASOL PROPIONATE 0.5 MG/G
OINTMENT TOPICAL ONCE
OUTPATIENT
Start: 2023-12-08 | End: 2023-12-08

## 2023-12-08 RX ORDER — SODIUM CHLOR/HYPOCHLOROUS ACID 0.033 %
SOLUTION, IRRIGATION IRRIGATION ONCE
OUTPATIENT
Start: 2023-12-08 | End: 2023-12-08

## 2023-12-08 RX ORDER — IBUPROFEN 200 MG
TABLET ORAL ONCE
OUTPATIENT
Start: 2023-12-08 | End: 2023-12-08

## 2023-12-08 RX ORDER — GINSENG 100 MG
CAPSULE ORAL ONCE
OUTPATIENT
Start: 2023-12-08 | End: 2023-12-08

## 2023-12-08 RX ORDER — LIDOCAINE 50 MG/G
OINTMENT TOPICAL ONCE
OUTPATIENT
Start: 2023-12-08 | End: 2023-12-08

## 2023-12-08 RX ORDER — LIDOCAINE 40 MG/G
CREAM TOPICAL ONCE
OUTPATIENT
Start: 2023-12-08 | End: 2023-12-08

## 2023-12-08 NOTE — PROGRESS NOTES
Base) MCG/ACT inhaler Inhale 2 puffs into the lungs every 4 hours as needed 4/28/21   Automatic Reconciliation, Ar   allopurinol (ZYLOPRIM) 100 MG tablet 0.5 tablets    Automatic Reconciliation, Ar   amLODIPine (NORVASC) 2.5 MG tablet Take 1 tablet by mouth every evening 7/27/22   Automatic Reconciliation, Ar   aspirin 81 MG chewable tablet Take 1 tablet by mouth daily    Automatic Reconciliation, Ar   atorvastatin (LIPITOR) 40 MG tablet atorvastatin 40 mg tablet    Automatic Reconciliation, Ar   azelastine (ASTELIN) 0.1 % nasal spray 1 spray 2 times daily    Automatic Reconciliation, Ar   carvedilol (COREG) 3.125 MG tablet Take 1 tablet by mouth 2 times daily (with meals) 7/27/22   Automatic Reconciliation, Ar   Cholecalciferol 50 MCG (2000 UT) TABS Take 1 tablet by mouth daily 3/8/21   Automatic Reconciliation, Ar   docusate (COLACE, DULCOLAX) 100 MG CAPS Take 100 mg by mouth 2 times daily    Automatic Reconciliation, Ar   donepezil (ARICEPT) 5 MG tablet Take 1 tablet by mouth daily    Automatic Reconciliation, Ar   fluticasone (FLONASE) 50 MCG/ACT nasal spray 2 sprays by Nasal route daily    Automatic Reconciliation, Ar   fluticasone-salmeterol (ADVAIR HFA) 115-21 MCG/ACT inhaler Inhale 2 puffs into the lungs 9/2/21   Automatic Reconciliation, Ar   ipratropium (ATROVENT) 0.03 % nasal spray 1 SPRAY BY BOTH NOSTRILS ROUTE EVERY TWELVE (12) HOURS AS NEEDED FOR RUNNY NOSE 3/30/22   Automatic Reconciliation, Ar   loratadine (CLARITIN) 10 MG capsule Take by mouth    Automatic Reconciliation, Ar     Allergies   Allergen Reactions    Ibuprofen Nausea And Vomiting     Due to renal function  Other reaction(s): ibuprofen ([id number])      Naproxen Itching     Due to renal function  Other reaction(s): naproxen ([id number])      Codeine Itching     Other reaction(s): Rash  Other reaction(s): Rash      Nsaids Itching     Due to renal function       Family History   Problem Relation Age of Onset    Breast Cancer Maternal Aunt

## 2023-12-08 NOTE — PATIENT INSTRUCTIONS
Discharge Instructions for  [address]  Phone: [phone] Fax: [phone]    NAME:  Marco A Matute OF BIRTH:  1945  MEDICAL RECORD NUMBER:  [id number]  DATE:  December 8, 2023  WOUND CARE ORDERS:  Right lower leg wound - Cleanse with baby shampoo. Rinse and pat dry. Apply xeroform gauze to wound base. Cover with gauze and gauze roll. Double layer tubigrip size E for mild compression. Change three times a week with home health. Place towel or blanket at lateral hip and thigh to rotate leg inward, to offload pressure on wound. Do not cross legs. Activity:  [x] Elevate leg(s) above the level of the heart when sitting. [x] Avoid prolonged standing in one place. [x] Do no get dressing/wrap wet. Dietary:  [] Diet as tolerated      [] Diabetic Diet            [] Increase Protein: examples (Meat, cheese, eggs, greek yogurt, fish, nuts)          [] [facility] Therapeutic Nutrition Powder    Return Appointment:  [x] Return Appointment: With Dr. Kiesha Larios in 3 weeks. [] Nurse Visit :   [] Ordered tests:      Electronically signed Jennifer Wilkins RN on 12/8/2023 at 11:44 AM     [address] [place] [address] Information: Should you experience any significant changes in your wound(s) or have questions about your wound care, please contact the [address] [place] [address] at 1 [facility] 8:00 am - 4:30. If you need help with your wound outside these hours and cannot wait until we are again available, contact your PCP or go to the hospital emergency room. PLEASE NOTE: IF YOU ARE UNABLE TO OBTAIN WOUND SUPPLIES, CONTINUE TO USE THE SUPPLIES YOU HAVE AVAILABLE UNTIL YOU ARE ABLE TO REACH US. IT IS MOST IMPORTANT TO KEEP THE WOUND COVERED AT ALL TIMES.      Physician Signature:_______________________    Date: ___________ Time:  ____________

## 2024-01-12 ENCOUNTER — HOSPITAL ENCOUNTER (OUTPATIENT)
Facility: HOSPITAL | Age: 79
Discharge: HOME | End: 2024-01-12
Attending: PODIATRIST
Payer: MEDICARE

## 2024-01-12 VITALS — SYSTOLIC BLOOD PRESSURE: 174 MMHG | DIASTOLIC BLOOD PRESSURE: 67 MMHG | TEMPERATURE: 97.8 F | HEART RATE: 75 BPM

## 2024-01-12 PROCEDURE — 99213 OFFICE O/P EST LOW 20 MIN: CPT

## 2024-01-12 PROCEDURE — 99212 OFFICE O/P EST SF 10 MIN: CPT

## 2024-01-12 NOTE — PROGRESS NOTES
Patient presents to wound clinic for: Daniel Garcia is a 78 y.o. female who presents for evaluation of lateral right leg ulcer. She is accompanied by a caretaker who helps relate her history. She recently got out of rehab for an right tibia fracture. While she was there, she developed a lateral RLE pressure ulcer. She has been having home health care come out and dress it with medihoney.  Her caretake notes that they recently have noted some swelling to the area around the wound which was concerning. Their home health care nurse recommended that they follow-up in the wound care center for further evaluation.    Update 1/12/24: Patient presents today for a a follow-up. She is accompanied at today's visit by her caretaker. She notes that the wound seems to be healed.    Pertinent Medical History:  Past Medical History:   Diagnosis Date    Blindness/low vision 2008    due to dm    CHF (congestive heart failure) (HCC)     Chronic kidney disease     Chronic pain     SPINAL STENOSIS    DM (diabetes mellitus) (HCC)     HTN (hypertension)     Menopause     Stroke (HCC)      Past Surgical History:   Procedure Laterality Date    OVARY REMOVAL      STENT INSERTION  2001,2002    Chaz Mora     Prior to Admission medications    Medication Sig Start Date End Date Taking? Authorizing Provider   VITAMIN D PO Take by mouth    Amanda Oneill MD   Cyanocobalamin (VITAMIN B-12 PO) Take by mouth    Amanda Oneill MD   diclofenac sodium (VOLTAREN) 1 % GEL Apply topically 2 times daily    Amanda Oneill MD   doxepin (SILENOR) 3 MG TABS tablet Take 1 tablet by mouth nightly    Amanda Oneill MD   linaCLOtide (LINZESS PO) Take by mouth    ProviderAmanda MD   LIDOCAINE EX Apply topically    ProviderAmanda MD   sevelamer hcl (RENAGEL) 800 MG tablet Take 1 tablet by mouth 3 times daily (with meals)    Amanda Oneill MD   albuterol sulfate HFA (PROVENTIL;VENTOLIN;PROAIR) 108 (90 Base) MCG/ACT

## 2024-01-12 NOTE — PATIENT INSTRUCTIONS
Discharge Instructions for  [facility]  [address] [address]  Phone: [phone] Fax: [phone]    NAME:  Daniel Garcia  YOB: 1945  MEDICAL RECORD NUMBER:  [id number]  DATE:  January 12, 2024  WOUND CARE ORDERS:    Continue to place towel or blanket at lateral hip and thigh to rotate leg inward, to offload pressure on wound. Do not cross legs.      Activity:  [x] Elevate leg(s) above the level of the heart when sitting.  [x] Avoid prolonged standing in one place.   [] Do no get dressing/wrap wet.       Dietary:  [x] Diet as tolerated      [] Diabetic Diet            [] Increase Protein: examples (Meat, cheese, eggs, greek yogurt, fish, nuts)          [] [employer] Therapeutic Nutrition Powder    Return Appointment:  [x] Return Appointment: With Dr. Merlene Chambers  as needed  [] Nurse Visit :   [] Ordered tests:     [facility] THANK YOU    Your treatment has been completed at [facility] outpatient wound clinic on 1/12/2024, per Dr. Chambers.    We know patients have several options when choosing a health care provider. We would like to express our sincere appreciation for having had the chance to be yours. More importantly, we enjoy having you as part of our family.     We also hope your experience with us has surpassed your expectations and that you have been pleased with our service. We appreciate the confidence you've shown in selecting us as your health care provider and we will continue or commitment to provide the highest quality of service.      Again, thank you for choosing us for your health care needs. If you have any further questions or concerns, please call [phone]. It has been our pleasure serving you and we hope to continue our relationship in the future, if the need occurs.     Sincerely,  [facility] Team      Electronically signed Tsering White RN on 1/12/2024 at 8:22 AM        Physician Signature:_______________________    Date: ___________ Time:

## 2024-01-20 ENCOUNTER — HOSPITAL ENCOUNTER (EMERGENCY)
Facility: HOSPITAL | Age: 79
Discharge: HOME | End: 2024-01-20
Attending: EMERGENCY MEDICINE
Payer: MEDICARE

## 2024-01-20 ENCOUNTER — APPOINTMENT (OUTPATIENT)
Facility: HOSPITAL | Age: 79
End: 2024-01-20
Attending: EMERGENCY MEDICINE
Payer: MEDICARE

## 2024-01-20 VITALS
DIASTOLIC BLOOD PRESSURE: 101 MMHG | RESPIRATION RATE: 15 BRPM | OXYGEN SATURATION: 97 % | HEART RATE: 77 BPM | HEIGHT: 64 IN | WEIGHT: 165 LBS | SYSTOLIC BLOOD PRESSURE: 148 MMHG | TEMPERATURE: 98.7 F | BODY MASS INDEX: 28.17 KG/M2

## 2024-01-20 DIAGNOSIS — R07.9 CHEST PAIN, UNSPECIFIED TYPE: Primary | ICD-10-CM

## 2024-01-20 LAB
ALBUMIN SERPL-MCNC: 3.1 G/DL (ref 3.5–5)
ALBUMIN/GLOB SERPL: 0.7 (ref 1.1–2.2)
ALP SERPL-CCNC: 89 U/L (ref 45–117)
ALT SERPL-CCNC: 9 U/L (ref 12–78)
ANION GAP SERPL CALC-SCNC: 8 MMOL/L (ref 5–15)
AST SERPL W P-5'-P-CCNC: 15 U/L (ref 15–37)
BASOPHILS # BLD: 0 K/UL (ref 0–0.1)
BASOPHILS NFR BLD: 1 % (ref 0–1)
BILIRUB SERPL-MCNC: 0.4 MG/DL (ref 0.2–1)
BUN SERPL-MCNC: 92 MG/DL (ref 6–20)
BUN/CREAT SERPL: 11 (ref 12–20)
CA-I BLD-MCNC: 9 MG/DL (ref 8.5–10.1)
CHLORIDE SERPL-SCNC: 99 MMOL/L (ref 97–108)
CO2 SERPL-SCNC: 32 MMOL/L (ref 21–32)
CREAT SERPL-MCNC: 8.19 MG/DL (ref 0.55–1.02)
DIFFERENTIAL METHOD BLD: ABNORMAL
EKG ATRIAL RATE: 78 BPM
EKG DIAGNOSIS: NORMAL
EKG P AXIS: 23 DEGREES
EKG P-R INTERVAL: 193 MS
EKG Q-T INTERVAL: 423 MS
EKG QRS DURATION: 90 MS
EKG QTC CALCULATION (BAZETT): 479 MS
EKG R AXIS: 30 DEGREES
EKG T AXIS: 66 DEGREES
EKG VENTRICULAR RATE: 77 BPM
EOSINOPHIL # BLD: 0.2 K/UL (ref 0–0.4)
EOSINOPHIL NFR BLD: 3 % (ref 0–7)
ERYTHROCYTE [DISTWIDTH] IN BLOOD BY AUTOMATED COUNT: 17.5 % (ref 11.5–14.5)
GLOBULIN SER CALC-MCNC: 4.6 G/DL (ref 2–4)
GLUCOSE BLD STRIP.AUTO-MCNC: 169 MG/DL (ref 65–100)
GLUCOSE SERPL-MCNC: 137 MG/DL (ref 65–100)
HCT VFR BLD AUTO: 39.2 % (ref 35–47)
HGB BLD-MCNC: 11.7 G/DL (ref 11.5–16)
IMM GRANULOCYTES # BLD AUTO: 0 K/UL (ref 0–0.04)
IMM GRANULOCYTES NFR BLD AUTO: 0 % (ref 0–0.5)
LYMPHOCYTES # BLD: 1.6 K/UL (ref 0.8–3.5)
LYMPHOCYTES NFR BLD: 23 % (ref 12–49)
MAGNESIUM SERPL-MCNC: 2.6 MG/DL (ref 1.6–2.4)
MCH RBC QN AUTO: 26.8 PG (ref 26–34)
MCHC RBC AUTO-ENTMCNC: 29.8 G/DL (ref 30–36.5)
MCV RBC AUTO: 89.9 FL (ref 80–99)
MONOCYTES # BLD: 0.6 K/UL (ref 0–1)
MONOCYTES NFR BLD: 8 % (ref 5–13)
NEUTS SEG # BLD: 4.5 K/UL (ref 1.8–8)
NEUTS SEG NFR BLD: 65 % (ref 32–75)
NRBC # BLD: 0 K/UL (ref 0–0.01)
NRBC BLD-RTO: 0 PER 100 WBC
PERFORMED BY:: ABNORMAL
PLATELET # BLD AUTO: 221 K/UL (ref 150–400)
PMV BLD AUTO: 10.7 FL (ref 8.9–12.9)
POTASSIUM SERPL-SCNC: 6.4 MMOL/L (ref 3.5–5.1)
PROT SERPL-MCNC: 7.7 G/DL (ref 6.4–8.2)
RBC # BLD AUTO: 4.36 M/UL (ref 3.8–5.2)
SODIUM SERPL-SCNC: 139 MMOL/L (ref 136–145)
TROPONIN I SERPL HS-MCNC: 32 NG/L (ref 0–51)
WBC # BLD AUTO: 6.9 K/UL (ref 3.6–11)

## 2024-01-20 PROCEDURE — 83735 ASSAY OF MAGNESIUM: CPT

## 2024-01-20 PROCEDURE — 85025 COMPLETE CBC W/AUTO DIFF WBC: CPT

## 2024-01-20 PROCEDURE — 99285 EMERGENCY DEPT VISIT HI MDM: CPT

## 2024-01-20 PROCEDURE — 36415 COLL VENOUS BLD VENIPUNCTURE: CPT

## 2024-01-20 PROCEDURE — 93005 ELECTROCARDIOGRAM TRACING: CPT | Performed by: EMERGENCY MEDICINE

## 2024-01-20 PROCEDURE — 80053 COMPREHEN METABOLIC PANEL: CPT

## 2024-01-20 PROCEDURE — 84484 ASSAY OF TROPONIN QUANT: CPT

## 2024-01-20 PROCEDURE — 71045 X-RAY EXAM CHEST 1 VIEW: CPT

## 2024-01-20 PROCEDURE — 82962 GLUCOSE BLOOD TEST: CPT

## 2024-01-20 ASSESSMENT — PAIN - FUNCTIONAL ASSESSMENT: PAIN_FUNCTIONAL_ASSESSMENT: 0-10

## 2024-01-20 ASSESSMENT — PAIN SCALES - GENERAL: PAINLEVEL_OUTOF10: 0

## 2024-01-20 NOTE — ED PROVIDER NOTES
progression, early transition  Minimal ST elevation, inferior leads     POCT Glucose    Collection Time: 01/20/24 12:55 PM   Result Value Ref Range    POC Glucose 169 (H) 65 - 100 mg/dL    Performed by: JOJO KELLY    CBC with Auto Differential    Collection Time: 01/20/24  1:15 PM   Result Value Ref Range    WBC 6.9 3.6 - 11.0 K/uL    RBC 4.36 3.80 - 5.20 M/uL    Hemoglobin 11.7 11.5 - 16.0 g/dL    Hematocrit 39.2 35.0 - 47.0 %    MCV 89.9 80.0 - 99.0 FL    MCH 26.8 26.0 - 34.0 PG    MCHC 29.8 (L) 30.0 - 36.5 g/dL    RDW 17.5 (H) 11.5 - 14.5 %    Platelets 221 150 - 400 K/uL    MPV 10.7 8.9 - 12.9 FL    Nucleated RBCs 0.0 0.0  WBC    nRBC 0.00 0.00 - 0.01 K/uL    Neutrophils % 65 32 - 75 %    Lymphocytes % 23 12 - 49 %    Monocytes % 8 5 - 13 %    Eosinophils % 3 0 - 7 %    Basophils % 1 0 - 1 %    Immature Granulocytes 0 0 - 0.5 %    Neutrophils Absolute 4.5 1.8 - 8.0 K/UL    Lymphocytes Absolute 1.6 0.8 - 3.5 K/UL    Monocytes Absolute 0.6 0.0 - 1.0 K/UL    Eosinophils Absolute 0.2 0.0 - 0.4 K/UL    Basophils Absolute 0.0 0.0 - 0.1 K/UL    Absolute Immature Granulocyte 0.0 0.00 - 0.04 K/UL    Differential Type AUTOMATED     Comprehensive Metabolic Panel    Collection Time: 01/20/24  1:15 PM   Result Value Ref Range    Sodium 139 136 - 145 mmol/L    Potassium 6.4 (H) 3.5 - 5.1 mmol/L    Chloride 99 97 - 108 mmol/L    CO2 32 21 - 32 mmol/L    Anion Gap 8 5 - 15 mmol/L    Glucose 137 (H) 65 - 100 mg/dL    BUN 92 (H) 6 - 20 mg/dL    Creatinine 8.19 (H) 0.55 - 1.02 mg/dL    Bun/Cre Ratio 11 (L) 12 - 20      Est, Glom Filt Rate 5 (L) >60 ml/min/1.73m2    Calcium 9.0 8.5 - 10.1 mg/dL    Total Bilirubin 0.4 0.2 - 1.0 mg/dL    AST 15 15 - 37 U/L    ALT 9 (L) 12 - 78 U/L    Alk Phosphatase 89 45 - 117 U/L    Total Protein 7.7 6.4 - 8.2 g/dL    Albumin 3.1 (L) 3.5 - 5.0 g/dL    Globulin 4.6 (H) 2.0 - 4.0 g/dL    Albumin/Globulin Ratio 0.7 (L) 1.1 - 2.2     Troponin    Collection Time: 01/20/24  1:15 PM   Result  suspicious EKG, or positive cardiac enzymes. In these selected cases, the risk of a \"Low 4\" is still most likely lower than the risk of admission and further testing/imaging. [id number]          Records Reviewed (source and summary of external notes): Prior medical records and Nursing notes    Vitals:    Vitals:    01/20/24 1228   BP: (!) 148/101   Pulse: 77   Resp: 15   SpO2: 97%   Weight: 74.8 kg (165 lb)   Height: 1.626 m (5' 4\")        ED COURSE  ED Course as of 01/21/24 0949   Sat Jan 20, 2024   1447 Patient refusing repeat troponin at this time.  Will do informed refusal as risks were explained the patient.  We attempted to assist her with setting up dialysis 1:00 Monday and patient states she wants to go Tuesday.  Told her she could work with the nursing home to reschedule.  Patient denies any chest pain at time of discharge. [LG]   4272 Attempted to leave message with patient's friend Lashell, however number was disconnected or change so was unable to.  Patient was informed of this. [LG]      ED Course User Index  [LG] Ellen Menendez MD       Sepsis Reassessment: Sepsis reassessment not applicable    Disposition Considerations (Tests not done, Shared Decision Making, Pt Expectation of Test or Treatment.): Not Applicable    Patient was given the following medications:  Medications - No data to display    CONSULTS: (Who and What was discussed)  None     Social Determinants affecting Dx or Tx: None    Smoking Cessation: Not Applicable    PROCEDURES   Unless otherwise noted above, none.  Procedures      CRITICAL CARE TIME   Patient does not meet Critical Care Time, 0 minutes    FINAL IMPRESSION     1. Chest pain, unspecified type          DISPOSITION/PLAN   DISPOSITION      Discharge Note: The patient is stable for discharge home. The signs, symptoms, diagnosis, and discharge instructions have been discussed, understanding conveyed, and agreed upon. The patient is to follow up as recommended or return to

## 2024-01-20 NOTE — ED NOTES
Called [facility] to see where the patient resides was told she does live at home. And that we would have to call the daughter to get the care givers info. Called Daughter Cierra Garcia at [phone] and was told she was aware that mother was in hospital and care giver would not be available till after 4pm.. to come get her Care givers name is Daria Liu her # is [phone].

## 2024-01-20 NOTE — ED TRIAGE NOTES
Pt reports she was at dialysis and did not receive any of her treatment. PT rpeorts she began having pain under her left breast that has now resolved. Pt reports increase stress due to family problems and daughter not visiting her. Pt in NAD, elevated BP noted. PT received 324mg of ASA from EMS.

## 2024-01-21 ASSESSMENT — HEART SCORE: ECG: 0

## 2024-01-22 ENCOUNTER — HOSPITAL ENCOUNTER (EMERGENCY)
Facility: HOSPITAL | Age: 79
Discharge: HOME | End: 2024-01-22
Attending: EMERGENCY MEDICINE
Payer: MEDICARE

## 2024-01-22 ENCOUNTER — APPOINTMENT (OUTPATIENT)
Facility: HOSPITAL | Age: 79
End: 2024-01-22
Attending: EMERGENCY MEDICINE
Payer: MEDICARE

## 2024-01-22 VITALS
HEART RATE: 83 BPM | HEIGHT: 64 IN | TEMPERATURE: 98 F | WEIGHT: 165 LBS | RESPIRATION RATE: 20 BRPM | SYSTOLIC BLOOD PRESSURE: 156 MMHG | DIASTOLIC BLOOD PRESSURE: 80 MMHG | OXYGEN SATURATION: 97 % | BODY MASS INDEX: 28.17 KG/M2
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
## 2024-01-22 LAB
ALBUMIN SERPL-MCNC: 3.5 G/DL (ref 3.5–5)
ALBUMIN/GLOB SERPL: 0.7 (ref 1.1–2.2)
ALP SERPL-CCNC: 92 U/L (ref 45–117)
ALT SERPL-CCNC: 10 U/L (ref 12–78)
ANION GAP SERPL CALC-SCNC: 12 MMOL/L (ref 5–15)
AST SERPL W P-5'-P-CCNC: 16 U/L (ref 15–37)
BASOPHILS # BLD: 0 K/UL (ref 0–0.1)
BASOPHILS NFR BLD: 1 % (ref 0–1)
BILIRUB SERPL-MCNC: 0.5 MG/DL (ref 0.2–1)
BUN SERPL-MCNC: 41 MG/DL (ref 6–20)
BUN/CREAT SERPL: 8 (ref 12–20)
CA-I BLD-MCNC: 9.1 MG/DL (ref 8.5–10.1)
CHLORIDE SERPL-SCNC: 97 MMOL/L (ref 97–108)
CO2 SERPL-SCNC: 30 MMOL/L (ref 21–32)
CREAT SERPL-MCNC: 5.18 MG/DL (ref 0.55–1.02)
DIFFERENTIAL METHOD BLD: ABNORMAL
EKG ATRIAL RATE: 78 BPM
EKG DIAGNOSIS: NORMAL
EKG P AXIS: 23 DEGREES
EKG P-R INTERVAL: 193 MS
EKG Q-T INTERVAL: 423 MS
EKG QRS DURATION: 90 MS
EKG QTC CALCULATION (BAZETT): 479 MS
EKG R AXIS: 30 DEGREES
EKG T AXIS: 66 DEGREES
EKG VENTRICULAR RATE: 77 BPM
EOSINOPHIL # BLD: 0.2 K/UL (ref 0–0.4)
EOSINOPHIL NFR BLD: 2 % (ref 0–7)
ERYTHROCYTE [DISTWIDTH] IN BLOOD BY AUTOMATED COUNT: 18.1 % (ref 11.5–14.5)
GLOBULIN SER CALC-MCNC: 4.9 G/DL (ref 2–4)
GLUCOSE SERPL-MCNC: 192 MG/DL (ref 65–100)
HCT VFR BLD AUTO: 38.6 % (ref 35–47)
HGB BLD-MCNC: 11.9 G/DL (ref 11.5–16)
IMM GRANULOCYTES # BLD AUTO: 0 K/UL (ref 0–0.04)
IMM GRANULOCYTES NFR BLD AUTO: 1 % (ref 0–0.5)
LIPASE SERPL-CCNC: 51 U/L (ref 13–75)
LYMPHOCYTES # BLD: 1.2 K/UL (ref 0.8–3.5)
LYMPHOCYTES NFR BLD: 14 % (ref 12–49)
MCH RBC QN AUTO: 27 PG (ref 26–34)
MCHC RBC AUTO-ENTMCNC: 30.8 G/DL (ref 30–36.5)
MCV RBC AUTO: 87.5 FL (ref 80–99)
MONOCYTES # BLD: 0.5 K/UL (ref 0–1)
MONOCYTES NFR BLD: 6 % (ref 5–13)
NEUTS SEG # BLD: 6.6 K/UL (ref 1.8–8)
NEUTS SEG NFR BLD: 76 % (ref 32–75)
NRBC # BLD: 0 K/UL (ref 0–0.01)
NRBC BLD-RTO: 0 PER 100 WBC
PLATELET # BLD AUTO: 225 K/UL (ref 150–400)
PMV BLD AUTO: 10.7 FL (ref 8.9–12.9)
POTASSIUM SERPL-SCNC: 4.2 MMOL/L (ref 3.5–5.1)
PROT SERPL-MCNC: 8.4 G/DL (ref 6.4–8.2)
RBC # BLD AUTO: 4.41 M/UL (ref 3.8–5.2)
SODIUM SERPL-SCNC: 139 MMOL/L (ref 136–145)
TROPONIN I SERPL HS-MCNC: 28 NG/L (ref 0–51)
WBC # BLD AUTO: 8.5 K/UL (ref 3.6–11)

## 2024-01-22 PROCEDURE — 80053 COMPREHEN METABOLIC PANEL: CPT

## 2024-01-22 PROCEDURE — 83690 ASSAY OF LIPASE: CPT

## 2024-01-22 PROCEDURE — 93005 ELECTROCARDIOGRAM TRACING: CPT | Performed by: EMERGENCY MEDICINE

## 2024-01-22 PROCEDURE — 71045 X-RAY EXAM CHEST 1 VIEW: CPT

## 2024-01-22 PROCEDURE — 99285 EMERGENCY DEPT VISIT HI MDM: CPT

## 2024-01-22 PROCEDURE — 70450 CT HEAD/BRAIN W/O DYE: CPT

## 2024-01-22 PROCEDURE — 85025 COMPLETE CBC W/AUTO DIFF WBC: CPT

## 2024-01-22 PROCEDURE — 36415 COLL VENOUS BLD VENIPUNCTURE: CPT

## 2024-01-22 PROCEDURE — 84484 ASSAY OF TROPONIN QUANT: CPT

## 2024-01-22 RX ORDER — ONDANSETRON 2 MG/ML
4 INJECTION INTRAMUSCULAR; INTRAVENOUS ONCE
Status: DISCONTINUED | OUTPATIENT
Start: 2024-01-22 | End: 2024-01-22

## 2024-01-22 NOTE — ED PROVIDER NOTES
except as in HPI.  ENT: Negative except as in HPI.  Cardiovascular: Negative except as in HPI.  Respiratory: Negative except as in HPI.  Gastrointestinal: Negative except as in HPI.  Genitourinary: Negative except as in HPI.  Musculoskeletal: Negative except as in HPI.  Integumentary: Negative except as in HPI.  Neurological: Negative except as in HPI.  Psychiatric: Negative except as in HPI.  Endocrine: Negative except as in HPI.  Hematologic/Lymphatic: Negative except as in HPI.  Allergic/Immunologic: Negative except as in HPI.    Physical Exam   Constitutional: Awake and alert, interactive, NAD  Eyes: PERRL, no injection or scleral icterus, no discharge  HEENT: NCAT, neck supple, MMM, no oropharyngeal exudates  CV: RRR, no m/r/g  Respiratory: CTAB, no r/r/w  GI: Abd soft, nondistended, nontender  : Deferred  MSK: FROM, no joint effusions or edema  Skin: No rashes  Neuro: CN2-12 intact, symmetric facies, fluent speech. 5-/5 strength throughout, baseline per pt and caretaker, negative pronator drift. SILT distally.  Psych: Well-groomed, normal speech, behavior, appropriate mood    Lab and Diagnostic Study Results     Labs -   No results found for this or any previous visit (from the past 12 hour(s)).    Radiologic Studies -   [unfilled]  [unfilled]  [unfilled]    Medical Decision Making and ED Course   - I am the first and primary provider for this patient AND AM THE PRIMARY PROVIDER OF RECORD.    - I reviewed the vital signs, available nursing notes, past medical history, past surgical history, family history and social history.    - Initial assessment performed. The patients presenting problems have been discussed, and the staff are in agreement with the care plan formulated and outlined with them.  I have encouraged them to ask questions as they arise throughout their visit.    Vital Signs-Reviewed the patient's vital signs.    Vitals:    01/22/24 1814   BP: (!) 156/80   Pulse: 83   Resp: 20   Temp: 98 °F (36.7

## 2024-01-22 NOTE — ED TRIAGE NOTES
Pt brought in by EMS. Pt was leaving dialysis after full treatment, family was helping pt in the car when pt became weak in the knees. Pt family allowed pt to slide to the ground. Pt then vomited. Pt denies any pain at this time. Did not hit head.

## 2024-01-23 LAB
EKG ATRIAL RATE: 81 BPM
EKG DIAGNOSIS: NORMAL
EKG P AXIS: -2 DEGREES
EKG P-R INTERVAL: 201 MS
EKG Q-T INTERVAL: 409 MS
EKG QRS DURATION: 96 MS
EKG QTC CALCULATION (BAZETT): 475 MS
EKG R AXIS: 21 DEGREES
EKG T AXIS: 84 DEGREES
EKG VENTRICULAR RATE: 81 BPM

## 2024-01-23 NOTE — ED NOTES
Patient stable at time of discharge. Reviewed discharge instructions and education. Patient verbalized understanding. Patient states no questions at this time.

## 2024-04-29 ENCOUNTER — APPOINTMENT (OUTPATIENT)
Facility: HOSPITAL | Age: 79
End: 2024-04-29
Payer: MEDICARE

## 2024-04-29 ENCOUNTER — HOSPITAL ENCOUNTER (EMERGENCY)
Facility: HOSPITAL | Age: 79
Discharge: HOME | End: 2024-04-30
Attending: EMERGENCY MEDICINE
Payer: MEDICARE

## 2024-04-29 VITALS
WEIGHT: 165 LBS | SYSTOLIC BLOOD PRESSURE: 163 MMHG | BODY MASS INDEX: 28.17 KG/M2 | DIASTOLIC BLOOD PRESSURE: 76 MMHG | HEART RATE: 81 BPM | RESPIRATION RATE: 19 BRPM | OXYGEN SATURATION: 95 % | TEMPERATURE: 100.5 F | HEIGHT: 64 IN

## 2024-04-29 DIAGNOSIS — S80.02XA CONTUSION OF LEFT KNEE, INITIAL ENCOUNTER: Primary | ICD-10-CM

## 2024-04-29 PROCEDURE — 87636 SARSCOV2 & INF A&B AMP PRB: CPT

## 2024-04-29 PROCEDURE — 99284 EMERGENCY DEPT VISIT MOD MDM: CPT

## 2024-04-29 PROCEDURE — 73590 X-RAY EXAM OF LOWER LEG: CPT

## 2024-04-29 PROCEDURE — 96372 THER/PROPH/DIAG INJ SC/IM: CPT

## 2024-04-29 PROCEDURE — 73552 X-RAY EXAM OF FEMUR 2/>: CPT

## 2024-04-29 PROCEDURE — 73130 X-RAY EXAM OF HAND: CPT

## 2024-04-29 ASSESSMENT — PAIN - FUNCTIONAL ASSESSMENT: PAIN_FUNCTIONAL_ASSESSMENT: 0-10

## 2024-04-29 ASSESSMENT — PAIN SCALES - GENERAL: PAINLEVEL_OUTOF10: 10

## 2024-04-30 ENCOUNTER — APPOINTMENT (OUTPATIENT)
Facility: HOSPITAL | Age: 79
End: 2024-04-30
Payer: MEDICARE

## 2024-04-30 LAB
FLUAV RNA SPEC QL NAA+PROBE: NOT DETECTED
FLUBV RNA SPEC QL NAA+PROBE: NOT DETECTED
SARS-COV-2 RNA RESP QL NAA+PROBE: NOT DETECTED

## 2024-04-30 PROCEDURE — 6360000002 HC RX W HCPCS: Performed by: EMERGENCY MEDICINE

## 2024-04-30 PROCEDURE — 73700 CT LOWER EXTREMITY W/O DYE: CPT

## 2024-04-30 RX ORDER — FENTANYL CITRATE 50 UG/ML
50 INJECTION, SOLUTION INTRAMUSCULAR; INTRAVENOUS
Status: COMPLETED | OUTPATIENT
Start: 2024-04-30 | End: 2024-04-30

## 2024-04-30 RX ADMIN — FENTANYL CITRATE 50 MCG: 50 INJECTION INTRAMUSCULAR; INTRAVENOUS at 01:22

## 2024-04-30 ASSESSMENT — ENCOUNTER SYMPTOMS
RESPIRATORY NEGATIVE: 1
GASTROINTESTINAL NEGATIVE: 1
EYES NEGATIVE: 1

## 2024-04-30 NOTE — ED TRIAGE NOTES
Patient presents by EMS after ground level fall. EMS states that patient was sitting on the side of the bed at their arrival to her residence. EMS reports that the fall was unwitnessed. Patient aid arrived to let EMS into the patients home. Aid reports that patient took oxycodone at approximately 1930 today.     Upon arrival to ED patient is noted to be alert and oriented x4. Patient states that she fell onto the carpet. Patient reports pain in her right hand/finger and left leg. Patient denies hitting her head and reports to LOC.   Patient requesting pain meds at time of arrival.

## 2024-04-30 NOTE — ED NOTES
Discharge instructions reviewed and pt verbalized understanding. Pt was stable and assisted out of ED by wheelchair.  
      PATIENT REFERRED TO:  No follow-up provider specified.    DISCHARGE MEDICATIONS:  New Prescriptions    No medications on file     Controlled Substances Monitoring:          No data to display                (Please note that portions of this note were completed with a voice recognition program.  Efforts were made to edit the dictations but occasionally words are mis-transcribed.)    Santiago Fagan MD (electronically signed)  Attending Emergency Physician           Santiago Fagan MD  04/30/24 [id number]

## 2024-05-16 ENCOUNTER — HOSPITAL ENCOUNTER (OUTPATIENT)
Facility: HOSPITAL | Age: 79
Discharge: HOME | End: 2024-05-16
Payer: MEDICARE

## 2024-05-16 DIAGNOSIS — R07.81 RIB PAIN: ICD-10-CM

## 2024-05-16 PROCEDURE — 71045 X-RAY EXAM CHEST 1 VIEW: CPT

## 2024-05-17 ENCOUNTER — HOSPITAL ENCOUNTER (OUTPATIENT)
Facility: HOSPITAL | Age: 79
End: 2024-05-17
Payer: MEDICARE

## 2024-05-17 DIAGNOSIS — R07.81 RIB PAIN: ICD-10-CM

## 2024-05-17 PROCEDURE — 71100 X-RAY EXAM RIBS UNI 2 VIEWS: CPT

## 2024-05-24 ENCOUNTER — TRANSCRIBE ORDERS (OUTPATIENT)
Facility: HOSPITAL | Age: 79
End: 2024-05-24

## 2024-05-24 DIAGNOSIS — I69.351 HEMIPARESIS AFFECTING RIGHT SIDE AS LATE EFFECT OF CEREBROVASCULAR ACCIDENT (HCC): ICD-10-CM

## 2024-05-24 DIAGNOSIS — R13.10 DYSPHAGIA, UNSPECIFIED TYPE: Primary | ICD-10-CM

## 2024-07-08 ENCOUNTER — HOSPITAL ENCOUNTER (OUTPATIENT)
Facility: HOSPITAL | Age: 79
Discharge: HOME | End: 2024-07-11
Attending: INTERNAL MEDICINE
Payer: MEDICARE

## 2024-07-08 DIAGNOSIS — I69.351 HEMIPARESIS AFFECTING RIGHT SIDE AS LATE EFFECT OF CEREBROVASCULAR ACCIDENT (HCC): ICD-10-CM

## 2024-07-08 DIAGNOSIS — R13.10 DYSPHAGIA, UNSPECIFIED TYPE: ICD-10-CM

## 2024-07-08 PROCEDURE — 70490 CT SOFT TISSUE NECK W/O DYE: CPT

## 2024-07-08 PROCEDURE — 2500000003 HC RX 250 WO HCPCS: Performed by: INTERNAL MEDICINE

## 2024-07-08 PROCEDURE — 92611 MOTION FLUOROSCOPY/SWALLOW: CPT

## 2024-07-08 PROCEDURE — 74230 X-RAY XM SWLNG FUNCJ C+: CPT

## 2024-07-08 RX ADMIN — BARIUM SULFATE 10 ML: 0.81 POWDER, FOR SUSPENSION ORAL at 12:11

## 2024-07-08 RX ADMIN — BARIUM SULFATE 10 ML: 400 SUSPENSION ORAL at 12:10

## 2024-07-08 RX ADMIN — BARIUM SULFATE 10 ML: 400 PASTE ORAL at 12:10

## 2024-07-08 NOTE — THERAPY EVALUATION
[facility]  SPEECH PATHOLOGY MODIFIED BARIUM SWALLOW STUDY      Patient: Daniel Garcia (78 y.o. female)  Date: 7/8/2024  Primary Diagnosis: Dysphagia, unspecified type [R13.10]  Hemiparesis affecting right side as late effect of cerebrovascular accident (HCC) [I69.351]       Precautions: aspiration                     ASSESSMENT :  Based on the objective data described below, the patient presents w/ minimal oral dysphagia. Pharyngeal swallow WFL. No aspiration observed     Oral phase c/b slow A-P oral transit. Lingual pumping/thrusting w/ thicker consistencies. Swallow initiated w/ minimal delay.    Pharyngeal phase c/b largely WFL. Hyolaryngeal excursion and protraction adequate. Epiglottic inversion intact.   Trace, flash penetration of thin during the swallow. No further penetration or aspiration observed w/ repeated thin liquid trials.   No penetration or aspiration observed w/ remaining consistencies.   No significant pharyngeal residue.    UES: WFL       PLAN :  Recommendations and Planned Interventions:  Easy to chew, thin  Medications in applesauce or pudding   Discharge Recommendations: home     SUBJECTIVE:   Patient stated “I get all this saliva in my mouth and I have to wipe it out.\"     OBJECTIVE:     Past Medical History:   Diagnosis Date    Blindness/low vision 2008    due to dm    CHF (congestive heart failure) ([facility])     Chronic kidney disease     Chronic pain     SPINAL STENOSIS    DM (diabetes mellitus) ([facility])     HTN (hypertension)     Menopause     Stroke ([facility])      Past Surgical History:   Procedure Laterality Date    OVARY REMOVAL      STENT INSERTION  2001,2002    Chaz Mora     Prior Level of Function/Home Situation: unknown     Diet prior to admission: regular, thin  Current Diet:  regular, thin     Video Flouroscopic Procedures  [x] Lateral View   [] A-P View [] Scanned to level of Sternum    [] Seated at 90 deg.  [] Other:    Presentation:   [x] Spoon   [x]

## 2025-02-10 ENCOUNTER — HOSPITAL ENCOUNTER (OUTPATIENT)
Facility: HOSPITAL | Age: 80
Setting detail: RECURRING SERIES
Discharge: HOME | End: 2025-02-13
Payer: MEDICARE

## 2025-02-10 PROCEDURE — 97161 PT EVAL LOW COMPLEX 20 MIN: CPT

## 2025-02-10 NOTE — THERAPY EVALUATION
[facility]  [address] [place] [id number]  Ph: [phone]     Fax: [phone]             PHYSICAL THERAPY - EVALUATION ONLY / DISCHARGE  (updated 3/23)      Date of Service: 2/10/2025        Patient Name:  Daniel Garcia :  1945   Medical   Diagnosis:  Personal history of (healed) traumatic fracture [Z87.81] Treatment Diagnosis:  R26.81   Unsteadiness on feet    Referral Source:  Daniel Buchanan Jr., * Provider #:  [id number]                Insurance: Payor: MEDICARE / Plan: MEDICARE PART A AND B / Product Type: *No Product type* /      Visit #   Current  / Total 1    Time   In / Out 1115 1200   Total Treatment Time    Total Timed Codes    [x] Patient's date of birth verified      SUBJECTIVE  Pain Level (0-10 scale): 7/10  Changes in pain since onset: Intermittent    Any medication changes, allergies to medications, adverse drug reactions, diagnosis change, or new procedure performed?: [x] No    [] Yes (see summary sheet for update)  Medications: Verified on Patient Summary List    Subjective functional status/changes:     Patient is 79 y.o. -American Female  who was referred to physical therapy evaluation for right knee pain and tibia/fibula fracture from a fall in . Patient has a history of   multiple CVA, chronic kidney disease and dialysis 3 x a week. Following fracture, patient was hospitalized 10 days at [facility] and Home Health Physical Therapy was provided.  Patient lives at home and a caretaker provides total assistance during day.  Patient has been non-ambulatory for over 2 years and requires assistance with transfers, bathing and ADL. Patient is eating better sitter and would like to do more for herself than getting depressed. Her present daily schedule is sleeping and looking at TV. Patient and caretaker stated that they perform exercises at home that was provided from rehab and home health at time. Handouts have already been 
   doxepin (SILENOR) 3 mg, Oral, NIGHTLY    fluticasone (FLONASE) 50 MCG/ACT nasal spray 2 sprays, Nasal, DAILY    fluticasone-salmeterol (ADVAIR HFA) 115-21 MCG/ACT inhaler 2 puffs, Inhalation    ipratropium (ATROVENT) 0.03 % nasal spray 1 SPRAY BY BOTH NOSTRILS ROUTE EVERY TWELVE (12) HOURS AS NEEDED FOR RUNNY NOSE    LIDOCAINE EX Apply externally    linaCLOtide (LINZESS PO) Oral    loratadine (CLARITIN) 10 MG capsule Oral    sevelamer hcl (RENAGEL) 800 mg, Oral, 3 TIMES DAILY WITH MEALS    VITAMIN D PO Oral     Allergies:  Allergies   Allergen Reactions    Ibuprofen Nausea And Vomiting     Due to renal function  Other reaction(s): ibuprofen ([id number])      Naproxen Itching     Due to renal function  Other reaction(s): naproxen ([id number])      Codeine Itching     Other reaction(s): Rash  Other reaction(s): Rash      Nsaids Itching     Due to renal function             OBJECTIVE/EXAMINATION  Posture: rounded shoulders, forward head, decreased trunk strength  Other Observations: transported in , not able to sit to stand with 2 hand support, right LE (externally rotated)  Gait and Functional Mobility: non-ambulatory, transfers from  to bed (Max x 1)  Rolling from side to side (moderate x 1), supine to sit (moderate x 1)   Palpation: pain of the right calf from hardware per patient    Lower Extremity Testing    MMT AROM    Right Left Right Left   Hip   Hip Flexion     3/5 3-/5 WFL WFL   Hip Abduction    3/5 3/5 WFL WFL   Hip Adduction   3+/5 3+/5 WFL WFL   Knee   Knee Extension 3/5 3-/5 WFL WFL   Knee Flexion   3/5 3+5     WFL WFL   Ankle   Ankle PF 4/5 4/5 WFL WFL   Ankle DF 4/5 4/5 WFL WFL     Joint Mobility Assessment: fair    Balance Testing  ROQUE: not able to perform  Tandem Stance: not able to perform  SLS: not able to perform   High Fall Risk    Flexibility: fair    Neurological: Reflexes / Sensations: Intact    45 min [x]Eval - untimed                        Pain Level at end of session (0-10 scale): 
No    3. ***  Status at last Eval/Progress Note: ***  Current Status: see above, Initial Evaluation completed today  Goal Met?  No    4. ***  Status at last Eval/Progress Note: ***  Current Status: see above, Initial Evaluation completed today  Goal Met?  No      Long Term Goals, to be met within *** treatments:  1.***  Status at last Eval/Progress Note: ***  Current Status: see above, Initial Evaluation completed today  Goal Met?  No    2.  ***  Status at last Eval/Progress Note: ***  Current Status: see above, Initial Evaluation completed today  Goal Met?  No    3. ***  Status at last Eval/Progress Note: ***  Current Status: see above, Initial Evaluation completed today  Goal Met?  No    4. ***  Status at last Eval/Progress Note: ***  Current Status: see above, Initial Evaluation completed today  Goal Met?  No      Frequency / Duration: Patient to be seen *** times a week for *** treatments.    Patient/ Caregiver education and instruction: Diagnosis, prognosis, {Outpt PT patient caregiver ed.:04042}   [x]  Plan of care has been reviewed with ERIKA Dumont, PT,        2/10/2025       11:12 AM        ===================================================================  I certify that the above Therapy Services are being furnished while the patient is under my care. I agree with the treatment plan and certify that this therapy is necessary.    Physician's Signature:_________________________   DATE:_________   TIME:________                           Daniel Buchanan Jr., *    ** Signature, Date and Time must be completed for valid certification **  Please sign and fax to [phone].  Thank you      Patient Name:  Daniel REINA Garcia :  1945

## 2025-03-29 ENCOUNTER — APPOINTMENT (OUTPATIENT)
Facility: HOSPITAL | Age: 80
End: 2025-03-29
Payer: MEDICARE

## 2025-03-29 ENCOUNTER — HOSPITAL ENCOUNTER (EMERGENCY)
Facility: HOSPITAL | Age: 80
Discharge: HOME | End: 2025-03-29
Attending: EMERGENCY MEDICINE
Payer: MEDICARE

## 2025-03-29 VITALS
TEMPERATURE: 97 F | OXYGEN SATURATION: 98 % | WEIGHT: 205.9 LBS | HEIGHT: 64 IN | DIASTOLIC BLOOD PRESSURE: 73 MMHG | SYSTOLIC BLOOD PRESSURE: 149 MMHG | HEART RATE: 80 BPM | RESPIRATION RATE: 23 BRPM | BODY MASS INDEX: 35.15 KG/M2

## 2025-03-29 DIAGNOSIS — K59.00 CONSTIPATION, UNSPECIFIED CONSTIPATION TYPE: ICD-10-CM

## 2025-03-29 DIAGNOSIS — R10.30 LOWER ABDOMINAL PAIN: Primary | ICD-10-CM

## 2025-03-29 LAB
ALBUMIN SERPL-MCNC: 2.6 G/DL (ref 3.5–5)
ALBUMIN/GLOB SERPL: 0.6 (ref 1.1–2.2)
ALP SERPL-CCNC: 80 U/L (ref 45–117)
ALT SERPL-CCNC: 18 U/L (ref 12–78)
ANION GAP SERPL CALC-SCNC: 9 MMOL/L (ref 2–12)
AST SERPL W P-5'-P-CCNC: 22 U/L (ref 15–37)
BASOPHILS # BLD: 0.04 K/UL (ref 0–0.1)
BASOPHILS NFR BLD: 0.3 % (ref 0–1)
BILIRUB SERPL-MCNC: 0.6 MG/DL (ref 0.2–1)
BUN SERPL-MCNC: 27 MG/DL (ref 6–20)
BUN/CREAT SERPL: 6 (ref 12–20)
CA-I BLD-MCNC: 8.6 MG/DL (ref 8.5–10.1)
CHLORIDE SERPL-SCNC: 100 MMOL/L (ref 97–108)
CO2 SERPL-SCNC: 27 MMOL/L (ref 21–32)
CREAT SERPL-MCNC: 4.22 MG/DL (ref 0.55–1.02)
DIFFERENTIAL METHOD BLD: ABNORMAL
EKG ATRIAL RATE: 79 BPM
EKG DIAGNOSIS: NORMAL
EKG P AXIS: 0 DEGREES
EKG P-R INTERVAL: 59 MS
EKG Q-T INTERVAL: 402 MS
EKG QRS DURATION: 96 MS
EKG QTC CALCULATION (BAZETT): 446 MS
EKG R AXIS: 33 DEGREES
EKG T AXIS: 170 DEGREES
EKG VENTRICULAR RATE: 74 BPM
EOSINOPHIL # BLD: 0.16 K/UL (ref 0–0.4)
EOSINOPHIL NFR BLD: 1.2 % (ref 0–7)
ERYTHROCYTE [DISTWIDTH] IN BLOOD BY AUTOMATED COUNT: 14.6 % (ref 11.5–14.5)
GLOBULIN SER CALC-MCNC: 4.5 G/DL (ref 2–4)
GLUCOSE SERPL-MCNC: 178 MG/DL (ref 65–100)
HCT VFR BLD AUTO: 36.7 % (ref 35–47)
HGB BLD-MCNC: 11.3 G/DL (ref 11.5–16)
IMM GRANULOCYTES # BLD AUTO: 0.12 K/UL (ref 0–0.04)
IMM GRANULOCYTES NFR BLD AUTO: 0.9 % (ref 0–0.5)
LIPASE SERPL-CCNC: 51 U/L (ref 13–75)
LYMPHOCYTES # BLD: 1.37 K/UL (ref 0.8–3.5)
LYMPHOCYTES NFR BLD: 9.9 % (ref 12–49)
MCH RBC QN AUTO: 29.2 PG (ref 26–34)
MCHC RBC AUTO-ENTMCNC: 30.8 G/DL (ref 30–36.5)
MCV RBC AUTO: 94.8 FL (ref 80–99)
MONOCYTES # BLD: 0.59 K/UL (ref 0–1)
MONOCYTES NFR BLD: 4.3 % (ref 5–13)
NEUTS SEG # BLD: 11.58 K/UL (ref 1.8–8)
NEUTS SEG NFR BLD: 83.4 % (ref 32–75)
NRBC # BLD: 0 K/UL (ref 0–0.01)
NRBC BLD-RTO: 0 PER 100 WBC
PLATELET # BLD AUTO: 211 K/UL (ref 150–400)
PMV BLD AUTO: 10.9 FL (ref 8.9–12.9)
POTASSIUM SERPL-SCNC: 3.8 MMOL/L (ref 3.5–5.1)
PROT SERPL-MCNC: 7.1 G/DL (ref 6.4–8.2)
RBC # BLD AUTO: 3.87 M/UL (ref 3.8–5.2)
SODIUM SERPL-SCNC: 136 MMOL/L (ref 136–145)
WBC # BLD AUTO: 13.9 K/UL (ref 3.6–11)

## 2025-03-29 PROCEDURE — 99284 EMERGENCY DEPT VISIT MOD MDM: CPT

## 2025-03-29 PROCEDURE — 93005 ELECTROCARDIOGRAM TRACING: CPT | Performed by: EMERGENCY MEDICINE

## 2025-03-29 PROCEDURE — 83690 ASSAY OF LIPASE: CPT

## 2025-03-29 PROCEDURE — 74176 CT ABD & PELVIS W/O CONTRAST: CPT

## 2025-03-29 PROCEDURE — 85025 COMPLETE CBC W/AUTO DIFF WBC: CPT

## 2025-03-29 PROCEDURE — 80053 COMPREHEN METABOLIC PANEL: CPT

## 2025-03-29 PROCEDURE — 36415 COLL VENOUS BLD VENIPUNCTURE: CPT

## 2025-03-29 ASSESSMENT — PAIN DESCRIPTION - LOCATION: LOCATION: ABDOMEN

## 2025-03-29 ASSESSMENT — LIFESTYLE VARIABLES
HOW OFTEN DO YOU HAVE A DRINK CONTAINING ALCOHOL: NEVER
HOW MANY STANDARD DRINKS CONTAINING ALCOHOL DO YOU HAVE ON A TYPICAL DAY: PATIENT DOES NOT DRINK

## 2025-03-29 ASSESSMENT — PAIN - FUNCTIONAL ASSESSMENT
PAIN_FUNCTIONAL_ASSESSMENT: ACTIVITIES ARE NOT PREVENTED
PAIN_FUNCTIONAL_ASSESSMENT: 0-10

## 2025-03-29 ASSESSMENT — PAIN SCALES - GENERAL: PAINLEVEL_OUTOF10: 8

## 2025-03-29 ASSESSMENT — PAIN DESCRIPTION - ORIENTATION: ORIENTATION: LEFT;RIGHT;LOWER

## 2025-03-29 NOTE — ED TRIAGE NOTES
Patient presents via [facility] ambulance from [facility] for complaint of lower abdominal pain.  L arm fistula for dialysis access.  Did not complete dialysis treatment.

## 2025-03-29 NOTE — DISCHARGE INSTRUCTIONS
Please increase your fiber/roughage intake to include apples, whole cuts string beans, broccoli, cabbage, etc. do not add additional cheese to your food because it can constipate you further, decrease your fast food intake

## 2025-03-29 NOTE — ED PROVIDER NOTES
[facility] EMERGENCY DEPT  EMERGENCY DEPARTMENT HISTORY AND PHYSICAL EXAM      Date: 3/29/2025  Patient Name: Daniel Garcia  MRN: [id number]  YOB: 1945  Date of evaluation: 3/29/2025  Provider: Halle Dickey MD   Note Started: 2:35 PM EDT 3/29/25    HISTORY OF PRESENT ILLNESS     Chief Complaint   Patient presents with    Abdominal Pain       History Provided By: Patient    HPI: Daniel Garcia is a 79 y.o. female     PAST MEDICAL HISTORY   Past Medical History:  Past Medical History:   Diagnosis Date    Blindness/low vision 2008    due to dm    CHF (congestive heart failure) (HCC)     Chronic kidney disease     Chronic pain     SPINAL STENOSIS    DM (diabetes mellitus) (HCC)     HTN (hypertension)     Menopause     Stroke (HCC)        Past Surgical History:  Past Surgical History:   Procedure Laterality Date    OVARY REMOVAL      STENT INSERTION  2001,2002    Chaz Mora       Family History:  Family History   Problem Relation Age of Onset    Breast Cancer Maternal Aunt     Breast Cancer Cousin        Social History:  Social History     Tobacco Use    Smoking status: Former     Passive exposure: Never    Smokeless tobacco: Never    Tobacco comments:     Quit smoking: quit 1972   Vaping Use    Vaping status: Never Used   Substance Use Topics    Alcohol use: No    Drug use: No       Allergies:  Allergies   Allergen Reactions    Ibuprofen Nausea And Vomiting     Due to renal function  Other reaction(s): ibuprofen ([id number])      Naproxen Itching     Due to renal function  Other reaction(s): naproxen ([id number])      Codeine Itching     Other reaction(s): Rash  Other reaction(s): Rash      Nsaids Itching     Due to renal function         PCP: Odilon Del Cid Sr., MD    Current Meds:   No current facility-administered medications for this encounter.     Current Outpatient Medications   Medication Sig Dispense Refill    VITAMIN D PO Take by mouth      Cyanocobalamin (VITAMIN B-12 PO) Take by mouth

## 2025-03-31 LAB
EKG ATRIAL RATE: 79 BPM
EKG DIAGNOSIS: NORMAL
EKG P AXIS: 0 DEGREES
EKG P-R INTERVAL: 59 MS
EKG Q-T INTERVAL: 402 MS
EKG QRS DURATION: 96 MS
EKG QTC CALCULATION (BAZETT): 446 MS
EKG R AXIS: 33 DEGREES
EKG T AXIS: 170 DEGREES
EKG VENTRICULAR RATE: 74 BPM

## 2025-04-23 NOTE — PROGRESS NOTES
Bedside shift change report given to Glendy Blunt (oncoming nurse) by Garret (Student) (offgoing nurse). Report included the following information SBAR, Kardex, Cardiac Rhythm NSR/SB and Dual Neuro Assessment. Contacted the patient to schedule an endoscopy procedure(s) Colonoscopy . The patient did not answer the call and left a voice message requesting a call back.

## 2025-05-21 ENCOUNTER — HOSPITAL ENCOUNTER (OUTPATIENT)
Facility: HOSPITAL | Age: 80
Setting detail: RECURRING SERIES
Discharge: HOME | End: 2025-05-24
Payer: MEDICARE

## 2025-05-21 PROCEDURE — 97161 PT EVAL LOW COMPLEX 20 MIN: CPT

## 2025-05-21 PROCEDURE — 97165 OT EVAL LOW COMPLEX 30 MIN: CPT

## 2025-05-21 NOTE — THERAPY EVALUATION
[facility]  [address] [place] [address]  Ph: [phone]     Fax: [phone]         OCCUPATIONAL THERAPY - EVALUATION / DISCHARGE SUMMARY (updated 3/23)      Date of Service: 2025          Patient Name:  Daniel Garcia :  1945   Medical   Diagnosis:  Hemiplegia and hemiparesis following cerebral infarction affecting right dominant side (HCC) [I69.351] Treatment Diagnosis:  M62.81  GENERAL MUSCLE WEAKNESS    Referral Source:  Odilon Del Cid Sr* Provider #:  [id number]                Insurance: Payor: MEDICARE / Plan: MEDICARE PART A AND B / Product Type: *No Product type* /    [x] Patient's date of birth verified      Visit #   Current  / Total 1 1   Time   In / Out 1030 1120   Total Treatment Time 50 minutes   Total Timed Codes 50 minutes     SUBJECTIVE  Pain Level (0-10 scale): 0/10  Changes in pain since onset: Constant    Any medication changes, allergies to medications, adverse drug reactions, diagnosis change, or new procedure performed?: [x] No    [] Yes (see summary sheet for update)  Medications: Verified on Patient Summary List    Subjective functional status/changes:      Patient is 79 y.o. -American female who presents for occupational therapy evaluation. Patient with referral to physical and occupational therapy for evaluations per daughter request to improve patient quality of life and decrease caregiver burden. Patient has a history of multiple CVA (1 in  and , 3 in ), chronic kidney disease and dialysis 3 x a week. Patient has hx of right knee pain and tibia/fibula fracture from a fall in . Patient with rehab stay in [facility] following fx where she suffered a wound to right calf that limited progress with therapy per daughter. Prior to fall she was able to participate with daily tasks and was ambulating with RW throughout home. Patient currently lives at home and has multiple caretakers that alternate

## 2025-06-15 ENCOUNTER — HOSPITAL ENCOUNTER (EMERGENCY)
Facility: HOSPITAL | Age: 80
Discharge: HOME | End: 2025-06-16
Attending: EMERGENCY MEDICINE
Payer: MEDICARE

## 2025-06-15 ENCOUNTER — APPOINTMENT (OUTPATIENT)
Facility: HOSPITAL | Age: 80
End: 2025-06-15
Payer: MEDICARE

## 2025-06-15 DIAGNOSIS — R07.89 CHEST WALL PAIN: Primary | ICD-10-CM

## 2025-06-15 LAB
ALBUMIN SERPL-MCNC: 3 G/DL (ref 3.5–5)
ALBUMIN/GLOB SERPL: 0.8 (ref 1.1–2.2)
ALP SERPL-CCNC: 76 U/L (ref 45–117)
ALT SERPL-CCNC: 11 U/L (ref 12–78)
ANION GAP SERPL CALC-SCNC: 9 MMOL/L (ref 2–12)
AST SERPL W P-5'-P-CCNC: 20 U/L (ref 15–37)
BASOPHILS # BLD: 0.05 K/UL (ref 0–0.1)
BASOPHILS NFR BLD: 0.7 % (ref 0–1)
BILIRUB SERPL-MCNC: 0.5 MG/DL (ref 0.2–1)
BUN SERPL-MCNC: 60 MG/DL (ref 6–20)
BUN/CREAT SERPL: 7 (ref 12–20)
CA-I BLD-MCNC: 9.1 MG/DL (ref 8.5–10.1)
CHLORIDE SERPL-SCNC: 101 MMOL/L (ref 97–108)
CO2 SERPL-SCNC: 32 MMOL/L (ref 21–32)
CREAT SERPL-MCNC: 8.74 MG/DL (ref 0.55–1.02)
DIFFERENTIAL METHOD BLD: ABNORMAL
EOSINOPHIL # BLD: 0.19 K/UL (ref 0–0.4)
EOSINOPHIL NFR BLD: 2.7 % (ref 0–7)
ERYTHROCYTE [DISTWIDTH] IN BLOOD BY AUTOMATED COUNT: 14.4 % (ref 11.5–14.5)
GLOBULIN SER CALC-MCNC: 4 G/DL (ref 2–4)
GLUCOSE SERPL-MCNC: 113 MG/DL (ref 65–100)
HCT VFR BLD AUTO: 31.7 % (ref 35–47)
HGB BLD-MCNC: 9.8 G/DL (ref 11.5–16)
IMM GRANULOCYTES # BLD AUTO: 0.03 K/UL (ref 0–0.04)
IMM GRANULOCYTES NFR BLD AUTO: 0.4 % (ref 0–0.5)
LYMPHOCYTES # BLD: 1.61 K/UL (ref 0.8–3.5)
LYMPHOCYTES NFR BLD: 22.9 % (ref 12–49)
MAGNESIUM SERPL-MCNC: 2.5 MG/DL (ref 1.6–2.4)
MCH RBC QN AUTO: 28.8 PG (ref 26–34)
MCHC RBC AUTO-ENTMCNC: 30.9 G/DL (ref 30–36.5)
MCV RBC AUTO: 93.2 FL (ref 80–99)
MONOCYTES # BLD: 0.59 K/UL (ref 0–1)
MONOCYTES NFR BLD: 8.4 % (ref 5–13)
NEUTS SEG # BLD: 4.56 K/UL (ref 1.8–8)
NEUTS SEG NFR BLD: 64.9 % (ref 32–75)
NRBC # BLD: 0 K/UL (ref 0–0.01)
NRBC BLD-RTO: 0 PER 100 WBC
PHOSPHATE SERPL-MCNC: 6.2 MG/DL (ref 2.6–4.7)
PLATELET # BLD AUTO: 187 K/UL (ref 150–400)
PMV BLD AUTO: 11.2 FL (ref 8.9–12.9)
POTASSIUM SERPL-SCNC: 5.1 MMOL/L (ref 3.5–5.1)
PROT SERPL-MCNC: 7 G/DL (ref 6.4–8.2)
RBC # BLD AUTO: 3.4 M/UL (ref 3.8–5.2)
SODIUM SERPL-SCNC: 142 MMOL/L (ref 136–145)
TROPONIN I SERPL HS-MCNC: 31 NG/L (ref 0–51)
TROPONIN I SERPL HS-MCNC: 31 NG/L (ref 0–51)
WBC # BLD AUTO: 7 K/UL (ref 3.6–11)

## 2025-06-15 PROCEDURE — 80053 COMPREHEN METABOLIC PANEL: CPT

## 2025-06-15 PROCEDURE — 83735 ASSAY OF MAGNESIUM: CPT

## 2025-06-15 PROCEDURE — 85025 COMPLETE CBC W/AUTO DIFF WBC: CPT

## 2025-06-15 PROCEDURE — 99285 EMERGENCY DEPT VISIT HI MDM: CPT

## 2025-06-15 PROCEDURE — 96374 THER/PROPH/DIAG INJ IV PUSH: CPT

## 2025-06-15 PROCEDURE — 6360000002 HC RX W HCPCS: Performed by: EMERGENCY MEDICINE

## 2025-06-15 PROCEDURE — 71045 X-RAY EXAM CHEST 1 VIEW: CPT

## 2025-06-15 PROCEDURE — 36415 COLL VENOUS BLD VENIPUNCTURE: CPT

## 2025-06-15 PROCEDURE — 6370000000 HC RX 637 (ALT 250 FOR IP): Performed by: EMERGENCY MEDICINE

## 2025-06-15 PROCEDURE — 84100 ASSAY OF PHOSPHORUS: CPT

## 2025-06-15 PROCEDURE — 84484 ASSAY OF TROPONIN QUANT: CPT

## 2025-06-15 PROCEDURE — 93005 ELECTROCARDIOGRAM TRACING: CPT | Performed by: EMERGENCY MEDICINE

## 2025-06-15 RX ORDER — ACETAMINOPHEN 325 MG/1
650 TABLET ORAL
Status: COMPLETED | OUTPATIENT
Start: 2025-06-15 | End: 2025-06-15

## 2025-06-15 RX ORDER — LABETALOL HYDROCHLORIDE 5 MG/ML
10 INJECTION, SOLUTION INTRAVENOUS
Status: COMPLETED | OUTPATIENT
Start: 2025-06-15 | End: 2025-06-15

## 2025-06-15 RX ADMIN — LABETALOL HYDROCHLORIDE 10 MG: 5 INJECTION, SOLUTION INTRAVENOUS at 21:41

## 2025-06-15 RX ADMIN — ACETAMINOPHEN 650 MG: 325 TABLET ORAL at 22:06

## 2025-06-15 RX ADMIN — SODIUM ZIRCONIUM CYCLOSILICATE 5 G: 5 POWDER, FOR SUSPENSION ORAL at 23:19

## 2025-06-15 ASSESSMENT — HEART SCORE: ECG: NORMAL

## 2025-06-16 VITALS
TEMPERATURE: 97.6 F | WEIGHT: 200 LBS | BODY MASS INDEX: 34.15 KG/M2 | DIASTOLIC BLOOD PRESSURE: 61 MMHG | HEIGHT: 64 IN | RESPIRATION RATE: 17 BRPM | HEART RATE: 74 BPM | OXYGEN SATURATION: 95 % | SYSTOLIC BLOOD PRESSURE: 169 MMHG

## 2025-06-16 LAB
EKG ATRIAL RATE: 74 BPM
EKG DIAGNOSIS: NORMAL
EKG P-R INTERVAL: 59 MS
EKG Q-T INTERVAL: 434 MS
EKG QRS DURATION: 101 MS
EKG QTC CALCULATION (BAZETT): 472 MS
EKG R AXIS: 26 DEGREES
EKG T AXIS: 72 DEGREES
EKG VENTRICULAR RATE: 71 BPM

## 2025-06-16 NOTE — ED TRIAGE NOTES
Patient called EMS for chest pain, admits to skipping her dialysis treatment yesterday. EMS reports patient was reluctant to come to the ED.

## 2025-06-16 NOTE — ED PROVIDER NOTES
[facility] EMERGENCY DEPT  EMERGENCY DEPARTMENT HISTORY AND PHYSICAL EXAM      Date of evaluation: 6/15/2025  Patient Name: Daniel Garcia  Birthdate 1945  MRN: [id number]  ED Provider: Mauro Patricia MD   Note Started: 12:11 AM EDT 6/16/25    HISTORY OF PRESENT ILLNESS     Chief Complaint   Patient presents with    Chest Pain       History Provided By: Patient, and caregiver    HPI: Daniel Garcia is a 79 y.o. female Chief Complaint  Chest pain, shortness of breath, \"a little bump\" in shoulder area, high blood pressure    History of Present Illness  - Daniel Garcia is a 79-year-old female with a history of kidney disease requiring dialysis, diabetes, and hypertension presenting to the ED with chest pain and shortness of breath.  - Patient initially denied chest pain but later reported:    - Pain located in \"this part of my chest\"    - Described as \"hurting so badly\"  - Patient reports:    - \"A little bump\" in the shoulder area    - Shortness of breath: \"Yeah, a little bit\"    - Neuropathy  - Patient missed her last dialysis session on Saturday (yesterday):    - Reason: \"I was tired of going every day for a year\"    - Usually attends dialysis 3 times a week  - Patient denies:    - Swelling in legs or belly  - Recent blood pressure readings at home:    - Reports usual readings of 159/147  - Medication:    - Takes amlodipine and carvedilol    - Took both medications tonight      Medications and Supplements  - Amlodipine    - Taken tonight  - Carvedilol    - Taken tonight    PAST MEDICAL HISTORY   Past Medical History:  Past Medical History:   Diagnosis Date    Blindness/low vision 2008    due to dm    CHF (congestive heart failure) (HCC)     Chronic kidney disease     Chronic pain     SPINAL STENOSIS    DM (diabetes mellitus) (HCC)     HTN (hypertension)     Menopause     Stroke (HCC)        Past Surgical History:  Past Surgical History:   Procedure Laterality Date    OVARY REMOVAL      STENT INSERTION  2001,2002